# Patient Record
Sex: FEMALE | Race: WHITE
[De-identification: names, ages, dates, MRNs, and addresses within clinical notes are randomized per-mention and may not be internally consistent; named-entity substitution may affect disease eponyms.]

---

## 2017-02-03 ENCOUNTER — HOSPITAL ENCOUNTER (OUTPATIENT)
Dept: HOSPITAL 75 - PREOP | Age: 52
End: 2017-02-03
Attending: SURGERY
Payer: COMMERCIAL

## 2017-02-03 VITALS — BODY MASS INDEX: 44.2 KG/M2 | HEIGHT: 66 IN | WEIGHT: 275 LBS

## 2017-02-03 DIAGNOSIS — R19.5: ICD-10-CM

## 2017-02-03 DIAGNOSIS — R13.10: ICD-10-CM

## 2017-02-03 DIAGNOSIS — Z01.818: Primary | ICD-10-CM

## 2017-02-07 ENCOUNTER — HOSPITAL ENCOUNTER (OUTPATIENT)
Dept: HOSPITAL 75 - ENDO | Age: 52
Discharge: HOME | End: 2017-02-07
Attending: SURGERY
Payer: COMMERCIAL

## 2017-02-07 VITALS — SYSTOLIC BLOOD PRESSURE: 127 MMHG | DIASTOLIC BLOOD PRESSURE: 69 MMHG

## 2017-02-07 VITALS — WEIGHT: 275 LBS | BODY MASS INDEX: 44.2 KG/M2 | HEIGHT: 66 IN

## 2017-02-07 VITALS — DIASTOLIC BLOOD PRESSURE: 66 MMHG | SYSTOLIC BLOOD PRESSURE: 114 MMHG

## 2017-02-07 VITALS — SYSTOLIC BLOOD PRESSURE: 114 MMHG | DIASTOLIC BLOOD PRESSURE: 66 MMHG

## 2017-02-07 VITALS — DIASTOLIC BLOOD PRESSURE: 66 MMHG | SYSTOLIC BLOOD PRESSURE: 108 MMHG

## 2017-02-07 DIAGNOSIS — K44.9: ICD-10-CM

## 2017-02-07 DIAGNOSIS — K21.0: ICD-10-CM

## 2017-02-07 DIAGNOSIS — D12.3: Primary | ICD-10-CM

## 2017-02-07 PROCEDURE — 84703 CHORIONIC GONADOTROPIN ASSAY: CPT

## 2017-02-07 PROCEDURE — 82962 GLUCOSE BLOOD TEST: CPT

## 2017-02-07 PROCEDURE — 88305 TISSUE EXAM BY PATHOLOGIST: CPT

## 2017-02-07 NOTE — PROGRESS NOTE-PRE OPERATIVE
Pre-Operative Progress Note


H&P Reviewed


The H&P was reviewed, patient examined and no changes noted.


Date H&P Reviewed:  Feb 7, 2017


Time H&P Reviewed:  13:17


Pre-Operative Diagnosis:  dysphagia, blood in stools








NELSON MAI DO Feb 7, 2017 1:18 pm

## 2017-02-07 NOTE — PROGRESS NOTE-POST OPERATIVE
Post-Operative Progess Note


Pre-Operative Diagnosis


dysphagia, blood in stools





Post-Operative Diagnosis





small hiatal hernia, reflux esophagitis


colon polyps





Post-Op Procedure Note


Date of Procedure:  Feb 7, 2017


Name of Procedure:  


egd c biopsies, colonoscopy with hot bx polypectomy x 2, inking of colon


polyp 2 mL, snare polypectomy x 1 splenic flexure


Procedure Note/Findings


see note


Anesthesia Type


per crna


Estimated blood loss (mL):  none


Specimen(s) collected


antrum, distal esophagus, hepatic flexure x 2 splenic flexure x 1








NELSON MAI DO Feb 7, 2017 2:44 pm

## 2017-02-08 NOTE — PROCEDURE REPORT
PROCEDURE PHYSICIAN:   NELSON MAI

 

DATE OF PROCEDURE:  

02/07/2017

 

PREOPERATIVE DIAGNOSIS:  

Dysphagia,  blood in stools.  

 

POSTOPERATIVE DIAGNOSES:

1.   Small hiatal hernia. 

2.   Reflux esophagitis. 

3.   Colon polyps. 

 

PROCEDURE:

1.   EGD biopsies. 

2.   Colonoscopy with hot biopsy polypectomy x2. 

3.   Gloria inking of colon at the hepatic flexure, 

4.   Snare polypectomy x1 at the splenic flexure. 

 

ANESTHESIA:

Per CRNA. 

 

SURGEON:

Ronald. 

 

ESTIMATED BLOOD LOSS:

None. 

 

COMPLICATIONS:

None. 

 

INDICATIONS:

The patient is a 52-year-old female with dysphagia and blood in

her stools. She understands the risks and benefits of the

procedure and wishes to proceed with procedure. Consent was

signed on the chart. 

 

PROCEDURE:

The patient was taken to the endoscopy suite, placed in left

lateral recumbent position. Timeout was performed. The scope so

the mouth down the esophagus, stomach and into the duodenum

without difficulty. There are no polyps, masses or ulcerations

within the duodenum. The scope was slowly retracted back into the

stomach where it was further insufflated. There were no polyps,

masses, ulcerations within the stomach. The scope was then

retroflexed noting a small hiatal hernia. There were no polyps,

masses, ulcerations. The scope was returned its normal position.

Biopsy of the antrum was obtained. The scope was slowly retracted

back to the distal esophagus which had some erythematous changes.

Biopsy of the distal esophagus was obtained. The scope was slowly

retracted back noting no other pathology. The scope was slowly

retracted until completely removed. 

 

COLONOSCOPY:

Digital rectal exam was performed. There is no palpable polyps,

masses, ulcerations. The scope was inserted in the rectum,

advanced all of the way to the cecum with minimal difficulty.

Prep was adequate. 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

coli throughout the colon. There were no polyps, masses,

ulcerations within the cecum. No polyps, masses ulceration of the

ascending colon. At the hepatic flexure, there was a small polyp,

which hot biopsy polypectomy was performed. The scope was

continued be slowly retracted noting more of a slightly raised

polyp around fold of the colonic wall in the hepatic flexure. 

Hot biopsy polypectomy was performed. Multiple biopsies were

obtained and this area was inked for reevaluation or for if

needing colon resection. 1 mL of  Gloria ink was injected in 2

different locations at the level of the polyp. The scope was

continued to be slowly retracted back.  At the splenic flexure a

large pedunculated polyp was present which snare polypectomy was

performed. The specimen had to be removed at that time for it

would not suction. Once removed, the scope was then reinserted to

the location of the polyp and then continued be slowly retracted

back. There were no polyps, masses, ulcerations within the

descending colon, sigmoid colon or in the rectum, where the scope

was also retroflexed noting no further pathology. The scope was

returned to its normal position and slowly withdrawn until

completely removed. 

 

The patient tolerated the procedure well. She was taken to the

recovery room in stable condition. 

 

RECOMMENDATIONS:

The patient will need repeat colonoscopy in 3 months for

reevaluation. We will follow-up in 3 weeks to go over her

pathology.  The patient will be placed on Protonix and Carafate

and see how her symptoms are progressing from this point. 

 

 

 

Job ID: [id number]

Dictated Date: 02/07/2017 14:49:07 

Transcription Date: 02/08/2017 13:04:46 / live TIAN

## 2017-05-08 ENCOUNTER — HOSPITAL ENCOUNTER (OUTPATIENT)
Dept: HOSPITAL 75 - PREOP | Age: 52
End: 2017-05-08
Attending: SURGERY
Payer: COMMERCIAL

## 2017-05-08 VITALS — HEIGHT: 66 IN | BODY MASS INDEX: 43.39 KG/M2 | WEIGHT: 270 LBS

## 2017-05-08 DIAGNOSIS — Z86.010: ICD-10-CM

## 2017-05-08 DIAGNOSIS — Z01.818: Primary | ICD-10-CM

## 2017-05-09 ENCOUNTER — HOSPITAL ENCOUNTER (OUTPATIENT)
Dept: HOSPITAL 75 - ENDO | Age: 52
Discharge: HOME | End: 2017-05-09
Attending: SURGERY
Payer: COMMERCIAL

## 2017-05-09 VITALS — BODY MASS INDEX: 43.39 KG/M2 | WEIGHT: 270 LBS | HEIGHT: 66 IN

## 2017-05-09 VITALS — SYSTOLIC BLOOD PRESSURE: 118 MMHG | DIASTOLIC BLOOD PRESSURE: 68 MMHG

## 2017-05-09 VITALS — SYSTOLIC BLOOD PRESSURE: 128 MMHG | DIASTOLIC BLOOD PRESSURE: 68 MMHG

## 2017-05-09 VITALS — SYSTOLIC BLOOD PRESSURE: 96 MMHG | DIASTOLIC BLOOD PRESSURE: 62 MMHG

## 2017-05-09 DIAGNOSIS — D12.2: Primary | ICD-10-CM

## 2017-05-09 DIAGNOSIS — E11.9: ICD-10-CM

## 2017-05-09 DIAGNOSIS — D12.3: ICD-10-CM

## 2017-05-09 DIAGNOSIS — Z79.4: ICD-10-CM

## 2017-05-09 DIAGNOSIS — J44.9: ICD-10-CM

## 2017-05-09 DIAGNOSIS — Z79.899: ICD-10-CM

## 2017-05-09 DIAGNOSIS — E78.5: ICD-10-CM

## 2017-05-09 DIAGNOSIS — I10: ICD-10-CM

## 2017-05-09 PROCEDURE — 88305 TISSUE EXAM BY PATHOLOGIST: CPT

## 2017-05-09 PROCEDURE — 82962 GLUCOSE BLOOD TEST: CPT

## 2017-05-09 NOTE — PROGRESS NOTE-PRE OPERATIVE
Pre-Operative Progress Note


H&P Reviewed


The H&P was reviewed, patient examined and no changes noted.


Date H&P Reviewed:  May 9, 2017


Time H&P Reviewed:  13:09


Pre-Operative Diagnosis:  history tubular adenoma











NELSON MAI DO May 9, 2017 1:09 pm

## 2017-05-09 NOTE — OPERATIVE REPORT
DATE OF SERVICE:  05/09/2017



PREOPERATIVE DIAGNOSIS:

History of tubular adenomas.



POSTOPERATIVE DIAGNOSIS:

Colon polyps.



PROCEDURE PERFORMED:

Colonoscopy with hot biopsy polypectomy x 3.



SURGEON:

Nelson Cardenas DO.



ANESTHESIA:

Per CRNA.



ESTIMATED BLOOD LOSS:

None.



COMPLICATIONS:

None.



INDICATIONS:

The patient is a 52-year-old female with previous colonoscopy demonstrating a

larger polyp around the hepatic flexure.  She had multiple polyps.  She

understands risks and benefits of procedure and wished to proceed with

procedure.  Consent was signed on chart.



PROCEDURE:

The patient was taken to the endoscopy suite, placed in left lateral recumbent

position.  Timeout was performed.  The scope was inserted in the rectum and

advanced all the way to the cecum with minimal difficulty.  Prep was adequate

after irrigation and suction.  There was a lot of fine particulate and liquid

within the colon.  There are no polyps, masses or ulcerations visualized within

the cecum.  The scope was then slowly retracted back to the ascending colon.  A

small polyp which hot biopsy polypectomy was performed.  The scope was continued

slowly retracted back.  At the hepatic flexure, there was a marking of [place]

ink for the colon polyp, having grown, it was not completely eradicated.  This

was grasped and multiple biopsies were obtained and cautery was used to

fulgurate it.  This was present at the flexure at the site of previous [place]

ink.  The scope was then slowly retracted back.  In the transverse colon, one

more small polyp was present which hot biopsy polypectomy was performed.  The

scope was slowly retracted back into the descending colon.  No polyps were

visualized.  No other pathology was noted.  Scope was continued slowly retracted

back through the sigmoid colon with no pathology noted.  The scope was continued

back into the rectum, retroflexed noting no other pathology.  Scope was returned

to its normal position, slowly withdrawn until completely removed.



RECOMMENDATIONS:

The patient will follow up on pathology in 2 weeks.  Would recommend repeat

colonoscopy in 3 months to reevaluate this area again.  We can also discuss

colon resection versus another endoscopic examination.  If the patient would

like to proceed with endoscopic evaluation, we would perform this in

approximately  three months.  If she has any problems prior to that, she should

be reevaluated at that time.





Job ID: [id number]

DocumentID: [id number]

Dictated Date:  05/09/2017 14:03:32

Transcription Date: 05/09/2017 15:22:04

Dictated By: NELSON CARDENAS DO

MTDD

## 2017-05-09 NOTE — DISCHARGE INST-SIMPLE/STANDARD
Discharge Inst-Standard


Patient Instructions/Follow Up


Plan of Care/Instructions/FU:  


Follow up with Dr. Cardenas in 2 weeks


Will need repeat colonoscopy in 3 months


Hold Aspirin and meloxicam for 3 days.


Activity as Tolerated:  Yes


Discharge Diet:  No Restrictions











AMARA MANZO May 9, 2017 13:55

## 2017-05-09 NOTE — PROGRESS NOTE-POST OPERATIVE
Post-Operative Progess Note


Surgeon (s)/Assistant (s)


Surgeon


NELSON MAI DO


Assistant:  na





Pre-Operative Diagnosis


history tubular adenoma





Post-Operative Diagnosis





colon polyps





Procedure & Operative Findings


Date of Procedure


5/9/17


Procedure Preformed/Findings


colonoscopy with hot bx polypectomies


Anesthesia Type


per crna





Estimated Blood Loss


Estimated blood loss (mL):  none





Specimens/Packing


Specimens Removed


colon polyps


Packing:  


NELSON Rios DO May 9, 2017 2:01 pm

## 2017-08-08 ENCOUNTER — HOSPITAL ENCOUNTER (OUTPATIENT)
Dept: HOSPITAL 75 - ENDO | Age: 52
Discharge: HOME | End: 2017-08-08
Attending: SURGERY
Payer: MEDICAID

## 2017-08-08 VITALS — SYSTOLIC BLOOD PRESSURE: 101 MMHG | DIASTOLIC BLOOD PRESSURE: 53 MMHG

## 2017-08-08 VITALS — BODY MASS INDEX: 43.39 KG/M2 | HEIGHT: 66 IN | WEIGHT: 270 LBS

## 2017-08-08 VITALS — DIASTOLIC BLOOD PRESSURE: 74 MMHG | SYSTOLIC BLOOD PRESSURE: 125 MMHG

## 2017-08-08 VITALS — SYSTOLIC BLOOD PRESSURE: 95 MMHG | DIASTOLIC BLOOD PRESSURE: 48 MMHG

## 2017-08-08 DIAGNOSIS — R19.7: ICD-10-CM

## 2017-08-08 DIAGNOSIS — Z86.010: ICD-10-CM

## 2017-08-08 DIAGNOSIS — K59.00: ICD-10-CM

## 2017-08-08 DIAGNOSIS — Z79.899: ICD-10-CM

## 2017-08-08 DIAGNOSIS — Z09: Primary | ICD-10-CM

## 2017-08-08 DIAGNOSIS — F17.210: ICD-10-CM

## 2017-08-08 DIAGNOSIS — Z79.4: ICD-10-CM

## 2017-08-08 DIAGNOSIS — E78.5: ICD-10-CM

## 2017-08-08 DIAGNOSIS — D12.2: ICD-10-CM

## 2017-08-08 DIAGNOSIS — F41.9: ICD-10-CM

## 2017-08-08 DIAGNOSIS — Z79.84: ICD-10-CM

## 2017-08-08 DIAGNOSIS — D12.3: ICD-10-CM

## 2017-08-08 DIAGNOSIS — F32.9: ICD-10-CM

## 2017-08-08 DIAGNOSIS — E11.43: ICD-10-CM

## 2017-08-08 DIAGNOSIS — J44.9: ICD-10-CM

## 2017-08-08 DIAGNOSIS — I10: ICD-10-CM

## 2017-08-08 PROCEDURE — 84703 CHORIONIC GONADOTROPIN ASSAY: CPT

## 2017-08-08 PROCEDURE — 82962 GLUCOSE BLOOD TEST: CPT

## 2017-08-08 NOTE — PROGRESS NOTE-PRE OPERATIVE
Pre-Operative Progress Note


H&P Reviewed


The H&P was reviewed, patient examined and no changes noted.


Date Seen by Provider:  Aug 8, 2017


Time Seen by Provider:  13:18


Date H&P Reviewed:  Aug 8, 2017


Time H&P Reviewed:  13:18


Pre-Operative Diagnosis:  history of polyps











NELSON MAI DO Aug 8, 2017 1:19 pm

## 2017-08-08 NOTE — PROGRESS NOTE-POST OPERATIVE
Post-Operative Progess Note


Surgeon (s)/Assistant (s)


Surgeon


NELSON MAI DO


Assistant:  na





Pre-Operative Diagnosis


history of polyps





Post-Operative Diagnosis





ascending colon polyp, transverse colon polyp





Procedure & Operative Findings


Date of Procedure


8/8/17


Procedure Performed/Findings


colonoscopy with hot bx polypectomy ascending colon polyp and snare polpyectomy 

transverse colon polyp


Anesthesia Type


per crna





Estimated Blood Loss


Estimated blood loss (mL):  none





Specimens/Packing


Specimens Removed


colon polyps











NELSON MAI DO Aug 8, 2017 2:18 pm

## 2017-08-08 NOTE — DISCHARGE INST-SIMPLE/STANDARD
Discharge Inst-Standard


Patient Instructions/Follow Up


Plan of Care/Instructions/FU:  


Follow with Dr. Cardenas in 2 weeks


Will need repeat colonoscopy in 3-6 months due to inadequate prep


Activity as Tolerated:  Yes


Discharge Diet:  No Restrictions











AMARA MANZO Aug 8, 2017 14:08

## 2017-08-10 NOTE — OPERATIVE REPORT
DATE OF SERVICE:  08/08/2017



PREOPERATIVE DIAGNOSIS:

History of polyps.



POSTOPERATIVE DIAGNOSIS:

Ascending colon polyp and transverse colon polyp.



PROCEDURE:

Colonoscopy with hot biopsy polypectomy of ascending colon polyp and snare

polypectomy of  transverse colon polyp.



SURGEON:

Nelson Cardenas DO



ANESTHESIA:

Per CRNA.



ESTIMATED BLOOD LOSS:

None.  



COMPLICATIONS:

None.



INDICATIONS:

The patient is a 52-year-old female with history of colon polyps.  She was

recommended to have a repeat evaluation.  She understands risks and benefits and

wishes to proceed.  Consent was signed and on the chart.



DESCRIPTION OF PROCEDURE:

The patient was taken to the endoscopy suite, placed in left lateral recumbent

position.  Timeout was performed.  Digital rectal exam was performed.  There

were no palpable polyps, masses or ulcerations.  Scope was inserted in the

rectum and advanced all way to the cecum.  This was done with minimal difficulty

but there was a fair amount of stool that was still present.  The colon was

continued to be irrigated and suctioned, but patient still with poor prep.  Once

identifying the cecum, the scope was then slowly began to be retracted back. 

There were no polyps or masses or ulcerations visualized within the cecum.  In

the ascending colon on the back of a fold was what  appears to be previously

where a large polyp was.  There was a small polyp still present.  Hot biopsy

polypectomy was performed.  Scope was then continued to be slowly retracted

back.  There were no other polyps, masses or ulcerations visualized within the

ascending colon.  In the transverse colon, a second polyp was present which

snare polypectomy was performed.  This was obtained for pathology review.  Scope

was continued slowly retracted back along with copious amounts of irrigation and

suction.  There is no other polyps, masses or ulcerations visualized within the

transverse colon, descending colon, sigmoid colon and rectum.  The mucosa did

have the appearance of melanosis coli throughout the colon.  Once in the rectum,

scope was also retroflexed noting no other pathology.  Scope was returned to its

normal position, slowly withdrawn until completely removed.



RECOMMENDATIONS:

The patient will follow up in the office in 2 weeks to discuss pathology

results.  Would recommend repeat colonoscopy in 3 to 6 months due to poor prep. 

At that time would consider even extended prep or different problems such as

GoLYTELY for reevaluation since history of polyps and poor prep.





Job ID: [id number]

DocumentID: [id number]

Dictated Date:  08/08/2017 14:22:22

Transcription Date: 08/09/2017 04:18:34

Dictated By: NELSON CARDENAS DO

## 2018-02-15 ENCOUNTER — HOSPITAL ENCOUNTER (OUTPATIENT)
Dept: HOSPITAL 75 - PREOP | Age: 53
End: 2018-02-15
Attending: SURGERY
Payer: MEDICAID

## 2018-02-15 VITALS — BODY MASS INDEX: 42.75 KG/M2 | HEIGHT: 66 IN | WEIGHT: 266 LBS

## 2018-02-15 DIAGNOSIS — Z01.818: Primary | ICD-10-CM

## 2018-02-15 DIAGNOSIS — Z86.010: ICD-10-CM

## 2018-02-20 ENCOUNTER — HOSPITAL ENCOUNTER (OUTPATIENT)
Dept: HOSPITAL 75 - ENDO | Age: 53
Discharge: HOME | End: 2018-02-20
Attending: SURGERY
Payer: MEDICAID

## 2018-02-20 VITALS — DIASTOLIC BLOOD PRESSURE: 77 MMHG | SYSTOLIC BLOOD PRESSURE: 141 MMHG

## 2018-02-20 VITALS — SYSTOLIC BLOOD PRESSURE: 129 MMHG | DIASTOLIC BLOOD PRESSURE: 70 MMHG

## 2018-02-20 VITALS — WEIGHT: 266 LBS | HEIGHT: 66 IN | BODY MASS INDEX: 42.75 KG/M2

## 2018-02-20 VITALS — DIASTOLIC BLOOD PRESSURE: 62 MMHG | SYSTOLIC BLOOD PRESSURE: 116 MMHG

## 2018-02-20 VITALS — DIASTOLIC BLOOD PRESSURE: 70 MMHG | SYSTOLIC BLOOD PRESSURE: 129 MMHG

## 2018-02-20 DIAGNOSIS — I10: ICD-10-CM

## 2018-02-20 DIAGNOSIS — Z79.899: ICD-10-CM

## 2018-02-20 DIAGNOSIS — F41.9: ICD-10-CM

## 2018-02-20 DIAGNOSIS — Z09: Primary | ICD-10-CM

## 2018-02-20 DIAGNOSIS — F44.9: ICD-10-CM

## 2018-02-20 DIAGNOSIS — F17.210: ICD-10-CM

## 2018-02-20 DIAGNOSIS — I73.9: ICD-10-CM

## 2018-02-20 DIAGNOSIS — D12.3: ICD-10-CM

## 2018-02-20 DIAGNOSIS — E11.43: ICD-10-CM

## 2018-02-20 DIAGNOSIS — Z79.4: ICD-10-CM

## 2018-02-20 DIAGNOSIS — F32.9: ICD-10-CM

## 2018-02-20 PROCEDURE — 82962 GLUCOSE BLOOD TEST: CPT

## 2018-02-20 NOTE — OPERATIVE REPORT
DATE OF SERVICE:  02/20/2018



PREOPERATIVE DIAGNOSIS:

History of polyps.



POSTOPERATIVE DIAGNOSIS:

Hepatic flexure polyp.



SURGEON:

Nelson Cardenas DO.



ANESTHESIA:

Per MDA.



ESTIMATED BLOOD LOSS:

None.



COMPLICATIONS:

None.



INDICATIONS:

The patient is a 53-year-old female, who has had a recurrent polyp in the

hepatic flexure.  She understands risks and benefits of procedure and wished to

proceed with procedure.  Consent was signed and on the chart.



PROCEDURE PERFORMED:

Colonoscopy with hot biopsy polypectomy x1 at the hepatic flexure.



DESCRIPTION OF PROCEDURE:

The patient was taken to the endoscopy suite, placed in the left lateral

recumbent position.  Timeout was performed.  Digital rectal exam was performed

and there were no palpable polyps, mass or ulcerations.  The scope was inserted

in the rectum and advanced all the way to the cecum with minimal difficulty. 

Prep was adequate with irrigation and suction.  Scope was slowly retracted back.

 There were no polyps, masses or ulcerations within the cecum and ascending

colon.  At the hepatic flexure, there is Gloria ink marking and there is also a

smaller polyp present.  Again, hot biopsy polypectomy was performed.  Scope was

then slowly retracted back.  There were no polyps, masses or ulcerations within

the remainder of the transverse colon, descending colon, sigmoid colon and once

the scope was in the rectum, it was also retroflexed noting no other pathology. 

Scope was returned to its normal position noting no other pathology.  Scope was

slowly retracted until completely removed, noting no other pathology.  The

patient tolerated procedure well without any complications.  She was taken to

recovery room in stable condition.



RECOMMENDATIONS:

The patient will follow up on pathology in 2 weeks.  I would recheck colonoscopy

in one year for further reevaluation.  If she has any changes prior to that, she

should be reevaluated at that time.  We will also consider colon resection

depending on pathology and circumstances and the patient wishes as well.





Job ID: [id number]

DocumentID: [id number]

Dictated Date:  02/20/2018 13:26:34

Transcription Date: 02/20/2018 17:15:47

Dictated By: NELSON CARDENAS DO

## 2018-02-20 NOTE — XMS REPORT
[facility]

 Created on: 2017



Duyen Larkin

External Reference #: [id number]

: 1965

Sex: Female



Demographics







 Address  [address] [place]  [address]

 

 Preferred Language  Unknown

 

 Marital Status  Unknown

 

 [employer] Affiliation  Unknown

 

 Race  Unknown

 

 Ethnic Group  Unknown





Author







 Author  LIYAH LOWE

 

 [facility] [place]

 

 Address  [address] [place]  [address]



 

 Phone  [phone]







Care Team Providers







 Care Team Member Name  Role  Phone

 

 LIYAH LOWE  Unavailable  [phone]







PROBLEMS







 Type  Condition  ICD9-CM Code  GVW68-GM Code  Onset Dates  Condition Status  
SNOMED Code

 

 Problem  Hyperlipidemia LDL goal <130     E78.5     Active  29185594

 

 Problem  Hypothyroidism (acquired)     E03.9     Active  123869769

 

 Problem  Unilateral primary osteoarthritis, left knee     M17.12     Active  
402920562

 

 Problem  Type 2 diabetes mellitus with diabetic neuropathy, without long-term 
current use of insulin     E11.40     Active  36967885

 

 Problem  Other hyperlipidemia     E78.4     Active  44480735

 

 Problem  Primary osteoarthritis of both knees     M17.0     Active  628962977

 

 Problem  Primary generalized (osteo)arthritis     M15.0     Active  732639146

 

 Problem  Claudication     I73.9     Active  380911518

 

 Problem  Mild episode of recurrent major depressive disorder     F33.0     
Active  794140769

 

 Problem  COPD with exacerbation     J44.1     Active  377621512

 

 Problem  Chronic renal insufficiency, stage II (mild)     N18.2     Active  
033267719

 

 Problem  Chronic obstructive pulmonary disease with acute exacerbation     
J44.1     Active  659425250

 

 Problem  Essential (primary) hypertension     I10     Active  47696564

 

 Problem  Anxiety disorder, unspecified     F41.9     Active  453158130

 

 Problem  Type 2 diabetes mellitus without complications     E11.9     Active  
670737277

 

 Problem  Cataracts, bilateral     H26.9     Active  33929585

 

 Problem  Effusion, left knee     M25.462     Active  590546106

 

 Problem  Type 2 diabetes mellitus with hyperglycemia     E11.65     Active  
47581387

 

 Problem  Esophageal reflux  530.81        Active  328609866

 

 Problem  Major depressive disorder, single episode, unspecified     F32.9     
Active  92918133

 

 Problem  Old tear of lateral meniscus of left knee, unspecified tear type     
M23.201     Active  906288174

 

 Problem  Effusion of lower leg joint  719.06        Active  149921541

 

 Problem  Peripheral arterial occlusive disease     I77.9     Active  830581475

 

 Problem  Dysphagia, unspecified type     R13.10     Active  70224804







ALLERGIES







 Substance  Reaction  Event Type  Date  Status

 

 Lisinopril  Unknown  Drug Allergy  12 Dec, 2016  Active

 

 Gabapentin  dizzy/nausea  Drug Allergy  12 Dec, 2016  Active

 

 Demerol  Unknown  Drug Allergy  12 Dec, 2016  Active







SOCIAL HISTORY

No smoking Hx information available



PLAN OF CARE







 Activity  Details









  









 Follow Up  4 Weeks Reason:recheck medical condition







VITAL SIGNS







 Height  66 in  2016

 

 Weight  275.6 lbs  2016

 

 Temperature  98.8 degrees Fahrenheit  2016

 

 Heart Rate  80 bpm  2016

 

 Respiratory Rate  18   2016

 

 BMI  44.48 kg/m2  2016

 

 Blood pressure systolic  141 mmHg  2016

 

 Blood pressure diastolic  70 mmHg  2016







MEDICATIONS







 Medication  Instructions  Dosage  Frequency  Start Date  End Date  Duration  
Status

 

 Cymbalta 30 mg     take 3 capsule by Oral route 1 time per day             Active

 

 Lantus SoloStar 100 unit/mL (3 mL)     inject 92 Units by Subcutaneous route 
as per insulin protocol  1 time per day at HS (Pt assistance)     19 Mar, 2015 
       Active

 

 Micardis 40 MG     take 1 tablet (40 mg) by oral route once daily             Active

 

 Colace 100 MG  Orally Once a day  1 capsule as needed  24h           Active

 

 Levothyroxine Sodium 25 MCG  Orally Once a day in the morning  Take 1 tablet 
on an empty stomach             Active

 

 Ventolin HFA 90 mcg/actuation     inhale 1-2 puffs  4h  26 Sep, 2014        
Active

 

 Voltaren 1 %  Transdermal 4 times a day PRN knee pain  4 grams     11 May, 
2015        Active

 

 Lasix 20 MG     take 1 tablet (20 mg) by oral route once daily     31 Oct, 
2014        Active

 

 Klor-Con 10 10 MEQ     take 1 tablet by Oral route with food  1 time per day  
           Active

 

 Aspirin 325 MG  Orally Once a day  1 tablet  24h           Active

 

 Zoloft 100 mg     take 1 tablet (100 mg) by oral route once daily     10 Feb, 
2014        Active

 

 Ipratropium Bromide 0.02 %  Inhalation every 6 hours PRN wheezing/SOA.  MIx 
with albuterol  as directed     11 May, 2015        Active

 

 Lipitor 80 MG  Orally Once a day  1/2 tablet  24h  17 2015        Active

 

 NovoLog Flexpen 100 UNIT/ML     Inject 20 Units by Subcutaneous route as per 
insulin sliding scale protocol  3 times per day             Active

 

 Tramadol HCl 50 mg  Orally every 6 hrs  1 tablet as needed for pain  6h  14 Oct
, 2015        Active

 

 trazodone 150 mg     take 1 Tablet by Oral route 1 time per day qhs     31 Oct
, 2014        Active

 

 Fish Oil 1000 MG  Orally Once a day  1 capsule  24h           Active

 

 Alprazolam 0.5 MG     take 1 tablet (0.5 mg) by oral route 3 times per day     
10 Feb, 2014        Active

 

 pantoprazole 20 mg  by oral route Once a day  1 tablet  24h  17 2015     
   Active

 

 Meloxicam 15MG     TAKE ONE TABLET BY MOUTH ONCE DAILY              Active

 

 NovoFine 30G X 8 MM     as directed             Active

 

 Albuterol Sulfate (2.5 MG/3ML) 0.083%  Inhalation every 6 hrs PRN wheezing/SOA
  3 ml     11 May, 2015        Active

 

 Metformin HCl 500 MG  Orally Twice a day  1 tablet with meals  12h  14 Oct, 
2015        Active

 

 Vitamin B 12 100 MCG                    Active

 

 Cyclobenzaprine HCl 10MG     Take 1 tab by mouth 3 times daily as needed for 
muscle spasm.              Active







RESULTS







 Name  Result  Date  Reference Range

 

 TSH     2016   

 

 TSH  3.700     0.450-4.500

 

 CMP     2016   

 

 Glucose, Serum  124     65-99

 

 BUN  16     6-24

 

 Creatinine, Serum  0.91     0.57-1.00

 

 eGFR If NonAfricn Am  73         >59

 

 eGFR If Africn Am  84         >59

 

 BUN/Creatinine Ratio  18     9-23

 

 Sodium, Serum  143     134-144

 

 Potassium, Serum  4.0     3.5-5.2

 

 Chloride, Serum  101     

 

 Carbon Dioxide, Total  23     18-29

 

 Calcium, Serum  9.5     8.7-10.2

 

 Protein, Total, Serum  6.8     6.0-8.5

 

 Albumin, Serum  4.5     3.5-5.5

 

 Globulin, Total  2.3     1.5-4.5

 

 A/G Ratio  2.0     1.1-2.5

 

 Bilirubin, Total  0.3     0.0-1.2

 

 Alkaline Phosphatase, S  111     

 

 AST (SGOT)  15     0-40

 

 ALT (SGPT)  16     0-32

 

 Barium Swallow     2016   







PROCEDURES







 Procedure  Date Ordered  Related Diagnosis  Body Site

 

 ROUTINE VENIPUNCTURE  2016  N/A   

 

 ASSAY THYROID STIM HORMONE  Dec 12, 2016      

 

 VENIPUNCT, ROUTINE*  Dec 12, 2016      

 

 COMPREHEN METABOLIC PANEL  Dec 12, 2016      

 

 Office Visit, Est Pt., Level 3  Dec 12, 2016      







IMMUNIZATIONS

No Known Immunizations

## 2018-02-20 NOTE — XMS REPORT
[facility]

 Created on: 2017



Duyen Larkin

External Reference #: [id number]

: 1965

Sex: Female



Demographics







 Address  [address] [place]  [address]

 

 Preferred Language  Unknown

 

 Marital Status  Unknown

 

 Lutheran Affiliation  Unknown

 

 Race  Unknown

 

 Ethnic Group  Unknown





Author







 Author  LIYAH LOWE

 

 [facility] [place]

 

 Address  [address] [place]  [address]



 

 Phone  [phone]







Care Team Providers







 Care Team Member Name  Role  Phone

 

 LIYAH LOWE  Unavailable  [phone]







PROBLEMS







 Type  Condition  ICD9-CM Code  FYI95-RO Code  Onset Dates  Condition Status  
SNOMED Code

 

 Problem  Unilateral primary osteoarthritis, left knee     M17.12     Active  
279761989

 

 Problem  Dysphagia, unspecified type     R13.10     Active  14072262

 

 Problem  Hypothyroidism (acquired)     E03.9     Active  385830048

 

 Problem  Primary osteoarthritis of both knees     M17.0     Active  411029342

 

 Problem  Primary generalized (osteo)arthritis     M15.0     Active  343246708

 

 Problem  Type 2 diabetes mellitus with diabetic neuropathy, without long-term 
current use of insulin     E11.40     Active  76217779

 

 Problem  Type 2 diabetes mellitus without complications     E11.9     Active  
444384206

 

 Problem  Other hyperlipidemia     E78.4     Active  00510467

 

 Problem  Mild episode of recurrent major depressive disorder     F33.0     
Active  790166913

 

 Problem  COPD with exacerbation     J44.1     Active  839225502

 

 Problem  Chronic renal insufficiency, stage II (mild)     N18.2     Active  
080598164

 

 Problem  Chronic obstructive pulmonary disease with acute exacerbation     
J44.1     Active  896250363

 

 Problem  Essential (primary) hypertension     I10     Active  64687807

 

 Problem  Peripheral arterial occlusive disease     I77.9     Active  560461532

 

 Problem  Claudication     I73.9     Active  191616194

 

 Problem  Cataracts, bilateral     H26.9     Active  52168111

 

 Problem  Effusion, left knee     M25.462     Active  134556190

 

 Problem  Type 2 diabetes mellitus with hyperglycemia     E11.65     Active  
08950425

 

 Problem  Effusion of lower leg joint  719.06        Active  968116991

 

 Problem  Major depressive disorder, single episode, unspecified     F32.9     
Active  84166575

 

 Problem  Old tear of lateral meniscus of left knee, unspecified tear type     
M23.201     Active  907050706

 

 Problem  Esophageal reflux  530.81        Active  781300255

 

 Problem  Anxiety disorder, unspecified     F41.9     Active  143086813

 

 Problem  Hyperlipidemia LDL goal <130     E78.5     Active  95570025







ALLERGIES







 Substance  Reaction  Event Type  Date  Status

 

 Lisinopril  Unknown  Drug Allergy  10 Octaviano, 2017  Active

 

 Gabapentin  dizzy/nausea  Drug Allergy  10 Octaviano, 2017  Active

 

 Demerol  Unknown  Drug Allergy  10 Octaviano, 2017  Active







SOCIAL HISTORY

No smoking Hx information available



PLAN OF CARE







 Activity  Details









  









 Follow Up  prn Reason:







VITAL SIGNS







 Height  66 in  2017-01-10

 

 Weight  281.1 lbs  2017-01-10

 

 Temperature  98.2 degrees Fahrenheit  2017-01-10

 

 Heart Rate  80 bpm  2017-01-10

 

 Respiratory Rate  16   2017-01-10

 

 Oximetry  95 %  2017-01-10

 

 BMI  45.37 kg/m2  2017-01-10

 

 Blood pressure systolic  120 mmHg  2017-01-10

 

 Blood pressure diastolic  62 mmHg  2017-01-10







MEDICATIONS







 Medication  Instructions  Dosage  Frequency  Start Date  End Date  Duration  
Status

 

 Alprazolam 0.5 MG     take 1 tablet (0.5 mg) by oral route 3 times per day     
10 Feb, 2014        Active

 

 Tramadol HCl 50 mg  Orally every 6 hrs  1 tablet as needed for pain  6h  14 Oct
, 2015        Active

 

 Ventolin HFA 90 mcg/actuation     inhale 1-2 puffs  4h  26 Sep, 2014        
Active

 

 Aspirin 325 MG  Orally Once a day  1 tablet  24h           Active

 

 Lipitor 80 MG  Orally Once a day  1/2 tablet  24h          Active

 

 Vitamin B 12 100 MCG                    Active

 

 Fish Oil 1000 MG  Orally Once a day  1 capsule  24h           Active

 

 PredniSONE 20 mg  Orally Once a day with food or milk  2 tablets     10 Octaviano, 
2017  15 Octaviano, 2017  05 days  Active

 

 Zoloft 100 mg     take 1 tablet (100 mg) by oral route once daily     10 Feb, 
2014        Active

 

 Cymbalta 30 mg     take 3 capsule by Oral route 1 time per day             Active

 

 Ipratropium Bromide 0.02 %  Inhalation every 6 hours PRN wheezing/SOA.  MIx 
with albuterol  as directed     11 May, 2015        Active

 

 Klor-Con 10 10 MEQ     take 1 tablet by Oral route with food  1 time per day  
           Active

 

 Metformin HCl 500 MG  Orally Twice a day  1 tablet with meals  12h  14 Oct, 
2015        Active

 

 Lantus SoloStar 100 unit/mL (3 mL)     inject 92 Units by Subcutaneous route 
as per insulin protocol  1 time per day at HS (Pt assistance)     19 Mar, 2015 
       Active

 

 Voltaren 1 %  Transdermal 4 times a day PRN knee pain  4 grams     11 May, 
2015        Active

 

 trazodone 150 mg     take 1 Tablet by Oral route 1 time per day qhs     31 Oct
, 2014        Active

 

 Cyclobenzaprine HCl 10MG     TAKE ONE TABLET BY MOUTH THREE TIMES DAILY AS 
NEEDED FOR MUSCLE SPASM           30  Active

 

 Meloxicam 15MG     TAKE ONE TABLET BY MOUTH ONCE DAILY              Active

 

 Levothyroxine Sodium 25 MCG  Orally Once a day in the morning  Take 1 tablet 
on an empty stomach             Active

 

 Colace 100 MG  Orally Once a day  1 capsule as needed  24h           Active

 

 Micardis 40 mg     take 1 tablet (40 mg) by oral route once daily             Active

 

 Albuterol Sulfate (2.5 MG/3ML) 0.083%  Inhalation every 6 hrs PRN wheezing/SOA
  3 ml     11 May, 2015        Active

 

 Lasix 20 MG     take 1 tablet (20 mg) by oral route once daily     31 Oct, 
2014        Active

 

 NovoFine 30G X 8 MM     as directed             Active

 

 Pantoprazole Sodium 40 mg  Orally Once a day  1 tablet  24h  27 Dec, 2016     
   Active

 

 NovoLog Flexpen 100 UNIT/ML     Inject 20 Units by Subcutaneous route as per 
insulin sliding scale protocol  3 times per day             Active

 

 Doxycycline Hyclate 100 MG  Orally every 12 hrs  1 tablet  12h  10 Octaviano, 2017  
20 Octaviano, 2017  10 days  Active







RESULTS

No Results



PROCEDURES







 Procedure  Date Ordered  Related Diagnosis  Body Site

 

 MEASURE BLOOD OXYGEN LEVEL  Octaviano 10, 2017      

 

 Office Visit, Est Pt., Level 4  Octaviano 10, 2017      







IMMUNIZATIONS

No Known Immunizations

## 2018-02-20 NOTE — XMS REPORT
[facility]

 Created on: 2017



Duyen Larkin

External Reference #: [id number]

: 1965

Sex: Female



Demographics







 Address  [address] [place]  [address]

 

 Preferred Language  Unknown

 

 Marital Status  Unknown

 

 Scientologist Affiliation  Unknown

 

 Race  Unknown

 

 Ethnic Group  Unknown





Author







 Author  LIYAH LOWE

 

 [facility] [place]

 

 Address  [address] [place]  [address]



 

 Phone  [phone]







Care Team Providers







 Care Team Member Name  Role  Phone

 

 LIYAH LOWE  Unavailable  [phone]







PROBLEMS







 Type  Condition  ICD9-CM Code  ZCK24-ZR Code  Onset Dates  Condition Status  
SNOMED Code

 

 Problem  Effusion of lower leg joint  719.06        Active  968623133

 

 Problem  Hyperlipidemia LDL goal <130     E78.5     Active  09977738

 

 Problem  Esophageal reflux  530.81        Active  371124162

 

 Problem  Dysphagia, unspecified type     R13.10     Active  52582468

 

 Problem  Other hyperlipidemia     E78.4     Active  79189264

 

 Problem  Unilateral primary osteoarthritis, left knee     M17.12     Active  
864090846

 

 Problem  COPD with exacerbation     J44.1     Active  930809391

 

 Problem  Hypothyroidism (acquired)     E03.9     Active  909839321

 

 Problem  Diabetic polyneuropathy associated with type 2 diabetes mellitus     
E11.42     Active  83832431

 

 Problem  Primary osteoarthritis of both knees     M17.0     Active  956584028

 

 Problem  Claudication     I73.9     Active  559471807

 

 Problem  Primary generalized (osteo)arthritis     M15.0     Active  666827974

 

 Problem  Type 2 diabetes mellitus without complications     E11.9     Active  
943473786

 

 Problem  Chronic obstructive pulmonary disease with acute exacerbation     
J44.1     Active  460953885

 

 Problem  Mild episode of recurrent major depressive disorder     F33.0     
Active  602169930

 

 Problem  Type 2 diabetes mellitus with diabetic neuropathy, without long-term 
current use of insulin     E11.40     Active  64210927

 

 Problem  Chronic renal insufficiency, stage II (mild)     N18.2     Active  
922679213

 

 Problem  Peripheral arterial occlusive disease     I77.9     Active  048519833

 

 Problem  Major depressive disorder, single episode, unspecified     F32.9     
Active  62347603

 

 Problem  Cataracts, bilateral     H26.9     Active  99462455

 

 Problem  Essential (primary) hypertension     I10     Active  05152875

 

 Problem  Type 2 diabetes mellitus with hyperglycemia     E11.65     Active  
38158300

 

 Problem  Old tear of lateral meniscus of left knee, unspecified tear type     
M23.201     Active  064329539

 

 Problem  Anxiety disorder, unspecified     F41.9     Active  224053190

 

 Problem  Effusion, left knee     M25.462     Active  370826145







ALLERGIES

No Information



SOCIAL HISTORY

Never Assessed



PLAN OF CARE





VITAL SIGNS





MEDICATIONS

Unknown Medications



RESULTS

No Results



PROCEDURES

No Known procedures



IMMUNIZATIONS

No Known Immunizations



MEDICAL (GENERAL) HISTORY







 Type  Description  Date

 

 Medical History  Diabetes mellitus without mention of complication, type II or 
unspecified type, not stated as uncontrolled   

 

 Medical History  Depressive disorder, not elsewhere classified   

 

 Medical History  Anxiety state, unspecified   

 

 Medical History  Effusion of lower leg joint   

 

 Medical History  Generalized osteoarthrosis, unspecified site   

 

 Medical History  Other and unspecified hyperlipidemia   

 

 Medical History  Abnormal weight gain   

 

 Medical History  Effusion of joint, site unspecified   

 

 Medical History  Esophageal reflux   

 

 Medical History  hypertension   

 

 Medical History  colonoscopy- inscreased polyp   

 

 Surgical History  Tonsillectomy   

 

 Surgical History  Orthopedic: Bilateral Knee x2   

 

 Surgical History  colonoscopy  2017

 

 Hospitalization History  Surgery(s)/Childbirth(s) only

## 2018-02-20 NOTE — XMS REPORT
[facility]

 Created on: 10/03/2017



Debi Larkinthia

External Reference #: [id number]

: 1965

Sex: Female



Demographics







 Address  [address] [place]  [address]

 

 Preferred Language  Unknown

 

 Marital Status  Unknown

 

 Jainism Affiliation  Unknown

 

 Race  Unknown

 

 Ethnic Group  Unknown





Author







 Author  LIYAH LOWE

 

 [facility] [place]

 

 Address  [address] [place]  [address]



 

 Phone  [phone]







Care Team Providers







 Care Team Member Name  Role  Phone

 

 LIYAH LOWE  Unavailable  [phone]







PROBLEMS







 Type  Condition  ICD9-CM Code  WEP05-LD Code  Onset Dates  Condition Status  
SNOMED Code

 

 Problem  Effusion of lower leg joint  719.06        Active  892009687

 

 Problem  Hyperlipidemia LDL goal <130     E78.5     Active  41455389

 

 Problem  Esophageal reflux  530.81        Active  845107264

 

 Problem  Dysphagia, unspecified type     R13.10     Active  24581224

 

 Problem  Other hyperlipidemia     E78.4     Active  27766360

 

 Problem  Unilateral primary osteoarthritis, left knee     M17.12     Active  
711734981

 

 Problem  COPD with exacerbation     J44.1     Active  689705491

 

 Problem  Hypothyroidism (acquired)     E03.9     Active  430965953

 

 Problem  Diabetic polyneuropathy associated with type 2 diabetes mellitus     
E11.42     Active  76761462

 

 Problem  Primary osteoarthritis of both knees     M17.0     Active  757270359

 

 Problem  Claudication     I73.9     Active  412417289

 

 Problem  Primary generalized (osteo)arthritis     M15.0     Active  970553320

 

 Problem  Type 2 diabetes mellitus without complications     E11.9     Active  
837712145

 

 Problem  Chronic obstructive pulmonary disease with acute exacerbation     
J44.1     Active  479072448

 

 Problem  Mild episode of recurrent major depressive disorder     F33.0     
Active  456006121

 

 Problem  Type 2 diabetes mellitus with diabetic neuropathy, without long-term 
current use of insulin     E11.40     Active  64874909

 

 Problem  Chronic renal insufficiency, stage II (mild)     N18.2     Active  
758305610

 

 Problem  Peripheral arterial occlusive disease     I77.9     Active  088896052

 

 Problem  Major depressive disorder, single episode, unspecified     F32.9     
Active  24228296

 

 Problem  Cataracts, bilateral     H26.9     Active  10816808

 

 Problem  Essential (primary) hypertension     I10     Active  05181594

 

 Problem  Type 2 diabetes mellitus with hyperglycemia     E11.65     Active  
26563657

 

 Problem  Old tear of lateral meniscus of left knee, unspecified tear type     
M23.201     Active  653430798

 

 Problem  Anxiety disorder, unspecified     F41.9     Active  272866719

 

 Problem  Effusion, left knee     M25.462     Active  933163704







ALLERGIES

No Information



SOCIAL HISTORY

Never Assessed



PLAN OF CARE





VITAL SIGNS





MEDICATIONS







 Medication  Instructions  Dosage  Frequency  Start Date  End Date  Duration  
Status

 

 Tradjenta 5 mg  Orally Once a day  1 tablet  24h       30 day(s)  
Active







RESULTS

No Results



PROCEDURES

No Known procedures



IMMUNIZATIONS

No Known Immunizations



MEDICAL (GENERAL) HISTORY







 Type  Description  Date

 

 Medical History  Diabetes mellitus without mention of complication, type II or 
unspecified type, not stated as uncontrolled   

 

 Medical History  Depressive disorder, not elsewhere classified   

 

 Medical History  Anxiety state, unspecified   

 

 Medical History  Effusion of lower leg joint   

 

 Medical History  Generalized osteoarthrosis, unspecified site   

 

 Medical History  Other and unspecified hyperlipidemia   

 

 Medical History  Abnormal weight gain   

 

 Medical History  Effusion of joint, site unspecified   

 

 Medical History  Esophageal reflux   

 

 Medical History  hypertension   

 

 Medical History  colonoscopy- inscreased polyp   

 

 Surgical History  Tonsillectomy   

 

 Surgical History  Orthopedic: Bilateral Knee x2   

 

 Surgical History  colonoscopy  

 

 Hospitalization History  Surgery(s)/Childbirth(s) only

## 2018-02-20 NOTE — XMS REPORT
[facility]

 Created on: 10/07/2017



Duyen Larkin

External Reference #: [id number]

: 1965

Sex: Female



Demographics







 Address  [address] [place]  [address]

 

 Preferred Language  Unknown

 

 Marital Status  Unknown

 

 Denominational Affiliation  Unknown

 

 Race  Unknown

 

 Ethnic Group  Unknown





Author







 Author  LIYAH LOWE

 

 [facility] [place]

 

 Address  [address] [place]  [address]



 

 Phone  [phone]







Care Team Providers







 Care Team Member Name  Role  Phone

 

 LIYAH LOWE  Unavailable  [phone]







PROBLEMS







 Type  Condition  ICD9-CM Code  GHU89-UZ Code  Onset Dates  Condition Status  
SNOMED Code

 

 Problem  Effusion of lower leg joint  719.06        Active  207746496

 

 Problem  Hyperlipidemia LDL goal <130     E78.5     Active  90578855

 

 Problem  Esophageal reflux  530.81        Active  501708067

 

 Problem  Dysphagia, unspecified type     R13.10     Active  41987340

 

 Problem  Other hyperlipidemia     E78.4     Active  80104202

 

 Problem  Unilateral primary osteoarthritis, left knee     M17.12     Active  
075235009

 

 Problem  COPD with exacerbation     J44.1     Active  476078012

 

 Problem  Hypothyroidism (acquired)     E03.9     Active  125470237

 

 Problem  Diabetic polyneuropathy associated with type 2 diabetes mellitus     
E11.42     Active  88788730

 

 Problem  Primary osteoarthritis of both knees     M17.0     Active  482351759

 

 Problem  Claudication     I73.9     Active  509621045

 

 Problem  Primary generalized (osteo)arthritis     M15.0     Active  526827371

 

 Problem  Type 2 diabetes mellitus without complications     E11.9     Active  
525785426

 

 Problem  Chronic obstructive pulmonary disease with acute exacerbation     
J44.1     Active  761871487

 

 Problem  Mild episode of recurrent major depressive disorder     F33.0     
Active  980280768

 

 Problem  Type 2 diabetes mellitus with diabetic neuropathy, without long-term 
current use of insulin     E11.40     Active  84211606

 

 Problem  Chronic renal insufficiency, stage II (mild)     N18.2     Active  
651161022

 

 Problem  Peripheral arterial occlusive disease     I77.9     Active  674004523

 

 Problem  Major depressive disorder, single episode, unspecified     F32.9     
Active  15299824

 

 Problem  Cataracts, bilateral     H26.9     Active  46397853

 

 Problem  Essential (primary) hypertension     I10     Active  94710570

 

 Problem  Type 2 diabetes mellitus with hyperglycemia     E11.65     Active  
18622590

 

 Problem  Old tear of lateral meniscus of left knee, unspecified tear type     
M23.201     Active  961967968

 

 Problem  Anxiety disorder, unspecified     F41.9     Active  549476530

 

 Problem  Effusion, left knee     M25.462     Active  950913191







ALLERGIES

No Information



SOCIAL HISTORY

Never Assessed



PLAN OF CARE





VITAL SIGNS





MEDICATIONS







 Medication  Instructions  Dosage  Frequency  Start Date  End Date  Duration  
Status

 

 Tradjenta 5 mg  Orally Once a day  1 tablet  24h       30 day(s)  
Active







RESULTS

No Results



PROCEDURES

No Known procedures



IMMUNIZATIONS

No Known Immunizations



MEDICAL (GENERAL) HISTORY







 Type  Description  Date

 

 Medical History  Diabetes mellitus without mention of complication, type II or 
unspecified type, not stated as uncontrolled   

 

 Medical History  Depressive disorder, not elsewhere classified   

 

 Medical History  Anxiety state, unspecified   

 

 Medical History  Effusion of lower leg joint   

 

 Medical History  Generalized osteoarthrosis, unspecified site   

 

 Medical History  Other and unspecified hyperlipidemia   

 

 Medical History  Abnormal weight gain   

 

 Medical History  Effusion of joint, site unspecified   

 

 Medical History  Esophageal reflux   

 

 Medical History  hypertension   

 

 Medical History  colonoscopy- inscreased polyp   

 

 Surgical History  Tonsillectomy   

 

 Surgical History  Orthopedic: Bilateral Knee x2   

 

 Surgical History  colonoscopy  

 

 Hospitalization History  Surgery(s)/Childbirth(s) only

## 2018-02-20 NOTE — XMS REPORT
[facility]

 Created on: 2017



Duyen Larkin

External Reference #: [id number]

: 1965

Sex: Female



Demographics







 Address  [address] [place]  [address]

 

 Preferred Language  Unknown

 

 Marital Status  Unknown

 

 Yarsanism Affiliation  Unknown

 

 Race  Unknown

 

 Ethnic Group  Unknown





Author







 Author  LIYAH LOWE

 

 [facility] [place]

 

 Address  [address] [place]  [address]



 

 Phone  [phone]







Care Team Providers







 Care Team Member Name  Role  Phone

 

 LIYAH LOWE  Unavailable  [phone]







PROBLEMS







 Type  Condition  ICD9-CM Code  MQL33-EM Code  Onset Dates  Condition Status  
SNOMED Code

 

 Problem  Effusion of lower leg joint  719.06        Active  337374093

 

 Problem  Hyperlipidemia LDL goal <130     E78.5     Active  31951138

 

 Problem  Esophageal reflux  530.81        Active  995994505

 

 Problem  Dysphagia, unspecified type     R13.10     Active  89659333

 

 Problem  Other hyperlipidemia     E78.4     Active  10995717

 

 Problem  Unilateral primary osteoarthritis, left knee     M17.12     Active  
263563309

 

 Problem  COPD with exacerbation     J44.1     Active  328322517

 

 Problem  Hypothyroidism (acquired)     E03.9     Active  978727480

 

 Problem  Diabetic polyneuropathy associated with type 2 diabetes mellitus     
E11.42     Active  23198466

 

 Problem  Primary osteoarthritis of both knees     M17.0     Active  853212254

 

 Problem  Claudication     I73.9     Active  358837433

 

 Problem  Primary generalized (osteo)arthritis     M15.0     Active  087385750

 

 Problem  Type 2 diabetes mellitus without complications     E11.9     Active  
002793339

 

 Problem  Chronic obstructive pulmonary disease with acute exacerbation     
J44.1     Active  728286727

 

 Problem  Mild episode of recurrent major depressive disorder     F33.0     
Active  647433232

 

 Problem  Type 2 diabetes mellitus with diabetic neuropathy, without long-term 
current use of insulin     E11.40     Active  65754520

 

 Problem  Chronic renal insufficiency, stage II (mild)     N18.2     Active  
274806849

 

 Problem  Peripheral arterial occlusive disease     I77.9     Active  692674428

 

 Problem  Major depressive disorder, single episode, unspecified     F32.9     
Active  54385413

 

 Problem  Cataracts, bilateral     H26.9     Active  72175559

 

 Problem  Essential (primary) hypertension     I10     Active  05689664

 

 Problem  Type 2 diabetes mellitus with hyperglycemia     E11.65     Active  
52000664

 

 Problem  Old tear of lateral meniscus of left knee, unspecified tear type     
M23.201     Active  123202173

 

 Problem  Anxiety disorder, unspecified     F41.9     Active  625197396

 

 Problem  Effusion, left knee     M25.462     Active  954195529







ALLERGIES

No Information



SOCIAL HISTORY

Never Assessed



PLAN OF CARE





VITAL SIGNS





MEDICATIONS







 Medication  Instructions  Dosage  Frequency  Start Date  End Date  Duration  
Status

 

 Lantus SoloStar 100 unit/mL (3 mL)  Subcutaneous 2 times a day  inject 50 
Units by Subcutaneous  12h  19 Mar, 2015        Active







RESULTS

No Results



PROCEDURES

No Known procedures



IMMUNIZATIONS

No Known Immunizations



MEDICAL (GENERAL) HISTORY







 Type  Description  Date

 

 Medical History  Diabetes mellitus without mention of complication, type II or 
unspecified type, not stated as uncontrolled   

 

 Medical History  Depressive disorder, not elsewhere classified   

 

 Medical History  Anxiety state, unspecified   

 

 Medical History  Effusion of lower leg joint   

 

 Medical History  Generalized osteoarthrosis, unspecified site   

 

 Medical History  Other and unspecified hyperlipidemia   

 

 Medical History  Abnormal weight gain   

 

 Medical History  Effusion of joint, site unspecified   

 

 Medical History  Esophageal reflux   

 

 Medical History  hypertension   

 

 Medical History  colonoscopy- inscreased polyp   

 

 Surgical History  Tonsillectomy   

 

 Surgical History  Orthopedic: Bilateral Knee x2   

 

 Surgical History  colonoscopy  2017

 

 Hospitalization History  Surgery(s)/Childbirth(s) only

## 2018-02-20 NOTE — DISCHARGE INST-SIMPLE/STANDARD
Discharge Inst-Standard


Patient Instructions/Follow Up


Plan of Care/Instructions/FU:  


2 weeks Ronald


Activity as Tolerated:  Yes


Discharge Diet:  Regular Diet (high fiber)











NELSON MAI DO Feb 20, 2018 13:22

## 2018-02-20 NOTE — XMS REPORT
[facility]

 Created on: 2017



Duyen Larkin

External Reference #: [id number]

: 1965

Sex: Female



Demographics







 Address  [address] [place]  [address]

 

 Preferred Language  Unknown

 

 Marital Status  Unknown

 

 Scientologist Affiliation  Unknown

 

 Race  Unknown

 

 Ethnic Group  Unknown





Author







 Author  MARY ANNE WALKER

 

 [facility]

 

 Address  [address] [place]  [address]



 

 Phone  [phone]







Care Team Providers







 Care Team Member Name  Role  Phone

 

 WALKERMARY ANNE  Unavailable  [phone]







PROBLEMS







 Type  Condition  ICD9-CM Code  ULU10-NO Code  Onset Dates  Condition Status  
SNOMED Code

 

 Problem  Effusion of lower leg joint  719.06        Active  208466847

 

 Problem  Hyperlipidemia LDL goal <130     E78.5     Active  91802310

 

 Problem  Esophageal reflux  530.81        Active  878339754

 

 Problem  Dysphagia, unspecified type     R13.10     Active  43694025

 

 Problem  Other hyperlipidemia     E78.4     Active  12982427

 

 Problem  Unilateral primary osteoarthritis, left knee     M17.12     Active  
330539763

 

 Problem  COPD with exacerbation     J44.1     Active  201279849

 

 Problem  Hypothyroidism (acquired)     E03.9     Active  477263024

 

 Problem  Diabetic polyneuropathy associated with type 2 diabetes mellitus     
E11.42     Active  89234732

 

 Problem  Primary osteoarthritis of both knees     M17.0     Active  740553388

 

 Problem  Claudication     I73.9     Active  094428158

 

 Problem  Primary generalized (osteo)arthritis     M15.0     Active  253540585

 

 Problem  Type 2 diabetes mellitus without complications     E11.9     Active  
790621267

 

 Problem  Chronic obstructive pulmonary disease with acute exacerbation     
J44.1     Active  390679457

 

 Problem  Mild episode of recurrent major depressive disorder     F33.0     
Active  383723433

 

 Problem  Type 2 diabetes mellitus with diabetic neuropathy, without long-term 
current use of insulin     E11.40     Active  03878549

 

 Problem  Chronic renal insufficiency, stage II (mild)     N18.2     Active  
273178400

 

 Problem  Peripheral arterial occlusive disease     I77.9     Active  131838354

 

 Problem  Major depressive disorder, single episode, unspecified     F32.9     
Active  71980549

 

 Problem  Cataracts, bilateral     H26.9     Active  59452388

 

 Problem  Essential (primary) hypertension     I10     Active  26035132

 

 Problem  Type 2 diabetes mellitus with hyperglycemia     E11.65     Active  
53796535

 

 Problem  Old tear of lateral meniscus of left knee, unspecified tear type     
M23.201     Active  489521788

 

 Problem  Anxiety disorder, unspecified     F41.9     Active  889745918

 

 Problem  Effusion, left knee     M25.462     Active  660735730







ALLERGIES







 Substance  Reaction  Event Type  Date  Status

 

 Lisinopril  Unknown  Drug Allergy    Active

 

 Gabapentin  dizzy/nausea  Drug Allergy    Active

 

 Demerol  Unknown  Drug Allergy    Active







SOCIAL HISTORY

Never Assessed



PLAN OF CARE







 Activity  Details









  









 Follow Up  prn Reason:







VITAL SIGNS







 Height  66 in  2017

 

 Weight  273.8 lbs  2017

 

 Temperature  98.4 degrees Fahrenheit  2017

 

 Heart Rate  80 bpm  2017

 

 Respiratory Rate  20   2017

 

 BMI  44.19 kg/m2  2017

 

 Blood pressure systolic  118 mmHg  2017

 

 Blood pressure diastolic  68 mmHg  2017







MEDICATIONS







 Medication  Instructions  Dosage  Frequency  Start Date  End Date  Duration  
Status

 

 Tramadol HCl 50 mg  Orally every 6 hrs  1 tablet as needed for pain  6h  14 Oct
, 2015        Active

 

 Lipitor 80 MG  Orally Once a day  1/2 tablet  24h          Active

 

 Fish Oil 1000 MG  Orally Once a day  1 capsule  24h           Active

 

 NovoLog Flexpen 100 UNIT/ML     Inject 20 Units by Subcutaneous route as per 
insulin sliding scale protocol  3 times per day             Active

 

 Alprazolam 0.5 MG     take 1 tablet (0.5 mg) by oral route 3 times per day     
10 Feb, 2014        Active

 

 trazodone 150 mg     take 1 Tablet by Oral route 1 time per day qhs     31 Oct
, 2014        Active

 

 Cyclobenzaprine HCl 10MG     TAKE ONE TABLET BY MOUTH THREE TIMES DAILY AS 
NEEDED FOR MUSCLE SPASM           30  Active

 

 Diflucan 150 MG  Orally Take 1 tab po then 1 tab after 72 hours  1 tablet     
  03 days  Active

 

 Zoloft 100 mg     take 1 tablet (100 mg) by oral route once daily     10 Feb, 
2014        Active

 

 Albuterol Sulfate (2.5 MG/3ML) 0.083%  Inhalation every 6 hrs PRN wheezing/SOA
  3 ml     11 May, 2015        Active

 

 Micardis 40 mg     take 1 tablet (40 mg) by oral route once daily             Active

 

 Voltaren 1 %  Transdermal 4 times a day PRN knee pain  4 grams     11 May, 
2015        Active

 

 NovoFine 30G X 8 MM     as directed             Active

 

 Vitamin B 12 100 MCG                    Active

 

 Cymbalta 30 mg     take 3 capsule by Oral route 1 time per day             Active

 

 Sucralfate 1 GM  Orally 4 times a day  1 tablet on an empty stomach  6h       
    Active

 

 Meloxicam 15MG     TAKE ONE TABLET BY MOUTH ONCE DAILY              Active

 

 Metformin HCl 500 MG  Orally Twice a day  1 tablet with meals  12h  14 Oct, 
2015        Active

 

 Lantus SoloStar 100 unit/mL (3 mL)     inject 92 Units by Subcutaneous route 
as per insulin protocol  1 time per day at HS (Pt assistance)     19 Mar, 2015 
       Active

 

 Klor-Con 10 10 MEQ     take 1 tablet by Oral route with food  1 time per day  
           Active

 

 Ventolin HFA 90 mcg/actuation  by inhalation route every 4 hrs  1-2 puffs  4h  
26 Sep, 2014     90 days  Active

 

 Lasix 20 MG     take 1 tablet (20 mg) by oral route once daily     31 Oct, 
2014        Active

 

 Colace 100 MG  Orally Once a day  1 capsule as needed  24h           Active

 

 Levothyroxine Sodium 25 MCG  Orally Once a day in the morning  Take 1 tablet 
on an empty stomach           30  Active

 

 Aspirin 325 MG  Orally Once a day  1 tablet  24h           Active

 

 Pantoprazole Sodium 40 mg  Orally Once a day  1 tablet  24h  27 Dec, 2016     
   Active







RESULTS







 Name  Result  Date  Reference Range

 

 UA LONG DIP (IN HOUSE)     2017   

 

 Lot #  [id number]      

 

 Exp date  2017      

 

 Clarity  clear      

 

 Color  yellow      

 

 Odor  no      

 

 GLU  neg      

 

 ADRIA  neg      

 

 KET  neg      

 

 SG  1.015      

 

 BLO  neg      

 

 pH  6.0      

 

 Protein  neg      

 

 URO  0.2E.U./dl      

 

 NIT  neg      

 

 DANIA  neg      

 

 Lot #         

 

 Exp date         







PROCEDURES







 Procedure  Date Ordered  Result  Body Site

 

 URINALYSIS, AUTO, W/O SCOPE  2017      







IMMUNIZATIONS

No Known Immunizations



MEDICAL (GENERAL) HISTORY







 Type  Description  Date

 

 Medical History  Diabetes mellitus without mention of complication, type II or 
unspecified type, not stated as uncontrolled   

 

 Medical History  Depressive disorder, not elsewhere classified   

 

 Medical History  Anxiety state, unspecified   

 

 Medical History  Effusion of lower leg joint   

 

 Medical History  Generalized osteoarthrosis, unspecified site   

 

 Medical History  Other and unspecified hyperlipidemia   

 

 Medical History  Abnormal weight gain   

 

 Medical History  Effusion of joint, site unspecified   

 

 Medical History  Esophageal reflux   

 

 Medical History  hypertension   

 

 Medical History  colonoscopy- inscreased polyp   

 

 Surgical History  Tonsillectomy   

 

 Surgical History  Orthopedic: Bilateral Knee x2   

 

 Surgical History  colonoscopy  2017

 

 Hospitalization History  Surgery(s)/Childbirth(s) only

## 2018-02-20 NOTE — XMS REPORT
[facility]

 Created on: 2017



Duyen Larkin

External Reference #: [id number]

: 1965

Sex: Female



Demographics







 Address  [address] [place]  [address]

 

 Preferred Language  Unknown

 

 Marital Status  Unknown

 

 Catholic Affiliation  Unknown

 

 Race  Unknown

 

 Ethnic Group  Unknown





Author







 Author  LIYAH LOWE

 

 [facility] [place]

 

 Address  [address] [place]  [address]



 

 Phone  [phone]







Care Team Providers







 Care Team Member Name  Role  Phone

 

 LIYAH LOWE  Unavailable  [phone]







PROBLEMS







 Type  Condition  ICD9-CM Code  QUG27-HV Code  Onset Dates  Condition Status  
SNOMED Code

 

 Problem  Hyperlipidemia LDL goal <130     E78.5     Active  78421561

 

 Problem  Hypothyroidism (acquired)     E03.9     Active  579232926

 

 Problem  Unilateral primary osteoarthritis, left knee     M17.12     Active  
952422788

 

 Problem  Type 2 diabetes mellitus with diabetic neuropathy, without long-term 
current use of insulin     E11.40     Active  14493774

 

 Problem  Other hyperlipidemia     E78.4     Active  27218798

 

 Problem  Primary osteoarthritis of both knees     M17.0     Active  491046158

 

 Problem  Primary generalized (osteo)arthritis     M15.0     Active  427141815

 

 Problem  Claudication     I73.9     Active  803268494

 

 Problem  Mild episode of recurrent major depressive disorder     F33.0     
Active  737863961

 

 Problem  COPD with exacerbation     J44.1     Active  555103944

 

 Problem  Chronic renal insufficiency, stage II (mild)     N18.2     Active  
936662600

 

 Problem  Chronic obstructive pulmonary disease with acute exacerbation     
J44.1     Active  699711469

 

 Problem  Essential (primary) hypertension     I10     Active  46750921

 

 Problem  Anxiety disorder, unspecified     F41.9     Active  187605992

 

 Problem  Type 2 diabetes mellitus without complications     E11.9     Active  
146429344

 

 Problem  Cataracts, bilateral     H26.9     Active  72739504

 

 Problem  Effusion, left knee     M25.462     Active  764518611

 

 Problem  Type 2 diabetes mellitus with hyperglycemia     E11.65     Active  
47484318

 

 Problem  Esophageal reflux  530.81        Active  207994322

 

 Problem  Major depressive disorder, single episode, unspecified     F32.9     
Active  36626138

 

 Problem  Old tear of lateral meniscus of left knee, unspecified tear type     
M23.201     Active  383860457

 

 Problem  Effusion of lower leg joint  719.06        Active  594963789

 

 Problem  Peripheral arterial occlusive disease     I77.9     Active  014249296

 

 Problem  Dysphagia, unspecified type     R13.10     Active  09105854







ALLERGIES

Unknown Allergies



SOCIAL HISTORY

No smoking Hx information available



PLAN OF CARE





VITAL SIGNS





MEDICATIONS







 Medication  Instructions  Dosage  Frequency  Start Date  End Date  Duration  
Status

 

 Micardis 40 mg     take 1 tablet (40 mg) by oral route once daily             Active

 

 Pantoprazole Sodium 40 mg  Orally Once a day  1 tablet  24h  27 Dec, 2016     
   Active







RESULTS

No Results



PROCEDURES

No Known procedures



IMMUNIZATIONS

No Known Immunizations

## 2018-02-20 NOTE — XMS REPORT
[facility]

 Created on: 2017



Debi Larkinthia

External Reference #: [id number]

: 1965

Sex: Female



Demographics







 Address  [address] [place]  [address]

 

 Preferred Language  Unknown

 

 Marital Status  Unknown

 

 Jew Affiliation  Unknown

 

 Race  Unknown

 

 Ethnic Group  Unknown





Author







 Author  LIYAH LOWE

 

 [facility]

 

 Address  [address] [place]  [address]



 

 Phone  [phone]







Care Team Providers







 Care Team Member Name  Role  Phone

 

 LIYAH LOWE  Unavailable  [phone]







PROBLEMS







 Type  Condition  ICD9-CM Code  WBL87-RS Code  Onset Dates  Condition Status  
SNOMED Code

 

 Problem  Effusion of lower leg joint  719.06        Active  874496888

 

 Problem  Hyperlipidemia LDL goal <130     E78.5     Active  70472884

 

 Problem  Esophageal reflux  530.81        Active  066821281

 

 Problem  Dysphagia, unspecified type     R13.10     Active  53147890

 

 Problem  Other hyperlipidemia     E78.4     Active  34140726

 

 Problem  Unilateral primary osteoarthritis, left knee     M17.12     Active  
318226052

 

 Problem  COPD with exacerbation     J44.1     Active  860900502

 

 Problem  Hypothyroidism (acquired)     E03.9     Active  812929221

 

 Problem  Diabetic polyneuropathy associated with type 2 diabetes mellitus     
E11.42     Active  27663909

 

 Problem  Primary osteoarthritis of both knees     M17.0     Active  873508570

 

 Problem  Claudication     I73.9     Active  012927631

 

 Problem  Primary generalized (osteo)arthritis     M15.0     Active  235686006

 

 Problem  Type 2 diabetes mellitus without complications     E11.9     Active  
586442980

 

 Problem  Chronic obstructive pulmonary disease with acute exacerbation     
J44.1     Active  690966504

 

 Problem  Mild episode of recurrent major depressive disorder     F33.0     
Active  607773953

 

 Problem  Type 2 diabetes mellitus with diabetic neuropathy, without long-term 
current use of insulin     E11.40     Active  36344169

 

 Problem  Chronic renal insufficiency, stage II (mild)     N18.2     Active  
007127167

 

 Problem  Peripheral arterial occlusive disease     I77.9     Active  008411726

 

 Problem  Major depressive disorder, single episode, unspecified     F32.9     
Active  00960956

 

 Problem  Cataracts, bilateral     H26.9     Active  58515445

 

 Problem  Essential (primary) hypertension     I10     Active  12279320

 

 Problem  Type 2 diabetes mellitus with hyperglycemia     E11.65     Active  
37579245

 

 Problem  Old tear of lateral meniscus of left knee, unspecified tear type     
M23.201     Active  763626847

 

 Problem  Anxiety disorder, unspecified     F41.9     Active  102640119

 

 Problem  Effusion, left knee     M25.462     Active  451075746







ALLERGIES

No Information



SOCIAL HISTORY

Never Assessed



PLAN OF CARE





VITAL SIGNS





MEDICATIONS







 Medication  Instructions  Dosage  Frequency  Start Date  End Date  Duration  
Status

 

 Levothyroxine Sodium 25 MCG  Orally Once a day in the morning  Take 1 tablet 
on an empty stomach           30  Active







RESULTS

No Results



PROCEDURES

No Known procedures



IMMUNIZATIONS

No Known Immunizations



MEDICAL (GENERAL) HISTORY







 Type  Description  Date

 

 Medical History  Diabetes mellitus without mention of complication, type II or 
unspecified type, not stated as uncontrolled   

 

 Medical History  Depressive disorder, not elsewhere classified   

 

 Medical History  Anxiety state, unspecified   

 

 Medical History  Effusion of lower leg joint   

 

 Medical History  Generalized osteoarthrosis, unspecified site   

 

 Medical History  Other and unspecified hyperlipidemia   

 

 Medical History  Abnormal weight gain   

 

 Medical History  Effusion of joint, site unspecified   

 

 Medical History  Esophageal reflux   

 

 Medical History  hypertension   

 

 Medical History  colonoscopy- inscreased polyp   

 

 Surgical History  Tonsillectomy   

 

 Surgical History  Orthopedic: Bilateral Knee x2   

 

 Surgical History  colonoscopy  

 

 Hospitalization History  Surgery(s)/Childbirth(s) only

## 2018-02-20 NOTE — XMS REPORT
[facility]

 Created on: 2017



Debi Lariknthia

External Reference #: [id number]

: 1965

Sex: Female



Demographics







 Address  [address] [place]  [address]

 

 Preferred Language  Unknown

 

 Marital Status  Unknown

 

 Taoism Affiliation  Unknown

 

 Race  Unknown

 

 Ethnic Group  Unknown





Author







 Author  LIYAH LOWE

 

 [facility]

 

 Address  [address] [place]  [address]



 

 Phone  [phone]







Care Team Providers







 Care Team Member Name  Role  Phone

 

 LIYAH LOWE  Unavailable  [phone]







PROBLEMS







 Type  Condition  ICD9-CM Code  BZG35-KG Code  Onset Dates  Condition Status  
SNOMED Code

 

 Problem  Effusion of lower leg joint  719.06        Active  734831947

 

 Problem  Hyperlipidemia LDL goal <130     E78.5     Active  16163900

 

 Problem  Esophageal reflux  530.81        Active  255188562

 

 Problem  Dysphagia, unspecified type     R13.10     Active  95261954

 

 Problem  Other hyperlipidemia     E78.4     Active  98172118

 

 Problem  Unilateral primary osteoarthritis, left knee     M17.12     Active  
483095658

 

 Problem  COPD with exacerbation     J44.1     Active  532401200

 

 Problem  Hypothyroidism (acquired)     E03.9     Active  786763934

 

 Problem  Diabetic polyneuropathy associated with type 2 diabetes mellitus     
E11.42     Active  68549369

 

 Problem  Primary osteoarthritis of both knees     M17.0     Active  365219997

 

 Problem  Claudication     I73.9     Active  979372962

 

 Problem  Primary generalized (osteo)arthritis     M15.0     Active  372853023

 

 Problem  Type 2 diabetes mellitus without complications     E11.9     Active  
447880627

 

 Problem  Chronic obstructive pulmonary disease with acute exacerbation     
J44.1     Active  012692550

 

 Problem  Mild episode of recurrent major depressive disorder     F33.0     
Active  171734021

 

 Problem  Type 2 diabetes mellitus with diabetic neuropathy, without long-term 
current use of insulin     E11.40     Active  15604668

 

 Problem  Chronic renal insufficiency, stage II (mild)     N18.2     Active  
334632281

 

 Problem  Peripheral arterial occlusive disease     I77.9     Active  939957496

 

 Problem  Major depressive disorder, single episode, unspecified     F32.9     
Active  57454574

 

 Problem  Cataracts, bilateral     H26.9     Active  14714642

 

 Problem  Essential (primary) hypertension     I10     Active  45460551

 

 Problem  Type 2 diabetes mellitus with hyperglycemia     E11.65     Active  
43890082

 

 Problem  Old tear of lateral meniscus of left knee, unspecified tear type     
M23.201     Active  479972007

 

 Problem  Anxiety disorder, unspecified     F41.9     Active  688595626

 

 Problem  Effusion, left knee     M25.462     Active  749803228







ALLERGIES

No Information



SOCIAL HISTORY

Never Assessed



PLAN OF CARE





VITAL SIGNS





MEDICATIONS







 Medication  Instructions  Dosage  Frequency  Start Date  End Date  Duration  
Status

 

 Pantoprazole Sodium 40 mg  Orally Once a day  1 tablet  24h  27 Dec, 2016     
   Active







RESULTS

No Results



PROCEDURES

No Known procedures



IMMUNIZATIONS

No Known Immunizations



MEDICAL (GENERAL) HISTORY







 Type  Description  Date

 

 Medical History  Diabetes mellitus without mention of complication, type II or 
unspecified type, not stated as uncontrolled   

 

 Medical History  Depressive disorder, not elsewhere classified   

 

 Medical History  Anxiety state, unspecified   

 

 Medical History  Effusion of lower leg joint   

 

 Medical History  Generalized osteoarthrosis, unspecified site   

 

 Medical History  Other and unspecified hyperlipidemia   

 

 Medical History  Abnormal weight gain   

 

 Medical History  Effusion of joint, site unspecified   

 

 Medical History  Esophageal reflux   

 

 Medical History  hypertension   

 

 Medical History  colonoscopy- inscreased polyp   

 

 Surgical History  Tonsillectomy   

 

 Surgical History  Orthopedic: Bilateral Knee x2   

 

 Surgical History  colonoscopy  2017

 

 Hospitalization History  Surgery(s)/Childbirth(s) only

## 2018-02-20 NOTE — XMS REPORT
[facility]

 Created on: 10/04/2017



Debi Larkinthia

External Reference #: [id number]

: 1965

Sex: Female



Demographics







 Address  [address] [place]  [address]

 

 Preferred Language  Unknown

 

 Marital Status  Unknown

 

 Quaker Affiliation  Unknown

 

 Race  Unknown

 

 Ethnic Group  Unknown





Author







 Author  LIYAH LOWE

 

 [facility] [place]

 

 Address  [address] [place]  [address]



 

 Phone  [phone]







Care Team Providers







 Care Team Member Name  Role  Phone

 

 LIYAH LOWE  Unavailable  [phone]







PROBLEMS







 Type  Condition  ICD9-CM Code  KPP78-AI Code  Onset Dates  Condition Status  
SNOMED Code

 

 Problem  Effusion of lower leg joint  719.06        Active  484077334

 

 Problem  Hyperlipidemia LDL goal <130     E78.5     Active  24560426

 

 Problem  Esophageal reflux  530.81        Active  146505975

 

 Problem  Dysphagia, unspecified type     R13.10     Active  21747077

 

 Problem  Other hyperlipidemia     E78.4     Active  72344939

 

 Problem  Unilateral primary osteoarthritis, left knee     M17.12     Active  
928201308

 

 Problem  COPD with exacerbation     J44.1     Active  740556036

 

 Problem  Hypothyroidism (acquired)     E03.9     Active  230813664

 

 Problem  Diabetic polyneuropathy associated with type 2 diabetes mellitus     
E11.42     Active  15697042

 

 Problem  Primary osteoarthritis of both knees     M17.0     Active  935020087

 

 Problem  Claudication     I73.9     Active  727225114

 

 Problem  Primary generalized (osteo)arthritis     M15.0     Active  938080710

 

 Problem  Type 2 diabetes mellitus without complications     E11.9     Active  
776315051

 

 Problem  Chronic obstructive pulmonary disease with acute exacerbation     
J44.1     Active  300753550

 

 Problem  Mild episode of recurrent major depressive disorder     F33.0     
Active  346504361

 

 Problem  Type 2 diabetes mellitus with diabetic neuropathy, without long-term 
current use of insulin     E11.40     Active  33154790

 

 Problem  Chronic renal insufficiency, stage II (mild)     N18.2     Active  
172899717

 

 Problem  Peripheral arterial occlusive disease     I77.9     Active  737581153

 

 Problem  Major depressive disorder, single episode, unspecified     F32.9     
Active  86714775

 

 Problem  Cataracts, bilateral     H26.9     Active  04152156

 

 Problem  Essential (primary) hypertension     I10     Active  05053635

 

 Problem  Type 2 diabetes mellitus with hyperglycemia     E11.65     Active  
86972705

 

 Problem  Old tear of lateral meniscus of left knee, unspecified tear type     
M23.201     Active  270264394

 

 Problem  Anxiety disorder, unspecified     F41.9     Active  646902379

 

 Problem  Effusion, left knee     M25.462     Active  383109136







ALLERGIES

No Information



SOCIAL HISTORY

Never Assessed



PLAN OF CARE





VITAL SIGNS





MEDICATIONS







 Medication  Instructions  Dosage  Frequency  Start Date  End Date  Duration  
Status

 

 Tradjenta 5 mg  Orally Once a day  1 tablet  24h       30 day(s)  
Active







RESULTS

No Results



PROCEDURES

No Known procedures



IMMUNIZATIONS

No Known Immunizations



MEDICAL (GENERAL) HISTORY







 Type  Description  Date

 

 Medical History  Diabetes mellitus without mention of complication, type II or 
unspecified type, not stated as uncontrolled   

 

 Medical History  Depressive disorder, not elsewhere classified   

 

 Medical History  Anxiety state, unspecified   

 

 Medical History  Effusion of lower leg joint   

 

 Medical History  Generalized osteoarthrosis, unspecified site   

 

 Medical History  Other and unspecified hyperlipidemia   

 

 Medical History  Abnormal weight gain   

 

 Medical History  Effusion of joint, site unspecified   

 

 Medical History  Esophageal reflux   

 

 Medical History  hypertension   

 

 Medical History  colonoscopy- inscreased polyp   

 

 Surgical History  Tonsillectomy   

 

 Surgical History  Orthopedic: Bilateral Knee x2   

 

 Surgical History  colonoscopy  

 

 Hospitalization History  Surgery(s)/Childbirth(s) only

## 2018-02-20 NOTE — XMS REPORT
[facility]

 Created on: 2017



Debi Larkinthia

External Reference #: [id number]

: 1965

Sex: Female



Demographics







 Address  [address] [place]  [address]

 

 Preferred Language  Unknown

 

 Marital Status  Unknown

 

 Jainism Affiliation  Unknown

 

 Race  Unknown

 

 Ethnic Group  Unknown





Author







 Author  LIYAH LOWE

 

 [facility]

 

 Address  [address] [place]  [address]



 

 Phone  [phone]







Care Team Providers







 Care Team Member Name  Role  Phone

 

 LIYAH LOWE  Unavailable  [phone]







PROBLEMS







 Type  Condition  ICD9-CM Code  CXF85-SE Code  Onset Dates  Condition Status  
SNOMED Code

 

 Problem  Effusion of lower leg joint  719.06        Active  623861673

 

 Problem  Hyperlipidemia LDL goal <130     E78.5     Active  14103210

 

 Problem  Esophageal reflux  530.81        Active  998339524

 

 Problem  Dysphagia, unspecified type     R13.10     Active  86612025

 

 Problem  Other hyperlipidemia     E78.4     Active  18761823

 

 Problem  Unilateral primary osteoarthritis, left knee     M17.12     Active  
281952707

 

 Problem  COPD with exacerbation     J44.1     Active  805142954

 

 Problem  Hypothyroidism (acquired)     E03.9     Active  668043364

 

 Problem  Diabetic polyneuropathy associated with type 2 diabetes mellitus     
E11.42     Active  15505923

 

 Problem  Primary osteoarthritis of both knees     M17.0     Active  049978969

 

 Problem  Claudication     I73.9     Active  452434909

 

 Problem  Primary generalized (osteo)arthritis     M15.0     Active  370342705

 

 Problem  Type 2 diabetes mellitus without complications     E11.9     Active  
217044373

 

 Problem  Chronic obstructive pulmonary disease with acute exacerbation     
J44.1     Active  708625227

 

 Problem  Mild episode of recurrent major depressive disorder     F33.0     
Active  833423443

 

 Problem  Type 2 diabetes mellitus with diabetic neuropathy, without long-term 
current use of insulin     E11.40     Active  88867940

 

 Problem  Chronic renal insufficiency, stage II (mild)     N18.2     Active  
082951418

 

 Problem  Peripheral arterial occlusive disease     I77.9     Active  854204690

 

 Problem  Major depressive disorder, single episode, unspecified     F32.9     
Active  14911437

 

 Problem  Cataracts, bilateral     H26.9     Active  75207000

 

 Problem  Essential (primary) hypertension     I10     Active  15142703

 

 Problem  Type 2 diabetes mellitus with hyperglycemia     E11.65     Active  
24981802

 

 Problem  Old tear of lateral meniscus of left knee, unspecified tear type     
M23.201     Active  663028003

 

 Problem  Anxiety disorder, unspecified     F41.9     Active  424851683

 

 Problem  Effusion, left knee     M25.462     Active  544200775







ALLERGIES

No Information



SOCIAL HISTORY

Never Assessed



PLAN OF CARE





VITAL SIGNS





MEDICATIONS







 Medication  Instructions  Dosage  Frequency  Start Date  End Date  Duration  
Status

 

 Atorvastatin Calcium 40 mg  Orally Once a day  1 tablet  24h  30 May, 2017    
    Active







RESULTS

No Results



PROCEDURES

No Known procedures



IMMUNIZATIONS

No Known Immunizations



MEDICAL (GENERAL) HISTORY







 Type  Description  Date

 

 Medical History  Diabetes mellitus without mention of complication, type II or 
unspecified type, not stated as uncontrolled   

 

 Medical History  Depressive disorder, not elsewhere classified   

 

 Medical History  Anxiety state, unspecified   

 

 Medical History  Effusion of lower leg joint   

 

 Medical History  Generalized osteoarthrosis, unspecified site   

 

 Medical History  Other and unspecified hyperlipidemia   

 

 Medical History  Abnormal weight gain   

 

 Medical History  Effusion of joint, site unspecified   

 

 Medical History  Esophageal reflux   

 

 Medical History  hypertension   

 

 Medical History  colonoscopy- inscreased polyp   

 

 Surgical History  Tonsillectomy   

 

 Surgical History  Orthopedic: Bilateral Knee x2   

 

 Surgical History  colonoscopy  

 

 Hospitalization History  Surgery(s)/Childbirth(s) only

## 2018-02-20 NOTE — XMS REPORT
[facility]

 Created on: 2017



Duyen Larkin

External Reference #: [id number]

: 1965

Sex: Female



Demographics







 Address  [address] [place]  [address]

 

 Preferred Language  Unknown

 

 Marital Status  Unknown

 

 Taoist Affiliation  Unknown

 

 Race  Unknown

 

 Ethnic Group  Unknown





Author







 Author  LIYAH LOWE

 

 [facility] [place] [facility]

 

 Address  [address] [place]  [address]



 

 Phone  [phone]







Care Team Providers







 Care Team Member Name  Role  Phone

 

 LIYAH LOWE  Unavailable  [phone]







PROBLEMS







 Type  Condition  ICD9-CM Code  JYO11-UF Code  Onset Dates  Condition Status  
SNOMED Code

 

 Problem  Effusion of lower leg joint  719.06        Active  121130593

 

 Problem  Hyperlipidemia LDL goal <130     E78.5     Active  57752791

 

 Problem  Esophageal reflux  530.81        Active  006063480

 

 Problem  Dysphagia, unspecified type     R13.10     Active  60877628

 

 Problem  Other hyperlipidemia     E78.4     Active  14143712

 

 Problem  Unilateral primary osteoarthritis, left knee     M17.12     Active  
993491761

 

 Problem  COPD with exacerbation     J44.1     Active  560206474

 

 Problem  Hypothyroidism (acquired)     E03.9     Active  469838534

 

 Problem  Diabetic polyneuropathy associated with type 2 diabetes mellitus     
E11.42     Active  60574064

 

 Problem  Primary osteoarthritis of both knees     M17.0     Active  985606754

 

 Problem  Claudication     I73.9     Active  778264579

 

 Problem  Primary generalized (osteo)arthritis     M15.0     Active  203434186

 

 Problem  Type 2 diabetes mellitus without complications     E11.9     Active  
495533440

 

 Problem  Chronic obstructive pulmonary disease with acute exacerbation     
J44.1     Active  707490907

 

 Problem  Mild episode of recurrent major depressive disorder     F33.0     
Active  195208249

 

 Problem  Type 2 diabetes mellitus with diabetic neuropathy, without long-term 
current use of insulin     E11.40     Active  18899667

 

 Problem  Chronic renal insufficiency, stage II (mild)     N18.2     Active  
911131469

 

 Problem  Peripheral arterial occlusive disease     I77.9     Active  383562373

 

 Problem  Major depressive disorder, single episode, unspecified     F32.9     
Active  82391245

 

 Problem  Cataracts, bilateral     H26.9     Active  05630151

 

 Problem  Essential (primary) hypertension     I10     Active  66498184

 

 Problem  Type 2 diabetes mellitus with hyperglycemia     E11.65     Active  
20680077

 

 Problem  Old tear of lateral meniscus of left knee, unspecified tear type     
M23.201     Active  104409510

 

 Problem  Anxiety disorder, unspecified     F41.9     Active  758106913

 

 Problem  Effusion, left knee     M25.462     Active  450327840







ALLERGIES







 Substance  Reaction  Event Type  Date  Status

 

 Lisinopril  Unknown  Drug Allergy    Active

 

 Gabapentin  dizzy/nausea  Drug Allergy    Active

 

 Demerol  Unknown  Drug Allergy    Active







SOCIAL HISTORY

Never Assessed



PLAN OF CARE







 Activity  Details









  









 Follow Up  4 Weeks Reason:recheck diabetes







VITAL SIGNS







 Height  66 in  2017

 

 Weight  273.0 lbs  2017

 

 Temperature  97.7 degrees Fahrenheit  2017

 

 Heart Rate  92 bpm  2017

 

 Respiratory Rate  20   2017

 

 BMI  44.06 kg/m2  2017

 

 Blood pressure systolic  112 mmHg  2017

 

 Blood pressure diastolic  62 mmHg  2017







MEDICATIONS







 Medication  Instructions  Dosage  Frequency  Start Date  End Date  Duration  
Status

 

 Meloxicam 15MG     TAKE ONE TABLET BY MOUTH ONCE DAILY              Active

 

 Pantoprazole Sodium 40 mg  Orally Once a day  1 tablet  24h  27 Dec, 2016     
   Active

 

 Micardis 40 mg     take 1 tablet (40 mg) by oral route once daily             Active

 

 Lipitor 80 MG  Orally Once a day  1/2 tablet  24h  17 2015        Active

 

 NovoLog Flexpen 100 UNIT/ML     Inject 20 Units by Subcutaneous route as per 
insulin sliding scale protocol  3 times per day             Active

 

 Levothyroxine Sodium 25 MCG  Orally Once a day in the morning  Take 1 tablet 
on an empty stomach           30  Active

 

 Zoloft 50 MG  Orally Once a day  take 2 tablets (100 mg) by oral route once 
daily  24h  10 Feb, 2014        Active

 

 Lasix 20 MG     take 1 tablet (20 mg) by oral route once daily     31 Oct, 
2014        Active

 

 Ventolin HFA 90 mcg/actuation  by inhalation route every 4 hrs  1-2 puffs  4h  
26 Sep, 2014     90 days  Active

 

 Cyclobenzaprine HCl 10MG     TAKE ONE TABLET BY MOUTH THREE TIMES DAILY AS 
NEEDED FOR MUSCLE SPASM           30  Active

 

 Lantus SoloStar 100 unit/mL (3 mL)  Subcutaneous 2 times a day  inject 50 
Units by Subcutaneous  12h  19 Mar, 2015        Active

 

 Klor-Con 10 10 MEQ     take 1 tablet by Oral route with food  1 time per day  
           Active

 

 Sucralfate 1 GM  Orally 4 times a day  1 tablet on an empty stomach  6h       
    Active

 

 Tradjenta 5 MG  Orally Once a day  1 tablet  24h       30 day(s)  
Active







RESULTS







 Name  Result  Date  Reference Range

 

 A1C (IN HOUSE)     2017   

 

 A1C IN HOUSE  8.3     4.3 - 5.6 %

 

 Previous A1c  7.2      

 

 Lot   [id number]      

 

 Exp date  2018      

 

 MICROALBUMIN, URINE (IN HOUSE)     2017   

 

 MICROALBUMIN         

 

 Lot #  [id number]      

 

 Exp date  2018      

 

 Clarity  clear      

 

 Color  yellow      

 

 ALB  10 mg/L      

 

 CRE  50 mg/dL      

 

 A:C (IN HOUSE)  <30 mg/g      

 

 Control         

 

 Control         

 

 Lot #         

 

 Exp date         

 

 UA LONG DIP (IN HOUSE)     2017   

 

 Lot #  [id number]      

 

 Exp date  2017      

 

 Clarity  clear      

 

 Color  yellow      

 

 Odor  no      

 

 GLU  neg      

 

 ADRIA  neg      

 

 KET  neg      

 

 SG  1.015      

 

 BLO  neg      

 

 pH  7.0      

 

 Protein  neg      

 

 URO  0.2 E. U./dL      

 

 NIT  neg      

 

 DANIA  neg      

 

 Lot #         

 

 Exp date         







PROCEDURES







 Procedure  Date Ordered  Result  Body Site

 

 URINALYSIS, AUTO, W/O SCOPE  2017      

 

 GLYCATED HEMOGLOBIN TEST  2017      

 

 URINE CULTURE/COLONY COUNT  2017      

 

 MICROALBUMIN, SEMIQUANT  2017      







IMMUNIZATIONS

No Known Immunizations



MEDICAL (GENERAL) HISTORY







 Type  Description  Date

 

 Medical History  Diabetes mellitus without mention of complication, type II or 
unspecified type, not stated as uncontrolled   

 

 Medical History  Depressive disorder, not elsewhere classified   

 

 Medical History  Anxiety state, unspecified   

 

 Medical History  Effusion of lower leg joint   

 

 Medical History  Generalized osteoarthrosis, unspecified site   

 

 Medical History  Other and unspecified hyperlipidemia   

 

 Medical History  Abnormal weight gain   

 

 Medical History  Effusion of joint, site unspecified   

 

 Medical History  Esophageal reflux   

 

 Medical History  hypertension   

 

 Medical History  colonoscopy- inscreased polyp   

 

 Surgical History  Tonsillectomy   

 

 Surgical History  Orthopedic: Bilateral Knee x2   

 

 Surgical History  colonoscopy  

 

 Hospitalization History  Surgery(s)/Childbirth(s) only

## 2018-02-20 NOTE — PROGRESS NOTE-POST OPERATIVE
Post-Operative Progess Note


Surgeon (s)/Assistant (s)


Surgeon


NELSON MAI DO


Assistant:  na





Pre-Operative Diagnosis


history of polyps





Post-Operative Diagnosis





hepatic flexure polyp





Procedure & Operative Findings


Date of Procedure


2/20/18


Procedure Performed/Findings


colonoscopy with hot bx polypectomy


Anesthesia Type


per [facility]





Estimated Blood Loss


Estimated blood loss (mL):  none





Specimens/Packing


Specimens Removed


hepatic flexure polyp











NELSON MAI DO Feb 20, 2018 13:19

## 2018-02-20 NOTE — XMS REPORT
[facility]

 Created on: 2017



Debi Larkinthia

External Reference #: [id number]

: 1965

Sex: Female



Demographics







 Address  [address] [place]  [address]

 

 Preferred Language  Unknown

 

 Marital Status  Unknown

 

 Buddhist Affiliation  Unknown

 

 Race  Unknown

 

 Ethnic Group  Unknown





Author







 Author  LIYAH LOWE

 

 [facility] [place]

 

 Address  [address] [place]  [address]



 

 Phone  [phone]







Care Team Providers







 Care Team Member Name  Role  Phone

 

 LIYAH LOWE  Unavailable  [phone]







PROBLEMS







 Type  Condition  ICD9-CM Code  IBF00-BY Code  Onset Dates  Condition Status  
SNOMED Code

 

 Problem  Effusion of lower leg joint  719.06        Active  657136156

 

 Problem  Hyperlipidemia LDL goal <130     E78.5     Active  01734063

 

 Problem  Esophageal reflux  530.81        Active  849347790

 

 Problem  Dysphagia, unspecified type     R13.10     Active  64339064

 

 Problem  Other hyperlipidemia     E78.4     Active  86090424

 

 Problem  Unilateral primary osteoarthritis, left knee     M17.12     Active  
041582839

 

 Problem  COPD with exacerbation     J44.1     Active  591758681

 

 Problem  Hypothyroidism (acquired)     E03.9     Active  948082971

 

 Problem  Diabetic polyneuropathy associated with type 2 diabetes mellitus     
E11.42     Active  53734615

 

 Problem  Primary osteoarthritis of both knees     M17.0     Active  161932774

 

 Problem  Claudication     I73.9     Active  259532002

 

 Problem  Primary generalized (osteo)arthritis     M15.0     Active  028145776

 

 Problem  Type 2 diabetes mellitus without complications     E11.9     Active  
649368172

 

 Problem  Chronic obstructive pulmonary disease with acute exacerbation     
J44.1     Active  117375107

 

 Problem  Mild episode of recurrent major depressive disorder     F33.0     
Active  902805963

 

 Problem  Type 2 diabetes mellitus with diabetic neuropathy, without long-term 
current use of insulin     E11.40     Active  32997929

 

 Problem  Chronic renal insufficiency, stage II (mild)     N18.2     Active  
380132425

 

 Problem  Peripheral arterial occlusive disease     I77.9     Active  638791163

 

 Problem  Major depressive disorder, single episode, unspecified     F32.9     
Active  46620393

 

 Problem  Cataracts, bilateral     H26.9     Active  52491066

 

 Problem  Essential (primary) hypertension     I10     Active  24912788

 

 Problem  Type 2 diabetes mellitus with hyperglycemia     E11.65     Active  
02993212

 

 Problem  Old tear of lateral meniscus of left knee, unspecified tear type     
M23.201     Active  247227286

 

 Problem  Anxiety disorder, unspecified     F41.9     Active  677538685

 

 Problem  Effusion, left knee     M25.462     Active  794017936







ALLERGIES

No Information



SOCIAL HISTORY

Never Assessed



PLAN OF CARE





VITAL SIGNS





MEDICATIONS







 Medication  Instructions  Dosage  Frequency  Start Date  End Date  Duration  
Status

 

 Ventolin HFA 90 mcg/actuation  by inhalation route every 4 hrs  1-2 puffs  4h  
26 Sep, 2014     90 days  Active







RESULTS

No Results



PROCEDURES

No Known procedures



IMMUNIZATIONS

No Known Immunizations



MEDICAL (GENERAL) HISTORY







 Type  Description  Date

 

 Medical History  Diabetes mellitus without mention of complication, type II or 
unspecified type, not stated as uncontrolled   

 

 Medical History  Depressive disorder, not elsewhere classified   

 

 Medical History  Anxiety state, unspecified   

 

 Medical History  Effusion of lower leg joint   

 

 Medical History  Generalized osteoarthrosis, unspecified site   

 

 Medical History  Other and unspecified hyperlipidemia   

 

 Medical History  Abnormal weight gain   

 

 Medical History  Effusion of joint, site unspecified   

 

 Medical History  Esophageal reflux   

 

 Medical History  hypertension   

 

 Medical History  colonoscopy- inscreased polyp   

 

 Surgical History  Tonsillectomy   

 

 Surgical History  Orthopedic: Bilateral Knee x2   

 

 Surgical History  colonoscopy  2017

 

 Hospitalization History  Surgery(s)/Childbirth(s) only

## 2018-02-20 NOTE — XMS REPORT
[facility]

 Created on: 2017



Duyen Larkin

External Reference #: [id number]

: 1965

Sex: Female



Demographics







 Address  [address] [place]  [address]

 

 Preferred Language  Unknown

 

 Marital Status  Unknown

 

 Lutheran Affiliation  Unknown

 

 Race  Unknown

 

 Ethnic Group  Unknown





Author







 Author  LIYAH LOWE

 

 [facility] [place]

 

 Address  [address] [place]  [address]



 

 Phone  [phone]







Care Team Providers







 Care Team Member Name  Role  Phone

 

 LIYAH LOWE  Unavailable  [phone]







PROBLEMS







 Type  Condition  ICD9-CM Code  NWY80-KA Code  Onset Dates  Condition Status  
SNOMED Code

 

 Problem  Effusion of lower leg joint  719.06        Active  862651005

 

 Problem  Hyperlipidemia LDL goal <130     E78.5     Active  88186041

 

 Problem  Esophageal reflux  530.81        Active  633576816

 

 Problem  Dysphagia, unspecified type     R13.10     Active  18529961

 

 Problem  Other hyperlipidemia     E78.4     Active  84567378

 

 Problem  Unilateral primary osteoarthritis, left knee     M17.12     Active  
753402354

 

 Problem  COPD with exacerbation     J44.1     Active  890718598

 

 Problem  Hypothyroidism (acquired)     E03.9     Active  616869626

 

 Problem  Diabetic polyneuropathy associated with type 2 diabetes mellitus     
E11.42     Active  85713838

 

 Problem  Primary osteoarthritis of both knees     M17.0     Active  517478680

 

 Problem  Claudication     I73.9     Active  977585697

 

 Problem  Primary generalized (osteo)arthritis     M15.0     Active  284950294

 

 Problem  Type 2 diabetes mellitus without complications     E11.9     Active  
239679963

 

 Problem  Chronic obstructive pulmonary disease with acute exacerbation     
J44.1     Active  811843974

 

 Problem  Mild episode of recurrent major depressive disorder     F33.0     
Active  721464680

 

 Problem  Type 2 diabetes mellitus with diabetic neuropathy, without long-term 
current use of insulin     E11.40     Active  97198456

 

 Problem  Chronic renal insufficiency, stage II (mild)     N18.2     Active  
459274763

 

 Problem  Peripheral arterial occlusive disease     I77.9     Active  767799566

 

 Problem  Major depressive disorder, single episode, unspecified     F32.9     
Active  70772077

 

 Problem  Cataracts, bilateral     H26.9     Active  07565286

 

 Problem  Essential (primary) hypertension     I10     Active  64673108

 

 Problem  Type 2 diabetes mellitus with hyperglycemia     E11.65     Active  
18039176

 

 Problem  Old tear of lateral meniscus of left knee, unspecified tear type     
M23.201     Active  975322870

 

 Problem  Anxiety disorder, unspecified     F41.9     Active  072840774

 

 Problem  Effusion, left knee     M25.462     Active  834126440







ALLERGIES

No Information



SOCIAL HISTORY

Never Assessed



PLAN OF CARE





VITAL SIGNS





MEDICATIONS

Unknown Medications



RESULTS

No Results



PROCEDURES

No Known procedures



IMMUNIZATIONS

No Known Immunizations



MEDICAL (GENERAL) HISTORY







 Type  Description  Date

 

 Medical History  Diabetes mellitus without mention of complication, type II or 
unspecified type, not stated as uncontrolled   

 

 Medical History  Depressive disorder, not elsewhere classified   

 

 Medical History  Anxiety state, unspecified   

 

 Medical History  Effusion of lower leg joint   

 

 Medical History  Generalized osteoarthrosis, unspecified site   

 

 Medical History  Other and unspecified hyperlipidemia   

 

 Medical History  Abnormal weight gain   

 

 Medical History  Effusion of joint, site unspecified   

 

 Medical History  Esophageal reflux   

 

 Medical History  hypertension   

 

 Medical History  colonoscopy- inscreased polyp   

 

 Surgical History  Tonsillectomy   

 

 Surgical History  Orthopedic: Bilateral Knee x2   

 

 Surgical History  colonoscopy  2017

 

 Hospitalization History  Surgery(s)/Childbirth(s) only

## 2018-02-20 NOTE — XMS REPORT
Continuity of Care Document

 Created on: 2018



YELENA CHILDERS

External Reference #: [id number]

: 1965

Sex: Female



Demographics







 Address  [address] [place] [address]

 

 Home Phone  [phone] x

 

 Preferred Language  Unknown

 

 Marital Status  Unknown

 

 Yarsanism Affiliation  Unknown

 

 Race  Unknown

 

 Ethnic Group  Unknown





Author







 Author  [facility]

 

 Address  Unknown

 

 Phone  Unavailable



              



Allergies

      





 Active            Description            Code            Type            
Severity            Reaction            Onset            Reported/Identified   
         Relationship to Patient            Clinical Status        

 

 Yes            Demerol                         Drug Allergy            N/A    
        N/A                         02/10/2014                                  

 

 Yes            gabapentin                         Drug Allergy            N/A 
           N/A                         02/10/2014                              
    

 

 Yes            lisinopril                         Drug Allergy            N/A 
           N/A                         02/10/2014                              
    

 

 Yes            gabapentin            [id number]            Drug Allergy       
     Moderate            DIZZYNESS, GI U                         2017    
                              

 

 Yes            lisinopril            [id number]            Drug Allergy       
     Unknown            N/A                         2017                 
                 

 

 Yes            meperidine            [id number]            Drug Allergy       
     Unknown            N/A                         2017                 
                 



                            



Medications

      



There is no data.                  



Problems

      





 Date Dx Coded            Attending            Type            Code            
Diagnosis            Diagnosed By        

 

 02/10/2014            ENOCH KRAFT DO                         250.00         
   DIABETES II CONTROLLED (UNCOMPLICATED)                     

 

 02/10/2014            ENOCH KRAFT DO                         300.00         
   ANXIETY UNSPEC                     

 

 02/10/2014            ENOCH KRAFT DO                         311            
DEPRESSIVE DISORDER NOS                     

 

 02/10/2014            ENOCH KRAFT DO                         401.9          
  HYPERTENSION, UNSPECIFIED ESSENTIAL                     

 

 02/10/2014            ADELFO BALDWIN MD                         250.00        
    DIABETES II CONTROLLED (UNCOMPLICATED)                     

 

 02/10/2014            ADELFO BALDWIN MD                         300.00        
    ANXIETY UNSPEC                     

 

 02/10/2014            ADELFO BALDWIN MD                         311            
DEPRESSIVE DISORDER NOS                     

 

 02/10/2014            ADELFO BALDWIN MD                         401.9         
   HYPERTENSION, UNSPECIFIED ESSENTIAL                     

 

 02/10/2014            ADELFO BALDWIN MD                         250.00        
    DIABETES II CONTROLLED (UNCOMPLICATED)                     

 

 02/10/2014            ADELFO BALDWIN MD                         300.00        
    ANXIETY UNSPEC                     

 

 02/10/2014            ADELFO BALDWIN MD                         311            
DEPRESSIVE DISORDER NOS                     

 

 02/10/2014            ADELFO BALDWIN MD                         401.9         
   HYPERTENSION, UNSPECIFIED ESSENTIAL                     

 

 02/10/2014            ADELFO BALDWIN MD                         250.00        
    DIABETES II CONTROLLED (UNCOMPLICATED)                     

 

 02/10/2014            ADELFO BALDWIN MD                         300.00        
    ANXIETY UNSPEC                     

 

 02/10/2014            ADELFO BALDWIN MD                         311            
DEPRESSIVE DISORDER NOS                     

 

 02/10/2014            ADELFO BALDWIN MD                         401.9         
   HYPERTENSION, UNSPECIFIED ESSENTIAL                     

 

 02/10/2014            ADELFO BALDWIN MD                         250.00        
    DIABETES II CONTROLLED (UNCOMPLICATED)                     

 

 02/10/2014            ADELFO BALDWIN MD                         300.00        
    ANXIETY UNSPEC                     

 

 02/10/2014            ADELFO BALDWIN MD                         311            
DEPRESSIVE DISORDER NOS                     

 

 02/10/2014            ADELFO BALDWIN MD                         401.9         
   HYPERTENSION, UNSPECIFIED ESSENTIAL                     

 

 02/10/2014            ADELFO BALDWIN MD                         250.00        
    DIABETES II CONTROLLED (UNCOMPLICATED)                     

 

 02/10/2014            ADELFO BALDWIN MD                         300.00        
    ANXIETY UNSPEC                     

 

 02/10/2014            ADELFO BALDWIN MD                         311            
DEPRESSIVE DISORDER NOS                     

 

 02/10/2014            ADELFO BALDWIN MD                         401.9         
   HYPERTENSION, UNSPECIFIED ESSENTIAL                     

 

 02/10/2014            ADELFO BALDWIN MD                         250.00        
    DIABETES II CONTROLLED (UNCOMPLICATED)                     

 

 02/10/2014            ADELFO BALDWIN MD                         300.00        
    ANXIETY UNSPEC                     

 

 02/10/2014            ADELFO BALDWIN MD                         311            
DEPRESSIVE DISORDER NOS                     

 

 02/10/2014            ADELFO BALDWIN MD                         401.9         
   HYPERTENSION, UNSPECIFIED ESSENTIAL                     

 

 02/10/2014            ADELFO BALDWIN MD                         250.00        
    DIABETES II CONTROLLED (UNCOMPLICATED)                     

 

 02/10/2014            ADELFO BALDWIN MD                         300.00        
    ANXIETY UNSPEC                     

 

 02/10/2014            ADELFO BALDWIN MD                         311            
DEPRESSIVE DISORDER NOS                     

 

 02/10/2014            ADELFO BALDWIN MD                         401.9         
   HYPERTENSION, UNSPECIFIED ESSENTIAL                     

 

 02/10/2014            ADELFO BALDWIN MD                         250.00        
    DIABETES II CONTROLLED (UNCOMPLICATED)                     

 

 02/10/2014            ADELFO BALDWIN MD                         300.00        
    ANXIETY UNSPEC                     

 

 02/10/2014            ADELFO BALDWIN MD                         311            
DEPRESSIVE DISORDER NOS                     

 

 02/10/2014            ADELFO BALDWIN MD                         401.9         
   HYPERTENSION, UNSPECIFIED ESSENTIAL                     

 

 02/10/2014            ADELFO BALDWIN MD                         250.00        
    DIABETES II CONTROLLED (UNCOMPLICATED)                     

 

 02/10/2014            ADELFO BALDWIN MD                         300.00        
    ANXIETY UNSPEC                     

 

 02/10/2014            ADELFO BALDWIN MD                         311            
DEPRESSIVE DISORDER NOS                     

 

 02/10/2014            ADELFO BALDWIN MD                         401.9         
   HYPERTENSION, UNSPECIFIED ESSENTIAL                     

 

 2014            ADELFO BALDWIN MD                         715.00        
    OSTEOARTHROSIS GENERALIZED INVOLVING UNSPECIFIED SITE                     

 

 2014            ADELFO BALDWIN MD                         719.06        
    EFFUSION OF LOWER LEG JOINT                     

 

 2014            ADELFO BALDWIN MD                         715.00        
    OSTEOARTHROSIS GENERALIZED INVOLVING UNSPECIFIED SITE                     

 

 2014            ADELFO BALDWIN MD                         719.06        
    EFFUSION OF LOWER LEG JOINT                     

 

 2014            ADELFO BALDWIN MD                         715.00        
    OSTEOARTHROSIS GENERALIZED INVOLVING UNSPECIFIED SITE                     

 

 2014            ADELFO BALDWIN MD                         719.06        
    EFFUSION OF LOWER LEG JOINT                     

 

 2014            ADELFO BALDWIN MD                         715.00        
    OSTEOARTHROSIS GENERALIZED INVOLVING UNSPECIFIED SITE                     

 

 2014            ADELFO BALDWIN MD                         719.06        
    EFFUSION OF LOWER LEG JOINT                     

 

 2014            ADELFO BALDWIN MD                         715.00        
    OSTEOARTHROSIS GENERALIZED INVOLVING UNSPECIFIED SITE                     

 

 2014            ADELFO BALDWIN MD                         719.06        
    EFFUSION OF LOWER LEG JOINT                     

 

 2014            ADELFO BALDWIN MD                         715.00        
    OSTEOARTHROSIS GENERALIZED INVOLVING UNSPECIFIED SITE                     

 

 2014            ADELFO BALDWIN MD                         719.06        
    EFFUSION OF LOWER LEG JOINT                     

 

 2014            ADELFO BALDWIN MD                         715.00        
    OSTEOARTHROSIS GENERALIZED INVOLVING UNSPECIFIED SITE                     

 

 2014            ADELFO BALDWIN MD                         719.06        
    EFFUSION OF LOWER LEG JOINT                     

 

 2014            ADELFO BALDWIN MD                         715.00        
    OSTEOARTHROSIS GENERALIZED INVOLVING UNSPECIFIED SITE                     

 

 2014            ADELFO BALDWIN MD                         719.06        
    EFFUSION OF LOWER LEG JOINT                     

 

 2014            ADELFO BALDWIN MD                         715.00        
    OSTEOARTHROSIS GENERALIZED INVOLVING UNSPECIFIED SITE                     

 

 2014            ADELFO BALDWIN MD                         719.06        
    EFFUSION OF LOWER LEG JOINT                     

 

 2014            ADELFO BALDWIN MD                         564.00        
    CONSTIPATION                     

 

 2014            ADELFO BALDWIN MD                         719.00        
    EFFUSION OF JOINT SITE UNSPECIFIED                     

 

 2014            ADELFO BALDWIN MD                         564.00        
    CONSTIPATION                     

 

 2014            ADEFLO BALDWIN MD                         719.00        
    EFFUSION OF JOINT SITE UNSPECIFIED                     

 

 2014            ADELFO BALDWIN MD                         564.00        
    CONSTIPATION                     

 

 2014            ADELFO BALDWIN MD                         719.00        
    EFFUSION OF JOINT SITE UNSPECIFIED                     

 

 2014            ADELFO BALDWIN MD                         564.00        
    CONSTIPATION                     

 

 2014            ADELFO BALDWIN MD                         719.00        
    EFFUSION OF JOINT SITE UNSPECIFIED                     

 

 2014            ADELFO BALDWIN MD                         564.00        
    CONSTIPATION                     

 

 2014            ADELFO BALDWIN MD                         719.00        
    EFFUSION OF JOINT SITE UNSPECIFIED                     

 

 2014            ADELFO BALDWIN MD                         564.00        
    CONSTIPATION                     

 

 2014            ADELFO BALDWIN MD                         719.00        
    EFFUSION OF JOINT SITE UNSPECIFIED                     

 

 2014            ADELFO BALDWIN MD                         564.00        
    CONSTIPATION                     

 

 2014            ADELFO BALDWIN MD                         719.00        
    EFFUSION OF JOINT SITE UNSPECIFIED                     

 

 2014            ADELFO BALDWIN MD                         564.00        
    CONSTIPATION                     

 

 2014            ADELFO BALDWIN MD                         719.00        
    EFFUSION OF JOINT SITE UNSPECIFIED                     

 

 2014            ADELFO BALDWIN MD                         272.4         
   HYPERLIPIDEMIA                     

 

 2014            ADELFO BALDWIN MD                         729.82        
    CRAMP OF LIMB                     

 

 2014            ADELFO BALDWIN MD                         780.79        
    FATIGUE                     

 

 2014            ADELFO BALDWIN MD                         783.1         
   WEIGHT GAIN ABNORMAL                     

 

 2014            ADELFO BALDWIN MD                         788.1         
   DYSURIA                     

 

 2014            ADELFO BALDWIN MD                         272.4         
   HYPERLIPIDEMIA                     

 

 2014            ADELFO BALDWIN MD                         729.82        
    CRAMP OF LIMB                     

 

 2014            ADELFO BALDWIN MD                         780.79        
    FATIGUE                     

 

 2014            ADELFO BALDWIN MD                         783.1         
   WEIGHT GAIN ABNORMAL                     

 

 2014            ADELFO BALDWIN MD                         788.1         
   DYSURIA                     

 

 2014            ADELFO BALDWIN MD                         272.4         
   HYPERLIPIDEMIA                     

 

 2014            ADELFO BALDWIN MD                         729.82        
    CRAMP OF LIMB                     

 

 2014            ADELFO BALDWIN MD                         780.79        
    FATIGUE                     

 

 2014            ADELFO BALDWIN MD                         783.1         
   WEIGHT GAIN ABNORMAL                     

 

 2014            ADELFO BALDWIN MD                         788.1         
   DYSURIA                     

 

 2014            ADELFO BALDWIN MD                         272.4         
   HYPERLIPIDEMIA                     

 

 2014            ADELFO BALDWIN MD                         729.82        
    CRAMP OF LIMB                     

 

 2014            ADELFO BALDWIN MD                         780.79        
    FATIGUE                     

 

 2014            ADELFO BALDWIN MD                         783.1         
   WEIGHT GAIN ABNORMAL                     

 

 2014            ADELFO BALDWIN MD                         788.1         
   DYSURIA                     

 

 2014            ADELFO BALDWIN MD                         272.4         
   HYPERLIPIDEMIA                     

 

 2014            ADELFO BALDWIN MD                         729.82        
    CRAMP OF LIMB                     

 

 2014            ADELFO BALDWIN MD                         780.79        
    FATIGUE                     

 

 2014            ADELFO BALDWIN MD                         783.1         
   WEIGHT GAIN ABNORMAL                     

 

 2014            ADELFO BALDWIN MD                         788.1         
   DYSURIA                     

 

 2014            ADELFO BALDWIN MD                         272.4         
   HYPERLIPIDEMIA                     

 

 2014            ADELFO BALDWIN MD                         729.82        
    CRAMP OF LIMB                     

 

 2014            ADELFO BALDWIN MD                         780.79        
    FATIGUE                     

 

 2014            ADELFO BALDWIN MD                         783.1         
   WEIGHT GAIN ABNORMAL                     

 

 2014            ADELFO BALDWIN MD                         788.1         
   DYSURIA                     

 

 2014            ADELFO BALDWIN MD                         272.4         
   HYPERLIPIDEMIA                     

 

 2014            ADELFO BALDWIN MD                         729.82        
    CRAMP OF LIMB                     

 

 2014            ADELFO BALDWIN MD                         780.79        
    FATIGUE                     

 

 2014            ADELFO BALDWIN MD                         783.1         
   WEIGHT GAIN ABNORMAL                     

 

 2014            ADELFO BALDWIN MD                         788.1         
   DYSURIA                     

 

 2014            ADELFO BALDWIN MD                         794.5         
   NONSPECIFIC ABNORMAL RESULTS OF FUNCTION STUDY OF THYROID                   
  

 

 2014            ADELFO BALDWIN MD                         794.5         
   NONSPECIFIC ABNORMAL RESULTS OF FUNCTION STUDY OF THYROID                   
  

 

 2014            ADELFO BALDWIN MD                         794.5         
   NONSPECIFIC ABNORMAL RESULTS OF FUNCTION STUDY OF THYROID                   
  

 

 2014            ADELFO BALDWIN MD                         794.5         
   NONSPECIFIC ABNORMAL RESULTS OF FUNCTION STUDY OF THYROID                   
  

 

 2014            ADELFO BALDWIN MD                         794.5         
   NONSPECIFIC ABNORMAL RESULTS OF FUNCTION STUDY OF THYROID                   
  

 

 2014            ADELFO BALDWIN MD                         794.5         
   NONSPECIFIC ABNORMAL RESULTS OF FUNCTION STUDY OF THYROID                   
  

 

 2014            ADELFO BALDWIN MD                         530.81        
    GERD                     

 

 2014            ADELFO BALDWIN MD                         719.46        
    PAIN- KNEE                     

 

 2014            ADELFO BALDWIN MD                         530.81        
    GERD                     

 

 2014            DENNY JIMÉNEZ, ADELFO TAVAREZ                         719.46        
    PAIN- KNEE                     

 

 2014            ADELFO BALDWIN MD                         530.81        
    GERD                     

 

 2014            ADELFO BALDWIN MD                         719.46        
    PAIN- KNEE                     

 

 2014            ADELFO BALDWIN MD                         530.81        
    GERD                     

 

 2014            ADELFO BALDWIN MD                         719.46        
    PAIN- KNEE                     

 

 2014            ADELFO BALDWIN MD                         530.81        
    GERD                     

 

 2014            ADELFO BALDWIN MD                         719.46        
    PAIN- KNEE                     

 

 2016            CURL, WESTON L PA-C            Ot            I73.9      
                            

 

 2016            CURL, WESTON L PA-C            Ot            I73.9      
                            

 

 2016            CURL, WESTON L PA-C            Ot            I73.9      
      PERIPHERAL VASCULAR DISEASE, UNSPECIFIED                     

 

 2016            CURL, WESTON L PA-C            Ot            I73.9      
      PERIPHERAL VASCULAR DISEASE, UNSPECIFIED                     

 

 2016            CURL, WESTON L PA-C            Ot            I73.9      
      PERIPHERAL VASCULAR DISEASE, UNSPECIFIED                     

 

 2016            CURL, WESTON L PA-C            Ot            I73.9      
      PERIPHERAL VASCULAR DISEASE, UNSPECIFIED                     

 

 2016            CURL, WESTON L PA-C            Ot            M17.12     
       UNILATERAL PRIMARY OSTEOARTHRITIS, LEFT                      

 

 2016            CURL, WESTON L PA-C            Ot            M23.304    
        OTHER MENISCUS DERANGEMENTS, UNSP MEDIAL                     

 

 2016            CURL, WESTON L PA-C            Ot            M25.462    
        EFFUSION, LEFT KNEE                     

 

 2016            CURL, WESTON L PA-C            Ot            M41.26     
       OTHER IDIOPATHIC SCOLIOSIS, LUMBAR REGIO                     

 

 2016            CURL, WESTON L PA-C            Ot            M47.816    
        SPONDYLOSIS W/O MYELOPATHY OR RADICULOPA                     

 

 2016            CURL, WESTON L PA-C            Ot            M54.41     
       LUMBAGO WITH SCIATICA, RIGHT SIDE                     

 

 2016            ANDRÉS JIMÉNEZ, BRISEYDA MCGUIRE            Ot            R07.9       
     CHEST PAIN, UNSPECIFIED                     

 

 2016            ANDRÉS JIMÉNEZ, BRISEYDA MCGUIRE            Ot            R07.9       
     CHEST PAIN, UNSPECIFIED                     

 

 2016            CURL, WESTON L PA-C            Ot            M17.12     
       UNILATERAL PRIMARY OSTEOARTHRITIS, LEFT                      

 

 2016            CURL, WESTON L PA-C            Ot            M23.304    
        OTHER MENISCUS DERANGEMENTS, UNSP MEDIAL                     

 

 2016            CURL, WESTON L PA-C            Ot            M25.462    
        EFFUSION, LEFT KNEE                     

 

 2016            CURL, WESTON L PA-C            Ot            M41.26     
       OTHER IDIOPATHIC SCOLIOSIS, LUMBAR REGIO                     

 

 2016            CURL, WESTON L PA-C            Ot            M47.816    
        SPONDYLOSIS W/O MYELOPATHY OR RADICULOPA                     

 

 2016            CURL, WESTON L PA-C            Ot            M54.41     
       LUMBAGO WITH SCIATICA, RIGHT SIDE                     

 

 2016            CURL, WESTON L PA-C            Ot            M17.12     
       UNILATERAL PRIMARY OSTEOARTHRITIS, LEFT                      

 

 2016            CURL, WESTON L PA-C            Ot            M23.304    
        OTHER MENISCUS DERANGEMENTS, UNSP MEDIAL                     

 

 2016            CURL, WESTON L PA-C            Ot            M25.462    
        EFFUSION, LEFT KNEE                     

 

 2016            CURL, WESTON L PA-C            Ot            M41.26     
       OTHER IDIOPATHIC SCOLIOSIS, LUMBAR REGIO                     

 

 2016            CURL, WESTON L PA-C            Ot            M47.816    
        SPONDYLOSIS W/O MYELOPATHY OR RADICULOPA                     

 

 2016            CURL, WESTON L PA-C            Ot            M54.41     
       LUMBAGO WITH SCIATICA, RIGHT SIDE                     

 

 2016            BRISEYDA BOWEN MD            Ot            R07.9       
     CHEST PAIN, UNSPECIFIED                     

 

 2016            BRISEYDA BOWEN MD            Ot            E11.9       
     TYPE 2 DIABETES MELLITUS WITHOUT COMPLIC                     

 

 2016            BRISEYDA BOWEN MD            Ot            E66.9       
     OBESITY, UNSPECIFIED                     

 

 2016            BRISEYDA BOWEN MD            Ot            E78.5       
     HYPERLIPIDEMIA, UNSPECIFIED                     

 

 2016            BRISEYDA BOWEN MD            Ot            F41.9       
     ANXIETY DISORDER, UNSPECIFIED                     

 

 2016            BRISEYDA BOWEN MD            Ot            I10         
   ESSENTIAL (PRIMARY) HYPERTENSION                     

 

 2016            BRISEYDA BOWEN MD            Ot            I25.10      
      ATHSCL HEART DISEASE OF NATIVE CORONARY                      

 

 2016            BRISEYDA BOWEN MD            Ot            I70.202     
       UNSP ATHSCL NATIVE ARTERIES OF EXTREMITI                     

 

 2016            BRISEYDA BOWEN MD            Ot            J44.9       
     CHRONIC OBSTRUCTIVE PULMONARY DISEASE, U                     

 

 2016            BRISEYDA BOWEN MD            Ot            M79.605     
       PAIN IN LEFT LEG                     

 

 2016            BRISEYDA BOWEN MD            Ot            R94.39      
      ABNORMAL RESULT OF OTHER CARDIOVASCULAR                      

 

 2016            BRISEYDA BOWEN MD            Ot            Z68.41      
      BODY MASS INDEX (BMI) 40.0-44.9, ADULT                     

 

 2016            BRISEYDA BOWEN MD            Ot            Z72.0       
     TOBACCO USE                     

 

 2016            BRISEYDA BOWEN MD, Ot            Z79.4       
     LONG TERM (CURRENT) USE OF INSULIN                     

 

 2016            BRISEYDA BOWEN MD, Ot            Z79.899     
       OTHER LONG TERM (CURRENT) DRUG THERAPY                     

 

 10/21/2016            RAH GARCIA MD            Ot            M51.16      
      INTERVERTEBRAL DISC DISORDERS W RADICULO                     

 

 10/21/2016            RAH GARCIA MD            Ot            M53.3       
     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                      

 

 2016            CHRISSY JIMÉNEZ, LIYAH MCGUIRE            Ot            R13.10    
        DYSPHAGIA, UNSPECIFIED                     

 

 2016            LIYAH LOWE MD            Ot            R13.10    
        DYSPHAGIA, UNSPECIFIED                     

 

 2016            CHRISSY JIMÉNEZ, LIYAH MCGUIRE            Ot            R13.10    
        DYSPHAGIA, UNSPECIFIED                     

 

 2017            NELSON MAI DO            Ot            R13.10      
      DYSPHAGIA, UNSPECIFIED                     

 

 2017            NELSON MAI DO            Ot            R19.5       
     OTHER FECAL ABNORMALITIES                     

 

 2017            NELSON MAI DO            Ot            Z01.818     
       ENCOUNTER FOR OTHER PREPROCEDURAL EXAMIN                     

 

 2017            NELSON MAI DO            Ot            R13.10      
      DYSPHAGIA, UNSPECIFIED                     

 

 2017            NELSON MAI DO            Ot            R19.5       
     OTHER FECAL ABNORMALITIES                     

 

 2017            NELSON MAI DO            Ot            Z01.818     
       ENCOUNTER FOR OTHER PREPROCEDURAL EXAMIN                     

 

 2017            NELSON MAI DO            Ot            D12.3       
     BENIGN NEOPLASM OF TRANSVERSE COLON                     

 

 2017            NELSON MAI DO            Ot            K21.0       
     GASTRO-ESOPHAGEAL REFLUX DISEASE WITH ES                     

 

 2017            NELSON MAI DO            Ot            K44.9       
     DIAPHRAGMATIC HERNIA WITHOUT OBSTRUCTION                     

 

 2017            NELSON MAI DO            Ot            K63.5       
     POLYP OF COLON                     

 

 02/10/2017            NELSON MAI DO            Ot            D12.3       
     BENIGN NEOPLASM OF TRANSVERSE COLON                     

 

 02/10/2017            NELSON MAI DO            Ot            K21.0       
     GASTRO-ESOPHAGEAL REFLUX DISEASE WITH ES                     

 

 02/10/2017            NELSON MAI DO            Ot            K44.9       
     DIAPHRAGMATIC HERNIA WITHOUT OBSTRUCTION                     

 

 2017            NELSON MAI DO            Ot            D12.3       
     BENIGN NEOPLASM OF TRANSVERSE COLON                     

 

 2017            NELSON MAI DO            Ot            K21.0       
     GASTRO-ESOPHAGEAL REFLUX DISEASE WITH ES                     

 

 2017            NELSON MAI DO            Ot            K44.9       
     DIAPHRAGMATIC HERNIA WITHOUT OBSTRUCTION                     

 

 2017            CURWESTON RECINOS PA-C            Ot            I73.9      
      PERIPHERAL VASCULAR DISEASE, UNSPECIFIED                     

 

 2017            CURL, WESTON RECINOS PA-C            Ot            M17.12     
       UNILATERAL PRIMARY OSTEOARTHRITIS, LEFT                      

 

 2017            CURLWESTON PA-C            Ot            M23.304    
        OTHER MENISCUS DERANGEMENTS, UNSP MEDIAL                     

 

 2017            CURLWESTON PA-C            Ot            M25.462    
        EFFUSION, LEFT KNEE                     

 

 2017            HECTORLWESTON PA-C            Ot            M41.26     
       OTHER IDIOPATHIC SCOLIOSIS, LUMBAR REGIO                     

 

 2017            CURL, WESTON RECINOS PA-C            Ot            M47.816    
        SPONDYLOSIS W/O MYELOPATHY OR RADICULOPA                     

 

 2017            WESTON TERRAZAS PA-C            Ot            M54.41     
       LUMBAGO WITH SCIATICA, RIGHT SIDE                     

 

 2017            ANDRÉS JIMÉNEZ, BRISEYDA MCGUIRE            Ot            R07.9       
     CHEST PAIN, UNSPECIFIED                     

 

 2017            CHRISSY JIMÉNEZ, LIYAH MCGUIRE            Ot            R13.10    
        DYSPHAGIA, UNSPECIFIED                     

 

 2017            NELSON MAI DO            Ot            Z01.818     
       ENCOUNTER FOR OTHER PREPROCEDURAL EXAMIN                     

 

 2017            NELSON MAI DO            Ot            Z86.010     
       PERSONAL HISTORY OF COLONIC POLYPS                     

 

 2017            NELSON MAI DO            Ot            Z01.818     
       ENCOUNTER FOR OTHER PREPROCEDURAL EXAMIN                     

 

 2017            NELSON MAI DO            Ot            Z86.010     
       PERSONAL HISTORY OF COLONIC POLYPS                     

 

 2017            NELSON MAI DO            Ot            Z01.818     
       ENCOUNTER FOR OTHER PREPROCEDURAL EXAMIN                     

 

 2017            NELSON MAI DO            Ot            Z86.010     
       PERSONAL HISTORY OF COLONIC POLYPS                     

 

 05/15/2017            NELSON MAI DO            Ot            D12.2       
     BENIGN NEOPLASM OF ASCENDING COLON                     

 

 05/15/2017            NELSON MAI DO            Ot            D12.3       
     BENIGN NEOPLASM OF TRANSVERSE COLON                     

 

 05/15/2017            NELSON MAI DO            Ot            E11.9       
     TYPE 2 DIABETES MELLITUS WITHOUT COMPLIC                     

 

 05/15/2017            NELSON MAI DO            Ot            E78.5       
     HYPERLIPIDEMIA, UNSPECIFIED                     

 

 05/15/2017            NELSON MAI DO            Ot            I10         
   ESSENTIAL (PRIMARY) HYPERTENSION                     

 

 05/15/2017            NELSON MAI DO            Ot            J44.9       
     CHRONIC OBSTRUCTIVE PULMONARY DISEASE, U                     

 

 05/15/2017            NELSON MAI DO            Ot            Z79.4       
     LONG TERM (CURRENT) USE OF INSULIN                     

 

 05/15/2017            NELSON MAI DO            Ot            Z79.899     
       OTHER LONG TERM (CURRENT) DRUG THERAPY                     

 

 2017            NELSON MAI DO            Ot            D12.2       
     BENIGN NEOPLASM OF ASCENDING COLON                     

 

 2017            NELSON MAI DO            Ot            D12.3       
     BENIGN NEOPLASM OF TRANSVERSE COLON                     

 

 2017            NELSON MAI DO            Ot            E11.9       
     TYPE 2 DIABETES MELLITUS WITHOUT COMPLIC                     

 

 2017            NELSON MAI DO            Ot            E78.5       
     HYPERLIPIDEMIA, UNSPECIFIED                     

 

 2017            NELSON MAI DO            Ot            I10         
   ESSENTIAL (PRIMARY) HYPERTENSION                     

 

 2017            NELSON MAI DO            Ot            J44.9       
     CHRONIC OBSTRUCTIVE PULMONARY DISEASE, U                     

 

 2017            NELSON MAI DO            Ot            Z79.4       
     LONG TERM (CURRENT) USE OF INSULIN                     

 

 2017            NELSON MAI DO            Ot            Z79.899     
       OTHER LONG TERM (CURRENT) DRUG THERAPY                     

 

 2017            NELSON MAI DO            Ot            D12.2       
     BENIGN NEOPLASM OF ASCENDING COLON                     

 

 2017            NELSON MAI DO            Ot            D12.3       
     BENIGN NEOPLASM OF TRANSVERSE COLON                     

 

 2017            NELSON MAI DO            Ot            E11.43      
      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                     

 

 2017            NELSON MAI DO            Ot            E78.5       
     HYPERLIPIDEMIA, UNSPECIFIED                     

 

 2017            NELSON MAI DO            Ot            F17.210     
       NICOTINE DEPENDENCE, CIGARETTES, UNCOMPL                     

 

 2017            NELSON MAI DO            Ot            F32.9       
     MAJOR DEPRESSIVE DISORDER, SINGLE EPISOD                     

 

 2017            NELSON MAI DO            Ot            F41.9       
     ANXIETY DISORDER, UNSPECIFIED                     

 

 2017            NELSON MAI DO            Ot            I10         
   ESSENTIAL (PRIMARY) HYPERTENSION                     

 

 2017            NELSON MAI DO            Ot            J44.9       
     CHRONIC OBSTRUCTIVE PULMONARY DISEASE, U                     

 

 2017            NELSON MAI DO            Ot            K59.00      
      CONSTIPATION, UNSPECIFIED                     

 

 2017            NELSON MAI DO            Ot            R19.7       
     DIARRHEA, UNSPECIFIED                     

 

 2017            NELSON MAI DO            Ot            Z09         
   ENCNTR FOR F/U EXAM AFT TRTMT FOR COND O                     

 

 2017            NELSON MAI DO            Ot            Z79.4       
     LONG TERM (CURRENT) USE OF INSULIN                     

 

 2017            NELSON MAI DO            Ot            Z79.84      
      LONG TERM (CURRENT) USE OF ORAL HYPOGLYC                     

 

 2017            NELSON MAI DO            Ot            Z79.899     
       OTHER LONG TERM (CURRENT) DRUG THERAPY                     

 

 2017            NELSON MAI DO            Ot            Z86.010     
       PERSONAL HISTORY OF COLONIC POLYPS                     

 

 08/10/2017            NELSON MAI DO            Ot            D12.2       
     BENIGN NEOPLASM OF ASCENDING COLON                     

 

 08/10/2017            NELSON MAI DO            Ot            D12.3       
     BENIGN NEOPLASM OF TRANSVERSE COLON                     

 

 08/10/2017            NELSON MAI DO            Ot            E11.43      
      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                     

 

 08/10/2017            NELSON MAI DO            Ot            E78.5       
     HYPERLIPIDEMIA, UNSPECIFIED                     

 

 08/10/2017            NELSON MAI DO            Ot            F17.210     
       NICOTINE DEPENDENCE, CIGARETTES, UNCOMPL                     

 

 08/10/2017            NELSON MAI DO            Ot            F32.9       
     MAJOR DEPRESSIVE DISORDER, SINGLE EPISOD                     

 

 08/10/2017            NELSON MAI DO            Ot            F41.9       
     ANXIETY DISORDER, UNSPECIFIED                     

 

 08/10/2017            NELSON MAI DO            Ot            I10         
   ESSENTIAL (PRIMARY) HYPERTENSION                     

 

 08/10/2017            NELSON MAI DO            Ot            J44.9       
     CHRONIC OBSTRUCTIVE PULMONARY DISEASE, U                     

 

 08/10/2017            NELSON MAI DO            Ot            K59.00      
      CONSTIPATION, UNSPECIFIED                     

 

 08/10/2017            NELSON MAI DO            Ot            R19.7       
     DIARRHEA, UNSPECIFIED                     

 

 08/10/2017            NELSON MAI DO            Ot            Z09         
   ENCNTR FOR F/U EXAM AFT TRTMT FOR COND O                     

 

 08/10/2017            NELSON MAI DO            Ot            Z79.4       
     LONG TERM (CURRENT) USE OF INSULIN                     

 

 08/10/2017            NELSON MAI DO            Ot            Z79.84      
      LONG TERM (CURRENT) USE OF ORAL HYPOGLYC                     

 

 08/10/2017            NELSON MAI DO            Ot            Z79.899     
       OTHER LONG TERM (CURRENT) DRUG THERAPY                     

 

 08/10/2017            NELSON MAI DO            Ot            Z86.010     
       PERSONAL HISTORY OF COLONIC POLYPS                     

 

 2017            NELSON MAI DO            Ot            D12.2       
     BENIGN NEOPLASM OF ASCENDING COLON                     

 

 2017            NELSON MAI DO            Ot            D12.3       
     BENIGN NEOPLASM OF TRANSVERSE COLON                     

 

 2017            NELSON MAI DO            Ot            E11.43      
      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                     

 

 2017            NELSON MAI DO            Ot            E78.5       
     HYPERLIPIDEMIA, UNSPECIFIED                     

 

 2017            NELSON MAI DO            Ot            F17.210     
       NICOTINE DEPENDENCE, CIGARETTES, UNCOMPL                     

 

 2017            NELSON MAI DO            Ot            F32.9       
     MAJOR DEPRESSIVE DISORDER, SINGLE EPISOD                     

 

 2017            NELSON MAI DO            Ot            F41.9       
     ANXIETY DISORDER, UNSPECIFIED                     

 

 2017            NELSON MAI DO            Ot            I10         
   ESSENTIAL (PRIMARY) HYPERTENSION                     

 

 2017            NELSON MAI DO            Ot            J44.9       
     CHRONIC OBSTRUCTIVE PULMONARY DISEASE, U                     

 

 2017            NELSON MAI DO            Ot            K59.00      
      CONSTIPATION, UNSPECIFIED                     

 

 2017            NELSON MAI DO            Ot            R19.7       
     DIARRHEA, UNSPECIFIED                     

 

 2017            NELSON MAI DO            Ot            Z09         
   ENCNTR FOR F/U EXAM AFT TRTMT FOR COND O                     

 

 2017            NELSON MAI DO            Ot            Z79.4       
     LONG TERM (CURRENT) USE OF INSULIN                     

 

 2017            NELSON MAI DO            Ot            Z79.84      
      LONG TERM (CURRENT) USE OF ORAL HYPOGLYC                     

 

 2017            NELSON MAI DO            Ot            Z79.899     
       OTHER LONG TERM (CURRENT) DRUG THERAPY                     

 

 2017            NELSON MAI DO            Ot            Z86.010     
       PERSONAL HISTORY OF COLONIC POLYPS                     

 

 2017            NELSON MAI DO            Ot            D12.2       
     BENIGN NEOPLASM OF ASCENDING COLON                     

 

 2017            MAI DO, NELSON D            Ot            D12.3       
     BENIGN NEOPLASM OF TRANSVERSE COLON                     

 

 2017            MAI DO, NELSON D            Ot            E11.9       
     TYPE 2 DIABETES MELLITUS WITHOUT COMPLIC                     

 

 2017            MAI DO, NELSON D            Ot            E78.5       
     HYPERLIPIDEMIA, UNSPECIFIED                     

 

 2017            MAI DO, NELOSN D            Ot            I10         
   ESSENTIAL (PRIMARY) HYPERTENSION                     

 

 2017            MAI DO, NELSON D            Ot            J44.9       
     CHRONIC OBSTRUCTIVE PULMONARY DISEASE, U                     

 

 2017            MAI DO NELSON D            Ot            Z79.4       
     LONG TERM (CURRENT) USE OF INSULIN                     

 

 2017            MAI DO NELSON D            Ot            Z79.899     
       OTHER LONG TERM (CURRENT) DRUG THERAPY                     

 

 2017            MAI DO, NELSON D            Ot            D12.2       
     BENIGN NEOPLASM OF ASCENDING COLON                     

 

 2017            MAI DO, NELSON D            Ot            D12.3       
     BENIGN NEOPLASM OF TRANSVERSE COLON                     

 

 2017            MAI DO, NELSON D            Ot            E11.9       
     TYPE 2 DIABETES MELLITUS WITHOUT COMPLIC                     

 

 2017            MAI DOADILIATT D            Ot            E78.5       
     HYPERLIPIDEMIA, UNSPECIFIED                     

 

 2017            MAI DO, NELSON D            Ot            I10         
   ESSENTIAL (PRIMARY) HYPERTENSION                     

 

 2017            MAI DOADILIATT D            Ot            J44.9       
     CHRONIC OBSTRUCTIVE PULMONARY DISEASE, U                     

 

 2017            MAI DO, NELSON D            Ot            Z79.4       
     LONG TERM (CURRENT) USE OF INSULIN                     

 

 2017            MAI ADILIA JOHNSONTT D            Ot            Z79.899     
       OTHER LONG TERM (CURRENT) DRUG THERAPY                     

 

 09/15/2017            WESTON TERRAZAS-ANKITA            Ot            I73.9      
      PERIPHERAL VASCULAR DISEASE, UNSPECIFIED                     

 

 09/15/2017            WESTON TERRAZAS-C            Ot            M17.12     
       UNILATERAL PRIMARY OSTEOARTHRITIS, LEFT                      

 

 09/15/2017            WESTON TERRAZAS-C            Ot            M23.304    
        OTHER MENISCUS DERANGEMENTS, UNSP MEDIAL                     

 

 09/15/2017            WESTON TERRAZASC            Ot            M25.462    
        EFFUSION, LEFT KNEE                     

 

 09/15/2017            WESTON TERRAZASC            Ot            M41.26     
       OTHER IDIOPATHIC SCOLIOSIS, LUMBAR REGIO                     

 

 09/15/2017            WESTON TERRAZASC            Ot            M47.816    
        SPONDYLOSIS W/O MYELOPATHY OR RADICULOPA                     

 

 09/15/2017            CURL, WESTON L PA-C            Ot            M54.41     
       LUMBAGO WITH SCIATICA, RIGHT SIDE                     

 

 09/15/2017            ANDRÉS JIMÉNEZ, BRISEYDA MCGUIRE            Ot            R07.9       
     CHEST PAIN, UNSPECIFIED                     

 

 09/15/2017            CHRISSY JIMÉNEZ, LIYAH MCGUIRE            Ot            R13.10    
        DYSPHAGIA, UNSPECIFIED                     

 

 09/15/2017            NELSON MAI DO            Ot            D12.2       
     BENIGN NEOPLASM OF ASCENDING COLON                     

 

 09/15/2017            NELSON MAI DO            Ot            D12.3       
     BENIGN NEOPLASM OF TRANSVERSE COLON                     

 

 09/15/2017            NELSON MAI DO            Ot            E11.9       
     TYPE 2 DIABETES MELLITUS WITHOUT COMPLIC                     

 

 09/15/2017            NELSON MAI DO            Ot            E78.5       
     HYPERLIPIDEMIA, UNSPECIFIED                     

 

 09/15/2017            NELSON MAI DO            Ot            I10         
   ESSENTIAL (PRIMARY) HYPERTENSION                     

 

 09/15/2017            NELSON MAI DO            Ot            J44.9       
     CHRONIC OBSTRUCTIVE PULMONARY DISEASE, U                     

 

 09/15/2017            NELSON MAI DO            Ot            Z79.4       
     LONG TERM (CURRENT) USE OF INSULIN                     

 

 09/15/2017            NELSON MAI DO            Ot            Z79.899     
       OTHER LONG TERM (CURRENT) DRUG THERAPY                     

 

 2017            WESTON TERRAZAS PA-C            Ot            I73.9      
      PERIPHERAL VASCULAR DISEASE, UNSPECIFIED                     

 

 2017            WESTON TERRAZAS PA-C            Ot            M17.12     
       UNILATERAL PRIMARY OSTEOARTHRITIS, LEFT                      

 

 2017            WESTON TERRAZAS PA-C            Ot            M23.304    
        OTHER MENISCUS DERANGEMENTS, UNSP MEDIAL                     

 

 2017            WESTON TERRAZAS PA-C            Ot            M25.462    
        EFFUSION, LEFT KNEE                     

 

 2017            WESTON TERRAZAS PA-C            Ot            M41.26     
       OTHER IDIOPATHIC SCOLIOSIS, LUMBAR REGIO                     

 

 2017            WESTON TERRAZAS PA-C            Ot            M47.816    
        SPONDYLOSIS W/O MYELOPATHY OR RADICULOPA                     

 

 2017            WESTON TERRAZAS PA-C            Ot            M54.41     
       LUMBAGO WITH SCIATICA, RIGHT SIDE                     

 

 2017            ANDRÉS JIMÉNEZ, BRISEYDA MCGUIRE            Ot            R07.9       
     CHEST PAIN, UNSPECIFIED                     

 

 2017            CHRISSY JIMÉNEZ, LIYAH MCGUIRE            Ot            R13.10    
        DYSPHAGIA, UNSPECIFIED                     

 

 2017            NELSON MAI DO            Ot            D12.2       
     BENIGN NEOPLASM OF ASCENDING COLON                     

 

 2017            MAI DO, NELSON D            Ot            D12.3       
     BENIGN NEOPLASM OF TRANSVERSE COLON                     

 

 2017            MAI DO, NLESON D            Ot            E11.9       
     TYPE 2 DIABETES MELLITUS WITHOUT COMPLIC                     

 

 2017            MAI DO, NELSON D            Ot            E78.5       
     HYPERLIPIDEMIA, UNSPECIFIED                     

 

 2017            MAI DO, NELSON D            Ot            I10         
   ESSENTIAL (PRIMARY) HYPERTENSION                     

 

 2017            MAI DO NELSON D            Ot            J44.9       
     CHRONIC OBSTRUCTIVE PULMONARY DISEASE, U                     

 

 2017            MAI DO NELSON D            Ot            Z79.4       
     LONG TERM (CURRENT) USE OF INSULIN                     

 

 2017            MAI DO, NELSON D            Ot            Z79.899     
       OTHER LONG TERM (CURRENT) DRUG THERAPY                     

 

 10/11/2017            MAI DO, NELSON D            Ot            D12.2       
     BENIGN NEOPLASM OF ASCENDING COLON                     

 

 10/11/2017            MAI DO, NELSON D            Ot            D12.3       
     BENIGN NEOPLASM OF TRANSVERSE COLON                     

 

 10/11/2017            MAI DO, NELSON D            Ot            E11.9       
     TYPE 2 DIABETES MELLITUS WITHOUT COMPLIC                     

 

 10/11/2017            SERGEI DOADILIATT D            Ot            E78.5       
     HYPERLIPIDEMIA, UNSPECIFIED                     

 

 10/11/2017            MAI DO, NELSON D            Ot            I10         
   ESSENTIAL (PRIMARY) HYPERTENSION                     

 

 10/11/2017            MAI DO, NELSON D            Ot            J44.9       
     CHRONIC OBSTRUCTIVE PULMONARY DISEASE, U                     

 

 10/11/2017            MAI DOADILIATT D            Ot            Z79.4       
     LONG TERM (CURRENT) USE OF INSULIN                     

 

 10/11/2017            MAI DO NELSON D            Ot            Z79.899     
       OTHER LONG TERM (CURRENT) DRUG THERAPY                     

 

 10/23/2017            MAI DO NELSON D            Ot            D12.2       
     BENIGN NEOPLASM OF ASCENDING COLON                     

 

 10/23/2017            MAI DO, NELSON D            Ot            D12.3       
     BENIGN NEOPLASM OF TRANSVERSE COLON                     

 

 10/23/2017            MAI DO, NELSON D            Ot            E11.9       
     TYPE 2 DIABETES MELLITUS WITHOUT COMPLIC                     

 

 10/23/2017            MAI DO, NELSON D            Ot            E78.5       
     HYPERLIPIDEMIA, UNSPECIFIED                     

 

 10/23/2017            MAI DO, NELSON D            Ot            I10         
   ESSENTIAL (PRIMARY) HYPERTENSION                     

 

 10/23/2017            MAI DO, NELSON D            Ot            J44.9       
     CHRONIC OBSTRUCTIVE PULMONARY DISEASE, U                     

 

 10/23/2017            MAI DOADILIATT D            Ot            Z79.4       
     LONG TERM (CURRENT) USE OF INSULIN                     

 

 10/23/2017            MAI DO, ENLSON D            Ot            Z79.899     
       OTHER LONG TERM (CURRENT) DRUG THERAPY                     



                                                                               
                                                                               
                                                                               
                                                                               
                                                                               
                                                                               
                                                                               
                                                                               
                                                



Procedures

      





 Code            Description            Performed By            Performed On   
     

 

                                               DRAIN/INJECT JOINT/BURSA   
                                2014        

 

                                               KENALOG INJ, PER 10 MG     
                              2014        

 

             39149                                  A1C (IN-HOUSE)             
                      2014        

 

             11772                                  ROUTINE VENIPUNCTURE       
                            2014        

 

             44815                                  CAPILLARY BLOOD DRAW       
                            2014        

 

             06512                                  UA LONG DIP                
                   2014        

 

             46510                                  CMP                        
           2014        

 

             91589                                  LIPID PANEL                
                   2014        

 

             79811                                  MAGNESIUM                  
                 2014        

 

             1125213                                  GFR CALC (RESULT ONLY)   
                                2014        

 

             08647                                  CBC                        
           2014        

 

             23539                                  TSH                        
           2014        

 

             62970                                  T4 TOTAL                   
                2014        

 

             68022                                  TSH                        
           2014        

 

             75420                                  XRAY KNEE LEFT 3 VIEWS     
                              10/09/2014        

 

             94039                                  CAPILLARY BLOOD DRAW       
                            10/31/2014        

 

             00223                                  A1C (IN-HOUSE)             
                      10/31/2014        

 

             51095                                  ROUTINE VENIPUNCTURE       
                            2015        

 

             87825                                  BMP                        
           2015        

 

             79805                                  MAGNESIUM                  
                 2015        

 

             [id number]                                  GFR CALC (RESULT ONLY)   
                                2015        



                                                          



Results

      





 Test            Result            Range        









 Complete urinalysis with reflex to culture - 16 09:20         









 Urine color determination            YELLOW             NRG        

 

 Urine clarity determination            CLEAR             NRG        

 

 Urine pH measurement by test strip            7             5-9        

 

 Specific gravity of urine by test strip            1.010             1.016-
1.022        

 

 Urine protein assay by test strip, semi-quantitative            NEGATIVE      
       NEGATIVE        

 

 Urine glucose detection by automated test strip            NEGATIVE           
  NEGATIVE        

 

 Erythrocytes detection in urine sediment by light microscopy            
NEGATIVE             NEGATIVE        

 

 Urine ketones detection by automated test strip            NEGATIVE           
  NEGATIVE        

 

 Urine nitrite detection by test strip            NEGATIVE             NEGATIVE
        

 

 Urine total bilirubin detection by test strip            NEGATIVE             
NEGATIVE        

 

 Urine urobilinogen measurement by automated test strip (mass/volume)          
  NORMAL             NORMAL        

 

 Urine leukocyte esterase detection by dipstick            NEGATIVE             
NEGATIVE        

 

 Automated urine sediment erythrocyte count by microscopy (number/high power 
field)            NONE             NRG        

 

 Automated urine sediment leukocyte count by microscopy (number/high power field
)            NONE             NRG        

 

 Bacteria detection in urine sediment by light microscopy            TRACE     
        NRG        

 

 Squamous epithelial cells detection in urine sediment by light microscopy     
       2-5             NRG        

 

 Crystals detection in urine sediment by light microscopy            NONE      
       NRG        

 

 Casts detection in urine sediment by light microscopy            NONE         
    NRG        

 

 Mucus detection in urine sediment by light microscopy            NEGATIVE     
        NRG        

 

 Complete urinalysis with reflex to culture            NO             NRG      
  









 Automated blood complete blood count (hemogram) panel - 16 09:47         









 Blood leukocytes automated count (number/volume)            8.0 10*3/uL       
     4.3-11.0        

 

 Blood erythrocytes automated count (number/volume)            4.33 10*6/uL    
        4.35-5.85        

 

 Venous blood hemoglobin measurement (mass/volume)            14.4 g/dL        
    11.5-16.0        

 

 Blood hematocrit (volume fraction)            42 %            35-52        

 

 Automated erythrocyte mean corpuscular volume            97 [foz_us]          
  80-99        

 

 Automated erythrocyte mean corpuscular hemoglobin (mass per erythrocyte)      
      33 pg            25-34        

 

 Automated erythrocyte mean corpuscular hemoglobin concentration measurement (
mass/volume)            34 g/dL            32-36        

 

 Automated erythrocyte distribution width ratio            12.9 %            
10.0-14.5        

 

 Automated blood platelet count (count/volume)            127 10*3/uL          
  130-400        

 

 Automated blood platelet mean volume measurement            12.4 [foz_us]     
       7.4-10.4        









 PT panel in platelet poor plasma by coagulation assay - 16 09:47         









 Prothrombin time (PT) in platelet poor plasma by coagulation assay            
12.3 s            12.2-14.7        

 

 INR in platelet poor plasma or blood by coagulation assay            0.9      
       0.8-1.4        









 Activated partial thromboplastin time (aPTT) in platelet poor plasma 
bycoagulation assay - 16 09:47         









 Activated partial thromboplastin time (aPTT) in platelet poor plasma 
bycoagulation assay            29 s            24-35        









 Comprehensive metabolic panel - 16 09:47         









 Serum or plasma sodium measurement (moles/volume)            139 mmol/L       
     135-145        

 

 Serum or plasma potassium measurement (moles/volume)            4.3 mmol/L    
        3.6-5.0        

 

 Serum or plasma chloride measurement (moles/volume)            106 mmol/L     
               

 

 Carbon dioxide            23 mmol/L            21-32        

 

 Serum or plasma anion gap determination (moles/volume)            10 mmol/L   
         5-14        

 

 Serum or plasma urea nitrogen measurement (mass/volume)            11 mg/dL   
         7-18        

 

 Serum or plasma creatinine measurement (mass/volume)            0.82 mg/dL    
        0.60-1.30        

 

 Serum or plasma urea nitrogen/creatinine mass ratio            13             
NRG        

 

 Serum or plasma creatinine measurement with calculation of estimated 
glomerular filtration rate            >             NRG        

 

 Serum or plasma glucose measurement (mass/volume)            196 mg/dL        
            

 

 Serum or plasma calcium measurement (mass/volume)            9.4 mg/dL        
    8.5-10.1        

 

 Serum or plasma total bilirubin measurement (mass/volume)            0.6 mg/dL
            0.1-1.0        

 

 Serum or plasma alkaline phosphatase measurement (enzymatic activity/volume)  
          115 U/L                    

 

 Serum or plasma aspartate aminotransferase measurement (enzymatic activity/
volume)            18 U/L            5-34        

 

 Serum or plasma alanine aminotransferase measurement (enzymatic activity/volume
)            25 U/L            0-55        

 

 Serum or plasma protein measurement (mass/volume)            6.6 g/dL         
   6.4-8.2        

 

 Serum or plasma albumin measurement (mass/volume)            4.3 g/dL         
   3.2-4.5        









 Lipid 1996 panel - 16 09:47         









 Serum or plasma triglyceride measurement (mass/volume)            193 mg/dL   
         <150        

 

 Serum or plasma cholesterol measurement (mass/volume)            151 mg/dL    
        < 200        

 

 Serum or plasma cholesterol in HDL measurement (mass/volume)            35 mg/
dL            40-60        

 

 Cholesterol in LDL [mass/volume] in serum or plasma by direct assay            
88 mg/dL            1-129        

 

 Serum or plasma cholesterol in VLDL measurement (mass/volume)            39 mg/
dL            5-40        









 Methicillin resistant Staphylococcus aureus (MRSA) screening culture -  09:47         









 Methicillin resistant Staphylococcus aureus (MRSA) screening culture          
  NEG             [facility]        









 Capillary blood glucose measurement by glucometer (mass/volume) - 16 13:
42         









 Capillary blood glucose measurement by glucometer (mass/volume)            145 
mg/dL                    









 Urine beta human chorionic gonadotropin (hCG) measurement - 17 13:00    
     









 Urine beta human chorionic gonadotropin (hCG) measurement            NEGATIVE 
            NEGATIVE        









 Capillary blood glucose measurement by glucometer (mass/volume) - 17 13:
13         









 Capillary blood glucose measurement by glucometer (mass/volume)            154 
mg/dL                    









 Capillary blood glucose measurement by glucometer (mass/volume) - 17 12:
50         









 Capillary blood glucose measurement by glucometer (mass/volume)            108 
mg/dL                    









 Urine beta human chorionic gonadotropin (hCG) measurement - 17 13:05    
     









 Urine beta human chorionic gonadotropin (hCG) measurement            NEGATIVE 
            NEGATIVE        









 Capillary blood glucose measurement by glucometer (mass/volume) - 17 13:
18         









 Capillary blood glucose measurement by glucometer (mass/volume)            77 
mg/dL                    



                                        



Encounters

      





 ACCT No.            Visit Date/Time            Discharge            Status    
        Pt. Type            Provider            Facility            Loc./Unit  
          Complaint        

 

 [id number]            2015 09:52:00            2015 23:59:59          
  CLS            Outpatient            ADELFO BALDWIN MD                       
                        

 

 [id number]            2015 10:05:00            2015 23:59:59          
  CLS            Outpatient            ADELFO BALDWIN MD                       
                        

 

 [id number]            2014 10:40:00            2014 23:59:59          
  CLS            Outpatient            ADELFO BALDWIN MD                       
                        

 

 [id number]            10/31/2014 14:02:00            10/31/2014 23:59:59          
  CLS            Outpatient            ADELFO BALDWIN MD                       
                        

 

 [id number]            2014 09:48:00            2014 23:59:59          
  CLS            Outpatient            ADELFO BALDWIN MD                       
                        

 

 [id number]            2014 09:03:00            2014 23:59:59          
  CLS            Outpatient            ADELFO BALDWIN MD                       
                        

 

 [id number]            2014 08:22:00            2014 23:59:59          
  CLS            Outpatient            ADELFO BALDWIN MD                       
                        

 

 [id number]            2014 13:17:00            2014 23:59:59          
  CLS            Outpatient            ADELFO BALDWIN MD                       
                        

 

 [id number]            2014 14:52:00            2014 23:59:59          
  CLS            Outpatient            ADELFO BALDWIN MD                       
                        

 

 [id number]            02/10/2014 10:48:00            02/10/2014 23:59:59          
  CLS            Outpatient            ENOCH KRAFT DO                        
                       

 

 [id number]            2017 12:57:00            2017 15:00:00    
        DIS            Outpatient            NELSON MAI DO            [facility] 
[facility]            ENDO            HISTORY OF POLYPS      
  

 

 [id number]            2017 11:57:00            2017 23:59:59    
        CLS            Outpatient            NELSON MAI DO            [facility] 
[facility]            ENDO            HX OF POLYPS        

 

 [id number]            2017 06:10:00            2017 11:13:00    
        DIS            Outpatient            NELSON MAI DO            [facility] 
[facility]            PREOP            HX OF POLYPS        

 

 [id number]            2017 12:51:00            2017 15:50:00    
        DIS            Outpatient            NELSON MAI DO            [facility] 
[facility]            ENDO            BLOOD IN STOOLS;
DYSPHAGIA        

 

 [id number]            2017 09:27:00            2017 12:24:00    
        DIS            Outpatient            NELSON MAI DO            [facility] 
[facility]            PREOP            BLOOD IN STOOLS;
DYSPHAGIA        

 

 [id number]            2016 10:00:00            2016 23:59:59    
        CLS            Outpatient            LIYAH LOWE MD            
[facility]            RAD            DYSPHAGIA        

 

 [id number]            10/21/2016 10:15:00            10/21/2016 11:49:00    
        DIS            Outpatient            RAH GARCIA MD            [facility] 
[facility]            CARD            M51.16, M53.3        

 

 [id number]            2016 08:40:00            2016 16:05:00    
        DIS            Outpatient            BRISEYDA BOWEN MD            [facility] 
[facility]            CATH            PVP,CP,HTN, HLP        

 

 [id number]            08/10/2016 09:29:00            08/10/2016 23:59:59    
        CLS            Outpatient            BRISEYDA BOWEN MD            [facility] 
[facility]            CARD            CHEST PAIN        

 

 [id number]            2016 12:38:00            2016 23:59:59    
        CLS            Outpatient            WESTON TERRAZAS PA-C            [facility] 
[facility]            RAD            EFFUSION LEFT KNEE,LOW 
BACK PAIN        

 

 [id number]            2016 12:24:00            2016 23:59:59    
        CLS            Outpatient            WESTON TERRAZAS PA-C            [facility] 
[facility]            RAD            BILATERAL CLAUDICATION  
      

 

 [id number]            2018 12:00:00                         PEN       
     Preadmit            NELSON MAI DO            [facility]            ENDO            HX OF POLYPS

## 2018-02-20 NOTE — XMS REPORT
[facility]

 Created on: 2017



Duyen Larkin

External Reference #: [id number]

: 1965

Sex: Female



Demographics







 Address  [address] [place]  [address]

 

 Preferred Language  Unknown

 

 Marital Status  Unknown

 

 Jain Affiliation  Unknown

 

 Race  Unknown

 

 Ethnic Group  Unknown





Author







 Author  LIYAH LOWE

 

 [facility]

 

 Address  [address] [place]  [address]



 

 Phone  [phone]







Care Team Providers







 Care Team Member Name  Role  Phone

 

 LIYAH LOWE  Unavailable  [phone]







PROBLEMS







 Type  Condition  ICD9-CM Code  ICS54-FJ Code  Onset Dates  Condition Status  
SNOMED Code

 

 Problem  Effusion of lower leg joint  719.06        Active  829335167

 

 Problem  Hyperlipidemia LDL goal <130     E78.5     Active  84193017

 

 Problem  Esophageal reflux  530.81        Active  801447087

 

 Problem  Dysphagia, unspecified type     R13.10     Active  54274276

 

 Problem  Other hyperlipidemia     E78.4     Active  55096928

 

 Problem  Unilateral primary osteoarthritis, left knee     M17.12     Active  
758894845

 

 Problem  COPD with exacerbation     J44.1     Active  214423352

 

 Problem  Hypothyroidism (acquired)     E03.9     Active  834708142

 

 Problem  Diabetic polyneuropathy associated with type 2 diabetes mellitus     
E11.42     Active  27336092

 

 Problem  Primary osteoarthritis of both knees     M17.0     Active  600369575

 

 Problem  Claudication     I73.9     Active  935069797

 

 Problem  Primary generalized (osteo)arthritis     M15.0     Active  514523233

 

 Problem  Type 2 diabetes mellitus without complications     E11.9     Active  
691214977

 

 Problem  Chronic obstructive pulmonary disease with acute exacerbation     
J44.1     Active  994414274

 

 Problem  Mild episode of recurrent major depressive disorder     F33.0     
Active  369441522

 

 Problem  Type 2 diabetes mellitus with diabetic neuropathy, without long-term 
current use of insulin     E11.40     Active  73367787

 

 Problem  Chronic renal insufficiency, stage II (mild)     N18.2     Active  
438100456

 

 Problem  Peripheral arterial occlusive disease     I77.9     Active  019203813

 

 Problem  Major depressive disorder, single episode, unspecified     F32.9     
Active  51041770

 

 Problem  Cataracts, bilateral     H26.9     Active  18090409

 

 Problem  Essential (primary) hypertension     I10     Active  40399232

 

 Problem  Type 2 diabetes mellitus with hyperglycemia     E11.65     Active  
95366274

 

 Problem  Old tear of lateral meniscus of left knee, unspecified tear type     
M23.201     Active  551352552

 

 Problem  Anxiety disorder, unspecified     F41.9     Active  936096811

 

 Problem  Effusion, left knee     M25.462     Active  594653669







ALLERGIES

No Information



SOCIAL HISTORY

Never Assessed



PLAN OF CARE





VITAL SIGNS





MEDICATIONS

Unknown Medications



RESULTS

No Results



PROCEDURES

No Known procedures



IMMUNIZATIONS

No Known Immunizations



MEDICAL (GENERAL) HISTORY







 Type  Description  Date

 

 Medical History  Diabetes mellitus without mention of complication, type II or 
unspecified type, not stated as uncontrolled   

 

 Medical History  Depressive disorder, not elsewhere classified   

 

 Medical History  Anxiety state, unspecified   

 

 Medical History  Effusion of lower leg joint   

 

 Medical History  Generalized osteoarthrosis, unspecified site   

 

 Medical History  Other and unspecified hyperlipidemia   

 

 Medical History  Abnormal weight gain   

 

 Medical History  Effusion of joint, site unspecified   

 

 Medical History  Esophageal reflux   

 

 Medical History  hypertension   

 

 Medical History  colonoscopy- inscreased polyp   

 

 Surgical History  Tonsillectomy   

 

 Surgical History  Orthopedic: Bilateral Knee x2   

 

 Surgical History  colonoscopy  2017

 

 Hospitalization History  Surgery(s)/Childbirth(s) only

## 2018-02-20 NOTE — XMS REPORT
[facility]

 Created on: 2017



Duyen Larkin

External Reference #: [id number]

: 1965

Sex: Female



Demographics







 Address  [address] [place]  [address]

 

 Preferred Language  Unknown

 

 Marital Status  Unknown

 

 Druze Affiliation  Unknown

 

 Race  Unknown

 

 Ethnic Group  Unknown





Author







 Author  LIYAH LOWE

 

 [facility]

 

 Address  [address] [place]  [address]



 

 Phone  [phone]







Care Team Providers







 Care Team Member Name  Role  Phone

 

 LIYAH LOWE  Unavailable  [phone]







PROBLEMS







 Type  Condition  ICD9-CM Code  OSM71-MI Code  Onset Dates  Condition Status  
SNOMED Code

 

 Problem  Unilateral primary osteoarthritis, left knee     M17.12     Active  
318206143

 

 Problem  Dysphagia, unspecified type     R13.10     Active  89387275

 

 Problem  Hypothyroidism (acquired)     E03.9     Active  904231251

 

 Problem  Primary osteoarthritis of both knees     M17.0     Active  222285915

 

 Problem  Primary generalized (osteo)arthritis     M15.0     Active  952327326

 

 Problem  Type 2 diabetes mellitus with diabetic neuropathy, without long-term 
current use of insulin     E11.40     Active  64745997

 

 Problem  Type 2 diabetes mellitus without complications     E11.9     Active  
260393038

 

 Problem  Other hyperlipidemia     E78.4     Active  64389852

 

 Problem  Mild episode of recurrent major depressive disorder     F33.0     
Active  839206737

 

 Problem  COPD with exacerbation     J44.1     Active  480340636

 

 Problem  Chronic renal insufficiency, stage II (mild)     N18.2     Active  
539531056

 

 Problem  Chronic obstructive pulmonary disease with acute exacerbation     
J44.1     Active  832343864

 

 Problem  Essential (primary) hypertension     I10     Active  18710149

 

 Problem  Peripheral arterial occlusive disease     I77.9     Active  993791218

 

 Problem  Claudication     I73.9     Active  262367318

 

 Problem  Cataracts, bilateral     H26.9     Active  85039067

 

 Problem  Effusion, left knee     M25.462     Active  990538771

 

 Problem  Type 2 diabetes mellitus with hyperglycemia     E11.65     Active  
01017211

 

 Problem  Effusion of lower leg joint  719.06        Active  541438233

 

 Problem  Major depressive disorder, single episode, unspecified     F32.9     
Active  27218496

 

 Problem  Old tear of lateral meniscus of left knee, unspecified tear type     
M23.201     Active  645714957

 

 Problem  Esophageal reflux  530.81        Active  046473100

 

 Problem  Anxiety disorder, unspecified     F41.9     Active  612181048

 

 Problem  Hyperlipidemia LDL goal <130     E78.5     Active  05626342







ALLERGIES

Unknown Allergies



SOCIAL HISTORY

No smoking Hx information available



PLAN OF CARE





VITAL SIGNS





MEDICATIONS







 Medication  Instructions  Dosage  Frequency  Start Date  End Date  Duration  
Status

 

 Ventolin HFA 90 mcg/actuation  by inhalation route every 4 hrs  1-2 puffs  4h  
26 Sep, 2014     90 days  Active







RESULTS

No Results



PROCEDURES

No Known procedures



IMMUNIZATIONS

No Known Immunizations

## 2018-02-20 NOTE — XMS REPORT
[facility]

 Created on: 2017



Duyen Larkin

External Reference #: [id number]

: 1965

Sex: Female



Demographics







 Address  [address] [place]  [address]

 

 Preferred Language  Unknown

 

 Marital Status  Unknown

 

 Mandaen Affiliation  Unknown

 

 Race  Unknown

 

 Ethnic Group  Unknown





Author







 Author  LIYAH LOWE

 

 [facility]

 

 Address  [address] [place]  [address]



 

 Phone  [phone]







Care Team Providers







 Care Team Member Name  Role  Phone

 

 LIYAH LOWE  Unavailable  [phone]







PROBLEMS







 Type  Condition  ICD9-CM Code  SXZ09-NF Code  Onset Dates  Condition Status  
SNOMED Code

 

 Problem  Effusion of lower leg joint  719.06        Active  368239366

 

 Problem  Hyperlipidemia LDL goal <130     E78.5     Active  25882627

 

 Problem  Esophageal reflux  530.81        Active  958250744

 

 Problem  Dysphagia, unspecified type     R13.10     Active  71111953

 

 Problem  Other hyperlipidemia     E78.4     Active  38741928

 

 Problem  Unilateral primary osteoarthritis, left knee     M17.12     Active  
545582692

 

 Problem  COPD with exacerbation     J44.1     Active  023077986

 

 Problem  Hypothyroidism (acquired)     E03.9     Active  316056479

 

 Problem  Diabetic polyneuropathy associated with type 2 diabetes mellitus     
E11.42     Active  09295620

 

 Problem  Primary osteoarthritis of both knees     M17.0     Active  592461152

 

 Problem  Claudication     I73.9     Active  428746378

 

 Problem  Primary generalized (osteo)arthritis     M15.0     Active  085171861

 

 Problem  Type 2 diabetes mellitus without complications     E11.9     Active  
705755237

 

 Problem  Chronic obstructive pulmonary disease with acute exacerbation     
J44.1     Active  911234351

 

 Problem  Mild episode of recurrent major depressive disorder     F33.0     
Active  617028531

 

 Problem  Type 2 diabetes mellitus with diabetic neuropathy, without long-term 
current use of insulin     E11.40     Active  10266396

 

 Problem  Chronic renal insufficiency, stage II (mild)     N18.2     Active  
939050765

 

 Problem  Peripheral arterial occlusive disease     I77.9     Active  582726534

 

 Problem  Major depressive disorder, single episode, unspecified     F32.9     
Active  53509981

 

 Problem  Cataracts, bilateral     H26.9     Active  60407194

 

 Problem  Essential (primary) hypertension     I10     Active  93359585

 

 Problem  Type 2 diabetes mellitus with hyperglycemia     E11.65     Active  
70293834

 

 Problem  Old tear of lateral meniscus of left knee, unspecified tear type     
M23.201     Active  745233261

 

 Problem  Anxiety disorder, unspecified     F41.9     Active  425764689

 

 Problem  Effusion, left knee     M25.462     Active  482374084







ALLERGIES







 Substance  Reaction  Event Type  Date  Status

 

 Lisinopril  Unknown  Drug Allergy    Active

 

 Gabapentin  dizzy/nausea  Drug Allergy    Active

 

 Demerol  Unknown  Drug Allergy    Active







SOCIAL HISTORY

Never Assessed



PLAN OF CARE







 Activity  Details









  









 Follow Up  call tomorrow Reason:







VITAL SIGNS







 Height  66 in  2017

 

 Weight  276.6 lbs  2017

 

 Temperature  98.0 degrees Fahrenheit  2017

 

 Heart Rate  72 bpm  2017

 

 Respiratory Rate  20   2017

 

 Oximetry  95 %  2017

 

 BMI  44.64 kg/m2  2017

 

 Blood pressure systolic  110 mmHg  2017

 

 Blood pressure diastolic  69 mmHg  2017







MEDICATIONS







 Medication  Instructions  Dosage  Frequency  Start Date  End Date  Duration  
Status

 

 Meloxicam 15MG     TAKE ONE TABLET BY MOUTH ONCE DAILY              Active

 

 Lantus SoloStar 100 unit/mL (3 mL)  Subcutaneous 2 times a day  inject 50 
Units by Subcutaneous  12h  19 Mar, 2015        Active

 

 Colace 100 MG  Orally Once a day  1 capsule as needed  24h           Active

 

 Cyclobenzaprine HCl 10MG     TAKE ONE TABLET BY MOUTH THREE TIMES DAILY AS 
NEEDED FOR MUSCLE SPASM           30  Active

 

 Vitamin B 12 100 MCG                    Active

 

 Micardis 40 mg     take 1 tablet (40 mg) by oral route once daily             Active

 

 Klor-Con 10 10 MEQ     take 1 tablet by Oral route with food  1 time per day  
           Active

 

 NovoFine 30G X 8 MM     as directed             Active

 

 PredniSONE 20 mg  Orally Once a day  2 tabs daily with food or milk  24h    05 days  Active

 

 NovoLog Flexpen 100 UNIT/ML     Inject 20 Units by Subcutaneous route as per 
insulin sliding scale protocol  3 times per day             Active

 

 Levaquin 250 MG  Orally Once a day  3 tablets  24h  
  05 days  Active

 

 Fish Oil 1000 MG  Orally Once a day  1 capsule  24h           Active

 

 Pantoprazole Sodium 40 mg  Orally Once a day  1 tablet  24h  27 Dec, 2016     
   Active

 

 Ventolin HFA 90 mcg/actuation  by inhalation route every 4 hrs  1-2 puffs  4h  
26 Sep, 2014     90 days  Active

 

 Lipitor 80 MG  Orally Once a day  1/2 tablet  24h          Active

 

 Lasix 20 MG     take 1 tablet (20 mg) by oral route once daily     31 Oct, 
2014        Active

 

 Voltaren 1 %  Transdermal 4 times a day PRN knee pain  4 grams     11 May, 
2015        Active

 

 Zoloft 50 MG  Orally Once a day  take 2 tablets (100 mg) by oral route once 
daily  24h  10 Feb, 2014        Active

 

 Tradjenta 5 MG  Orally Once a day  1 tablet  24h       30 day(s)  
Active

 

 Levothyroxine Sodium 25 MCG  Orally Once a day in the morning  Take 1 tablet 
on an empty stomach           30  Active

 

 Alprazolam 0.5 MG     take 1 tablet (0.5 mg) by oral route 3 times per day     
10 Feb, 2014        Active

 

 Metformin HCl 500 MG  Orally Twice a day  1 tablet with meals  12h  14 Oct, 
2015        Active

 

 trazodone 150 mg     take 1 Tablet by Oral route 1 time per day qhs     31 Oct
, 2014        Active

 

 Sucralfate 1 GM  Orally 4 times a day  1 tablet on an empty stomach  6h       
    Active

 

 Tramadol HCl 50 mg  Orally every 6 hrs  1 tablet as needed for pain  6h  14 Oct
, 2015        Active

 

 Albuterol Sulfate (2.5 MG/3ML) 0.083%  Inhalation every 6 hrs PRN wheezing/SOA
  3 ml     11 May, 2015        Active

 

 Cymbalta 30 mg     take 3 capsule by Oral route 1 time per day             Active







RESULTS

No Results



PROCEDURES







 Procedure  Date Ordered  Result  Body Site

 

 MEASURE BLOOD OXYGEN LEVEL  2017      







IMMUNIZATIONS

No Known Immunizations



MEDICAL (GENERAL) HISTORY







 Type  Description  Date

 

 Medical History  Diabetes mellitus without mention of complication, type II or 
unspecified type, not stated as uncontrolled   

 

 Medical History  Depressive disorder, not elsewhere classified   

 

 Medical History  Anxiety state, unspecified   

 

 Medical History  Effusion of lower leg joint   

 

 Medical History  Generalized osteoarthrosis, unspecified site   

 

 Medical History  Other and unspecified hyperlipidemia   

 

 Medical History  Abnormal weight gain   

 

 Medical History  Effusion of joint, site unspecified   

 

 Medical History  Esophageal reflux   

 

 Medical History  hypertension   

 

 Medical History  colonoscopy- inscreased polyp   

 

 Surgical History  Tonsillectomy   

 

 Surgical History  Orthopedic: Bilateral Knee x2   

 

 Surgical History  colonoscopy  

 

 Hospitalization History  Surgery(s)/Childbirth(s) only

## 2019-02-19 ENCOUNTER — HOSPITAL ENCOUNTER (OUTPATIENT)
Dept: HOSPITAL 75 - PREOP | Age: 54
Discharge: HOME | End: 2019-02-19
Attending: SURGERY
Payer: MEDICAID

## 2019-02-19 VITALS — BODY MASS INDEX: 42.75 KG/M2 | HEIGHT: 66 IN | WEIGHT: 266 LBS

## 2019-02-19 DIAGNOSIS — Z01.818: Primary | ICD-10-CM

## 2019-02-26 ENCOUNTER — HOSPITAL ENCOUNTER (OUTPATIENT)
Dept: HOSPITAL 75 - ENDO | Age: 54
Discharge: HOME | End: 2019-02-26
Attending: SURGERY
Payer: MEDICAID

## 2019-02-26 VITALS — SYSTOLIC BLOOD PRESSURE: 110 MMHG | DIASTOLIC BLOOD PRESSURE: 63 MMHG

## 2019-02-26 VITALS — DIASTOLIC BLOOD PRESSURE: 51 MMHG | SYSTOLIC BLOOD PRESSURE: 100 MMHG

## 2019-02-26 VITALS — SYSTOLIC BLOOD PRESSURE: 125 MMHG | DIASTOLIC BLOOD PRESSURE: 65 MMHG

## 2019-02-26 VITALS — WEIGHT: 266 LBS | HEIGHT: 66 IN | BODY MASS INDEX: 42.75 KG/M2

## 2019-02-26 DIAGNOSIS — J44.9: ICD-10-CM

## 2019-02-26 DIAGNOSIS — Z86.010: ICD-10-CM

## 2019-02-26 DIAGNOSIS — K63.89: ICD-10-CM

## 2019-02-26 DIAGNOSIS — F17.210: ICD-10-CM

## 2019-02-26 DIAGNOSIS — K59.09: ICD-10-CM

## 2019-02-26 DIAGNOSIS — Z79.899: ICD-10-CM

## 2019-02-26 DIAGNOSIS — Z09: Primary | ICD-10-CM

## 2019-02-26 DIAGNOSIS — K63.5: ICD-10-CM

## 2019-02-26 DIAGNOSIS — Z79.4: ICD-10-CM

## 2019-02-26 DIAGNOSIS — E11.51: ICD-10-CM

## 2019-02-26 DIAGNOSIS — E78.5: ICD-10-CM

## 2019-02-26 DIAGNOSIS — I10: ICD-10-CM

## 2019-02-26 PROCEDURE — 82962 GLUCOSE BLOOD TEST: CPT

## 2019-02-26 PROCEDURE — 88305 TISSUE EXAM BY PATHOLOGIST: CPT

## 2019-02-26 NOTE — PROGRESS NOTE-PRE OPERATIVE
Pre-Operative Progress Note


H&P Reviewed


The H&P was reviewed, patient examined and no changes noted.


Date Seen by Provider:  Feb 26, 2019


Time Seen by Provider:  13:17


Date H&P Reviewed:  Feb 26, 2019


Time H&P Reviewed:  13:17


Pre-Operative Diagnosis:  history of polyps











NELSON MAI DO Feb 26, 2019 13:17

## 2019-02-26 NOTE — XMS REPORT
[facility]

 Created on: 2018



Duyen Larkin

External Reference #: [id number]

: 1965

Sex: Female



Demographics







 Address  [address] [place]  [address]

 

 Preferred Language  Unknown

 

 Marital Status  Unknown

 

 Christianity Affiliation  Unknown

 

 Race  Unknown

 

 Ethnic Group  Unknown





Author







 Author  LIYAH LOWE

 

 [facility]  [facility]  [place]

 

 Address  [address] [place]  [address]



 

 Phone  [phone]







Care Team Providers







 Care Team Member Name  Role  Phone

 

 LIYAH LOWE  Unavailable  [phone]







PROBLEMS







 Type  Condition  ICD9-CM Code  MSG34-TU Code  Onset Dates  Condition Status  
SNOMED Code

 

 Problem  Effusion of lower leg joint  719.06        Active  324629829

 

 Problem  Esophageal reflux  530.81        Active  872221277

 

 Problem  Other hyperlipidemia     E78.4     Active  43460137

 

 Problem  Type 2 diabetes mellitus without complications     E11.9     Active  
172384402

 

 Problem  Primary generalized (osteo)arthritis     M15.0     Active  414373204

 

 Problem  COPD with exacerbation     J44.1     Active  329531421

 

 Problem  Claudication     I73.9     Active  871626175

 

 Problem  Mild episode of recurrent major depressive disorder     F33.0     
Active  870758066

 

 Problem  Cataracts, bilateral     H26.9     Active  00188187

 

 Problem  Chronic obstructive pulmonary disease with acute exacerbation     
J44.1     Active  427052616

 

 Problem  Chronic renal insufficiency, stage II (mild)     N18.2     Active  
086397267

 

 Problem  Primary osteoarthritis of both knees     M17.0     Active  019565467

 

 Problem  Other chronic pain     G89.29     Active  15399609

 

 Problem  Lumbar degenerative disc disease     M51.36     Active  68864669

 

 Problem  Essential (primary) hypertension     I10     Active  98274449

 

 Problem  Anxiety disorder, unspecified     F41.9     Active  420957460

 

 Problem  Peripheral arterial occlusive disease     I77.9     Active  116601527

 

 Problem  Diabetic polyneuropathy associated with type 2 diabetes mellitus     
E11.42     Active  07678636

 

 Problem  Type 2 diabetes mellitus with diabetic neuropathy, without long-term 
current use of insulin     E11.40     Active  37550093

 

 Problem  Chronic obstructive pulmonary disease, unspecified COPD type     
J44.9     Active  89650020

 

 Problem  Long term current use of insulin     Z79.4     Active  145135390

 

 Problem  Type 2 diabetes mellitus with hyperglycemia     E11.65     Active  
81877550

 

 Problem  Old tear of lateral meniscus of left knee, unspecified tear type     
M23.201     Active  369021244

 

 Problem  Major depressive disorder, single episode, unspecified     F32.9     
Active  53441549

 

 Problem  Effusion, left knee     M25.462     Active  212947271

 

 Problem  Dysphagia, unspecified type     R13.10     Active  82700146

 

 Problem  Hyperlipidemia LDL goal <130     E78.5     Active  44094914

 

 Problem  Unilateral primary osteoarthritis, left knee     M17.12     Active  
411647805

 

 Problem  Hypothyroidism (acquired)     E03.9     Active  250682644







ALLERGIES

No Information



ENCOUNTERS







 Encounter  Location  Date  Diagnosis

 

 [address] [address]     

 

 [address] [address]  21 May, 2018  Pain in left 
knee M25.562

 

 [address] [address]  21 May, 2018  Pain in left 
knee M25.562 and Other chronic pain G89.29

 

 [facility]  [address] [place] [address]  15 May, 2018   

 

 [address] [address]  02 May, 2018  Type 2 diabetes 
mellitus without complications E11.9 ; Long term current use of insulin Z79.4 ; 
Unilateral primary osteoarthritis, left knee M17.12 ; Lumbar degenerative disc 
disease M51.36 and Chronic obstructive pulmonary disease, unspecified COPD type 
J44.9

 

 [address] [address]     

 

 [address] [address]     

 

 [address] [address]     

 

 [address] [address]  23 Mar, 2018  Type 2 diabetes 
mellitus without complications E11.9

 

 [address] [address]  23 Mar, 2018   

 

 [facility]  [address] [place] [address]  13 Mar, 2018   

 

 [facility]  [address] [place] [address]-
[address]  09 Mar, 2018  Type 2 diabetes mellitus without complications E11.9

 

 [address] [place] [address]     

 

 [facility]  [address] [place] [address]    Diabetic 
polyneuropathy associated with type 2 diabetes mellitus E11.42

 

 [facility] [place]  [address] [place] [address]     

 

 [facility] [place]  [address] [place] [address]  21 Dec, 2017  Type 2 diabetes 
mellitus without complications E11.9 ; Long term current use of insulin Z79.4 ; 
High risk sexual behavior Z72.51 ; BMI 40.0-44.9, adult Z68.41 and Encounter 
for immunization Z23

 

 [facility] [address] [address]  04 Dec, 2017   

 

 [facility] [place]  [address] [place] [address]  04 Dec, 2017   

 

 [facility] [place]  [address] [place] [address]  17 2017  COPD with 
exacerbation J44.1 and BMI 40.0-44.9, adult Z68.41

 

 [facility] [address] [address]  09 Oct, 2017  Encounter for 
screening mammogram for malignant neoplasm of breast Z12.31 ; Well woman exam 
Z01.419 and High risk sexual behavior Z72.51

 

 [facility] [address] [address]  11 Sep, 2017   

 

 [facility] [address] [address]  11 Sep, 2017  Type 2 diabetes 
mellitus without complications E11.9

 

 [facility] [address] [address]  11 Sep, 2017   

 

 [facility] [address] [address]  11 Sep, 2017  Old tear of 
lateral meniscus of left knee, unspecified tear type M23.201

 

 [facility] [place]  [address] [place] [address]  11 Sep, 2017  Other type of 
osteoarthritis, unspecified site M19.90 ; Type 2 diabetes mellitus without 
complications E11.9 ; Primary osteoarthritis of both knees M17.0 ; Chronic 
renal insufficiency, stage II (mild) N18.2 ; Diabetic polyneuropathy associated 
with type 2 diabetes mellitus E11.42 and Exposure to hepatitis C Z20.5

 

 [facility] [place]  [address] [place] [address]  06 Sep, 2017  Type 2 diabetes 
mellitus without complications E11.9

 

 [facility] [place]  [address] [place] [address]  30 Aug, 2017   

 

 [facility] [place]  [address] [place] [address]  30 Aug, 2017   

 

 [facility] [place]  [address] [place] [address]  16 Aug, 2017  Other type of 
osteoarthritis, unspecified site M19.90

 

 [facility] [place]  [address] [place] [address]  15 Aug, 2017  Type 2 diabetes 
mellitus without complications E11.9

 

 [facility] [place]  [address] [place] [address]  07 Aug, 2017   

 

 [facility] [place]  [address] [place] [address]     

 

 [facility] [place]  [address] [place] [address]     

 

 [facility] [place]  [address] [place] [address]    Type 2 diabetes 
mellitus without complications E11.9 ; Type 2 diabetes mellitus with diabetic 
neuropathy, without long-term current use of insulin E11.40 ; Primary 
osteoarthritis of both knees M17.0 and Chronic renal insufficiency, stage II (
mild) N18.2

 

 [facility] [place]  [address] [place] [address]     

 

 [facility] [place]  [address] [place] [address]  30 May, 2017  Hyperlipidemia 
LDL goal <130 E78.5

 

 [facility]  [address] [place] [address]  19 May, 2017  Type 2 diabetes 
mellitus without complications E11.9

 

 [facility]  [address] [facility] [id number] [place] [address]  16 May, 2017   

 

 [facility] [place]  [address] [place] [address]  08 May, 2017   

 

 [facility]  [address] [place] [address]  01 May, 2017   

 

 [facility] [place]  [address] [place] [address]     

 

 [facility] [place]  [address] [place] [address]    Primary 
generalized (osteo)arthritis M15.0 and Type 2 diabetes mellitus without 
complications E11.9

 

 [facility] [place]  [address] [place] [address]    Old tear of 
lateral meniscus of left knee, unspecified tear type M23.201

 

 [facility] [place]  [address] [place] [address]     

 

 [facility] [place]  [address] [place] [address]     

 

 [facility] [place]  [address] [place] [address]     

 

 [facility] [place]  [address] [place] [address]  20 Mar, 2017  Type 2 diabetes 
mellitus without complications E11.9

 

 [facility] [place]  [address] [place] [address]  13 Mar, 2017  Type 2 diabetes 
mellitus without complications E11.9

 

 [facility] [place]  [address] [place] [address]  13 Mar, 2017  Type 2 diabetes 
mellitus without complications E11.9

 

 [facility] [place]  [address] [place] [address]  04 Mar, 2017  Type 2 diabetes 
mellitus without complications E11.9

 

 [facility] [address] [address]     

 

 [facility] [place]  [address] [place] [address]    Chronic 
obstructive pulmonary disease with acute exacerbation J44.1

 

 [facility] [address] [address]     

 

 [facility] [address] [address]    Dysuria R30.0 ; 
Essential (primary) hypertension I10 ; Hypothyroidism (acquired) E03.9 ; Type 2 
diabetes mellitus without complications E11.9 and Mild episode of recurrent 
major depressive disorder F33.0

 

 [facility] [place]  [address] [place] [address]     

 

 [facility] [place]  [address] [place] [address]  08 2017  Dysuria R30.0 ; 
Vaginal candidiasis B37.3 and Candidiasis B37.9

 

 [facility] [address] [address]     

 

 [facility] [place]  [address] [place] [address]  10 Octaviano, 2017  COPD with 
exacerbation J44.1 and Dysphagia, unspecified type R13.10

 

 [facility] [place]  [address] [place] [address]  27 Dec, 2016   

 

 [facility] [place]  [address] [place] [address]  12 Dec, 2016  Essential (
primary) hypertension I10 ; Hypothyroidism (acquired) E03.9 ; Type 2 diabetes 
mellitus without complications E11.9 ; Primary generalized (osteo)arthritis 
M15.0 ; Major depressive disorder, single episode, unspecified F32.9 ; 
Unilateral primary osteoarthritis, left knee M17.12 ; Hyperlipidemia LDL goal <
130 E78.5 and Dysphagia, unspecified type R13.10

 

 [facility] [place]  [address] [place] [address]  01 Dec, 2016   

 

 [facility] [place]  [address] [place] [address]     

 

 [facility] [place]  [address] [place] [address]     

 

 [facility] [place]  [address] [place] [address]     

 

 [facility] [place]  [address] [place] [address]     

 

 [facility] [place]  [address] [place] [address]     

 

 [facility] [place]  [address] [place] [address]    Type 2 diabetes 
mellitus without complications E11.9 ; Primary generalized (osteo)arthritis 
M15.0 ; Effusion, left knee M25.462 ; Old tear of lateral meniscus of left knee
, unspecified tear type M23.201 and Fatigue, unspecified type R53.83

 

 [facility] [place]  [address] [place] [address]  26 Sep, 2016  Type 2 diabetes 
mellitus without complications E11.9 ; Claudication I73.9 ; Pain in right leg 
M79.604 and Pain of left leg M79.605

 

 [facility] [place]  [address] [place] [address]  14 Sep, 2016   

 

 [facility] [place]  [address] [place] [address]  12 Sep, 2016   

 

 [facility] [place]  [address] [place] [address]  18 Aug, 2016  Type 2 diabetes 
mellitus with hyperglycemia E11.65 ; Long term current use of insulin Z79.4 ; 
Anxiety disorder, unspecified F41.9 ; Essential (primary) hypertension I10 ; 
Major depressive disorder, single episode, unspecified F32.9 and Peripheral 
arterial occlusive disease I77.9

 

 [address] [address]  17 Aug, 2016   

 

 [address] [address]  11 Aug, 2016   

 

 [address] [address]  11 Aug, 2016   

 

 [address] [address]  10 Aug, 2016   

 

 [address] [address]  05 Aug, 2016  Acute midline 
low back pain with right-sided sciatica M54.41

 

 [facility]  [address] [facility] [id number] [place] [address]-
[address]  05 Aug, 2016  Chest pain, unspecified type R07.9 ; Palpitations R00.2 ; 
Claudication I73.9 ; Generalized pain R52 and Type 2 diabetes mellitus without 
complications E11.9

 

 [address] [address]    Acute midline 
low back pain with right-sided sciatica M54.41 ; Dysuria R30.0 ; Claudication 
I73.9 ; Peripheral arterial occlusive disease I77.9 ; Shortness of breath 
R06.02 ; Effusion, left knee M25.462 and Type 2 diabetes mellitus without 
complications E11.9

 

 [address] [address]     

 

 [address] [address]     

 

 [address] [address]     

 

 [address] [address]    Type 2 diabetes 
mellitus without complications E11.9 ; Other hyperlipidemia E78.4 ; Peripheral 
arterial occlusive disease I77.9 ; Essential (primary) hypertension I10 and 
Other fatigue R53.83

 

 [facility] [place]  [address] [place] [address]  18 May, 2016   

 

 [facility] [place]  [address] [place] [address]  06 May, 2016   

 

 [facility] [place]  [address] [place] [address]  05 May, 2016  Chest pain, 
unspecified type R07.9 ; Peripheral arterial occlusive disease I77.9 ; Anxiety 
disorder, unspecified F41.9 ; Essential (primary) hypertension I10 ; Type 2 
diabetes mellitus without complications E11.9 and Other hyperlipidemia E78.4

 

 [facility]  [address] [place] [address]  04 May, 2016   

 

 [facility] [place]  [address] [place] [address]     

 

 [facility] [address] [address]     

 

 [facility] [address] [address]    Type 2 diabetes 
mellitus without complications E11.9 ; Peripheral arterial occlusive disease 
I77.9 ; Primary generalized (osteo)arthritis M15.0 ; Major depressive disorder, 
single episode, unspecified F32.9 ; Anxiety disorder, unspecified F41.9 and 
Essential (primary) hypertension I10

 

 [facility] [address] [address]     

 

 [facility] [address] [address]     

 

 [facility]  [address] [facility] [id number] [place] [address]-
[address]76  02 Mar, 2016   

 

 [facility] [address] [address]     

 

 [facility] [address] [address]    Bronchitis J40 ; 
Shortness of breath R06.02 and Wheezing R06.2

 

 [facility] [address] [address]     

 

 [facility] [address] [address]     

 

 [facility] [address] [address]     

 

 [facility] [address] [place] [address]     

 

 [facility]  [address] [place] [address]    Type 2 diabetes 
mellitus without complications E11.9 ; Claudication I73.9 ; Other 
hyperlipidemia E78.4 ; Cataracts, bilateral H26.9 and Other fatigue R53.83

 

 [facility]  [address] [place] [address]  28 Oct, 2015   

 

 [facility]  [address] [place] [address]  22 Oct, 2015   

 

 [facility] [place]  [address] [place] [address]  16 Oct, 2015   

 

 [facility]  [address] [place] [address]  14 Oct, 2015  Type 2 diabetes 
mellitus without complications E11.9 ; Other hyperlipidemia E78.4 ; Primary 
generalized (osteo)arthritis M15.0 and Claudication I73.9

 

 [facility]  [address] [place] [address]  12 Oct, 2015   

 

 [facility] [place]  [address] [place] [address]  26 Aug, 2015   

 

 [facility]  [address] [place] [address]  12 Aug, 2015   

 

 [facility]  [address] [place] [address]  12 Aug, 2015   

 

 [facility] [place]  [address] [place] [address]  10 Aug, 2015   

 

 [facility]  [address] [place] [address]  05 Aug, 2015   

 

 [facility] [place]  [address] [place] [address]  11 May, 2015  Diabetes 
mellitus without mention of complication, type II or unspecified type, not 
stated as uncontrolled 250.00 ; Bronchitis 490 and Effusion of lower leg joint 
719.06

 

 [facility]  [address] [place] [address]  01 May, 2015   

 

 [facility] [address] [address]  01 May, 2015  Bronchitis 490 
and Wheezing 786.07

 

 [facility]  [address] [facility] [id number] [address]-
2006     

 

 [facility]  [address]100[place] [address]  14 2015   

 

 [facility]  [address], [place] [address]-
[address]     

 

 [facility]  [address] [place] [address]  19 Mar, 2015   

 

 [facility]  [address] [address]-
[address]  19 Mar, 2015   

 

 [facility]  [address] [place] [address]  09 Mar, 2015   

 

 [facility]  [address] [address]-
[address]  09 Mar, 2015   

 

 [facility]  [address] [place] [address]  ,    

 

 [facility]  [address]100[place] [address]-
[address]  ,    

 

 [facility]  [address] [place] [address]-
9257  10 Feb,    

 

 [facility]  [address] [place] [address]     

 

 [facility]  [address] [place] [address]     

 

 [facility]  [address]100[place], [place] [address]-
6121     

 

 [facility]  [address]65100[place], [place] [address]-
[address]     

 

 [facility]  [address] [place] [address]     

 

 [facility]  [address]100[place], [place] [address]-
[address]68     

 

 [facility]  [address] [place] [address]     

 

 [facility]  [address], [place] [address]-
[address]     

 

 [facility]  [address] [place] [address]  24 Dec, 2014   

 

 [facility] [place]  [address] [facility] [id number][place] [address]-
8[address]  24 Dec, 2014   

 

 [facility]  [address] [place] [address]  18 Dec, 2014   

 

 [facility] [place]  [id number] [address]-
9666  18 Dec, 2014   

 

 [facility]  [address] [place] [address]  09 Dec, 2014   

 

 [facility]  [id number], [place] [address]-
[address]  09 Dec, 2014   

 

 [facility]  [address] [place] [address]  02 Dec, 2014   

 

 [facility] [place] [facility]  [address], [place] [address]-
1272  02 Dec, 2014   

 

 [facility]  [address] [place] [address]     

 

 [facility] [place]  [id number], [place] [address]-
[address]     

 

 [facility]  [address] [place] [address]     

 

 [facility] [place]  [id number], [place] [address]-
1236     

 

 [facility]  [address] [place] [address]  31 Oct, 2014   

 

 [facility] [place]  [id number], [place] [address]-
7394  31 Oct, 2014   

 

 [facility]  [address] [place] [address]  16 Oct, 2014   

 

 [facility] [place]  [id number], [place] [address]-
8116  16 Oct, 2014   

 

 [facility]  [address] [facility] [place] [address]  10 Oct, 2014   

 

 [facility] [place]  [id number], [place] [address]-
3920  10 Oct, 2014   

 

 [facility]  [address] [place] [address]  26 Sep, 2014   

 

 [facility] [place]  [address] [id number] [address]-
1549  26 Sep, 2014   

 

 [facility]  [address] [place] [address]  29 Aug, 2014   

 

 [facility] [place] [facility]  [address] [id number] [place] [address]-
9149  29 Aug, 2014   

 

 [facility]  [address] [place] [address]  25 Aug, 2014   

 

 [facility]  [address] [place] [address]  25 Aug, 2014   

 

 [facility] [place] [facility]  [address] [facility] [id number] [place] [address]-
8344  25 Aug, 2014   

 

 [facility] [place] [facility]  [address] [id number] [place] [address]-
2387  25 Aug, 2014   

 

 [facility]  [address] [place] [address]  18 Aug, 2014   

 

 [facility] [place] [facility]  [address] [id number] [address]-
1714  18 Aug, 2014   

 

 [facility]  [address] [place] [address]  14 Aug, 2014   

 

 [facility] [place] [facility]  [address] [facility] [id number] [place] [address]-
4286  14 Aug, 2014   

 

 [facility] [place] [facility]  [address] [facility] [id number] [place] [address]-
7335  11 Aug, 2014   

 

 [facility]  [address] [place] [address]  11 Aug, 2014   

 

 [facility] [facility]  [address] [facility] [id number] [address]-
[address]13  11 Aug, 2014   

 

 [facility] [place] [facility]  [address] [facility] [id number] [place] [address]-
8256  11 Aug, 2014   

 

 [facility] [place] [facility]  [address] [facility] [id number] [place] [address]-
7447     

 

 [facility]  [address] [facility] [place] [address]     

 

 [facility] [place] [facility]  [address] [facility] [id number][place] [address]-
5791     

 

 [facility] [facility]  [id number], [place] [address]     

 

 [facility]  [address] [place] [address]     

 

 [facility]  [id number] [id number]-
[address]     

 

 [facility]  [address] [place] [address]  30 May, 2014   

 

 [facility]  [id number] [address]  30 May, 2014   

 

 [facility]  [address] [place] [phone]  14 May, 2014   

 

 [facility]  [id number] [place] [phone]  14 May, 2014   

 

 [facility]  [address] [place] [address]  05 May, 2014   

 

 [facility]  [id number] [place] [phone]  05 May, 2014   

 

 [facility]  [address] [place] [address]     

 

 [facility]  [id number] [address]-
[address]     

 

 [facility]  [address] [place] [address]     

 

 [facility]  [id number], [place] [address]     

 

 [facility]  [address] [place] [phone]  10 Feb, 2014   

 

 [facility]  [id number], [place] [address]  10 Feb, 2014   







IMMUNIZATIONS

No Known Immunizations



SOCIAL HISTORY

Never Assessed



REASON FOR VISIT

prior authorization



PLAN OF CARE





VITAL SIGNS





MEDICATIONS

Unknown Medications



RESULTS

No Results



PROCEDURES

No Known procedures



INSTRUCTIONS





MEDICATIONS ADMINISTERED

No Known Medications



MEDICAL (GENERAL) HISTORY







 Type  Description  Date

 

 Medical History  Diabetes mellitus without mention of complication, type II or 
unspecified type, not stated as uncontrolled   

 

 Medical History  Depressive disorder, not elsewhere classified   

 

 Medical History  Anxiety state, unspecified   

 

 Medical History  Effusion of lower leg joint   

 

 Medical History  Generalized osteoarthrosis, unspecified site   

 

 Medical History  Other and unspecified hyperlipidemia   

 

 Medical History  Abnormal weight gain   

 

 Medical History  Effusion of joint, site unspecified   

 

 Medical History  Esophageal reflux   

 

 Medical History  hypertension   

 

 Medical History  colonoscopy- inscreased polyp   

 

 Surgical History  Tonsillectomy   

 

 Surgical History  Orthopedic: Bilateral Knee x2   

 

 Surgical History  colonoscopy  

 

 Hospitalization History  Surgery(s)/Childbirth(s) only

## 2019-02-26 NOTE — XMS REPORT
[facility]

 Created on: 2018



Duyen Larkin

External Reference #: [id number]

: 1965

Sex: Female



Demographics







 Address  [address] [place] [address]  [address]

 

 Preferred Language  Unknown

 

 Marital Status  Unknown

 

 Sikhism Affiliation  Unknown

 

 Race  Unknown

 

 Ethnic Group  Unknown





Author







 Author  LIYAH LOWE

 

 [facility]  [facility]

 

 Address  [address] [place]  [address]



 

 Phone  [phone]







Care Team Providers







 Care Team Member Name  Role  Phone

 

 ANGELITOLIYAH PAUL  Unavailable  [phone]







PROBLEMS







 Type  Condition  ICD9-CM Code  LQJ56-TW Code  Onset Dates  Condition Status  
SNOMED Code

 

 Problem  Effusion of lower leg joint  719.06        Active  903717268

 

 Problem  Esophageal reflux  530.81        Active  487795909

 

 Problem  Other hyperlipidemia     E78.4     Active  66066636

 

 Problem  Type 2 diabetes mellitus without complications     E11.9     Active  
585786926

 

 Problem  Primary generalized (osteo)arthritis     M15.0     Active  866754302

 

 Problem  Claudication     I73.9     Active  602815355

 

 Problem  Mild episode of recurrent major depressive disorder     F33.0     
Active  365093093

 

 Problem  Cataracts, bilateral     H26.9     Active  57018956

 

 Problem  Chronic obstructive pulmonary disease with acute exacerbation     
J44.1     Active  714009725

 

 Problem  Peripheral arterial occlusive disease     I77.9     Active  959277609

 

 Problem  Primary osteoarthritis of both knees     M17.0     Active  763828703

 

 Problem  Type 2 diabetes mellitus with diabetic neuropathy, without long-term 
current use of insulin     E11.40     Active  30262455

 

 Problem  Chronic renal insufficiency, stage II (mild)     N18.2     Active  
135915687

 

 Problem  Thrombocytopenia     D69.6     Active  965529416

 

 Problem  Other chronic pain     G89.29     Active  33779663

 

 Problem  Major depressive disorder, single episode, unspecified     F32.9     
Active  40306224

 

 Problem  Essential (primary) hypertension     I10     Active  39941162

 

 Problem  Anxiety disorder, unspecified     F41.9     Active  912239635

 

 Problem  Long term current use of insulin     Z79.4     Active  048633520

 

 Problem  Diabetic polyneuropathy associated with type 2 diabetes mellitus     
E11.42     Active  49280659

 

 Problem  Chronic obstructive pulmonary disease, unspecified COPD type     
J44.9     Active  77615959

 

 Problem  Lumbar degenerative disc disease     M51.36     Active  93305991

 

 Problem  Old tear of lateral meniscus of left knee, unspecified tear type     
M23.201     Active  842729431

 

 Problem  Unilateral primary osteoarthritis, left knee     M17.12     Active  
416580245

 

 Problem  Effusion, left knee     M25.462     Active  853545514

 

 Problem  Type 2 diabetes mellitus with hyperglycemia     E11.65     Active  
58286337

 

 Problem  Dysphagia, unspecified type     R13.10     Active  40992657

 

 Problem  COPD with exacerbation     J44.1     Active  578902483

 

 Problem  Hypothyroidism (acquired)     E03.9     Active  950663258

 

 Problem  Hyperlipidemia LDL goal <130     E78.5     Active  02456994







ALLERGIES

No Information



ENCOUNTERS







 Encounter  Location  Date  Diagnosis

 

 [place] [id number] [place] [phone]     

 

 [address]  [address] [place] [address]  18 Oct, 2018  Type 2 
diabetes mellitus without complications E11.9 ; Primary generalized (osteo)
arthritis M15.0 ; Chronic obstructive pulmonary disease, unspecified COPD type 
J44.9 ; Chronic renal insufficiency, stage II (mild) N18.2 ; Hyperlipidemia LDL 
goal <130 E78.5 ; Hypothyroidism (acquired) E03.9 and Thrombocytopenia D69.6

 

 [facility]  [address] [place] [address]  17 Oct, 2018   

 

 [place] [id number] [place] [phone]
[phone]  12 Oct, 2018   

 

 [address]  [address] [place] [address]  10 Oct, 2018   

 

 [facility]  [address] [place] [address]  21 May, 2018  Pain in 
left knee M25.562

 

 [facility]  [address] [place] [address]  21 May, 2018  Pain in 
left knee M25.562 and Other chronic pain G89.29

 

 [place] [id number] [place] [phone]
[phone]  15 May, 2018   

 

 [facility]  [address] [place] [address]  02 May, 2018  Type 2 
diabetes mellitus without complications E11.9 ; Long term current use of 
insulin Z79.4 ; Unilateral primary osteoarthritis, left knee M17.12 ; Lumbar 
degenerative disc disease M51.36 and Chronic obstructive pulmonary disease, 
unspecified COPD type J44.9

 

 [facility] [address]     

 

 [facility]  [address] [place] [address]     

 

 [facility]  [address] [place] [address]     

 

 [facility]  [address] [place] [address]  23 Mar, 2018  Type 2 
diabetes mellitus without complications E11.9

 

 [facility]  [address] [place] [address]  23 Mar, 2018   

 

 [facility] [place]  [address], [place] [address]  13 Mar, 2018   

 

 [facility] [place]  [address], [place] [address]  09 Mar, 2018  Type 2 diabetes mellitus without complications E11.9

 

 [facility]  [address] [place] [address]     

 

 [facility]  [address] [place] [address]    Diabetic 
polyneuropathy associated with type 2 diabetes mellitus E11.42

 

 [facility]  [address] [place] [address]     

 

 [facility]  [address] [place] [address]  21 Dec, 2017  Type 2 
diabetes mellitus without complications E11.9 ; Long term current use of 
insulin Z79.4 ; High risk sexual behavior Z72.51 ; BMI 40.0-44.9, adult Z68.41 
and Encounter for immunization Z23

 

 [facility]  [address] [place] [address]  04 Dec, 2017   

 

 [facility]  [address] [place] [address]  04 Dec, 2017   

 

 [facility]  [address] [place] [address]    COPD with 
exacerbation J44.1 and BMI 40.0-44.9, adult Z68.41

 

 [facility] [address]  09 Oct, 2017  Encounter 
for screening mammogram for malignant neoplasm of breast Z12.31 ; Well woman 
exam Z01.419 and High risk sexual behavior Z72.51

 

 [facility] [address]  11 Sep, 2017   

 

 [facility]  [address] [place] [address]  11 Sep, 2017  Type 2 
diabetes mellitus without complications E11.9

 

 [facility] [address]  11 Sep, 2017   

 

 [facility]  [address] [place] [address]  11 Sep, 2017  Old tear 
of lateral meniscus of left knee, unspecified tear type M23.201

 

 [facility]  [address] [place] [address]  11 Sep, 2017  Other type 
of osteoarthritis, unspecified site M19.90 ; Type 2 diabetes mellitus without 
complications E11.9 ; Primary osteoarthritis of both knees M17.0 ; Chronic 
renal insufficiency, stage II (mild) N18.2 ; Diabetic polyneuropathy associated 
with type 2 diabetes mellitus E11.42 and Exposure to hepatitis C Z20.5

 

 [facility]  [address] [place] [address]  06 Sep, 2017  Type 2 
diabetes mellitus without complications E11.9

 

 [facility]  [address] [place] [address]  30 Aug, 2017   

 

 [facility]  [address] [place] [address]  30 Aug, 2017   

 

 [facility]  [address] [place] [address]  16 Aug, 2017  Other type 
of osteoarthritis, unspecified site M19.90

 

 [facility]  [address] [place] [address]  15 Aug, 2017  Type 2 
diabetes mellitus without complications E11.9

 

 [facility] [address]  07 Aug, 2017   

 

 [facility]  [address] [place] [address]     

 

 [facility] [address]     

 

 [facility] [address]    Type 2 
diabetes mellitus without complications E11.9 ; Type 2 diabetes mellitus with 
diabetic neuropathy, without long-term current use of insulin E11.40 ; Primary 
osteoarthritis of both knees M17.0 and Chronic renal insufficiency, stage II (
mild) N18.2

 

 [facility]  [address] [place] [address]     

 

 [facility]  [address] [place] [address]  30 May, 2017  
Hyperlipidemia LDL goal <130 E78.5

 

 [facility]  [address] [place] [address]  19 May, 2017  Type 2 
diabetes mellitus without complications E11.9

 

 [facility] [place]  [address] [facility] [id number] [place] [address]  16 May, 2017   

 

 [facility] [address]  08 May, 2017   

 

 [facility] [address]  01 May, 2017   

 

 [facility]  [address] [place] [address]     

 

 [facility]  [address] [place] [address]    Primary 
generalized (osteo)arthritis M15.0 and Type 2 diabetes mellitus without 
complications E11.9

 

 [facility]  [address] [place] [address]    Old tear 
of lateral meniscus of left knee, unspecified tear type M23.201

 

 [facility]  [address] [place] [address]     

 

 [facility] [address]     

 

 [facility]  [address] [place] [address]     

 

 [facility] [address]  20 Mar, 2017  Type 2 
diabetes mellitus without complications E11.9

 

 [facility]  [address] [place] [address]  13 Mar, 2017  Type 2 
diabetes mellitus without complications E11.9

 

 [facility]  [address] [place] [address]  13 Mar, 2017  Type 2 
diabetes mellitus without complications E11.9

 

 [facility]  [address] [place] [address]  04 Mar, 2017  Type 2 
diabetes mellitus without complications E11.9

 

 [facility]  [address] [place] [address]     

 

 [facility]  [address] [place] [address]    Chronic 
obstructive pulmonary disease with acute exacerbation J44.1

 

 [facility]  [address] [place] [address]     

 

 [facility]  [address] [place] [address]    Dysuria 
R30.0 ; Essential (primary) hypertension I10 ; Hypothyroidism (acquired) E03.9 
; Type 2 diabetes mellitus without complications E11.9 and Mild episode of 
recurrent major depressive disorder F33.0

 

 [facility]  [address] [place] [address]     

 

 [facility]  [address] [place] [address]    Dysuria 
R30.0 ; Vaginal candidiasis B37.3 and Candidiasis B37.9

 

 [facility]  [address] [place] [address]     

 

 [facility]  [address] [place] [address]  10 Octaviano, 2017  COPD with 
exacerbation J44.1 and Dysphagia, unspecified type R13.10

 

 [facility]  [address] [place] [address]  27 Dec, 2016   

 

 [facility]  [address] [place] [address]  12 Dec, 2016  Essential (
primary) hypertension I10 ; Hypothyroidism (acquired) E03.9 ; Type 2 diabetes 
mellitus without complications E11.9 ; Primary generalized (osteo)arthritis 
M15.0 ; Major depressive disorder, single episode, unspecified F32.9 ; 
Unilateral primary osteoarthritis, left knee M17.12 ; Hyperlipidemia LDL goal <
130 E78.5 and Dysphagia, unspecified type R13.10

 

 [facility] [place] [address]  01 Dec, 2016   

 

 [facility]  [address] [place] [address]     

 

 [facility]  [address] [place] [address]     

 

 [facility]  [address] [place] [address]     

 

 [facility]  [address] [place] [address]     

 

 [facility]  [address] [place] [address]     

 

 [facility]  [address] [place] [address]    Type 2 
diabetes mellitus without complications E11.9 ; Primary generalized (osteo)
arthritis M15.0 ; Effusion, left knee M25.462 ; Old tear of lateral meniscus of 
left knee, unspecified tear type M23.201 and Fatigue, unspecified type R53.83

 

 [facility]  [address] [place] [address]  26 Sep, 2016  Type 2 
diabetes mellitus without complications E11.9 ; Claudication I73.9 ; Pain in 
right leg M79.604 and Pain of left leg M79.605

 

 [facility]  [address] [place] [address]  14 Sep, 2016   

 

 [facility]  [address] [place] [address]  12 Sep, 2016   

 

 [facility]  [address] [place] [address]  18 Aug, 2016  Type 2 
diabetes mellitus with hyperglycemia E11.65 ; Long term current use of insulin 
Z79.4 ; Anxiety disorder, unspecified F41.9 ; Essential (primary) hypertension 
I10 ; Major depressive disorder, single episode, unspecified F32.9 and 
Peripheral arterial occlusive disease I77.9

 

 [place] [address]  17 Aug, 2016   

 

 [facility]  [address] [place] [address]  11 Aug, 2016   

 

 [facility] [place] [address]  11 Aug, 2016   

 

 [place] [address]  10 Aug, 2016   

 

 [facility]  [address] [place] [address]  05 Aug, 2016  Acute 
midline low back pain with right-sided sciatica M54.41

 

 [facility] [place]  [address] [facility] [id number] [place] [address]-
[address]  05 Aug, 2016  Chest pain, unspecified type R07.9 ; Palpitations R00.2 ; 
Claudication I73.9 ; Generalized pain R52 and Type 2 diabetes mellitus without 
complications E11.9

 

 [facility] [place] [address]    Acute 
midline low back pain with right-sided sciatica M54.41 ; Dysuria R30.0 ; 
Claudication I73.9 ; Peripheral arterial occlusive disease I77.9 ; Shortness of 
breath R06.02 ; Effusion, left knee M25.462 and Type 2 diabetes mellitus 
without complications E11.9

 

 [facility] [place] [address]     

 

 [facility]  [address] [place] [address]     

 

 [facility]  [address] [place] [address]     

 

 [facility]  [address] [place] [address]    Type 2 
diabetes mellitus without complications E11.9 ; Other hyperlipidemia E78.4 ; 
Peripheral arterial occlusive disease I77.9 ; Essential (primary) hypertension 
I10 and Other fatigue R53.83

 

 [facility]  [address] [place] [address]  18 May, 2016   

 

 [facility]  [address] [place] [address]  06 May, 2016   

 

 [facility]  [address] [place] [address]  05 May, 2016  Chest pain
, unspecified type R07.9 ; Peripheral arterial occlusive disease I77.9 ; 
Anxiety disorder, unspecified F41.9 ; Essential (primary) hypertension I10 ; 
Type 2 diabetes mellitus without complications E11.9 and Other hyperlipidemia 
E78.4

 

 [facility]  [address] [place] [address]  04 May, 2016   

 

 [facility]  [address] [place] [address]     

 

 [facility]  [address] [place] [address]     

 

 [facility]  [address] [place] [address]    Type 2 
diabetes mellitus without complications E11.9 ; Peripheral arterial occlusive 
disease I77.9 ; Primary generalized (osteo)arthritis M15.0 ; Major depressive 
disorder, single episode, unspecified F32.9 ; Anxiety disorder, unspecified 
F41.9 and Essential (primary) hypertension I10

 

 [facility]  [address] [place] [address]     

 

 [facility]  [address] [place] [address]     

 

 [facility] [place]  [address] [facility] [id number] [place] [address]  02 Mar, 2016   

 

 [facility]  [address] [place] [address]     

 

 [facility]  [address] [place] [address]    Bronchitis 
J40 ; Shortness of breath R06.02 and Wheezing R06.2

 

 [facility]  [address] [place] [address]     

 

 [facility] [address]     

 

 [facility] [address]     

 

 [facility] [address]     

 

 [facility]  [address] [place] [address]    Type 2 
diabetes mellitus without complications E11.9 ; Claudication I73.9 ; Other 
hyperlipidemia E78.4 ; Cataracts, bilateral H26.9 and Other fatigue R53.83

 

 [facility]  [address] [place] [address]  28 Oct, 2015   

 

 [facility]  [address] [place] [address]  22 Oct, 2015   

 

 [facility]  [address] [place] [address]  16 Oct, 2015   

 

 [facility]  [address] [place] [address]  14 Oct, 2015  Type 2 
diabetes mellitus without complications E11.9 ; Other hyperlipidemia E78.4 ; 
Primary generalized (osteo)arthritis M15.0 and Claudication I73.9

 

 [facility]  [address] [place] [address]  12 Oct, 2015   

 

 [facility]  [address] [place] [address]  26 Aug, 2015   

 

 [facility]  [address] [place] [address]  12 Aug, 2015   

 

 [facility]  [address] [place] [address]  12 Aug, 2015   

 

 [facility] [address]  10 Aug, 2015   

 

 [facility]  [address] [place] [address]  05 Aug, 2015   

 

 [facility]  [address] [place] [address]  11 May, 2015  Diabetes 
mellitus without mention of complication, type II or unspecified type, not 
stated as uncontrolled 250.00 ; Bronchitis 490 and Effusion of lower leg joint 
719.06

 

 [facility] [place] [address]  01 May, 2015   

 

 [facility] [address]  01 May, 2015  Bronchitis 
490 and Wheezing 786.07

 

 [facility] [place]  [id number] [address]     

 

 [facility] [place]  [address] [address]-
[address]     

 

 [facility] [place]  [address] [place] [id number] [place] [address]-
2040     

 

 [facility] [address]  19 Mar, 2015   

 

 [facility] [place]  [address] [place] [id number] [place] [address]-
1591  19 Mar, 2015   

 

 [facility] [place] [address]  09 Mar, 2015   

 

 [facility] [place]  [address] [place] [id number] [place] [address]-
2741  09 Mar, 2015   

 

 [facility] [address]     

 

 [facility] [place]  [address], [place] [address]-
3073     

 

 [facility] [place]  [address] [place] [id number] [place] [address]-
2298  10 Feb, 2015   

 

 [facility] [address]     

 

 [facility] [address]     

 

 [facility] [place]  [address], [place] [address]32     

 

 [facility] [place]  [address] [place] [id number] [place] [address]-
2201     

 

 [facility] [address]     

 

 [facility] [place]  [address], [place] [address]-
9106     

 

 [facility] [address]     

 

 [facility] [place]  [address] [place] [address]-
3740     

 

 [facility]   [place] [address]  24 Dec, 2014   

 

 [facility] [place]  [address] [place] [address]-
2370  24 Dec, 2014   

 

 [facility]   [place] [address]  18 Dec, 2014   

 

 [facility] [place]  [address] [place] [address]-
6280  18 Dec, 2014   

 

 [facility]   [place] [address]  09 Dec, 2014   

 

 [facility] [place]  [address] [place] [address]-
0110  09 Dec, 2014   

 

 [facility]   [place] [address]  02 Dec, 2014   

 

 [facility] [place]  [address], [place] [address]
1466  02 Dec, 2014   

 

 [facility] [address]     

 

 [facility] [place]  [id number], [place] [address]-
3426     

 

 [facility] [address]     

 

 [facility] [place]  [address], [place] [address]
7701     

 

 [facility] [place] [address]  31 Oct, 2014   

 

 [facility]  [id number], [place] [address]
5563  31 Oct, 2014   

 

 [facility] [place] [address]  16 Oct, 2014   

 

 [facility]  [id number], [place] [address]
1198  16 Oct, 2014   

 

 [facility] [place] [address]  10 Oct, 2014   

 

 [facility] [place]  [address] [place] [id number] [place] [address]-
5434  10 Oct, 2014   

 

 [facility]   [place] [address]  26 Sep, 2014   

 

 [facility] [place]  [address] [place] [id number] [place] [address]-
3146  26 Sep, 2014   

 

 [facility] [place] [address]  29 Aug, 2014   

 

 [facility] [place]  [id number] [place] [id number]
[phone]  29 Aug, 2014   

 

 [facility] [place] [address]  25 Aug, 2014   

 

 [facility] [place] [address]  25 Aug, 2014   

 

 [facility] [place]  [address] [id number] [address]  25 Aug, 2014   

 

 [facility] [place]  [address] [id number] [address]  25 Aug, 2014   

 

 [facility] [place] [address]  18 Aug, 2014   

 

 [facility]  [address] [id number] [address]  18 Aug, 2014   

 

 [facility] [place] [address]  14 Aug, 2014   

 

 [facility], [place]  [id number] [place] [phone]
[phone]  14 Aug, 2014   

 

 [facility]  [address] [facility] [id number] [address]  11 Aug, 2014   

 

 [facility] [place] [address]  11 Aug, 2014   

 

 [facility]  [address] [facility] [id number] [place] [address]
68[address]  11 Aug, 2014   

 

 [facility]  [address] [facility] [id number] [address]  11 Aug, 2014   

 

 [facility]  [address] [facility] [id number] [place] [address]-
[address]     

 

 [facility] [place] [address]     

 

 [facility]  [address] [id number] [address]     

 

 [facility]  [id number], [place] [address]     

 

 [facility] [place] [address]     

 

 [facility]  [id number] [address]     

 

 [facility] [place] [address]  30 May, 2014   

 

 [facility]  [address] [facility] [id number] [address]80  30 May, 2014   

 

 [facility] [place] [address]  14 May, 2014   

 

 [facility]  [address] [facility] [id number] [address]  14 May, 2014   

 

 [facility] [place] [address]  05 May, 2014   

 

 [facility] [place] [facility]  [id number] [address]16  05 May, 2014   

 

 [facility] [place] [address]     

 

 [facility], [place]  [id number], [place] [address]     

 

 [facility] [place] [address]     

 

 [facility] [place]  [id number], [place] [address]-
1341     

 

 [facility] [place] [address]  10 Feb, 2014   

 

 [facility], [place]  [address] [id number] [address]  10 Feb, 2014   







IMMUNIZATIONS

No Known Immunizations



SOCIAL HISTORY

Never Assessed



REASON FOR VISIT

US



PLAN OF CARE





VITAL SIGNS





MEDICATIONS

Unknown Medications



RESULTS

No Results



PROCEDURES

No Known procedures



INSTRUCTIONS





MEDICATIONS ADMINISTERED

No Known Medications



MEDICAL (GENERAL) HISTORY







 Type  Description  Date

 

 Medical History  Diabetes mellitus without mention of complication, type II or 
unspecified type, not stated as uncontrolled   

 

 Medical History  Depressive disorder, not elsewhere classified   

 

 Medical History  Anxiety state, unspecified   

 

 Medical History  Effusion of lower leg joint   

 

 Medical History  Generalized osteoarthrosis, unspecified site   

 

 Medical History  Other and unspecified hyperlipidemia   

 

 Medical History  Abnormal weight gain   

 

 Medical History  Effusion of joint, site unspecified   

 

 Medical History  Esophageal reflux   

 

 Medical History  hypertension   

 

 Medical History  colonoscopy- inscreased polyp   

 

 Surgical History  Tonsillectomy   

 

 Surgical History  Orthopedic: Bilateral Knee x2   

 

 Surgical History  colonoscopy  2017

 

 Hospitalization History  Surgery(s)/Childbirth(s) only

## 2019-02-26 NOTE — XMS REPORT
[facility]

 Created on: 2018



Duyen Larkin

External Reference #: [id number]

: 1965

Sex: Female



Demographics







 Address  [address] [place]  [address]

 

 Preferred Language  Unknown

 

 Marital Status  Unknown

 

 Rastafari Affiliation  Unknown

 

 Race  Unknown

 

 Ethnic Group  Unknown





Author







 Author  LIYAH LOWE

 

 [facility]

 

 Address  [address] [place]  [address]



 

 Phone  [phone]







Care Team Providers







 Care Team Member Name  Role  Phone

 

 LIYAH LOWE  Unavailable  [phone]







PROBLEMS







 Type  Condition  ICD9-CM Code  OZN21-PB Code  Onset Dates  Condition Status  
SNOMED Code

 

 Problem  Effusion of lower leg joint  719.06        Active  049361768

 

 Problem  Esophageal reflux  530.81        Active  529931825

 

 Problem  Other hyperlipidemia     E78.4     Active  83370504

 

 Problem  Type 2 diabetes mellitus without complications     E11.9     Active  
605068013

 

 Problem  Primary generalized (osteo)arthritis     M15.0     Active  483252775

 

 Problem  COPD with exacerbation     J44.1     Active  438755162

 

 Problem  Claudication     I73.9     Active  524875145

 

 Problem  Mild episode of recurrent major depressive disorder     F33.0     
Active  108014047

 

 Problem  Cataracts, bilateral     H26.9     Active  48339921

 

 Problem  Chronic obstructive pulmonary disease with acute exacerbation     
J44.1     Active  432688907

 

 Problem  Chronic renal insufficiency, stage II (mild)     N18.2     Active  
960445286

 

 Problem  Primary osteoarthritis of both knees     M17.0     Active  475039692

 

 Problem  Other chronic pain     G89.29     Active  88342394

 

 Problem  Lumbar degenerative disc disease     M51.36     Active  08363847

 

 Problem  Essential (primary) hypertension     I10     Active  74953190

 

 Problem  Anxiety disorder, unspecified     F41.9     Active  040913101

 

 Problem  Peripheral arterial occlusive disease     I77.9     Active  232063589

 

 Problem  Diabetic polyneuropathy associated with type 2 diabetes mellitus     
E11.42     Active  78996777

 

 Problem  Type 2 diabetes mellitus with diabetic neuropathy, without long-term 
current use of insulin     E11.40     Active  04518532

 

 Problem  Chronic obstructive pulmonary disease, unspecified COPD type     
J44.9     Active  67309223

 

 Problem  Long term current use of insulin     Z79.4     Active  562930234

 

 Problem  Type 2 diabetes mellitus with hyperglycemia     E11.65     Active  
90907117

 

 Problem  Old tear of lateral meniscus of left knee, unspecified tear type     
M23.201     Active  235706092

 

 Problem  Major depressive disorder, single episode, unspecified     F32.9     
Active  43635970

 

 Problem  Effusion, left knee     M25.462     Active  311070856

 

 Problem  Dysphagia, unspecified type     R13.10     Active  87718752

 

 Problem  Hyperlipidemia LDL goal <130     E78.5     Active  32836308

 

 Problem  Unilateral primary osteoarthritis, left knee     M17.12     Active  
250447704

 

 Problem  Hypothyroidism (acquired)     E03.9     Active  701428512







ALLERGIES

No Information



ENCOUNTERS







 Encounter  Location  Date  Diagnosis

 

 [facility] [place]  [address] [facility] C [id number] [place] [id number]  21 May, 
2018  Pain in left knee M25.562

 

 [facility] [place]  [address] [place] [address] C [id number] [place] [id number]  21 May, 
2018  Pain in left knee M25.562 and Other chronic pain G89.29

 

 [facility] [place]  [address] [id number], [place] [address]  15 May, 2018   

 

 [facility] [place]  [address] [place] [address] C [id number] [place] [id number]  02 May, 
2018  Type 2 diabetes mellitus without complications E11.9 ; Long term current 
use of insulin Z79.4 ; Unilateral primary osteoarthritis, left knee M17.12 ; 
Lumbar degenerative disc disease M51.36 and Chronic obstructive pulmonary 
disease, unspecified COPD type J44.9

 

 [facility] [place]  [address] [facility] C [id number] [place] [id number]     

 

 [facility] [place]  [address] [facility] C [id number] [place], [place] [id number]     

 

 [facility] [place]  [address] [place] [address] C [id number] [place] [id number]     

 

 [facility] [place]  [address] [place] [address] C [id number] [place] [address]  23 Mar, 
2018  Type 2 diabetes mellitus without complications E11.9

 

 [facility] [place]  [address] [place] [address] C [id number] [place] [id number]  23 Mar, 
2018   

 

 [facility] [place]  [address] [facility] [id number] [address]  13 Mar, 2018   

 

 [facility] [place]  [address] [id number], [place] [address]  09 Mar, 2018  Type 2 diabetes mellitus without complications E11.9

 

 [facility] [place]  [address] [facility] C [id number] [place] [id number]     

 

 [facility] [place]  [address] [facility] C [id number] [place] [address]    Diabetic polyneuropathy associated with type 2 diabetes mellitus E11.42

 

 [facility] [place]  [address] [facility] C [id number] [place] [address]     

 

 [facility] [place]  [address] [facility] C [id number] [place] [address]  21 Dec, 
2017  Type 2 diabetes mellitus without complications E11.9 ; Long term current 
use of insulin Z79.4 ; High risk sexual behavior Z72.51 ; BMI 40.0-44.9, adult 
Z68.41 and Encounter for immunization Z23

 

 [facility] [place]  [address] [place] [address] C [id number] [place], [place] [id number]  04 Dec, 
2017   

 

 [facility] [place]  [address] [place] [address] C [id number] [place] [id number]  04 Dec, 
2017   

 

 [facility] [place]  [address] [place] [address] C [id number] [place] [id number]    COPD with exacerbation J44.1 and BMI 40.0-44.9, adult Z68.41

 

 [facility] [place]  [address] [facility] C [id number] [place] [id number]  09 Oct, 
2017  Encounter for screening mammogram for malignant neoplasm of breast Z12.31 
; Well woman exam Z01.419 and High risk sexual behavior Z72.51

 

 [facility] [place]  [address] [facility] [address] [id number] [place] [id number]  11 Sep, 
2017   

 

 [facility] [place]  [address] [facility] [address] [id number] [place] [id number]  11 Sep, 
2017  Type 2 diabetes mellitus without complications E11.9

 

 [facility] [place]  [address] [facility] [address] [id number] [place] [id number]  11 Sep, 
2017   

 

 [facility] [place]  [address] [facility] C [id number] [place] [id number]  11 Sep, 
2017  Old tear of lateral meniscus of left knee, unspecified tear type M23.201

 

 [facility] [place]  [address] [facility] [address] [id number] [place], [place] [address]  11 Sep, 
2017  Other type of osteoarthritis, unspecified site M19.90 ; Type 2 diabetes 
mellitus without complications E11.9 ; Primary osteoarthritis of both knees 
M17.0 ; Chronic renal insufficiency, stage II (mild) N18.2 ; Diabetic 
polyneuropathy associated with type 2 diabetes mellitus E11.42 and Exposure to 
hepatitis C Z20.5

 

 [facility] [place]  [address] [facility] [address] [id number] [place] [address]  06 Sep, 
2017  Type 2 diabetes mellitus without complications E11.9

 

 [facility] [place]  [address] [facility] [address] [id number] [place] [address]  30 Aug, 
2017   

 

 [facility] [place]  [address] [facility] [address] [id number] [place] [address]  30 Aug, 
2017   

 

 [facility] [place]  [address] [facility] [address] [id number] [place] [address]  16 Aug, 
2017  Other type of osteoarthritis, unspecified site M19.90

 

 [facility] [place]  [address] [facility] [address] [id number] [place] 119766350  15 Aug, 
2017  Type 2 diabetes mellitus without complications E11.9

 

 [facility] [place]  [address] [facility] [address] [id number] [place] [id number]  07 Aug, 
2017   

 

 [facility] [place]  [address] [facility] [address] [id number] [place] [id number]     

 

 [facility] [place]  [address] [facility] [address] [id number] [place] [id number]     

 

 [facility] [place]  [address] [facility] [address] [id number] [place], [place] [id number]    Type 2 diabetes mellitus without complications E11.9 ; Type 2 diabetes 
mellitus with diabetic neuropathy, without long-term current use of insulin 
E11.40 ; Primary osteoarthritis of both knees M17.0 and Chronic renal 
insufficiency, stage II (mild) N18.2

 

 [facility] [place]  [address] [facility] [address] [id number] [place] [id number]     

 

 [facility] [place]  [address] [facility] [address] [id number] [place], [place] [id number]  30 May, 
2017  Hyperlipidemia LDL goal <130 E78.5

 

 [facility] [place]  [address] [facility] [address] [id number] [place], [place] [id number]  19 May, 
2017  Type 2 diabetes mellitus without complications E11.9

 

 [facility]  [address] [facility] [id number] [place] [address]  16 May, 2017   

 

 [facility] [place]  [address] [facility] [address] [id number] [place], [place] [id number]  08 May, 
2017   

 

 [facility] [place]  [address] [facility] [address] [id number] [place], [place] [id number]  01 May, 
2017   

 

 [facility] [place]  [address] [facility] [address] [id number] [place] [id number]     

 

 [facility] [place]  [address] [facility] [address] [id number] [place], [place] [id number]    Primary generalized (osteo)arthritis M15.0 and Type 2 diabetes mellitus 
without complications E11.9

 

 [facility] [place]  [address] [facility] [address] [id number] [place] [address]    Old tear of lateral meniscus of left knee, unspecified tear type M23.201

 

 [facility] [place]  [address] [facility] [address] [id number] [place] [id number]     

 

 [facility] [place]  [address] [facility] [address] [id number] [place] [address]     

 

 [facility] [place]  [address] [facility] [address] [id number] [place] [id number]     

 

 [facility] [place]  [address] [facility] [address] [id number] [place] [id number]  20 Mar, 
2017  Type 2 diabetes mellitus without complications E11.9

 

 [facility] [place]  [address] [facility] [address] [id number] [place] [id number]  13 Mar, 
2017  Type 2 diabetes mellitus without complications E11.9

 

 [facility] [place]  [address] [facility] [address] [id number] [place] [id number]  13 Mar, 
2017  Type 2 diabetes mellitus without complications E11.9

 

 [facility] [place]  [address] [facility] [address] [id number] [place] 460[address]  04 Mar, 
2017  Type 2 diabetes mellitus without complications E11.9

 

 [facility] [place]  [address] [place] [address] C [id number] [place], [place] [id number]     

 

 [facility] [place]  [address] [place] [address] C [id number] [place] [id number]    Chronic obstructive pulmonary disease with acute exacerbation J44.1

 

 [facility] [place]  [address] [place] [address] C [id number] [place], [place] [id number]     

 

 [facility] [place]  [address] [place] [address] C [id number] [place], [place] [id number]    Dysuria R30.0 ; Essential (primary) hypertension I10 ; Hypothyroidism (
acquired) E03.9 ; Type 2 diabetes mellitus without complications E11.9 and Mild 
episode of recurrent major depressive disorder F33.0

 

 [facility] [place]  [address] [place] [address] C [id number] [place] [id number]     

 

 [facility] [place]  [address] [place] [address] C [id number] [place] [id number]    Dysuria R30.0 ; Vaginal candidiasis B37.3 and Candidiasis B37.9

 

 [facility] [place]  [address] [place] [address] C [id number] [place], [place] [id number]     

 

 [facility] [place]  [address] [place] [address] C [id number] [place], [place] [id number]  10 Octaviano, 
2017  COPD with exacerbation J44.1 and Dysphagia, unspecified type R13.10

 

 [facility] [place]  [address] [place] [address] C [id number] [place], [place] [id number]  27 Dec, 
2016   

 

 [facility] [place]  [address] [place] [address] C [id number] [place] [id number]  12 Dec, 
2016  Essential (primary) hypertension I10 ; Hypothyroidism (acquired) E03.9 ; 
Type 2 diabetes mellitus without complications E11.9 ; Primary generalized (
osteo)arthritis M15.0 ; Major depressive disorder, single episode, unspecified 
F32.9 ; Unilateral primary osteoarthritis, left knee M17.12 ; Hyperlipidemia 
LDL goal <130 E78.5 and Dysphagia, unspecified type R13.10

 

 [facility] [place]  [address] [place] [address] C [id number] [place] [id number]  01 Dec, 
2016   

 

 [facility] [place]  [address] [facility] C [id number] [place] [id number]     

 

 [facility] [place]  [address] [facility] C [id number] [place] [id number]     

 

 [facility] [place]  [address] [facility] C [id number] [place] 62346[address]     

 

 [facility] [place]  [address] [place] [address] C [id number] [place] 903352501     

 

 [facility] [place]  [address] [place] [address] C [id number] [place] [id number]     

 

 [facility] [place]  [address] [place] [address] C [id number] [place], [place] 549593751    Type 2 diabetes mellitus without complications E11.9 ; Primary 
generalized (osteo)arthritis M15.0 ; Effusion, left knee M25.462 ; Old tear of 
lateral meniscus of left knee, unspecified tear type M23.201 and Fatigue, 
unspecified type R53.83

 

 [facility] [place]  [address] [place] [address] C [id number] [place] [id number]  26 Sep, 
2016  Type 2 diabetes mellitus without complications E11.9 ; Claudication I73.9 
; Pain in right leg M79.604 and Pain of left leg M79.605

 

 [facility] [place]  [address] [place] [address] C [id number] [place] [id number]  14 Sep, 
2016   

 

 [facility] [place]  [address] [place] [address] C [id number] [place], [place] [id number]  12 Sep, 
2016   

 

 [facility] [place]  [address] [place] [address] C [id number] [place] [id number]  18 Aug, 
2016  Type 2 diabetes mellitus with hyperglycemia E11.65 ; Long term current 
use of insulin Z79.4 ; Anxiety disorder, unspecified F41.9 ; Essential (primary
) hypertension I10 ; Major depressive disorder, single episode, unspecified 
F32.9 and Peripheral arterial occlusive disease I77.9

 

 [facility] [place]  [address] [place] [address] C [id number] [place] [id number]  17 Aug, 
2016   

 

 [facility] [place]  [address] [place] [address] C [id number] [place] [id number]  11 Aug, 
2016   

 

 [facility] [place]  [address] [facility] [address] [id number] [place] [id number]  11 Aug, 
2016   

 

 [facility] [place]  [address] [facility] C [id number] [place] [id number]  10 Aug, 
2016   

 

 [facility] [place]  [address] [place] [address] C [id number] [place] [id number]  05 Aug, 
2016  Acute midline low back pain with right-sided sciatica M54.41

 

 [facility] [place]  [address] [facility] [id number] [place] [address]-
[address]  05 Aug, 2016  Chest pain, unspecified type R07.9 ; Palpitations R00.2 ; 
Claudication I73.9 ; Generalized pain R52 and Type 2 diabetes mellitus without 
complications E11.9

 

 [facility] [place]  [address] [place] [address] C [id number] [place], [place] [id number]    Acute midline low back pain with right-sided sciatica M54.41 ; Dysuria 
R30.0 ; Claudication I73.9 ; Peripheral arterial occlusive disease I77.9 ; 
Shortness of breath R06.02 ; Effusion, left knee M25.462 and Type 2 diabetes 
mellitus without complications E11.9

 

 [facility] [place]  [address] [place] [address] [address] [id number] [place] [id number]     

 

 [facility] [place]  [address] [place] [address] C [id number] [place] [id number]     

 

 [facility] [place]  [address] [place] [address] [address] [id number] [place] [id number]     

 

 [facility] [place]  [address] [place] [address] [address] [id number] [place] [id number]    Type 2 diabetes mellitus without complications E11.9 ; Other 
hyperlipidemia E78.4 ; Peripheral arterial occlusive disease I77.9 ; Essential (
primary) hypertension I10 and Other fatigue R53.83

 

 [facility] [place]  [address] [facility] [address] [id number] [place] [id number]  18 May, 
2016   

 

 [facility] [place]  [address] [place] [address] C [id number] [place], [place] [id number]  06 May, 
2016   

 

 [facility] [place]  [address] [facility] [address] [id number] [place] [id number]  05 May, 
2016  Chest pain, unspecified type R07.9 ; Peripheral arterial occlusive 
disease I77.9 ; Anxiety disorder, unspecified F41.9 ; Essential (primary) 
hypertension I10 ; Type 2 diabetes mellitus without complications E11.9 and 
Other hyperlipidemia E78.4

 

 [facility] [place]  [address] [facility] [address] [id number] [place] [id number]  04 May, 
2016   

 

 [facility] [place]  [address] [place] [address] [address] [id number] [place] [id number]     

 

 [facility] [place]  [address] [place] [address] [address] [id number] [place] [id number]     

 

 [facility] [place]  [address] [place] [address] [address] [id number] [place] [id number]    Type 2 diabetes mellitus without complications E11.9 ; Peripheral 
arterial occlusive disease I77.9 ; Primary generalized (osteo)arthritis M15.0 ; 
Major depressive disorder, single episode, unspecified F32.9 ; Anxiety disorder
, unspecified F41.9 and Essential (primary) hypertension I10

 

 [facility] [place]  [address] [place] [address] [address] [id number] [place], [place] [id number]     

 

 [facility] [place]  [address] [place] [address] [address] [id number] [place] [id number]     

 

 [facility] [place]  [address] [facility] [id number] [place] [address]-
[address]  02 Mar, 2016   

 

 [facility] [place]  [address] [place] [address] [address] [id number] [place] [id number]     

 

 [facility] [place]  [address] [place] [address] [address] [id number] [place], [place] [id number]    Bronchitis J40 ; Shortness of breath R06.02 and Wheezing R06.2

 

 [facility] [place]  [address] [place] [address] [address] [id number] [place] 966856067     

 

 [facility] [place]  [address] [place] [address] [address] [id number] [place] [id number]     

 

 [facility] [place]  [address] [place] [address] [address] [id number] [place] [id number]     

 

 [facility] [place]  [address] [facility] [address] [id number] [place] [id number]     

 

 [facility] [place]  [address] [facility] [address] [id number] [place] [id number]    Type 2 diabetes mellitus without complications E11.9 ; Claudication I73.9 
; Other hyperlipidemia E78.4 ; Cataracts, bilateral H26.9 and Other fatigue 
R53.83

 

 [facility] [place]  [address] [facility] [address] [id number] [place] [id number]  28 Oct, 
2015   

 

 [facility] [place]  [address] [facility] [address] [id number] [place] [id number]  22 Oct, 
2015   

 

 [facility] [place]  [address] [facility] [address] [id number] [place] [id number]  16 Oct, 
2015   

 

 [facility] [place]  [address] [facility] [address] [id number] [place] [id number]  14 Oct, 
2015  Type 2 diabetes mellitus without complications E11.9 ; Other 
hyperlipidemia E78.4 ; Primary generalized (osteo)arthritis M15.0 and 
Claudication I73.9

 

 [facility] [place]  [address] [facility] [address] [id number] [place] [id number]  12 Oct, 
2015   

 

 [facility] [place]  [address] [facility] [address] [id number] [place] 110806196  26 Aug, 
2015   

 

 [facility] [place]  [address] [facility] [address] [id number] [place] [id number]  12 Aug, 
2015   

 

 [facility] [place]  [address] [facility] [address] [id number] [place] [id number]  12 Aug, 
2015   

 

 [facility] [place]  [address] [facility] [address] [id number] [place] [id number]  10 Aug, 
2015   

 

 [facility] [place]  [address] [facility] [address] [id number] [place] [id number]  05 Aug, 
2015   

 

 [facility] [place]  [address] [facility] [address] [id number] [place], [place] [id number]  11 May, 
2015  Diabetes mellitus without mention of complication, type II or unspecified 
type, not stated as uncontrolled 250.00 ; Bronchitis 490 and Effusion of lower 
leg joint 719.06

 

 [facility] [place]  [address] [facility] [address] [id number] [place] [id number]  01 May, 
2015   

 

 [facility] [place]  [address] [facility] [address] [id number] [place], [place] [id number]  01 May, 
2015  Bronchitis 490 and Wheezing 786.07

 

 [facility]  [address] [facility] [id number] [place] [address]-
[address]     

 

 [facility] [place]  [address] [facility] [id number], [place] [address]-
[address]     

 

 [facility] [place]  [address] [facility] [id number], [place] [address]-
[address]     

 

 [facility] [place]  [address] [facility] [address] [id number] [place] [id number]  19 Mar, 
2015   

 

 [facility] [place]  [address] [facility] [id number], [place] [address]-
1335  19 Mar, 2015   

 

 [facility] [place]  [address] [facility] [address] [id number] [place] [id number]  09 Mar, 
2015   

 

 [facility] [place]  [address] [facility] [id number], [place] [address]-
8530  09 Mar, 2015   

 

 [facility] [place]  [address] [facility] [address] [id number] [place], [place] [id number]     

 

 [facility] [place]  [address] [facility] [id number], [place] [address]-
76[address]     

 

 [facility]  [address] [facility] [id number], [place] [address]-
2546  10 Feb, 2015   

 

 [facility] [place]  [address] [facility] [address] [id number] [place], [place] [id number]     

 

 [facility] [place]  [address] [facility] [address] [id number] [place], [place] [id number]     

 

 [facility] [place]  [address] [facility] [id number] [address]76     

 

 [facility] [place]  [address] [facility] [id number], [place] [address]-
9251     

 

 [facility] [place]  [address] [id number] [place] [id number]     

 

 [facility] [place] [facility]  [address] [id number] [place] [address]-
[address]     

 

 [facility] [place]  [address] [id number] [place] [id number]     

 

 [facility] [place] [facility]  [address] [id number] [place] [address]-
[address]     

 

 [facility] [place]  [address] [id number] [place] [id number]  24 Dec, 
2014   

 

 [facility] [place] [facility]  [address] [id number] [place] [address]-
[address]  24 Dec, 2014   

 

 [facility] [place]  [address] [id number] [place] [phone]  18 Dec, 
2014   

 

 [facility] [place] [facility]  [address] [id number] [place] [address]  18 Dec, 2014   

 

 [facility] [place]  [address] [id number] [place] [id number]  09 Dec, 
2014   

 

 [facility] [place] [facility]  [address] [id number] [place] [address]-
78[address]  09 Dec, 2014   

 

 [facility] [place]  [address] [facility] [address] [id number] [place] [phone]  02 Dec, 
2014   

 

 [facility] [place] [facility]  [address] [id number] [place] [address]  02 Dec, 2014   

 

 [facility] [place]  [address] [id number] [place] [id number]     

 

 [facility]  [address] [id number] [place] [address]-
[address]     

 

 [facility] [place]  [address] [id number] [place] [id number]     

 

 [facility] [place] [facility]  [address] [id number] [place] [address]-
1270     

 

 [facility] [place]  [address] [id number] [place] [id number]  31 Oct, 
2014   

 

 [facility] [place] [facility]  [address] [id number] [place] [address]-
2952  31 Oct, 2014   

 

 [facility] [place]  [address] [facility] [address] [id number] [place] [id number]  16 Oct, 
2014   

 

 [facility] [place] [facility]  [address] [id number] [place] [address]-
3895  16 Oct, 2014   

 

 [facility] [place]  [address] [facility] [address] [id number] [place] [phone]  10 Oct, 
2014   

 

 [facility] [place] [facility]  [address] [id number] [place] [address]-
9235  10 Oct, 2014   

 

 [facility] [place]  [address] [facility] [address] [id number] [place] [id number]  26 Sep, 
2014   

 

 [facility] [place] [facility]  [address] [id number] [place] [address]-
4271  26 Sep, 2014   

 

 [facility] [place]  [address] [facility] [address] [id number] [place] [id number]  29 Aug, 
2014   

 

 [facility] [place] [facility]  [address] [id number] [place] [address]-
3702  29 Aug, 2014   

 

 [facility] [place]  [address] [facility] [address] [id number] [place] [id number]  25 Aug, 
2014   

 

 [facility] [place]  [address] [facility] [address] [id number] [place] [id number]  25 Aug, 
2014   

 

 [facility] [place] [facility]  [address] [id number] [place] [address]-
4653  25 Aug, 2014   

 

 [facility] [place] [facility]  [address] [id number] [place] [address]-
8385  25 Aug, 2014   

 

 [facility] [place]  [address] [facility] [address] [id number] [place] [id number]  18 Aug, 
2014   

 

 [facility] [place] [facility]  [address] [id number] [place] [address]-
7829  18 Aug, 2014   

 

 [facility] [place]  [address] [facility] [address] [id number] [place] [id number]  14 Aug, 
2014   

 

 [facility]  [address] [id number] [place] [address]-
[address]  14 Aug, 2014   

 

 [facility] [place] [facility]  [address] [id number] [place] [address]-
[address]646  11 Aug, 2014   

 

 [facility] [place]  [address] [id number] [place] [id number]  11 Aug, 
2014   

 

 [facility] [place] [facility]  [address] [id number] [place] [address]-
7456  11 Aug, 2014   

 

 [facility]  [address] [id number] [place] [address]-
8787  11 Aug, 2014   

 

 [facility] [place] [facility]  [address] [id number] [place] [address]-
1773     

 

 [facility] [place]  [address] [id number] [place] [id number]     

 

 [facility] [place] [facility]  [address] [id number] [place] [address]-
2451     

 

 [facility] [place] [facility]  [address] [id number] [place] [address]-
2054     

 

 [facility] [place]  [address] [id number] [place] [id number]     

 

 [facility]  [address] [id number] [place] [address]-
45[address]     

 

 [facility] [place]  [address] [id number] [place] [id number]  30 May, 
2014   

 

 [facility] [place] [facility]  [address] [id number] [place] [address]-
[address]  30 May, 2014   

 

 [facility] [place]  [address] [id number] [place] [id number]  14 May, 
2014   

 

 [facility] [place] [facility]  [address] [id number] [place] [address]-
[address]6  14 May, 2014   

 

 [facility] [place]  [address] [id number] [place] [address]  05 May, 
2014   

 

 [facility] [place]  [address] [facility] [id number] [place] [address]-
8486  05 May, 2014   

 

 [facility] [place]  [address] [facility] [address] [id number] [place] [phone]     

 

 [facility] [place]  [address] [facility] [id number] [place] [address]-
1046     

 

 [facility] [place]  [address] [facility] [address] [id number] [place] [phone]     

 

 [facility] [place]  [address] [facility] [id number] [place] [address]-
3476     

 

 [facility] [place]  [address] [facility] [address] [id number] [place] [phone]  10 Feb, 
2014   

 

 [facility] [place]  [address] [facility] [id number] [place] [address]-
7457  10 Feb, 2014   







IMMUNIZATIONS

No Known Immunizations



SOCIAL HISTORY

Never Assessed



REASON FOR VISIT

Rx Request: Metformin, Losartan



PLAN OF CARE





VITAL SIGNS





MEDICATIONS







 Medication  Instructions  Dosage  Frequency  Start Date  End Date  Duration  
Status

 

 Lipitor 40 MG     1 tablet          90 days  Active

 

 Metformin HCl 500MG  Orally Twice a day  1 tablet with meals  12h        90 
days  Active







RESULTS

No Results



PROCEDURES

No Known procedures



INSTRUCTIONS





MEDICATIONS ADMINISTERED

No Known Medications



MEDICAL (GENERAL) HISTORY







 Type  Description  Date

 

 Medical History  Diabetes mellitus without mention of complication, type II or 
unspecified type, not stated as uncontrolled   

 

 Medical History  Depressive disorder, not elsewhere classified   

 

 Medical History  Anxiety state, unspecified   

 

 Medical History  Effusion of lower leg joint   

 

 Medical History  Generalized osteoarthrosis, unspecified site   

 

 Medical History  Other and unspecified hyperlipidemia   

 

 Medical History  Abnormal weight gain   

 

 Medical History  Effusion of joint, site unspecified   

 

 Medical History  Esophageal reflux   

 

 Medical History  hypertension   

 

 Medical History  colonoscopy- inscreased polyp   

 

 Surgical History  Tonsillectomy   

 

 Surgical History  Orthopedic: Bilateral Knee x2   

 

 Surgical History  colonoscopy  2017

 

 Hospitalization History  Surgery(s)/Childbirth(s) only

## 2019-02-26 NOTE — OPERATIVE REPORT
DATE OF SERVICE:  02/26/2019



PREOPERATIVE DIAGNOSIS:

History of colon polyps.



POSTOPERATIVE DIAGNOSIS:

Colon polyps.



PROCEDURE:

Colonoscopy with hot biopsy polypectomy x2.



SURGEON:

Nelson Cardenas DO



ANESTHESIA:

Per CRNA.



ESTIMATED BLOOD LOSS:

None.



COMPLICATIONS:

None.



INDICATIONS:

The patient is a 54-year-old female who had a large polyp previously in the

hepatic flexure.  This area was tattooed.  The patient was recommended repeat

colonoscopy in one year for reevaluation.  She understands risks and benefits of

procedure and wished to proceed with procedure.  Consent was signed on the

chart.



DESCRIPTION OF PROCEDURE:

The patient was taken to the endoscopy suite, placed in left lateral recumbent

position.  Timeout was performed.  Digital rectal exam was performed.  There

were no palpable polyps, masses or ulcerations.  Scope was inserted in the

rectum, advanced all the way to the cecum with minimal difficulty.  Prep was

adequate with irrigation and suction.  Scope was then slowly retracted back. 

There were no polyps, mass or ulceration of the cecum, ascending colon.  At the

hepatic flexure, the tattooed was previously placed was noted, near this was a

small polyp, which hot biopsy polypectomy was performed.  Just distal to this

area, another small polyp was present, which hot biopsy polypectomy was

performed.  Scope was continued to be slowly retracted back.  There were no

polyps, masses or ulcerations visualized within the transverse colon, descending

colon and sigmoid colon.  Once in the rectum, scope was retroflexed noting no

other pathology.  Scope was returned to its normal position, slowly withdrawn

until completely removed, noting no other pathology.



RECOMMENDATIONS:

The patient to follow up on pathology in 2 weeks.  Also recommend high-fiber

diet.  We would recommend repeat colonoscopy in three years.  If any issues

before that, will be seen at that time.





Job ID: [id number]

DocumentID: [id number]

Dictated Date:  02/26/2019 14:03:37

Transcription Date: 02/26/2019 22:51:46

Dictated By: NELSON CARDENAS DO

## 2019-02-26 NOTE — XMS REPORT
[facility]

 Created on: 2018



Duyen Larkin

External Reference #: [id number]

: 1965

Sex: Female



Demographics







 Address  [address] [place]  [address]

 

 Preferred Language  Unknown

 

 Marital Status  Unknown

 

 Advent Affiliation  Unknown

 

 Race  Unknown

 

 Ethnic Group  Unknown





Author







 Author  LIYAH LWOE

 

 [facility]

 

 Address  [address] [place]  [address]



 

 Phone  [phone]







Care Team Providers







 Care Team Member Name  Role  Phone

 

 LIYAH LOWE  Unavailable  [phone]







PROBLEMS







 Type  Condition  ICD9-CM Code  MZT63-GP Code  Onset Dates  Condition Status  
SNOMED Code

 

 Problem  Effusion of lower leg joint  719.06        Active  277043395

 

 Problem  Esophageal reflux  530.81        Active  844918434

 

 Problem  Dysphagia, unspecified type     R13.10     Active  44116635

 

 Problem  Other hyperlipidemia     E78.4     Active  92522517

 

 Problem  Unilateral primary osteoarthritis, left knee     M17.12     Active  
495633197

 

 Problem  Type 2 diabetes mellitus without complications     E11.9     Active  
509128152

 

 Problem  Hypothyroidism (acquired)     E03.9     Active  507954962

 

 Problem  Mild episode of recurrent major depressive disorder     F33.0     
Active  264240195

 

 Problem  COPD with exacerbation     J44.1     Active  375297423

 

 Problem  Long term current use of insulin     Z79.4     Active  567447216

 

 Problem  Diabetic polyneuropathy associated with type 2 diabetes mellitus     
E11.42     Active  40679493

 

 Problem  Cataracts, bilateral     H26.9     Active  78551111

 

 Problem  Claudication     I73.9     Active  853907891

 

 Problem  Primary generalized (osteo)arthritis     M15.0     Active  082037446

 

 Problem  Chronic renal insufficiency, stage II (mild)     N18.2     Active  
971887271

 

 Problem  Chronic obstructive pulmonary disease with acute exacerbation     
J44.1     Active  874363826

 

 Problem  Primary osteoarthritis of both knees     M17.0     Active  301880585

 

 Problem  Type 2 diabetes mellitus with diabetic neuropathy, without long-term 
current use of insulin     E11.40     Active  57139473

 

 Problem  Major depressive disorder, single episode, unspecified     F32.9     
Active  09589394

 

 Problem  Anxiety disorder, unspecified     F41.9     Active  666829266

 

 Problem  Essential (primary) hypertension     I10     Active  17007945

 

 Problem  Peripheral arterial occlusive disease     I77.9     Active  050955059

 

 Problem  Old tear of lateral meniscus of left knee, unspecified tear type     
M23.201     Active  787138991

 

 Problem  Hyperlipidemia LDL goal <130     E78.5     Active  13152054

 

 Problem  Effusion, left knee     M25.462     Active  553683330

 

 Problem  Type 2 diabetes mellitus with hyperglycemia     E11.65     Active  
46901981







ALLERGIES







 Substance  Reaction  Event Type  Date  Status

 

 Lisinopril  Unknown  Drug Allergy  11 Sep, 2017  Active

 

 Gabapentin  dizzy/nausea  Drug Allergy  11 Sep, 2017  Active

 

 Demerol  Unknown  Drug Allergy  11 Sep, 2017  Active







ENCOUNTERS







 Encounter  Location  Date  Diagnosis

 

 [facility] [place]  [address] [facility] [address] [id number] [place] [id number]  02 May, 
2018   

 

 [facility] [place]  [address] [facility] [address] [id number] [place] [id number]     

 

 [facility] [place]  [address] [facility] [address] [id number] [place] [id number]     

 

 [facility] [place]  [address] [facility] [address] [id number] [place] [id number]     

 

 [facility] [place]  [address] [facility] [address] [id number] [place] [id number]  23 Mar, 
2018  Type 2 diabetes mellitus without complications E11.9

 

 [facility] [place]  [address] [facility] [address] [id number] [place] [id number]  23 Mar, 
2018   

 

 [facility]  [address] [facility] [id number] [place] [address]  13 Mar, 2018   

 

 [facility]  [address] [facility] [id number] [place] [address]  09 Mar, 2018  Type 2 diabetes mellitus without complications E11.9

 

 [facility] [place]  [address] [place] [address] [address] [id number] [place] [id number]     

 

 [facility] [place]  [address] [place] [address] [address] [id number] [place] [id number]    Diabetic polyneuropathy associated with type 2 diabetes mellitus E11.42

 

 [facility] [place]  [address] [facility] [address] [id number] [place] [id number]     

 

 [facility] [place]  [address] [facility] [address] [id number] [place] [id number]  21 Dec, 
2017  Type 2 diabetes mellitus without complications E11.9 ; Long term current 
use of insulin Z79.4 ; High risk sexual behavior Z72.51 ; BMI 40.0-44.9, adult 
Z68.41 and Encounter for immunization Z23

 

 [facility] [place]  [address] [facility] [address] [id number] [place] [id number]  04 Dec, 
2017   

 

 [facility] [place]  [address] [facility] [address] [id number] [place] [id number]  04 Dec, 
2017   

 

 [facility] [place]  [address] [place] [address] [address] [id number] [place] [id number]  17 2017  COPD with exacerbation J44.1 and BMI 40.0-44.9, adult Z68.41

 

 [facility] [place]  [address] [facility] [address] [id number] [place] [id number]  09 Oct, 
2017  Encounter for screening mammogram for malignant neoplasm of breast Z12.31 
; Well woman exam Z01.419 and High risk sexual behavior Z72.51

 

 [facility] [place]  [address] [place] [address] C [id number] [place] [id number]  11 Sep, 
2017   

 

 [facility] [place]  [address] [place] [address] [address] [id number] [place] [id number]  11 Sep, 
2017  Type 2 diabetes mellitus without complications E11.9

 

 [facility] [place]  [address] [place] [address] [address] [id number] [place] [id number]  11 Sep, 
2017   

 

 [facility] [place]  [address] [place] [address] C [id number] [place], [place] [id number]  11 Sep, 
2017  Old tear of lateral meniscus of left knee, unspecified tear type M23.201

 

 [facility] [place]  [address] [place] [address] C [id number] [place] [id number]  11 Sep, 
2017  Other type of osteoarthritis, unspecified site M19.90 ; Type 2 diabetes 
mellitus without complications E11.9 ; Primary osteoarthritis of both knees 
M17.0 ; Chronic renal insufficiency, stage II (mild) N18.2 ; Diabetic 
polyneuropathy associated with type 2 diabetes mellitus E11.42 and Exposure to 
hepatitis C Z20.5

 

 [facility] [place]  [address] [facility] [address] [id number] [place], [place] [id number]  06 Sep, 
2017  Type 2 diabetes mellitus without complications E11.9

 

 [facility] [place]  [address] [facility] [address] [id number] [place] [id number]  30 Aug, 
2017   

 

 [facility] [place]  [address] [facility] [address] [id number] [place] [id number]  30 Aug, 
2017   

 

 [facility] [place]  [address] [facility] [address] [id number] [place], [place] [id number]  16 Aug, 
2017  Other type of osteoarthritis, unspecified site M19.90

 

 [facility] [place]  [address] [facility] [address] [id number] [place] [id number]  15 Aug, 
2017  Type 2 diabetes mellitus without complications E11.9

 

 [facility] [place]  [address] [facility] [address] [id number] [place] [id number]  07 Aug, 
2017   

 

 [facility] [place]  [address] [facility] [address] [id number] [place] [id number]     

 

 [facility] [place]  [address] [facility] [address] [id number] [place] [id number]     

 

 [facility] [place]  [address] [facility] [address] [id number] [place], [place] [id number]    Type 2 diabetes mellitus without complications E11.9 ; Type 2 diabetes 
mellitus with diabetic neuropathy, without long-term current use of insulin 
E11.40 ; Primary osteoarthritis of both knees M17.0 and Chronic renal 
insufficiency, stage II (mild) N18.2

 

 [facility] [place]  [address] [facility] [address] [id number] [place] [id number]     

 

 [facility] [place]  [address] [facility] [address] [id number] [place] [id number]  30 May, 
2017  Hyperlipidemia LDL goal <130 E78.5

 

 [facility] [place]  [address] [facility] [address] [id number] [place], [place] [id number]  19 May, 
2017  Type 2 diabetes mellitus without complications E11.9

 

 [facility]  [address] [facility] [id number] [place] [address]  16 May, 2017   

 

 [facility] [place]  [address] [facility] [address] [id number] [place] [id number]  08 May, 
2017   

 

 [facility] [place]  [address] [facility] [address] [id number] [place] [id number]  01 May, 
2017   

 

 [facility] [place]  [address] [facility] [address] [id number] [place] [id number]     

 

 [facility] [place]  [address] [facility] [address] [id number] [place], [place] [id number]    Primary generalized (osteo)arthritis M15.0 and Type 2 diabetes mellitus 
without complications E11.9

 

 [facility] [place]  [address] [facility] [address] [id number] [place] [address]    Old tear of lateral meniscus of left knee, unspecified tear type M23.201

 

 [facility] [place]  [address] [place] [address] [address] [id number] [place], [place] [id number]     

 

 [facility] [place]  [address] [place] [address] C [id number] [place] [id number]     

 

 [facility] [place]  [address] [place] [address] [address] [id number] [place] 005[address]395     

 

 [facility] [place]  [address] [place] [address] C [id number] [place] [id number]  20 Mar, 
2017  Type 2 diabetes mellitus without complications E11.9

 

 [facility] [place]  [address] [place] [address] [address] [id number] [place] [id number]  13 Mar, 
2017  Type 2 diabetes mellitus without complications E11.9

 

 [facility] [place]  [address] [place] [address] C [id number] [place], [place] [id number]  13 Mar, 
2017  Type 2 diabetes mellitus without complications E11.9

 

 [facility] [place]  [address] [place] [address] C [id number] [place] [id number]  04 Mar, 
2017  Type 2 diabetes mellitus without complications E11.9

 

 [facility] [place]  [address] [place] [address] C [id number] [place] [id number]     

 

 [facility] [place]  [address] [place] [address] C [id number] [place] [id number]    Chronic obstructive pulmonary disease with acute exacerbation J44.1

 

 [facility] [place]  [address] [place] [address] [address] [id number] [place] 970633281     

 

 [facility] [place]  [address] [place] [address] C [id number] [place] [id number]    Dysuria R30.0 ; Essential (primary) hypertension I10 ; Hypothyroidism (
acquired) E03.9 ; Type 2 diabetes mellitus without complications E11.9 and Mild 
episode of recurrent major depressive disorder F33.0

 

 [facility] [place]  [address] [place] [address] [address] [id number] [place] [id number]     

 

 [facility] [place]  [address] [facility] [address] [id number] [place] 517[address]8664    Dysuria R30.0 ; Vaginal candidiasis B37.3 and Candidiasis B37.9

 

 [facility] [place]  [address] [place] [address] [address] [id number] [place] [id number]     

 

 [facility] [place]  [address] [place] [address] [address] [id number] [place], [place] [id number]  10 Octaviano, 
2017  COPD with exacerbation J44.1 and Dysphagia, unspecified type R13.10

 

 [facility] [place]  [address] [place] [address] [address] [id number] [place] 874122403  27 Dec, 
2016   

 

 [facility] [place]  [address] [place] [address] [address] [id number] [place] [id number]  12 Dec, 
2016  Essential (primary) hypertension I10 ; Hypothyroidism (acquired) E03.9 ; 
Type 2 diabetes mellitus without complications E11.9 ; Primary generalized (
osteo)arthritis M15.0 ; Major depressive disorder, single episode, unspecified 
F32.9 ; Unilateral primary osteoarthritis, left knee M17.12 ; Hyperlipidemia 
LDL goal <130 E78.5 and Dysphagia, unspecified type R13.10

 

 [facility] [place]  [address] [place] [address] [address] [id number] [place] [id number]  01 Dec, 
2016   

 

 [facility] [place]  [address] [place] [address] [address] [id number] [place] [id number]     

 

 [facility] [place]  [address] [place] [address] [address] [id number] [place] [id number]     

 

 [facility] [place]  [address] [place] [address] [address] [id number] [place] [id number]     

 

 [facility] [place]  [address] [place] [address] [address] [id number] [place] [id number]     

 

 [facility] [place]  [address] [place] [address] [address] [id number] [place] [id number]     

 

 [facility] [place]  [address] [place] [address] [address] [id number] [place], [place] [id number]    Type 2 diabetes mellitus without complications E11.9 ; Primary 
generalized (osteo)arthritis M15.0 ; Effusion, left knee M25.462 ; Old tear of 
lateral meniscus of left knee, unspecified tear type M23.201 and Fatigue, 
unspecified type R53.83

 

 [facility] [place]  [address] [place] [address] C [id number] [place], [place] [id number]  26 Sep, 
2016  Type 2 diabetes mellitus without complications E11.9 ; Claudication I73.9 
; Pain in right leg M79.604 and Pain of left leg M79.605

 

 [facility] [place]  [address] [place] [address] C [id number] [place], [place] [id number]  14 Sep, 
2016   

 

 [facility] [place]  [address] [place] [address] C [id number] [place] [id number]  12 Sep, 
2016   

 

 [facility] [place]  [address] [place] [address] C [id number] [place], [place] [id number]  18 Aug, 
2016  Type 2 diabetes mellitus with hyperglycemia E11.65 ; Long term current 
use of insulin Z79.4 ; Anxiety disorder, unspecified F41.9 ; Essential (primary
) hypertension I10 ; Major depressive disorder, single episode, unspecified 
F32.9 and Peripheral arterial occlusive disease I77.9

 

 [facility] [place]  [address] [place] [address] C [id number] [place] [id number]  17 Aug, 
2016   

 

 [facility] [place]  [address] [place] [address] C [id number] [place] [id number]  11 Aug, 
2016   

 

 [facility] [place]  [address] [place] [address] C [id number] [place] [id number]  11 Aug, 
2016   

 

 [facility] [place]  [address] [place] [address] C [id number] [place] [id number]  10 Aug, 
2016   

 

 [facility] [place]  [address] [place] [address] C [id number] [place] [id number]  05 Aug, 
2016  Acute midline low back pain with right-sided sciatica M54.41

 

 [facility]  [address] [facility] [address] [id number] [place] [address]-
[address]  05 Aug, 2016  Chest pain, unspecified type R07.9 ; Palpitations R00.2 ; 
Claudication I73.9 ; Generalized pain R52 and Type 2 diabetes mellitus without 
complications E11.9

 

 [facility] [place]  [address] [facility] [address] [id number] [place], [place] [id number]    Acute midline low back pain with right-sided sciatica M54.41 ; Dysuria 
R30.0 ; Claudication I73.9 ; Peripheral arterial occlusive disease I77.9 ; 
Shortness of breath R06.02 ; Effusion, left knee M25.462 and Type 2 diabetes 
mellitus without complications E11.9

 

 [facility] [place]  [address] [facility] [address] [id number] [place] [id number]     

 

 [facility] [place]  [address] [facility] [address] [id number] [place] [id number]     

 

 [facility] [place]  [address] [facility] [address] [id number] [place], [place] 005242586     

 

 [facility] [place]  [address] [facility] [address] [id number] [place] 734142470    Type 2 diabetes mellitus without complications E11.9 ; Other 
hyperlipidemia E78.4 ; Peripheral arterial occlusive disease I77.9 ; Essential (
primary) hypertension I10 and Other fatigue R53.83

 

 [facility] [place]  [address] [facility] [address] [id number] [place] [id number]  18 May, 
2016   

 

 [facility] [place]  [address] [facility] [address] [id number] [place] [id number]  06 May, 
2016   

 

 [facility] [place]  [address] [facility] [address] [id number] [place] [id number]  05 May, 
2016  Chest pain, unspecified type R07.9 ; Peripheral arterial occlusive 
disease I77.9 ; Anxiety disorder, unspecified F41.9 ; Essential (primary) 
hypertension I10 ; Type 2 diabetes mellitus without complications E11.9 and 
Other hyperlipidemia E78.4

 

 [facility] [place]  [address] [facility] [address] [id number] [place] [id number]  04 May, 
2016   

 

 [facility] [place]  [address] [facility] [address] [id number] [place] [id number]     

 

 [facility] [place]  [address] [facility] [address] [id number] [place] [id number]     

 

 [facility] [place]  [address] [facility] [address] [id number] [place] [id number]    Type 2 diabetes mellitus without complications E11.9 ; Peripheral 
arterial occlusive disease I77.9 ; Primary generalized (osteo)arthritis M15.0 ; 
Major depressive disorder, single episode, unspecified F32.9 ; Anxiety disorder
, unspecified F41.9 and Essential (primary) hypertension I10

 

 [facility] [place]  [address] [place] [address] C [id number] [place] [id number]     

 

 [facility] [place]  [address] [place] [address] C [id number] [place] 138996906     

 

 [facility]  [address] [facility] [id number] [place] [address]-
[address]  02 Mar, 2016   

 

 [facility] [place]  [address] [place] [address] [address] [id number] [place] [id number]     

 

 [facility] [place]  [address] [place] [address] C [id number] [place] [id number]    Bronchitis J40 ; Shortness of breath R06.02 and Wheezing R06.2

 

 [facility] [place]  [address] [place] [address] C [id number] [place] [id number]     

 

 [facility] [place]  [address] [place] [address] [address] [id number] [place] [id number]     

 

 [facility] [place]  [address] [place] [address] [address] [id number] [place] 556366903     

 

 [facility] [place]  [address] [place] [address] [address] [id number] [place] [id number]     

 

 [facility] [place]  [address] [place] [address] [address] [id number] [place] [id number]    Type 2 diabetes mellitus without complications E11.9 ; Claudication I73.9 
; Other hyperlipidemia E78.4 ; Cataracts, bilateral H26.9 and Other fatigue 
R53.83

 

 [facility] [place]  [address] [facility] [address] [id number] [place] [id number]  28 Oct, 
2015   

 

 [facility] [place]  [address] [place] [address] [address] [id number] [place] [id number]  22 Oct, 
2015   

 

 [facility] [place]  [address] [place] [address] [address] [id number] [place] [id number]  16 Oct, 
2015   

 

 [facility] [place]  [address] [facility] [address] [id number] [place] [id number]  14 Oct, 
2015  Type 2 diabetes mellitus without complications E11.9 ; Other 
hyperlipidemia E78.4 ; Primary generalized (osteo)arthritis M15.0 and 
Claudication I73.9

 

 [facility] [place]  [address] [facility] [address] [id number] [place], [place] [id number]  12 Oct, 
2015   

 

 [facility] [place]  [address] [facility] [address] [id number] [place] [id number]  26 Aug, 
2015   

 

 [facility] [place]  [address] [facility] [address] [id number] [place] [id number]  12 Aug, 
2015   

 

 [facility] [place]  [address] [facility] [address] [id number] [place], [place] [id number]  12 Aug, 
2015   

 

 [facility] [place]  [address] [facility] [address] [id number] [place] [id number]  10 Aug, 
2015   

 

 [facility] [place]  [address] [facility] [address] [id number] [place] [id number]  05 Aug, 
2015   

 

 [facility] [place]  [address] [facility] [address] [id number] [place], [place] [id number]  11 May, 
2015  Diabetes mellitus without mention of complication, type II or unspecified 
type, not stated as uncontrolled 250.00 ; Bronchitis 490 and Effusion of lower 
leg joint 719.06

 

 [facility] [place]  [address] [facility] [address] [id number] [place], [place] [id number]  01 May, 
2015   

 

 [facility] [place]  [address] [facility] C [id number] [place] [id number]  01 May, 
2015  Bronchitis 490 and Wheezing 786.07

 

 [facility]  [address] [facility] [id number] [place] [phone]-
[address]     

 

 [facility]  [address] [id number] [address]-
[address]     

 

 [facility]  [address] [facility] [id number] [place] [address]-
57[address]     

 

 [facility] [place]  [address] [place] [address] [address] [id number] [place] [id number]  19 Mar, 
2015   

 

 [facility] [place] [facility]  [address] [facility] [id number] [facility] [place] [address]-
29[address]  19 Mar, 2015   

 

 [facility] [place]  [address] [facility] [address] [id number] [place] [id number]  09 Mar, 
2015   

 

 [facility] [place] [facility]  [address] [facility] [id number] [facility] [place] [address]-
40[address]  09 Mar, 2015   

 

 [facility] [place]  [address] [facility] [address] [id number] [place] [id number]     

 

 [facility] [place] [facility]  [address] [facility] [id number] [facility] [place] [address]-
35[address]     

 

 [facility] [place] [facility]  [address] [facility] [id number] [facility] [place] [address]-
[address]  10 Feb, 2015   

 

 [facility] [place]  [address] [facility] [address] [id number] [place] [id number]     

 

 [facility] [place]  [address] [facility] [address] [id number] [place] [id number]     

 

 [facility] [place] [facility]  [address] [facility] [id number] [facility] [place] [address]-
1636     

 

 [facility] [place] [facility]  [address] [facility] [id number] [facility] [place] [address]-
8496     

 

 [facility] [place]  [address] [facility] [address] [id number] [place] [id number]     

 

 [facility] [place] [facility]  [address] [facility] [id number] [facility] [place] [address]-
[address]6     

 

 [facility] [place]  [address] [facility] [address] [id number] [place] [id number]     

 

 [facility] [place] [facility]  [address] [facility] [id number] [facility] [place] [address]     

 

 [facility] [place]  [address] [facility] [address] [id number] [place] [id number]  24 Dec, 
2014   

 

 [facility] [place] [facility]  [address] [facility] [id number] [facility] [place] [address]-
[address]  24 Dec, 2014   

 

 [facility] [place]  [address] [id number] [place] [id number]  18 Dec, 
2014   

 

 [facility] [place] [facility]  [address] [id number] [place] [address]-
3119  18 Dec, 2014   

 

 [facility] [place]  [address] [id number] [place] [id number]  09 Dec, 
2014   

 

 [facility] [place] [facility]  [address] [id number] [place]-
8110  09 Dec, 2014   

 

 [facility] [place]  [address] [id number] [place], [place] [id number]  02 Dec, 
2014   

 

 [facility] [place] [facility]  [address] [id number] [place]
66[address]  02 Dec, 2014   

 

 [facility] [place]  [address] [facility] [address] [id number] [place] [id number]     

 

 [facility] [place] [facility]  [address] [id number] [place] [address]-
7947     

 

 [facility] [place]  [address] [id number] [place] [id number]     

 

 [facility] [place] [facility]  [address] [id number] [place] [id number]     

 

 [facility] [place]  [address] [facility] [address] [id number] [place], [place] [id number]  31 Oct, 
2014   

 

 [facility] [place] [facility]  [address] [id number] [place] [id number]  31 Oct, 2014   

 

 [facility] [place]  [address] [address] [id number] [place] [id number]  16 Oct, 
2014   

 

 [facility] [place] [facility]  [address] [id number] [place] [address]  16 Oct, 2014   

 

 [facility] [place]  [address] [id number] [place] [phone]  10 Oct, 
2014   

 

 [facility] [place] [facility]  [address] [facility] [id number] [place] [place] [address]  10 Oct, 2014   

 

 [facility] [place]  [address] [id number] [place] [phone]  26 Sep, 
2014   

 

 [facility] [place] [facility]  [address] [id number] [place] [address]-
[address]  26 Sep, 2014   

 

 [facility] [place]  [address] [id number] [place] [id number]  29 Aug, 
2014   

 

 [facility] [place] [facility]  [address] [id number] [facility] [place] [address]  29 Aug, 2014   

 

 [facility] [place]  [address] [id number] [place] [id number]  25 Aug, 
2014   

 

 [facility] [place]  [address] [id number] [place] [id number]  25 Aug, 
2014   

 

 [facility] [place] [facility]  [address] [id number] [place] [address]  25 Aug, 2014   

 

 [facility] [place] [facility]  [address] [id number] [place] [address]-
[address]  25 Aug, 2014   

 

 [facility] [place]  [address] [id number] [place] [id number]  18 Aug, 
2014   

 

 [facility] [place] [facility]  [address] [id number] [place] [address]-
2073  18 Aug, 2014   

 

 [facility] [place]  [address] [facility] [address] [id number] [place] [id number]  14 Aug, 
2014   

 

 [facility] [place] [facility]  [address] [facility] [address] [id number] [place] [address]  14 Aug, 2014   

 

 [facility] [place] [facility]  [address] [id number] [place] [address]-
7246  11 Aug, 2014   

 

 [facility] [place]  [address] [facility] [address] [id number] [place] [id number]  11 Aug, 
2014   

 

 [facility] [place] [facility]  [address] [id number] [place] [address]-
1313  11 Aug, 2014   

 

 [facility] [place] [facility]  [address] [facility] [address] [id number] [place] [address]-
[address]  11 Aug, 2014   

 

 [facility] [place] [facility]  [address] [id number] [place] [address]-
[address]     

 

 [facility] [place]  [address] [id number] [place] [id number]     

 

 [facility] [place] [facility]  [address] [id number] [facility] [place] [address]
[address]     

 

 [facility] [place] [facility]  [address] [id number] [facility] [place] [address]     

 

 [facility] [place]  [address] [id number] [place] [phone]     

 

 [facility] [place] [facility]  [address] [id number] [place] [address]
[address]     

 

 [facility] [place]  [address] [id number] [place] [id number]  30 May, 
2014   

 

 [facility] [place] [facility]  [address] [facility] [id number] [place] [place] [address]
5[address]  30 May, 2014   

 

 [facility] [place]  [address] [id number] [place] [phone]  14 May, 
2014   

 

 [facility] [place] [facility]  [address] [id number] [place] [address]  14 May, 2014   

 

 [facility] [place]  [address] [id number] [place] [id number]  05 May, 
2014   

 

 [facility] [place] [facility]  [address] [id number] [place] [address]-
[address]  05 May, 2014   

 

 [facility] [place]  [address] [id number] [place] [phone]     

 

 [facility] [place] [facility]  [address] [id number] [place] [address]-
[address]     

 

 [facility] [place]  [address] [id number] [place] [id number]     

 

 [facility] [place] [facility]  [address] [id number] [place] [address]-
[address]     

 

 [facility] [place]  [address] [id number] [place] [id number]  10 Feb, 
2014   

 

 [facility]  [address] [address] [id number] [place] [address]  10 Feb, 2014   







IMMUNIZATIONS

No Known Immunizations



SOCIAL HISTORY

Never Assessed



REASON FOR VISIT

[facility]- DM A1C   - Arsenio, Review Colonoscopy- Arsenio, Discuss Tramadol for 
pain due to Meoxicam decrease, Now has Medic Aide needs refills [facility]



PLAN OF CARE







 Activity  Details









  









 Follow Up  4 Weeks pap smear with Chely Gooden Reason:







VITAL SIGNS







 Height  66 in  2017

 

 Weight  264.6 lbs  2017

 

 Temperature  97.7 degrees Fahrenheit  2017

 

 Heart Rate  72 bpm  2017

 

 Respiratory Rate  20   2017

 

 BMI  42.70 kg/m2  2017

 

 Blood pressure systolic  128 mmHg  2017

 

 Blood pressure diastolic  68 mmHg  2017







MEDICATIONS







 Medication  Instructions  Dosage  Frequency  Start Date  End Date  Duration  
Status

 

 Lyrica 75 MG  Orally Twice a day  1 capsule  12h  11 Sep, 2017     28 days  
Active

 

 Tramadol HCl 50 mg  Orally at bedtime for pain  1 tablet as needed             Active

 

 Fish Oil 1000 MG  Orally Once a day  1 capsule  24h           Active

 

 Levothyroxine Sodium 25 MCG  Orally Once a day in the morning  Take 1 tablet 
on an empty stomach           30  Active

 

 Sucralfate 1 GM  Orally 4 times a day  1 tablet on an empty stomach  6h       
    Active

 

 NovoLog Flexpen 100 UNIT/ML  Subcutaneous 3 times a day  Inject 25 Units by 
Subcutaneous route as per insulin sliding scale protocol  3 times per day  8h  
        Active

 

 Mobic 7.5 MG  Orally Once a day  1 tablet  24h  20 Aug, 2017  18 Nov, 2017  30 
day(s)  Active

 

 Atorvastatin Calcium 40 mg  Orally Once a day  1 tablet  24h  30 May, 2017    
    Active

 

 Micardis 40 mg  Orally Once a day  take 1 tablet (40 mg)  24h     
  90 days  Active

 

 Lasix 20 mg     take 1 tablet (20 mg) by oral route once daily     31 Oct, 
2014        Active

 

 Pantoprazole Sodium 40 mg  Orally Once a day  1 tablet  24h  27 Dec, 2016     
   Active

 

 Albuterol Sulfate (2.5 MG/3ML) 0.083%  Inhalation every 6 hrs PRN wheezing/SOA
  3 ml     11 May, 2015        Active

 

 Alprazolam 0.5 MG     take 1 tablet (0.5 mg) by oral route 3 times per day     
10 Feb, 2014        Active

 

 Klor-Con 10 10 MEQ     take 1 tablet by Oral route with food  1 time per day  
           Active

 

 Zoloft 50 MG  Orally Once a day  take 2 tablets (100 mg) by oral route once 
daily  24h  10 Feb, 2014        Active

 

 Tradjenta 5 mg  Orally Once a day  1 tablet  24h       90 days  
Active

 

 Voltaren 1 %  Transdermal 4 times a day PRN knee pain  4 grams     11 May, 
2015        Active

 

 Tramadol HCl 50 mg  Orally every 6 hrs  1 tablet as needed for pain  6h  14 Oct
, 2015        Active

 

 trazodone 150 mg     take 1 Tablet by Oral route 1 time per day qhs     31 Oct
, 2014        Active

 

 Colace 100 MG  Orally Once a day  1 capsule as needed  24h           Active

 

 Ventolin HFA 90 mcg/actuation  by inhalation route every 4 hrs  1-2 puffs  4h  
26 Sep, 2014     90 days  Active

 

 Cyclobenzaprine HCl 10MG  Orally Three times a day for muscle spasms  1 tablet 
as needed           30  Active

 

 Cymbalta 30 mg     take 3 capsule by Oral route 1 time per day             Active

 

 Lantus SoloStar 100 unit/mL (3 mL)  Subcutaneous 2 times a day  inject 50 
Units by Subcutaneous  12h  19 Mar, 2015     30 days  Active

 

 NovoFine 30G X 8 MM     as directed             Active

 

 Metformin HCl 500 mg  Orally Twice a day  1 tablet with meals  12h  14 Oct, 
2015     30 days  Active







RESULTS







 Name  Result  Date  Reference Range

 

 A1C (IN HOUSE)     2017   

 

 A1C IN HOUSE  6.7     4.3 - 5.6 %

 

 Previous A1c  6.6      

 

 Lot   [id number]      

 

 Exp date  2019      

 

 [facility]     2017   

 

 Glucose, Serum  43     65-99

 

 BUN  11     6-24

 

 Creatinine, Serum  0.82     0.57-1.00

 

 eGFR If NonAfricn Am  83         >59

 

 eGFR If Africn Am  95         >59

 

 BUN/Creatinine Ratio  13     9-23

 

 Sodium, Serum  141     134-144

 

 Potassium, Serum  3.9     3.5-5.2

 

 Chloride, Serum  101     

 

 Carbon Dioxide, Total  21     18-29

 

 Calcium, Serum  9.4     8.7-10.2

 

 Protein, Total, Serum  7.1     6.0-8.5

 

 Albumin, Serum  4.6     3.5-5.5

 

 Globulin, Total  2.5     1.5-4.5

 

 A/G Ratio  1.8     1.2-2.2

 

 Bilirubin, Total  0.4     0.0-1.2

 

 Alkaline Phosphatase, S  111     

 

 AST (SGOT)  20     0-40

 

 ALT (SGPT)  19     0-32

 

 HEPATITIS PROFILE     2017   

 

 Hep A Ab, IgM  Negative     Negative

 

 HBsAg Screen  Negative     Negative

 

 Hep B Core Ab, IgM  Negative     Negative

 

 Hep C Virus Ab  <0.1     0.0-0.9







PROCEDURES







 Procedure  Date Ordered  Result  Body Site

 

 ROUTINE VENIPUNCTURE  2017  N/A   

 

 GLYCATED HEMOGLOBIN TEST  2017      

 

 LAB NOT BILLED BY [facility]  2017      







INSTRUCTIONS





MEDICATIONS ADMINISTERED

No Known Medications



MEDICAL (GENERAL) HISTORY







 Type  Description  Date

 

 Medical History  Diabetes mellitus without mention of complication, type II or 
unspecified type, not stated as uncontrolled   

 

 Medical History  Depressive disorder, not elsewhere classified   

 

 Medical History  Anxiety state, unspecified   

 

 Medical History  Effusion of lower leg joint   

 

 Medical History  Generalized osteoarthrosis, unspecified site   

 

 Medical History  Other and unspecified hyperlipidemia   

 

 Medical History  Abnormal weight gain   

 

 Medical History  Effusion of joint, site unspecified   

 

 Medical History  Esophageal reflux   

 

 Medical History  hypertension   

 

 Medical History  colonoscopy- inscreased polyp   

 

 Surgical History  Tonsillectomy   

 

 Surgical History  Orthopedic: Bilateral Knee x2   

 

 Surgical History  colonoscopy  2017

 

 Hospitalization History  Surgery(s)/Childbirth(s) only

## 2019-02-26 NOTE — XMS REPORT
[facility]

 Created on: 10/29/2018



Duyen Larkin

External Reference #: [id number]

: 1965

Sex: Female



Demographics







 Address  [address] [place] [address]  [address]

 

 Preferred Language  Unknown

 

 Marital Status  Unknown

 

 Sabianism Affiliation  Unknown

 

 Race  Unknown

 

 Ethnic Group  Unknown





Author







 Author  LIYAH LOWE

 

 [facility]  [facility]  [place]

 

 Address  [address] [place]  [address]



 

 Phone  [phone]







Care Team Providers







 Care Team Member Name  Role  Phone

 

 ANGELITOLIYAH PAUL  Unavailable  [phone]







PROBLEMS







 Type  Condition  ICD9-CM Code  YLH64-CY Code  Onset Dates  Condition Status  
SNOMED Code

 

 Problem  Effusion of lower leg joint  719.06        Active  612228102

 

 Problem  Esophageal reflux  530.81        Active  887583423

 

 Problem  Other hyperlipidemia     E78.4     Active  30635635

 

 Problem  Type 2 diabetes mellitus without complications     E11.9     Active  
575745022

 

 Problem  Primary generalized (osteo)arthritis     M15.0     Active  000071523

 

 Problem  Claudication     I73.9     Active  231929800

 

 Problem  Mild episode of recurrent major depressive disorder     F33.0     
Active  334496838

 

 Problem  Cataracts, bilateral     H26.9     Active  98693704

 

 Problem  Chronic obstructive pulmonary disease with acute exacerbation     
J44.1     Active  248706857

 

 Problem  Peripheral arterial occlusive disease     I77.9     Active  280733707

 

 Problem  Primary osteoarthritis of both knees     M17.0     Active  870114462

 

 Problem  Type 2 diabetes mellitus with diabetic neuropathy, without long-term 
current use of insulin     E11.40     Active  12746611

 

 Problem  Chronic renal insufficiency, stage II (mild)     N18.2     Active  
160266209

 

 Problem  Thrombocytopenia     D69.6     Active  666832776

 

 Problem  Other chronic pain     G89.29     Active  46550649

 

 Problem  Major depressive disorder, single episode, unspecified     F32.9     
Active  55952637

 

 Problem  Essential (primary) hypertension     I10     Active  87478905

 

 Problem  Anxiety disorder, unspecified     F41.9     Active  780783021

 

 Problem  Long term current use of insulin     Z79.4     Active  066545128

 

 Problem  Diabetic polyneuropathy associated with type 2 diabetes mellitus     
E11.42     Active  83246052

 

 Problem  Chronic obstructive pulmonary disease, unspecified COPD type     
J44.9     Active  23181504

 

 Problem  Lumbar degenerative disc disease     M51.36     Active  24615165

 

 Problem  Old tear of lateral meniscus of left knee, unspecified tear type     
M23.201     Active  189935223

 

 Problem  Unilateral primary osteoarthritis, left knee     M17.12     Active  
435758118

 

 Problem  Effusion, left knee     M25.462     Active  732620149

 

 Problem  Type 2 diabetes mellitus with hyperglycemia     E11.65     Active  
75134832

 

 Problem  Dysphagia, unspecified type     R13.10     Active  05128076

 

 Problem  COPD with exacerbation     J44.1     Active  303698027

 

 Problem  Hypothyroidism (acquired)     E03.9     Active  682852361

 

 Problem  Hyperlipidemia LDL goal <130     E78.5     Active  85634353







ALLERGIES







 Substance  Reaction  Event Type  Date  Status

 

 Lisinopril  Unknown  Drug Allergy  18 Oct, 2018  Active

 

 Gabapentin  dizzy/nausea  Drug Allergy  18 Oct, 2018  Active

 

 Demerol  Unknown  Drug Allergy  18 Oct, 2018  Active







ENCOUNTERS







 Encounter  Location  Date  Diagnosis

 

 [address]  [address] [place] [address]  18 Oct, 2018  Type 2 
diabetes mellitus without complications E11.9 ; Primary generalized (osteo)
arthritis M15.0 ; Chronic obstructive pulmonary disease, unspecified COPD type 
J44.9 ; Chronic renal insufficiency, stage II (mild) N18.2 ; Hyperlipidemia LDL 
goal <130 E78.5 ; Hypothyroidism (acquired) E03.9 and Thrombocytopenia D69.6

 

 [facility]  [address] [place] [address]  17 Oct, 2018   

 

 [address] [place] [phone]  12 Oct, 2018   

 

 [address]  [address] [place] [address]  10 Oct, 2018   

 

 [facility]  [address] [place] [address]  21 May, 2018  Pain in 
left knee M25.562

 

 [facility]  [address] [place] [address]  21 May, 2018  Pain in 
left knee M25.562 and Other chronic pain G89.29

 

 [address] [place] [address]-
[address]  15 May, 2018   

 

 [facility]  [address] [place] [address]  02 May, 2018  Type 2 
diabetes mellitus without complications E11.9 ; Long term current use of 
insulin Z79.4 ; Unilateral primary osteoarthritis, left knee M17.12 ; Lumbar 
degenerative disc disease M51.36 and Chronic obstructive pulmonary disease, 
unspecified COPD type J44.9

 

 [facility]  [address] [place] [address]     

 

 [facility] [place]  [address] [place] [address]     

 

 [facility] [place] [address]     

 

 [facility] [place]  [address] [place] [address]  23 Mar, 2018  Type 2 
diabetes mellitus without complications E11.9

 

 [facility]  [address] [place] [address]  23 Mar, 2018   

 

 [facility]  [address] [facility] [id number] [address]  13 Mar, 2018   

 

 [facility]  [id number] [address]  09 Mar, 2018  Type 2 diabetes mellitus without complications E11.9

 

 [facility] [place]  [address] [place] [address]     

 

 [facility] [place]  [address] [place] [address]    Diabetic 
polyneuropathy associated with type 2 diabetes mellitus E11.42

 

 [facility] [place]  [address] [place] [address]     

 

 [facility] [place]  [address] [place] [address]  21 Dec, 2017  Type 2 
diabetes mellitus without complications E11.9 ; Long term current use of 
insulin Z79.4 ; High risk sexual behavior Z72.51 ; BMI 40.0-44.9, adult Z68.41 
and Encounter for immunization Z23

 

 [facility]  [address] [place] [address]  04 Dec, 2017   

 

 [facility] [place]  [address] [place] [address]  04 Dec, 2017   

 

 [facility] [place]  [address] [place] [address]    COPD with 
exacerbation J44.1 and BMI 40.0-44.9, adult Z68.41

 

 [facility] [place] [address]  09 Oct, 2017  Encounter 
for screening mammogram for malignant neoplasm of breast Z12.31 ; Well woman 
exam Z01.419 and High risk sexual behavior Z72.51

 

 [facility] [place]  [address] [place] [address]  11 Sep, 2017   

 

 [facility] [place] [address]  11 Sep, 2017  Type 2 
diabetes mellitus without complications E11.9

 

 [facility] [place] [address]  11 Sep, 2017   

 

 [facility] [place]  [address] [place] [address]  11 Sep, 2017  Old tear 
of lateral meniscus of left knee, unspecified tear type M23.201

 

 [facility] [place]  [address] [place] [address]  11 Sep, 2017  Other type 
of osteoarthritis, unspecified site M19.90 ; Type 2 diabetes mellitus without 
complications E11.9 ; Primary osteoarthritis of both knees M17.0 ; Chronic 
renal insufficiency, stage II (mild) N18.2 ; Diabetic polyneuropathy associated 
with type 2 diabetes mellitus E11.42 and Exposure to hepatitis C Z20.5

 

 [facility] [place]  [address] [place] [address]  06 Sep, 2017  Type 2 
diabetes mellitus without complications E11.9

 

 [facility] [place]  [address] [place] [address]  30 Aug, 2017   

 

 [facility] [place]  [address] [place] [address]  30 Aug, 2017   

 

 [facility] [place]  [address] [place] [address]  16 Aug, 2017  Other type 
of osteoarthritis, unspecified site M19.90

 

 [facility] [place]  [address] [place] [address]  15 Aug, 2017  Type 2 
diabetes mellitus without complications E11.9

 

 [facility] [place]  [address] [place] [address]  07 Aug, 2017   

 

 [facility] [place]  [address] [place] [address]     

 

 [facility] [place]  [address] [place] [address]     

 

 [facility] [place]  [address] [place] [address]    Type 2 
diabetes mellitus without complications E11.9 ; Type 2 diabetes mellitus with 
diabetic neuropathy, without long-term current use of insulin E11.40 ; Primary 
osteoarthritis of both knees M17.0 and Chronic renal insufficiency, stage II (
mild) N18.2

 

 [facility] [address]     

 

 [facility] [place]  [address] [place] [address]  30 May, 2017  
Hyperlipidemia LDL goal <130 E78.5

 

 [facility] [place]  [address] [place] [address]  19 May, 2017  Type 2 
diabetes mellitus without complications E11.9

 

 [facility]  [address] [facility]note 2[facility] [id number] [place] [address]
29[address]  16 May, 2017   

 

 [facility] [place] [address]  08 May, 2017   

 

 [facility] [place]  [address] [place] [address]  01 May, 2017   

 

 [facility] [place]  [address] [place] [address]     

 

 [facility] [place]  [address] [place] [address]    Primary 
generalized (osteo)arthritis M15.0 and Type 2 diabetes mellitus without 
complications E11.9

 

 [facility] [place] [address]    Old tear 
of lateral meniscus of left knee, unspecified tear type M23.201

 

 [facility] [address]     

 

 [facility] [address]     

 

 [facility] [address]     

 

 [facility] [place] [address]  20 Mar, 2017  Type 2 
diabetes mellitus without complications E11.9

 

 [facility] [place]  [address] [place] [address]  13 Mar, 2017  Type 2 
diabetes mellitus without complications E11.9

 

 [facility] [place]  [address] [place] [address]  13 Mar, 2017  Type 2 
diabetes mellitus without complications E11.9

 

 [facility] [place]  [address] [place] [address]  04 Mar, 2017  Type 2 
diabetes mellitus without complications E11.9

 

 [facility] [place]  [address] [place] [address]     

 

 [facility]  [address] [place] [address]    Chronic 
obstructive pulmonary disease with acute exacerbation J44.1

 

 [facility] [place]  [address] [place] [address]     

 

 [facility] [place]  [address] [place] [address]    Dysuria 
R30.0 ; Essential (primary) hypertension I10 ; Hypothyroidism (acquired) E03.9 
; Type 2 diabetes mellitus without complications E11.9 and Mild episode of 
recurrent major depressive disorder F33.0

 

 [facility] [place]  [address] [place] [address]     

 

 [facility]  [address] [place] [address]    Dysuria 
R30.0 ; Vaginal candidiasis B37.3 and Candidiasis B37.9

 

 [facility] [place]  [address] [place] [address]     

 

 [facility] [place]  [address] [place] [address]  10 Octaviano, 2017  COPD with 
exacerbation J44.1 and Dysphagia, unspecified type R13.10

 

 [facility]  [address] [place] [address]  27 Dec, 2016   

 

 [facility]  [address] [place] [address]  12 Dec, 2016  Essential (
primary) hypertension I10 ; Hypothyroidism (acquired) E03.9 ; Type 2 diabetes 
mellitus without complications E11.9 ; Primary generalized (osteo)arthritis 
M15.0 ; Major depressive disorder, single episode, unspecified F32.9 ; 
Unilateral primary osteoarthritis, left knee M17.12 ; Hyperlipidemia LDL goal <
130 E78.5 and Dysphagia, unspecified type R13.10

 

 [facility] [place]  [address] [place] [address]  01 Dec, 2016   

 

 [facility] [place]  [address] [place] [address]     

 

 [facility] [place]  [address] [place] [address]     

 

 [facility] [place]  [address] [place] [address]     

 

 [facility] [place]  [address] [place] [address]     

 

 [facility] [place]  [address] [place] [address]     

 

 [facility] [place]  [address] [place] [address]    Type 2 
diabetes mellitus without complications E11.9 ; Primary generalized (osteo)
arthritis M15.0 ; Effusion, left knee M25.462 ; Old tear of lateral meniscus of 
left knee, unspecified tear type M23.201 and Fatigue, unspecified type R53.83

 

 [facility] [place]  [address] [place] [address]  26 Sep, 2016  Type 2 
diabetes mellitus without complications E11.9 ; Claudication I73.9 ; Pain in 
right leg M79.604 and Pain of left leg M79.605

 

 [facility] [place]  [address] [place] [address]  14 Sep, 2016   

 

 [facility] [place]  [address] [place] [address]  12 Sep, 2016   

 

 [facility] [place]  [address] [place] [address]  18 Aug, 2016  Type 2 
diabetes mellitus with hyperglycemia E11.65 ; Long term current use of insulin 
Z79.4 ; Anxiety disorder, unspecified F41.9 ; Essential (primary) hypertension 
I10 ; Major depressive disorder, single episode, unspecified F32.9 and 
Peripheral arterial occlusive disease I77.9

 

 [place] [address]  17 Aug, 2016   

 

 [facility] [place]  [address] [place] [address]  11 Aug, 2016   

 

 [facility] [place]  [address] [place] [address]  11 Aug, 2016   

 

 [place] [place] [address]  10 Aug, 2016   

 

 [facility] [place]  [address] [place] [address]  05 Aug, 2016  Acute 
midline low back pain with right-sided sciatica M54.41

 

 [facility]  [address] [facility]note 2] [id number] [place] [address]-
1420  05 Aug, 2016  Chest pain, unspecified type R07.9 ; Palpitations R00.2 ; 
Claudication I73.9 ; Generalized pain R52 and Type 2 diabetes mellitus without 
complications E11.9

 

 [facility]  [address] [place] [address]    Acute 
midline low back pain with right-sided sciatica M54.41 ; Dysuria R30.0 ; 
Claudication I73.9 ; Peripheral arterial occlusive disease I77.9 ; Shortness of 
breath R06.02 ; Effusion, left knee M25.462 and Type 2 diabetes mellitus 
without complications E11.9

 

 [facility] [place] [address]     

 

 [facility] [place]  [address] [place] [address]     

 

 [facility] [place]  [address] [place] [address]     

 

 [facility] [place]  [address] [place] [address]    Type 2 
diabetes mellitus without complications E11.9 ; Other hyperlipidemia E78.4 ; 
Peripheral arterial occlusive disease I77.9 ; Essential (primary) hypertension 
I10 and Other fatigue R53.83

 

 [facility]  [address] [place] [address]  18 May, 2016   

 

 [facility]  [address] [place] [address]  06 May, 2016   

 

 [facility] [place]  [address] [place] [address]  05 May, 2016  Chest pain
, unspecified type R07.9 ; Peripheral arterial occlusive disease I77.9 ; 
Anxiety disorder, unspecified F41.9 ; Essential (primary) hypertension I10 ; 
Type 2 diabetes mellitus without complications E11.9 and Other hyperlipidemia 
E78.4

 

 [facility]  [address] [place] [address]  04 May, 2016   

 

 [facility]  [address] [place] [address]     

 

 [facility] [place]  [address] [place] [address]     

 

 [address] [address]    Type 2 
diabetes mellitus without complications E11.9 ; Peripheral arterial occlusive 
disease I77.9 ; Primary generalized (osteo)arthritis M15.0 ; Major depressive 
disorder, single episode, unspecified F32.9 ; Anxiety disorder, unspecified 
F41.9 and Essential (primary) hypertension I10

 

 [facility]  [address] [place] [address]     

 

 [facility]  [address] [place] [address]     

 

 [facility]  [address] [facility]note 2] [id number] [place] [address]
7[address]  02 Mar, 2016   

 

 [address] [address]     

 

 [address] [address]    Bronchitis 
J40 ; Shortness of breath R06.02 and Wheezing R06.2

 

 [facility]  [address] [place] [address]     

 

 [facility] [place] [address]     

 

 [facility] [place] [address]     

 

 [facility] [place] [address]     

 

 [facility] [place]  [address] [place] [address]    Type 2 
diabetes mellitus without complications E11.9 ; Claudication I73.9 ; Other 
hyperlipidemia E78.4 ; Cataracts, bilateral H26.9 and Other fatigue R53.83

 

 [facility] [place]  [address] [place] [address]  28 Oct, 2015   

 

 [facility] [place]  [address] [place] [address]  22 Oct, 2015   

 

 [facility] [place]  [address] [place] [address]  16 Oct, 2015   

 

 [facility] [place]  [address] [place] [address]  14 Oct, 2015  Type 2 
diabetes mellitus without complications E11.9 ; Other hyperlipidemia E78.4 ; 
Primary generalized (osteo)arthritis M15.0 and Claudication I73.9

 

 [facility] [place]  [address] [place] [address]  12 Oct, 2015   

 

 [facility] [place]  [address] [place] [address]  26 Aug, 2015   

 

 [facility] [place]  [address] [place] [address]  12 Aug, 2015   

 

 [facility] [place] [address]  12 Aug, 2015   

 

 [facility] [place] [address]  10 Aug, 2015   

 

 [facility] [place]  [address] [place] [address]  05 Aug, 2015   

 

 [facility] [place]  [address] [place] [address]  11 May, 2015  Diabetes 
mellitus without mention of complication, type II or unspecified type, not 
stated as uncontrolled 250.00 ; Bronchitis 490 and Effusion of lower leg joint 
719.06

 

 [facility] [address]  01 May, 2015   

 

 [facility] [place] [address]  01 May, 2015  Bronchitis 
490 and Wheezing 786.07

 

 [facility]  [id number] [place] [address]-
4587     

 

 [facility]  [id number] [place] [address]-
7327     

 

 [facility]  [id number] [place] [address]-
4087     

 

 [facility] [place] [address]  19 Mar, 2015   

 

 [facility]  [id number] [place] [address]-
6677  19 Mar, 2015   

 

 [facility] [place] [address]  09 Mar, 2015   

 

 [facility]  [address] [place], [place] [address] [place] [address]-
9283  09 Mar, 2015   

 

 [facility] [place] [address]     

 

 [facility]  [address] [place] [address]-
9266     

 

 [facility]  [id number] [place] [address]-
4831  10 Feb, 2015   

 

 [facility] [place] [address]     

 

 [facility] [place] [address]     

 

 [facility]  [id number] [place] [address]-
2606     

 

 [facility]  [id number] [place] [address]-
1652     

 

 [facility] [place]   [facility] [place] [facility] [place] [address]     

 

 [facility]  [address] [address]-
[address]     

 

 [facility] [place] [address]     

 

 [facility]  [address], [place] [address]-
8918     

 

 [facility] [place] [address]  24 Dec, 2014   

 

 [facility]  [address] [place] [address]-
9284  24 Dec, 2014   

 

 [facility] [place] [address]  18 Dec, 2014   

 

 [facility]  [address] [address]-
5738  18 Dec, 2014   

 

 [facility] [place] [address]  09 Dec, 2014   

 

 [facility]  [address] [place] [address]-
0527  09 Dec, 2014   

 

 [facility] [place] [address]  02 Dec, 2014   

 

 [facility]  [address]100[place], [place] [address]-
5812  02 Dec, 2014   

 

 [facility] [place] [address]     

 

 [facility]  [address] [id number] [address]
8562     

 

 [facility] [address]     

 

 [facility]  [address], [place] [address]     

 

 [facility] [address]  31 Oct, 2014   

 

 [facility]  [address] [id number] [address]-
5794  31 Oct, 2014   

 

 [facility] [address]  16 Oct, 2014   

 

 [facility]  [address] [id number] [address]
2691  16 Oct, 2014   

 

 [facility] [place] [address]  10 Oct, 2014   

 

 [facility]  [id number] [address]
8784  10 Oct, 2014   

 

 [facility] [place] [address]  26 Sep, 2014   

 

 [facility]  [address] [place] [address]-
1542  26 Sep, 2014   

 

 [facility] [place] [address]  29 Aug, 2014   

 

 [facility]  [address] [id number] [address]-
5448  29 Aug, 2014   

 

 [facility] [place] [address]  25 Aug, 2014   

 

 [facility] [place] [address]  25 Aug, 2014   

 

 [facility]  [address] [id number] [address]
6658  25 Aug, 2014   

 

 [facility]  [address] [id number] [address]
4458  25 Aug, 2014   

 

 [facility] [place] [address]  18 Aug, 2014   

 

 [facility]  [address] [id number] [address]-
3384  18 Aug, 2014   

 

 [facility] [address]  14 Aug, 2014   

 

 [facility]  [address]100[place] [address]
8397  14 Aug, 2014   

 

 [facility]  [address] [facility]note 2] [id number] [address]-
0207  11 Aug, 2014   

 

 [facility] [place] [address]  11 Aug, 2014   

 

 [facility]  [address] [id number] [address]  11 Aug, 2014   

 

 [facility]  [address] [facility] [place]note 2[place] [id number] [address]  11 Aug, 2014   

 

 [facility]  [address] [facility] [place][note 2] [id number], [place] [address]-
5179     

 

 [facility] [address]     

 

 [facility]  [id number] [address]     

 

 [facility]  [id number], [place] [address]-
[address]     

 

 [facility]  [address] [place] [address]     

 

 [facility]  [id number], [place] [address]-
[address]     

 

 [facility] [address]  30 May, 2014   

 

 [facility]  [address] [id number] [address]  30 May, 2014   

 

 [facility]  [address] [place] [address]  14 May, 2014   

 

 [facility]  [address] [id number] [address]  14 May, 2014   

 

 [facility]  [address] [place] [address]  05 May, 2014   

 

 [facility]  [address] [id number] [address]
4890  05 May, 2014   

 

 [place] [address]     

 

 [facility]  [address] [id number] [address]
1467     

 

 [place] [address]     

 

 [facility]  [address] [id number] [address]
7366     

 

 [place] [address]  10 Feb, 2014   

 

 [facility]  [id number] [address]
8881  10 Feb, 2014   







IMMUNIZATIONS

No Known Immunizations



SOCIAL HISTORY

Never Assessed



REASON FOR VISIT

Diabetes, [place], A1C=7.0



PLAN OF CARE







 Activity  Details









  









 Follow Up  4 Months Reason:recheck dm

 

 Pending Test  Ultrasound : Abdomen, LIMITED (specify organ) 

 

 Future/Pending Procedure  FOOT EXAM PERFORMED 



  



VITAL SIGNS







 Height  66 in  2018-10-18

 

 Weight  265.8 lbs  2018-10-18

 

 Temperature  97.9 degrees Fahrenheit  2018-10-18

 

 Heart Rate  80 bpm  2018-10-18

 

 Respiratory Rate  20   2018-10-18

 

 BMI  42.90 kg/m2  2018-10-18

 

 Blood pressure systolic  124 mmHg  2018-10-18

 

 Blood pressure diastolic  73 mmHg  2018-10-18







MEDICATIONS







 Medication  Instructions  Dosage  Frequency  Start Date  End Date  Duration  
Status

 

 Klor-Con 10 10 MEQ     take 1 tablet by Oral route with food  1 time per day  
           Active

 

 Fish Oil 1000 MG  Orally Once a day  1 capsule  24h           Not-Taking

 

 Tramadol HCl 50 mg  Orally every 6 hrs  1 tablet as needed for pain  6h  14 Oct
, 2015        Not-Taking

 

 Pantoprazole Sodium 40MG  Orally Once a day  1 tablet  24h           Active

 

 Sucralfate 1 GM  Orally 4 times a day  1 tablet on an empty stomach  6h       
    Not-Taking

 

 Losartan Potassium 25MG  Orally Once a day  1 tablet  24h           Active

 

 Ipratropium-Albuterol 0.5-2.5 (3) MG/3ML  Inhalation every 6 hrs  3 ml  6h  02 
May, 2018        Active

 

 Alprazolam 0.5 MG     take 1 tablet (0.5 mg) by oral route 3 times per day     
10 Feb, 2014        Active

 

 NovoLog Flexpen 100 UNIT/ML  Subcutaneous 3 times a day  Inject 25 Units by 
Subcutaneous route as per insulin sliding scale protocol  3 times per day  8h  
05 2014        Active

 

 Mobic 7.5 MG  Orally Once a day  1 tablet  24h        30  Active

 

 Atorvastatin Calcium 40 mg  Orally Once a day  1 tablet  24h  30 May, 2017    
    Active

 

 trazodone 150 mg     take 1 Tablet by Oral route 1 time per day qhs     31 Oct
, 2014        Active

 

 B Complex -                    Not-Taking

 

 Levothyroxine Sodium 25MCG     TAKE ONE TABLET BY MOUTH IN THE MORNING ON  AN  
EMPTY  STOMACH.              Active

 

 Voltaren 1%  Transdermal 4 times a day PRN knee pain  4 grams              
Active

 

 Lasix 40MG     TAKE ONE-HALF TABLET BY MOUTH ONCE DAILY              Active

 

 Metformin HCl 1000 MG  Orally Twice a day  1 tablet with meals  12h        90 
days  Active

 

 NovoFine 30G X 8 MM     as directed             Not-Taking

 

 Lyrica 75MG  Orally twice a day  1 capsule  12h           Active

 

 HydrOXYzine HCl 50 MG/ML  Intramuscular every 6 hrs  1 ml as needed  6h        
30 day(s)  Active

 

 Lantus SoloStar 100UNIT/     INJECT 50 UNITS SUB-Q TWICE DAILY              
Active

 

 Colace 100 MG  Orally Once a day  1 capsule as needed  24h           Not-Taking

 

 Zoloft 50 MG  Orally Once a day  take 2 tablets (100 mg) by oral route once 
daily  24h  10 2014        Active

 

 Vitamin B 12 100 MCG                    Not-Taking

 

 Januvia 100 MG  Orally Once a day  1 tablet  24h        30  Active

 

 Meloxicam 15 mg  Orally Once a day  1 tablet  24h  18 Oct, 2018  17 Dec, 2018  
30 day(s)  Active

 

 Cyclobenzaprine HCl 10MG  Orally Three times a day for muscle spasms  1 tablet 
as needed              Active

 

 Tramadol HCl 50 mg  Orally 2 times a day  1 tablet as needed  12h  08 2017        Active

 

 Cymbalta 30 mg     take 3 capsule by Oral route 1 time per day             Active

 

 Ventolin HFA 90 mcg/actuation  by inhalation route every 4 hrs  1-2 puffs  4h  
26 Sep, 2014     90 days  Active







RESULTS

No Results



PROCEDURES







 Procedure  Date Ordered  Result  Body Site

 

 GLYCATED HEMOGLOBIN TEST  Oct 18, 2018      

 

 FOOT EXAM PERFORMED  Oct 18, 2018      

 

 MICROALBUMIN, SEMIQUANT  Oct 18, 2018      

 

 LAB NOT BILLED BY [facility]  Oct 18, 2018      







INSTRUCTIONS





MEDICATIONS ADMINISTERED

No Known Medications



MEDICAL (GENERAL) HISTORY







 Type  Description  Date

 

 Medical History  Diabetes mellitus without mention of complication, type II or 
unspecified type, not stated as uncontrolled   

 

 Medical History  Depressive disorder, not elsewhere classified   

 

 Medical History  Anxiety state, unspecified   

 

 Medical History  Effusion of lower leg joint   

 

 Medical History  Generalized osteoarthrosis, unspecified site   

 

 Medical History  Other and unspecified hyperlipidemia   

 

 Medical History  Abnormal weight gain   

 

 Medical History  Effusion of joint, site unspecified   

 

 Medical History  Esophageal reflux   

 

 Medical History  hypertension   

 

 Medical History  colonoscopy- inscreased polyp   

 

 Surgical History  Tonsillectomy   

 

 Surgical History  Orthopedic: Bilateral Knee x2   

 

 Surgical History  colonoscopy  

 

 Hospitalization History  Surgery(s)/Childbirth(s) only

## 2019-02-26 NOTE — XMS REPORT
[facility]

 Created on: 2018



Duyen Larkin

External Reference #: [id number]

: 1965

Sex: Female



Demographics







 Address  [address] [place]  [address]

 

 Preferred Language  Unknown

 

 Marital Status  Unknown

 

 Judaism Affiliation  Unknown

 

 Race  Unknown

 

 Ethnic Group  Unknown





Author







 Author  LIYAH LOWE

 

 [facility]

 

 Address  [address] [place]  [address]



 

 Phone  [phone]







Care Team Providers







 Care Team Member Name  Role  Phone

 

 LIYAH LOWE  Unavailable  [phone]







PROBLEMS







 Type  Condition  ICD9-CM Code  MDO00-RU Code  Onset Dates  Condition Status  
SNOMED Code

 

 Problem  Effusion of lower leg joint  719.06        Active  138631272

 

 Problem  Esophageal reflux  530.81        Active  930916101

 

 Problem  Other hyperlipidemia     E78.4     Active  70267474

 

 Problem  Type 2 diabetes mellitus without complications     E11.9     Active  
482244133

 

 Problem  Primary generalized (osteo)arthritis     M15.0     Active  609214308

 

 Problem  COPD with exacerbation     J44.1     Active  356988913

 

 Problem  Claudication     I73.9     Active  644317229

 

 Problem  Mild episode of recurrent major depressive disorder     F33.0     
Active  211430103

 

 Problem  Cataracts, bilateral     H26.9     Active  54332184

 

 Problem  Chronic obstructive pulmonary disease with acute exacerbation     
J44.1     Active  656513704

 

 Problem  Chronic renal insufficiency, stage II (mild)     N18.2     Active  
877755301

 

 Problem  Primary osteoarthritis of both knees     M17.0     Active  861961720

 

 Problem  Other chronic pain     G89.29     Active  23238408

 

 Problem  Lumbar degenerative disc disease     M51.36     Active  92882897

 

 Problem  Essential (primary) hypertension     I10     Active  21452735

 

 Problem  Anxiety disorder, unspecified     F41.9     Active  154938667

 

 Problem  Peripheral arterial occlusive disease     I77.9     Active  608629442

 

 Problem  Diabetic polyneuropathy associated with type 2 diabetes mellitus     
E11.42     Active  46892959

 

 Problem  Type 2 diabetes mellitus with diabetic neuropathy, without long-term 
current use of insulin     E11.40     Active  14791067

 

 Problem  Chronic obstructive pulmonary disease, unspecified COPD type     
J44.9     Active  06176257

 

 Problem  Long term current use of insulin     Z79.4     Active  155591528

 

 Problem  Type 2 diabetes mellitus with hyperglycemia     E11.65     Active  
12555072

 

 Problem  Old tear of lateral meniscus of left knee, unspecified tear type     
M23.201     Active  995096444

 

 Problem  Major depressive disorder, single episode, unspecified     F32.9     
Active  53801635

 

 Problem  Effusion, left knee     M25.462     Active  222932024

 

 Problem  Dysphagia, unspecified type     R13.10     Active  49052136

 

 Problem  Hyperlipidemia LDL goal <130     E78.5     Active  11327766

 

 Problem  Unilateral primary osteoarthritis, left knee     M17.12     Active  
577021756

 

 Problem  Hypothyroidism (acquired)     E03.9     Active  691046301







ALLERGIES

No Information



ENCOUNTERS







 Encounter  Location  Date  Diagnosis

 

 [address] [id number]  21 May, 2018  Pain in left 
knee M25.562

 

 [address] [id number]  21 May, 2018  Pain in left 
knee M25.562 and Other chronic pain G89.29

 

 [place] [id number] [place] [address]-
77[address]  15 May, 2018   

 

 [address] [id number]  02 May, 2018  Type 2 
diabetes mellitus without complications E11.9 ; Long term current use of 
insulin Z79.4 ; Unilateral primary osteoarthritis, left knee M17.12 ; Lumbar 
degenerative disc disease M51.36 and Chronic obstructive pulmonary disease, 
unspecified COPD type J44.9

 

 [address] [id number]     

 

 [address] [id number]     

 

 [address] [id number]     

 

 [address] [id number]  23 Mar, 2018  Type 2 
diabetes mellitus without complications E11.9

 

 [address] [id number]  23 Mar, 2018   

 

 [place] [id number] [address]  13 Mar, 2018   

 

 [place] [id number] [place] [phone]
[phone]  09 Mar, 2018  Type 2 diabetes mellitus without complications E11.9

 

 [address] [id number]     

 

 [address] [place] [id number]    Diabetic 
polyneuropathy associated with type 2 diabetes mellitus E11.42

 

 [address] [id number]     

 

 [address] [id number]  21 Dec, 2017  Type 2 
diabetes mellitus without complications E11.9 ; Long term current use of 
insulin Z79.4 ; High risk sexual behavior Z72.51 ; BMI 40.0-44.9, adult Z68.41 
and Encounter for immunization Z23

 

 [address] [id number]  04 Dec, 2017   

 

 [address] [id number]  04 Dec, 2017   

 

 [address] [id number]    COPD with 
exacerbation J44.1 and BMI 40.0-44.9, adult Z68.41

 

 [address] [id number]  09 Oct, 2017  Encounter for 
screening mammogram for malignant neoplasm of breast Z12.31 ; Well woman exam 
Z01.419 and High risk sexual behavior Z72.51

 

 [address] [id number]  11 Sep, 2017   

 

 [address] [id number]  11 Sep, 2017  Type 2 
diabetes mellitus without complications E11.9

 

 [address] [id number]  11 Sep, 2017   

 

 [address] [id number]  11 Sep, 2017  Old tear of 
lateral meniscus of left knee, unspecified tear type M23.201

 

 [address] [id number]  11 Sep, 2017  Other type of 
osteoarthritis, unspecified site M19.90 ; Type 2 diabetes mellitus without 
complications E11.9 ; Primary osteoarthritis of both knees M17.0 ; Chronic 
renal insufficiency, stage II (mild) N18.2 ; Diabetic polyneuropathy associated 
with type 2 diabetes mellitus E11.42 and Exposure to hepatitis C Z20.5

 

 [address] [id number]  06 Sep, 2017  Type 2 
diabetes mellitus without complications E11.9

 

 [facility]  [address] [place] [address] [id number]  30 Aug, 2017   

 

 [facility]  [address] [place] [address] [id number]  30 Aug, 2017   

 

 [facility]  [address] [place] [address] [id number]  16 Aug, 2017  Other type of 
osteoarthritis, unspecified site M19.90

 

 [facility]  [address] [place] [address] [id number]  15 Aug, 2017  Type 2 
diabetes mellitus without complications E11.9

 

 [facility]  [address] [place] [address] [id number]  07 Aug, 2017   

 

 [facility]  [address] [place] [address] [id number]     

 

 [facility]  [address] [place] [address] [id number]     

 

 [facility]  [address] [place] [address] [id number]    Type 2 
diabetes mellitus without complications E11.9 ; Type 2 diabetes mellitus with 
diabetic neuropathy, without long-term current use of insulin E11.40 ; Primary 
osteoarthritis of both knees M17.0 and Chronic renal insufficiency, stage II (
mild) N18.2

 

 [facility]  [address] [place] [address] [id number]     

 

 [facility]  [address] [place] [address] [id number]  30 May, 2017  
Hyperlipidemia LDL goal <130 E78.5

 

 [facility] [address] [id number]  19 May, 2017  Type 2 
diabetes mellitus without complications E11.9

 

 [facility]  [address] [facility] [id number] [place] [address]-
9380  16 May, 2017   

 

 [facility]  [address] [place] [address] [id number]  08 May, 2017   

 

 [facility]  [address] [place] [address] [id number]  01 May, 2017   

 

 [facility]  [address] [place] [address] [id number]     

 

 [facility]  [address] [place] [address] [id number]    Primary 
generalized (osteo)arthritis M15.0 and Type 2 diabetes mellitus without 
complications E11.9

 

 [facility] [address] [id number]    Old tear of 
lateral meniscus of left knee, unspecified tear type M23.201

 

 [address] [id number]     

 

 [facility] [address] [id number]     

 

 [address] [id number]     

 

 [address] [id number]  20 Mar, 2017  Type 2 
diabetes mellitus without complications E11.9

 

 [address] [id number]  13 Mar, 2017  Type 2 
diabetes mellitus without complications E11.9

 

 [address] [id number]  13 Mar, 2017  Type 2 
diabetes mellitus without complications E11.9

 

 [address] [id number]  04 Mar, 2017  Type 2 
diabetes mellitus without complications E11.9

 

 [address] [id number]     

 

 [address] [id number]    Chronic 
obstructive pulmonary disease with acute exacerbation J44.1

 

 [address] [id number]     

 

 [address] [id number]    Dysuria R30.0 
; Essential (primary) hypertension I10 ; Hypothyroidism (acquired) E03.9 ; Type 
2 diabetes mellitus without complications E11.9 and Mild episode of recurrent 
major depressive disorder F33.0

 

 [address] [id number]     

 

 [address] [id number]    Dysuria R30.0 
; Vaginal candidiasis B37.3 and Candidiasis B37.9

 

 [address] [id number]     

 

 [address] [id number]  10 Octaviano, 2017  COPD with 
exacerbation J44.1 and Dysphagia, unspecified type R13.10

 

 [address] [id number]  27 Dec, 2016   

 

 [address] [id number]  12 Dec, 2016  Essential (
primary) hypertension I10 ; Hypothyroidism (acquired) E03.9 ; Type 2 diabetes 
mellitus without complications E11.9 ; Primary generalized (osteo)arthritis 
M15.0 ; Major depressive disorder, single episode, unspecified F32.9 ; 
Unilateral primary osteoarthritis, left knee M17.12 ; Hyperlipidemia LDL goal <
130 E78.5 and Dysphagia, unspecified type R13.10

 

 [address] [id number]  01 Dec, 2016   

 

 [address] [id number]     

 

 [address] [id number]     

 

 [address] [id number]     

 

 [address] [id number]     

 

 [address] [id number]     

 

 [address] [id number]    Type 2 
diabetes mellitus without complications E11.9 ; Primary generalized (osteo)
arthritis M15.0 ; Effusion, left knee M25.462 ; Old tear of lateral meniscus of 
left knee, unspecified tear type M23.201 and Fatigue, unspecified type R53.83

 

 [address] [id number]  26 Sep, 2016  Type 2 
diabetes mellitus without complications E11.9 ; Claudication I73.9 ; Pain in 
right leg M79.604 and Pain of left leg M79.605

 

 [address] [id number]  14 Sep, 2016   

 

 [address] [id number]  12 Sep, 2016   

 

 [address] [id number]  18 Aug, 2016  Type 2 
diabetes mellitus with hyperglycemia E11.65 ; Long term current use of insulin 
Z79.4 ; Anxiety disorder, unspecified F41.9 ; Essential (primary) hypertension 
I10 ; Major depressive disorder, single episode, unspecified F32.9 and 
Peripheral arterial occlusive disease I77.9

 

 [address] [id number]  17 Aug, 2016   

 

 [address] [id number]  11 Aug, 2016   

 

 [address] [id number]  11 Aug, 2016   

 

 [address] [id number]  10 Aug, 2016   

 

 [address] [id number]  05 Aug, 2016  Acute midline 
low back pain with right-sided sciatica M54.41

 

 [facility]  [address] [facility] [id number] [place], [place] [phone]  05 Aug, 2016  Chest pain, unspecified type R07.9 ; Palpitations R00.2 ; 
Claudication I73.9 ; Generalized pain R52 and Type 2 diabetes mellitus without 
complications E11.9

 

 [address] [id number]    Acute midline 
low back pain with right-sided sciatica M54.41 ; Dysuria R30.0 ; Claudication 
I73.9 ; Peripheral arterial occlusive disease I77.9 ; Shortness of breath 
R06.02 ; Effusion, left knee M25.462 and Type 2 diabetes mellitus without 
complications E11.9

 

 [address] [id number]     

 

 [address] [id number]     

 

 [address] [id number]     

 

 [address] [id number]    Type 2 
diabetes mellitus without complications E11.9 ; Other hyperlipidemia E78.4 ; 
Peripheral arterial occlusive disease I77.9 ; Essential (primary) hypertension 
I10 and Other fatigue R53.83

 

 [address] [id number]  18 May, 2016   

 

 [address] [id number]  06 May, 2016   

 

 [address] [id number]  05 May, 2016  Chest pain, 
unspecified type R07.9 ; Peripheral arterial occlusive disease I77.9 ; Anxiety 
disorder, unspecified F41.9 ; Essential (primary) hypertension I10 ; Type 2 
diabetes mellitus without complications E11.9 and Other hyperlipidemia E78.4

 

 [address] [id number]  04 May, 2016   

 

 [address] [id number]     

 

 [address] [id number]     

 

 [address] [id number]    Type 2 
diabetes mellitus without complications E11.9 ; Peripheral arterial occlusive 
disease I77.9 ; Primary generalized (osteo)arthritis M15.0 ; Major depressive 
disorder, single episode, unspecified F32.9 ; Anxiety disorder, unspecified 
F41.9 and Essential (primary) hypertension I10

 

 [address] [id number]     

 

 [address] [id number]     

 

 [facility]  [address] [facility] [id number] [place] [address]-
7[address]  02 Mar, 2016   

 

 [address] [id number]     

 

 [address] [id number]    Bronchitis 
J40 ; Shortness of breath R06.02 and Wheezing R06.2

 

 [address] [id number]     

 

 [address] [id number]     

 

 [address] [id number]     

 

 [address] [id number]     

 

 [address], [place] [id number]    Type 2 
diabetes mellitus without complications E11.9 ; Claudication I73.9 ; Other 
hyperlipidemia E78.4 ; Cataracts, bilateral H26.9 and Other fatigue R53.83

 

 [address] [id number]  28 Oct, 2015   

 

 [address] [id number]  22 Oct, 2015   

 

 [address] [id number]  16 Oct, 2015   

 

 [address] [id number]  14 Oct, 2015  Type 2 
diabetes mellitus without complications E11.9 ; Other hyperlipidemia E78.4 ; 
Primary generalized (osteo)arthritis M15.0 and Claudication I73.9

 

 [address] [id number]  12 Oct, 2015   

 

 [address] [id number]  26 Aug, 2015   

 

 [address] [id number]  12 Aug, 2015   

 

 [address] [id number]  12 Aug, 2015   

 

 [address] [id number]  10 Aug, 2015   

 

 [address] [id number]  05 Aug, 2015   

 

 [address] [id number]  11 May, 2015  Diabetes 
mellitus without mention of complication, type II or unspecified type, not 
stated as uncontrolled 250.00 ; Bronchitis 490 and Effusion of lower leg joint 
719.06

 

 [address] [id number]  01 May, 2015   

 

 [address] [id number]  01 May, 2015  Bronchitis 
490 and Wheezing 786.07

 

 [facility]  [address] [place], [place] [address] [address]-
[address]     

 

 [facility]  [address] [place], [place] [address] [address]-
2566     

 

 [facility]  [address] [place] [id number] [address]-
[address]     

 

 [facility]  [address] [place] [id number]  19 Mar, 2015   

 

 [facility]  [address] [address]-
2095  19 Mar, 2015   

 

 [facility]  [address] [place] [id number]  09 Mar, 2015   

 

 [facility]  [address] [address]-
7473  09 Mar, 2015   

 

 [facility]  [address] [place] [id number]     

 

 [facility]  [address] [address]-
6[address]     

 

 [facility]  [address] [address]-
6036  10 Feb,    

 

 [facility]  [address] [place] [id number]     

 

 [facility]  [address] [place] [id number]     

 

 [facility]  [address] [address]-
4[address]     

 

 [facility]  [address] [address]-
57[address]     

 

 [facility]  [address] [place] [id number]     

 

 [facility]  [address] [place] [id number] [address]-
44[address]     

 

 [facility]  [address] [place] [id number]     

 

 [facility] [place] [facility]  [address] [place] [id number] [address]-
65[address]     

 

 [facility]  [address] [place] [id number]  24 Dec, 2014   

 

 [facility] [place]  [address] [place] [id number] [address]-
6326  24 Dec, 2014   

 

 [facility]  [address] [place] [id number]  18 Dec, 2014   

 

 [facility] [place]  [address] [place] [id number] [address]-
2112  18 Dec, 2014   

 

 [facility]  [address] [place] [id number]  09 Dec, 2014   

 

 [facility] [place]  [address] [place] [id number] [address]-
2156  09 Dec, 2014   

 

 [facility]  [address] [place] [id number]  02 Dec, 2014   

 

 [facility] [place]  [address] [place] [id number] [address]-
2054  02 Dec, 2014   

 

 [facility]  [address] [place] [id number]     

 

 [facility] [place]  [address] [address]-
3116     

 

 [facility]  [address] [place] [address]     

 

 [facility] [place]  [address] [address]-
3009     

 

 [facility]  [address] [place] [id number]  31 Oct, 2014   

 

 [facility] [place]  [address] [address]-
0926  31 Oct, 2014   

 

 [facility]  [address] [place], [place] [id number]  16 Oct, 2014   

 

 [facility]  [address] [place] [id number] [address]-
7231  16 Oct, 2014   

 

 [facility]  [address] [place] [id number]  10 Oct, 2014   

 

 [facility] [place]  [address] [place] [id number] [address]-
4336  10 Oct, 2014   

 

 [facility]  [address] [place] [id number]  26 Sep, 2014   

 

 [facility] [place]  [address] [place] [id number] [address]-
0376  26 Sep, 2014   

 

 [facility]  [address] [place] [id number]  29 Aug, 2014   

 

 [facility]  [address] [facility] [id number] [address]-
0906  29 Aug, 2014   

 

 [facility]   [address] [place] [id number]  25 Aug, 2014   

 

 [facility]   [address] [place] [id number]  25 Aug, 2014   

 

 [facility]  [address] [facility] [id number] [address]-
4826  25 Aug, 2014   

 

 [facility]  [address] [facility] [id number] [address]-
7475  25 Aug, 2014   

 

 [facility]   [address] [place] [id number]  18 Aug, 2014   

 

 [facility]  [address] [place] [id number] [address]-
1346  18 Aug, 2014   

 

 [facility]   [address] [place] [id number]  14 Aug, 2014   

 

 [facility]  [address] [facility] [id number][place] [address]
2546  14 Aug, 2014   

 

 [facility]  [address] [facility] [id number][place] [address]
2546  11 Aug, 2014   

 

 [facility]  [address] [id number]  11 Aug, 2014   

 

 [facility]  [address] [facility] [id number][place] [address]-
7866  11 Aug, 2014   

 

 [facility]  [address] [facility] [id number][place] [address]
2546  11 Aug, 2014   

 

 [facility]  [address] [facility] [id number] [address]
2546     

 

 [facility]  [address] [place] [id number]     

 

 [facility]  [address] [facility] [id number] [place] [address]-
0516     

 

 [facility]  [address] [facility] [id number][place] [address]-
2546     

 

 [address] [id number]     

 

 [facility]  [address] [address]-
2546     

 

 [address] [id number]  30 May, 2014   

 

 [address]100[place] [address]-
2546  30 May, 2014   

 

 [address] [id number]  14 May, 2014   

 

 [address]100[place] [address]-
2546  14 May, 2014   

 

 [address] [id number]  05 May, 2014   

 

 [place] [id number] [address]-
2546  05 May, 2014   

 

 [address] [id number]     

 

 [place] [id number] [address]-
2546     

 

 [address] [id number]     

 

 [place] [id number] [address]-
2546     

 

 [address] [id number]  10 Feb, 2014   

 

 [facility]  [address] [place], [place] [address]B[place]65[place] [address]-
2546  10 Feb, 2014   







IMMUNIZATIONS

No Known Immunizations



SOCIAL HISTORY

Never Assessed



REASON FOR VISIT

medication refill



PLAN OF CARE





VITAL SIGNS





MEDICATIONS







 Medication  Instructions  Dosage  Frequency  Start Date  End Date  Duration  
Status

 

 Tradjenta 5 mg  Orally Once a day  1 tablet  24h       90 days  
Active







RESULTS

No Results



PROCEDURES

No Known procedures



INSTRUCTIONS





MEDICATIONS ADMINISTERED

No Known Medications



MEDICAL (GENERAL) HISTORY







 Type  Description  Date

 

 Medical History  Diabetes mellitus without mention of complication, type II or 
unspecified type, not stated as uncontrolled   

 

 Medical History  Depressive disorder, not elsewhere classified   

 

 Medical History  Anxiety state, unspecified   

 

 Medical History  Effusion of lower leg joint   

 

 Medical History  Generalized osteoarthrosis, unspecified site   

 

 Medical History  Other and unspecified hyperlipidemia   

 

 Medical History  Abnormal weight gain   

 

 Medical History  Effusion of joint, site unspecified   

 

 Medical History  Esophageal reflux   

 

 Medical History  hypertension   

 

 Medical History  colonoscopy- inscreased polyp   

 

 Surgical History  Tonsillectomy   

 

 Surgical History  Orthopedic: Bilateral Knee x2   

 

 Surgical History  colonoscopy  2017

 

 Hospitalization History  Surgery(s)/Childbirth(s) only

## 2019-02-26 NOTE — XMS REPORT
[facility]

 Created on: 2018



Debi Lariknthia

External Reference #: [id number]

: 1965

Sex: Female



Demographics







 Address  [address] [place] [address]  [address]

 

 Preferred Language  Unknown

 

 Marital Status  Unknown

 

 Sabianism Affiliation  Unknown

 

 Race  Unknown

 

 Ethnic Group  Unknown





Author







 Author  LIYAH LOWE

 

 [facility]  [facility]  [place]

 

 Address  [address] [place]  [address]



 

 Phone  [phone]







Care Team Providers







 Care Team Member Name  Role  Phone

 

 LIYAH LOWE  Unavailable  [phone]







PROBLEMS







 Type  Condition  ICD9-CM Code  BXI39-OH Code  Onset Dates  Condition Status  
SNOMED Code

 

 Problem  Esophageal reflux  530.81        Active  344442052

 

 Problem  Effusion of lower leg joint  719.06        Active  184011149

 

 Problem  Other hyperlipidemia     E78.4     Active  96531878

 

 Problem  Type 2 diabetes mellitus without complications     E11.9     Active  
454129338

 

 Problem  Primary generalized (osteo)arthritis     M15.0     Active  464691102

 

 Problem  Claudication     I73.9     Active  666336818

 

 Problem  Cataracts, bilateral     H26.9     Active  63428826

 

 Problem  Chronic obstructive pulmonary disease with acute exacerbation     
J44.1     Active  093651643

 

 Problem  Peripheral arterial occlusive disease     I77.9     Active  626795428

 

 Problem  Primary osteoarthritis of both knees     M17.0     Active  991627204

 

 Problem  Anxiety disorder, unspecified     F41.9     Active  444809824

 

 Problem  Chronic renal insufficiency, stage II (mild)     N18.2     Active  
168004141

 

 Problem  Diabetic polyneuropathy associated with type 2 diabetes mellitus     
E11.42     Active  90468912

 

 Problem  Type 2 diabetes mellitus with diabetic neuropathy, without long-term 
current use of insulin     E11.40     Active  02582527

 

 Problem  COPD mixed type     J44.9     Active  30898155

 

 Problem  Thrombocytopenia     D69.6     Active  062101055

 

 Problem  Effusion, left knee     M25.462     Active  883234042

 

 Problem  Major depressive disorder, single episode, unspecified     F32.9     
Active  96653285

 

 Problem  Essential (primary) hypertension     I10     Active  09119777

 

 Problem  Lumbar degenerative disc disease     M51.36     Active  88328044

 

 Problem  Long term current use of insulin     Z79.4     Active  452764667

 

 Problem  Other chronic pain     G89.29     Active  18999777

 

 Problem  Chronic obstructive pulmonary disease, unspecified COPD type     
J44.9     Active  10106403

 

 Problem  Unilateral primary osteoarthritis, left knee     M17.12     Active  
020371223

 

 Problem  Hypothyroidism (acquired)     E03.9     Active  126229660

 

 Problem  Type 2 diabetes mellitus with hyperglycemia     E11.65     Active  
15699590

 

 Problem  Old tear of lateral meniscus of left knee, unspecified tear type     
M23.201     Active  211306855

 

 Problem  COPD with exacerbation     J44.1     Active  952553929

 

 Problem  Mild episode of recurrent major depressive disorder     F33.0     
Active  894252104

 

 Problem  Hyperlipidemia LDL goal <130     E78.5     Active  72369458

 

 Problem  Dysphagia, unspecified type     R13.10     Active  44160191







ALLERGIES

No Information



ENCOUNTERS







 Encounter  Location  Date  Diagnosis

 

 [address]  [address] [place] [address]  07 Dec, 2018  Primary 
generalized (osteo)arthritis M15.0

 

 [address] [address]  07 Dec, 2018   

 

 [facility]  [address] [place], [place] [address] [place] [phone]     

 

 [address] [address]  18 Oct, 2018  Type 2 
diabetes mellitus without complications E11.9 ; Primary generalized (osteo)
arthritis M15.0 ; Chronic obstructive pulmonary disease, unspecified COPD type 
J44.9 ; Chronic renal insufficiency, stage II (mild) N18.2 ; Hyperlipidemia LDL 
goal <130 E78.5 ; Hypothyroidism (acquired) E03.9 and Thrombocytopenia D69.6

 

 [address] [address]  17 Oct, 2018   

 

 [address] [place] [address]  12 Oct, 2018   

 

 [address] [address]  10 Oct, 2018   

 

 [address] [address]  21 May, 2018  Pain in 
left knee M25.562

 

 [address] [address]  21 May, 2018  Pain in 
left knee M25.562 and Other chronic pain G89.29

 

 [address] [place] [address]  15 May, 2018   

 

 [facility] [address]  02 May, 2018  Type 2 
diabetes mellitus without complications E11.9 ; Long term current use of 
insulin Z79.4 ; Unilateral primary osteoarthritis, left knee M17.12 ; Lumbar 
degenerative disc disease M51.36 and Chronic obstructive pulmonary disease, 
unspecified COPD type J44.9

 

 [place] [address]     

 

 [place] [address]     

 

 [facility] [address]     

 

 [facility]  [address] [place] [address]  23 Mar, 2018  Type 2 
diabetes mellitus without complications E11.9

 

 [facility] [address]  23 Mar, 2018   

 

 [facility]  [address] [place], [place] [address], [place] [address]  13 Mar, 2018   

 

 [facility]  [id number] [address]  09 Mar, 2018  Type 2 diabetes mellitus without complications E11.9

 

 [facility]  [address] [place] [address]     

 

 [facility]  [address] [place] [address]    Diabetic 
polyneuropathy associated with type 2 diabetes mellitus E11.42

 

 [facility]  [address] [place] [address]     

 

 [facility]  [address] [place] [address]  21 Dec, 2017  Type 2 
diabetes mellitus without complications E11.9 ; Long term current use of 
insulin Z79.4 ; High risk sexual behavior Z72.51 ; BMI 40.0-44.9, adult Z68.41 
and Encounter for immunization Z23

 

 [facility]  [address] [place] [address]  04 Dec, 2017   

 

 [facility]  [address] [place] [address]  04 Dec, 2017   

 

 [facility]  [address] [place] [address]  17 2017  COPD with 
exacerbation J44.1 and BMI 40.0-44.9, adult Z68.41

 

 [facility] [place]  [address] [place] [address]  09 Oct, 2017  Encounter 
for screening mammogram for malignant neoplasm of breast Z12.31 ; Well woman 
exam Z01.419 and High risk sexual behavior Z72.51

 

 [facility] [place]  [address] [place] [address]  11 Sep, 2017   

 

 [facility]  [address] [place] [address]  11 Sep, 2017  Type 2 
diabetes mellitus without complications E11.9

 

 [facility] [place]  [address] [place] [address]  11 Sep, 2017   

 

 [facility]  [address] [place] [address]  11 Sep, 2017  Old tear 
of lateral meniscus of left knee, unspecified tear type M23.201

 

 [facility] [place]  [address] [place] [address]  11 Sep, 2017  Other type 
of osteoarthritis, unspecified site M19.90 ; Type 2 diabetes mellitus without 
complications E11.9 ; Primary osteoarthritis of both knees M17.0 ; Chronic 
renal insufficiency, stage II (mild) N18.2 ; Diabetic polyneuropathy associated 
with type 2 diabetes mellitus E11.42 and Exposure to hepatitis C Z20.5

 

 [facility]  [address] [place] [address]  06 Sep, 2017  Type 2 
diabetes mellitus without complications E11.9

 

 [facility]  [address] [place] [address]  30 Aug, 2017   

 

 [facility]  [address] [place] [address]  30 Aug, 2017   

 

 [facility]  [address] [place] [address]  16 Aug, 2017  Other type 
of osteoarthritis, unspecified site M19.90

 

 [facility] [place]  [address] [place] [address]  15 Aug, 2017  Type 2 
diabetes mellitus without complications E11.9

 

 [facility] [address]  07 Aug, 2017   

 

 [facility]  [address] [place] [address]     

 

 [facility]  [address] [place] [address]     

 

 [facility]  [address] [place] [address]    Type 2 
diabetes mellitus without complications E11.9 ; Type 2 diabetes mellitus with 
diabetic neuropathy, without long-term current use of insulin E11.40 ; Primary 
osteoarthritis of both knees M17.0 and Chronic renal insufficiency, stage II (
mild) N18.2

 

 [facility]  [address] [place] [address]     

 

 [facility]  [address] [place] [address]  30 May, 2017  
Hyperlipidemia LDL goal <130 E78.5

 

 [facility]  [address] [place] [address]  19 May, 2017  Type 2 
diabetes mellitus without complications E11.9

 

 [facility]  [address] [facility] [id number] [place] [address]  16 May, 2017   

 

 [facility]  [address] [place] [address]  08 May, 2017   

 

 [facility]  [address] [place] [address]  01 May, 2017   

 

 [facility]  [address] [place] [address]     

 

 [facility]  [address] [place] [address]    Primary 
generalized (osteo)arthritis M15.0 and Type 2 diabetes mellitus without 
complications E11.9

 

 [facility]  [address] [place] [address]    Old tear 
of lateral meniscus of left knee, unspecified tear type M23.201

 

 [place] [address]     

 

 [facility] [address]     

 

 [facility] [address]     

 

 [facility]  [address] [place] [address]  20 Mar, 2017  Type 2 
diabetes mellitus without complications E11.9

 

 [facility] [address]  13 Mar, 2017  Type 2 
diabetes mellitus without complications E11.9

 

 [facility]  [address] [place] [address]  13 Mar, 2017  Type 2 
diabetes mellitus without complications E11.9

 

 [facility]  [address] [place] [address]  04 Mar, 2017  Type 2 
diabetes mellitus without complications E11.9

 

 [facility]  [address] [place] [address]     

 

 [facility]  [address] [place] [address]    Chronic 
obstructive pulmonary disease with acute exacerbation J44.1

 

 [facility]  [address] [place] [address]     

 

 [facility]  [address] [place] [address]    Dysuria 
R30.0 ; Essential (primary) hypertension I10 ; Hypothyroidism (acquired) E03.9 
; Type 2 diabetes mellitus without complications E11.9 and Mild episode of 
recurrent major depressive disorder F33.0

 

 [place] [address]     

 

 [facility]  [address] [place] [address]    Dysuria 
R30.0 ; Vaginal candidiasis B37.3 and Candidiasis B37.9

 

 [facility]  [address] [place] [address]     

 

 [facility]  [address] [place] [address]  10 Octaviano, 2017  COPD with 
exacerbation J44.1 and Dysphagia, unspecified type R13.10

 

 [facility] [place]  [address] [place] [address]  27 Dec, 2016   

 

 [facility] [place]  [address] [place] [address]  12 Dec, 2016  Essential (
primary) hypertension I10 ; Hypothyroidism (acquired) E03.9 ; Type 2 diabetes 
mellitus without complications E11.9 ; Primary generalized (osteo)arthritis 
M15.0 ; Major depressive disorder, single episode, unspecified F32.9 ; 
Unilateral primary osteoarthritis, left knee M17.12 ; Hyperlipidemia LDL goal <
130 E78.5 and Dysphagia, unspecified type R13.10

 

 [facility] [place]  [address] [place] [address]  01 Dec, 2016   

 

 [facility] [place]  [address] [place] [address]     

 

 [facility] [place]  [address] [place] [address]     

 

 [facility] [place]  [address] [place] [address]     

 

 [facility] [place]  [address] [place] [address]     

 

 [facility] [place]  [address] [place] [address]     

 

 [facility] [place]  [address] [place] [address]    Type 2 
diabetes mellitus without complications E11.9 ; Primary generalized (osteo)
arthritis M15.0 ; Effusion, left knee M25.462 ; Old tear of lateral meniscus of 
left knee, unspecified tear type M23.201 and Fatigue, unspecified type R53.83

 

 [facility] [place]  [address] [place] [address]  26 Sep, 2016  Type 2 
diabetes mellitus without complications E11.9 ; Claudication I73.9 ; Pain in 
right leg M79.604 and Pain of left leg M79.605

 

 [facility]  [address] [place] [address]  14 Sep, 2016   

 

 [facility] [place] [address]  12 Sep, 2016   

 

 [facility]  [address] [place] [address]  18 Aug, 2016  Type 2 
diabetes mellitus with hyperglycemia E11.65 ; Long term current use of insulin 
Z79.4 ; Anxiety disorder, unspecified F41.9 ; Essential (primary) hypertension 
I10 ; Major depressive disorder, single episode, unspecified F32.9 and 
Peripheral arterial occlusive disease I77.9

 

 [facility] [place] [address]  17 Aug, 2016   

 

 [facility] [place]  [address] [place] [address]  11 Aug, 2016   

 

 [facility]  [address] [place] [address]  11 Aug, 2016   

 

 [facility]  [address] [place] [address]  10 Aug, 2016   

 

 [facility]  [address] [place] [address]  05 Aug, 2016  Acute 
midline low back pain with right-sided sciatica M54.41

 

 [facility]  [address] [facility] [id number] [place] [address]  05 Aug, 2016  Chest pain, unspecified type R07.9 ; Palpitations R00.2 ; 
Claudication I73.9 ; Generalized pain R52 and Type 2 diabetes mellitus without 
complications E11.9

 

 [facility] [place] [address]    Acute 
midline low back pain with right-sided sciatica M54.41 ; Dysuria R30.0 ; 
Claudication I73.9 ; Peripheral arterial occlusive disease I77.9 ; Shortness of 
breath R06.02 ; Effusion, left knee M25.462 and Type 2 diabetes mellitus 
without complications E11.9

 

 [facility]  [address] [place] [address]     

 

 [facility]  [address] [place] [address]     

 

 [facility]  [address] [place] [address]     

 

 [facility] [place]  [address] [place] [address]    Type 2 
diabetes mellitus without complications E11.9 ; Other hyperlipidemia E78.4 ; 
Peripheral arterial occlusive disease I77.9 ; Essential (primary) hypertension 
I10 and Other fatigue R53.83

 

 [facility] [place] [address]  18 May, 2016   

 

 [facility]  [address] [place] [address]  06 May, 2016   

 

 [facility] [place]  [address] [place] [address]  05 May, 2016  Chest pain
, unspecified type R07.9 ; Peripheral arterial occlusive disease I77.9 ; 
Anxiety disorder, unspecified F41.9 ; Essential (primary) hypertension I10 ; 
Type 2 diabetes mellitus without complications E11.9 and Other hyperlipidemia 
E78.4

 

 [facility] [place] [address]  04 May, 2016   

 

 [facility]  [address] [place] [address]     

 

 [facility] [place]  [address] [place] [address]     

 

 [facility] [place]  [address] [place] [address]    Type 2 
diabetes mellitus without complications E11.9 ; Peripheral arterial occlusive 
disease I77.9 ; Primary generalized (osteo)arthritis M15.0 ; Major depressive 
disorder, single episode, unspecified F32.9 ; Anxiety disorder, unspecified 
F41.9 and Essential (primary) hypertension I10

 

 [facility] [place] [address]     

 

 [facility] [place]  [address] [place] [address]     

 

 [facility]  [address] [facility] [id number] [place] [address]  02 Mar, 2016   

 

 [facility]  [address] [place] [address]     

 

 [facility] [address]    Bronchitis 
J40 ; Shortness of breath R06.02 and Wheezing R06.2

 

 [facility]  [address] [place] [address]     

 

 [facility]  [address] [place] [address]     

 

 [facility]  [address] [place] [address]     

 

 [facility]  [address] [place] [address]     

 

 [facility]  [address] [place] [address]    Type 2 
diabetes mellitus without complications E11.9 ; Claudication I73.9 ; Other 
hyperlipidemia E78.4 ; Cataracts, bilateral H26.9 and Other fatigue R53.83

 

 [facility]  [address] [place] [address]  28 Oct, 2015   

 

 [facility]  [address] [place] [address]  22 Oct, 2015   

 

 [facility]  [address] [place] [address]  16 Oct, 2015   

 

 [facility]  [address] [place] [address]  14 Oct, 2015  Type 2 
diabetes mellitus without complications E11.9 ; Other hyperlipidemia E78.4 ; 
Primary generalized (osteo)arthritis M15.0 and Claudication I73.9

 

 [facility]  [address] [place] [address]  12 Oct, 2015   

 

 [facility]  [address] [place] [address]  26 Aug, 2015   

 

 [facility]  [address] [place] [address]  12 Aug, 2015   

 

 [facility]  [address] [place] [address]  12 Aug, 2015   

 

 [facility] [place]  [address] [place] [address]  10 Aug, 2015   

 

 [facility] [place] [place] [address]  05 Aug, 2015   

 

 [facility] [place]  [address] [place] [address]  11 May, 2015  Diabetes 
mellitus without mention of complication, type II or unspecified type, not 
stated as uncontrolled 250.00 ; Bronchitis 490 and Effusion of lower leg joint 
719.06

 

 [facility] [address]  01 May, 2015   

 

 [facility]  [address] [place] [address]  01 May, 2015  Bronchitis 
490 and Wheezing 786.07

 

 [facility]  [address] [place], [place] [address] [place] [phone]     

 

 [facility]  [address] [place], [place] [address] [place] [address]-
[address]     

 

 [facility]  [address] [place], [place] [address] [place] [address]-
8191     

 

 [facility] [place] [address]  19 Mar, 2015   

 

 [facility]  [address] [place], [place] [address] [place] [address]-
7199  19 Mar, 2015   

 

 [facility]  [address] [place] [address]  09 Mar, 2015   

 

 [facility]  [address] [place], [place] [address] [place] [address]-
[address]  09 Mar, 2015   

 

 [facility] [place] [address]     

 

 [facility]  [address] [place], [place] [address] [place] [address]-
[phone]     

 

 [facility]  [address] [place], [place] [address] [place] [address]-
[address]386  10 Feb, 2015   

 

 [facility] [place] [address]     

 

 [facility] [place]  [address] [place] [address]  ,    

 

 [facility]  [address], [place] [address]  ,    

 

 [facility]  [address], [place] [address]  ,    

 

 [facility] [place] [address]     

 

 [facility]  [address] [place] [address]     

 

 [facility] [place] [address]     

 

 [facility]  [address] [place] [address]-
8167     

 

 [facility] [place] [address]  24 Dec, 2014   

 

 [facility]  [address] [place] [address]-
[address]  24 Dec, 2014   

 

 [facility] [place] [address]  18 Dec, 2014   

 

 [facility]  [address], [place] [address]-
6852  18 Dec, 2014   

 

 [facility] [place] [address]  09 Dec, 2014   

 

 [facility]  [address], [place] [address]  09 Dec, 2014   

 

 [facility] [place] [address]  02 Dec, 2014   

 

 [facility]  [address] [place] [address]-
[address]  02 Dec, 2014   

 

 [facility] [place] [address]     

 

 [facility]  [address], [place] [address]
8[address]     

 

 [facility]   [place] [facility] [place] [address]     

 

 [facility]  [address] [facility] [id number][place] [address]68     

 

 [facility]   [place] [facility] [place] [address]  31 Oct, 2014   

 

 [facility]  [address] [id number] [address]
6328  31 Oct, 2014   

 

 [facility]   [place] [facility] [place] [address]  16 Oct, 2014   

 

 [facility]  [address] [id number] [address]
3621  16 Oct, 2014   

 

 [facility]   [place] [facility] [place] [address]  10 Oct, 2014   

 

 [facility]  [address] [id number] [address]
2479  10 Oct, 2014   

 

 [facility]   [place] [facility] [place] [address]  26 Sep, 2014   

 

 [facility]  [address] [id number] [address]
5229  26 Sep, 2014   

 

 [facility]   [place] [facility] [place] [address]  29 Aug, 2014   

 

 [facility]  [address] [id number] [address]-
[address]  29 Aug, 2014   

 

 [facility] [facility] [place] [address]  25 Aug, 2014   

 

 [facility]   [place] [facility] [place] [address]  25 Aug, 2014   

 

 [facility]  [address] [facility] [id number][place] [address]67  25 Aug, 2014   

 

 [facility]  [address] [id number] [place] [address]-
1778  25 Aug, 2014   

 

 [facility]   [place] [facility] [place] [address]  18 Aug, 2014   

 

 [facility]  [address] [facility] [id number][place] [address]-
8063  18 Aug, 2014   

 

 [facility]  [address] [place] [address]  14 Aug, 2014   

 

 [facility]  [address] [facility] [id number][place] [address]-
[address]06  14 Aug, 2014   

 

 [facility]  [address] [facility] [id number][place] [address]-
3226  11 Aug, 2014   

 

 [facility]  [address] [facility] [id number]  11 Aug, 2014   

 

 [facility]  [address] [id number] [address]-
[address]  11 Aug, 2014   

 

 [facility]  [address] [id number], [place] [address]-
[address]  11 Aug, 2014   

 

 [facility]  [address] [id number] [address]-
[address]     

 

 [facility]  [address] [place] [address]     

 

 [facility]  [address] [id number], [place] [address]     

 

 [facility]  [address] [id number], [place] [address]-
8668     

 

 [facility]  [address] [place] [address]     

 

 [facility]  [address] [id number], [place] [address]-
[address]23     

 

 [facility]  [address] [place] [address]  30 May, 2014   

 

 [facility]  [address] [id number] [address]  30 May, 2014   

 

 [facility]  [address] [place] [address]  14 May, 2014   

 

 [facility]  [address] [id number], [place] [address]  14 May, 2014   

 

 [place] [address]  05 May, 2014   

 

 [facility]  [id number], [place] [address]-
[address]  05 May, 2014   

 

 [place] [address]     

 

 [facility]  [id number], [place] [address]-
[address]37     

 

 [place] [address]     

 

 [facility]  [id number], [place] [address]-
[address]     

 

 [place] [address]  10 Feb, 2014   

 

 [place] [id number] [id number]-
[address]  10 Feb, 2014   







IMMUNIZATIONS

No Known Immunizations



SOCIAL HISTORY

Never Assessed



REASON FOR VISIT





PLAN OF CARE





VITAL SIGNS





MEDICATIONS







 Medication  Instructions  Dosage  Frequency  Start Date  End Date  Duration  
Status

 

 Meloxicam 15 mg  Orally Once a day  1 tablet  24h  18 Oct, 2018     30 day(s)  
Active







RESULTS

No Results



PROCEDURES

No Known procedures



INSTRUCTIONS





MEDICATIONS ADMINISTERED

No Known Medications



MEDICAL (GENERAL) HISTORY







 Type  Description  Date

 

 Medical History  Diabetes mellitus without mention of complication, type II or 
unspecified type, not stated as uncontrolled   

 

 Medical History  Depressive disorder, not elsewhere classified   

 

 Medical History  Anxiety state, unspecified   

 

 Medical History  Effusion of lower leg joint   

 

 Medical History  Generalized osteoarthrosis, unspecified site   

 

 Medical History  Other and unspecified hyperlipidemia   

 

 Medical History  Abnormal weight gain   

 

 Medical History  Effusion of joint, site unspecified   

 

 Medical History  Esophageal reflux   

 

 Medical History  hypertension   

 

 Medical History  colonoscopy- inscreased polyp   

 

 Surgical History  Tonsillectomy   

 

 Surgical History  Orthopedic: Bilateral Knee x2   

 

 Surgical History  colonoscopy  2017

 

 Hospitalization History  Surgery(s)/Childbirth(s) only

## 2019-02-26 NOTE — XMS REPORT
[facility]

 Created on: 2018



Duyen Larkin

External Reference #: [id number]

: 1965

Sex: Female



Demographics







 Address  [address] [place]  [address]

 

 Preferred Language  Unknown

 

 Marital Status  Unknown

 

 [employer] Affiliation  Unknown

 

 Race  Unknown

 

 Ethnic Group  Unknown





Author







 Author  LIYAH LOWE

 

 [facility]

 

 Address  [address] [place]  [address]



 

 Phone  [phone]







Care Team Providers







 Care Team Member Name  Role  Phone

 

 LIYAH LOWE  Unavailable  [phone]







PROBLEMS







 Type  Condition  ICD9-CM Code  HWZ62-BG Code  Onset Dates  Condition Status  
SNOMED Code

 

 Problem  Effusion of lower leg joint  719.06        Active  190332075

 

 Problem  Esophageal reflux  530.81        Active  980154028

 

 Problem  Dysphagia, unspecified type     R13.10     Active  66230849

 

 Problem  Other hyperlipidemia     E78.4     Active  56513188

 

 Problem  Unilateral primary osteoarthritis, left knee     M17.12     Active  
980892035

 

 Problem  Type 2 diabetes mellitus without complications     E11.9     Active  
927455150

 

 Problem  Hypothyroidism (acquired)     E03.9     Active  327172147

 

 Problem  Mild episode of recurrent major depressive disorder     F33.0     
Active  792538450

 

 Problem  COPD with exacerbation     J44.1     Active  211711233

 

 Problem  Long term current use of insulin     Z79.4     Active  175455142

 

 Problem  Diabetic polyneuropathy associated with type 2 diabetes mellitus     
E11.42     Active  77054758

 

 Problem  Cataracts, bilateral     H26.9     Active  56669226

 

 Problem  Claudication     I73.9     Active  191484223

 

 Problem  Primary generalized (osteo)arthritis     M15.0     Active  618409758

 

 Problem  Chronic renal insufficiency, stage II (mild)     N18.2     Active  
456709972

 

 Problem  Chronic obstructive pulmonary disease with acute exacerbation     
J44.1     Active  619363095

 

 Problem  Primary osteoarthritis of both knees     M17.0     Active  926164785

 

 Problem  Type 2 diabetes mellitus with diabetic neuropathy, without long-term 
current use of insulin     E11.40     Active  15235961

 

 Problem  Major depressive disorder, single episode, unspecified     F32.9     
Active  25812817

 

 Problem  Anxiety disorder, unspecified     F41.9     Active  019505338

 

 Problem  Essential (primary) hypertension     I10     Active  76865261

 

 Problem  Peripheral arterial occlusive disease     I77.9     Active  458909396

 

 Problem  Old tear of lateral meniscus of left knee, unspecified tear type     
M23.201     Active  445063894

 

 Problem  Hyperlipidemia LDL goal <130     E78.5     Active  24787078

 

 Problem  Effusion, left knee     M25.462     Active  950053451

 

 Problem  Type 2 diabetes mellitus with hyperglycemia     E11.65     Active  
69035726







ALLERGIES

No Information



ENCOUNTERS







 Encounter  Location  Date  Diagnosis

 

 [facility] [place]  [address] [facility] [address] [id number] [place], [place] [id number]  02 May, 
2018   

 

 [facility] [place]  [address] [facility] [address] [id number] [place] [id number]     

 

 [facility] [place]  [address] [facility] [address] [id number] [place] [id number]     

 

 [facility] [place]  [address] [facility] [address] [id number] [place] [id number]     

 

 [facility] [place]  [address] [facility] [address] [id number] [place] [id number]  23 Mar, 
2018  Type 2 diabetes mellitus without complications E11.9

 

 [facility] [place]  [address] [place] [address] [address] [id number] [place], [place] [id number]  23 Mar, 
2018   

 

 [facility]  [address] [facility] [place] [facility] [place] [id number] [place] [address]  13 Mar, 2018   

 

 [facility]  [address] [facility] [place] [facility] [place] [id number] [place] [address]  09 Mar, 2018  Type 2 diabetes mellitus without complications E11.9

 

 [facility] [place]  [address] [place] [address] [address] [id number] [place] [id number]     

 

 [facility] [place]  [address] [facility] [address] [id number] [place] [id number]    Diabetic polyneuropathy associated with type 2 diabetes mellitus E11.42

 

 [facility] [place]  [address] [facility] [address] [id number] [place] [id number]     

 

 [facility] [place]  [address] [facility] [address] [id number] [place] [id number]  21 Dec, 
2017  Type 2 diabetes mellitus without complications E11.9 ; Long term current 
use of insulin Z79.4 ; High risk sexual behavior Z72.51 ; BMI 40.0-44.9, adult 
Z68.41 and Encounter for immunization Z23

 

 [facility] [place]  [address] [facility] [address] [id number] [place] [id number]  04 Dec, 
2017   

 

 [facility] [place]  [address] [facility] [address] [id number] [place] [id number]  04 Dec, 
2017   

 

 [facility] [place]  [address] [facility] [address] [id number] [place] [id number]    COPD with exacerbation J44.1 and BMI 40.0-44.9, adult Z68.41

 

 [facility] [place]  [address] [facility] [address] [id number] [place] [id number]  09 Oct, 
2017  Encounter for screening mammogram for malignant neoplasm of breast Z12.31 
; Well woman exam Z01.419 and High risk sexual behavior Z72.51

 

 [facility] [place]  [address] [place] [address] [address] [id number] [place] [id number]  11 Sep, 
2017   

 

 [facility] [place]  [address] [place] [address] C [id number] [place] [id number]  11 Sep, 
2017  Type 2 diabetes mellitus without complications E11.9

 

 [facility] [place]  [address] [place] [address] C [id number] [place] [id number]  11 Sep, 
2017   

 

 [facility] [place]  [address] [place] [address] C [id number] [place] [id number]  11 Sep, 
2017  Old tear of lateral meniscus of left knee, unspecified tear type M23.201

 

 [facility] [place]  [address] [place] [address] C [id number] [place] [id number]  11 Sep, 
2017  Other type of osteoarthritis, unspecified site M19.90 ; Type 2 diabetes 
mellitus without complications E11.9 ; Primary osteoarthritis of both knees 
M17.0 ; Chronic renal insufficiency, stage II (mild) N18.2 ; Diabetic 
polyneuropathy associated with type 2 diabetes mellitus E11.42 and Exposure to 
hepatitis C Z20.5

 

 [facility] [place]  [address] [place] [address] [address] [id number] [place] [id number]  06 Sep, 
2017  Type 2 diabetes mellitus without complications E11.9

 

 [facility] [place]  [address] [facility] [address] [id number] [place] [id number]  30 Aug, 
2017   

 

 [facility] [place]  [address] [facility] [address] [id number] [place] [id number]  30 Aug, 
2017   

 

 [facility] [place]  [address] [place] [address] C [id number] [place] [id number]  16 Aug, 
2017  Other type of osteoarthritis, unspecified site M19.90

 

 [facility] [place]  [address] [facility] [address] [id number] [place] [id number]  15 Aug, 
2017  Type 2 diabetes mellitus without complications E11.9

 

 [facility] [place]  [address] [facility] [address] [id number] [place] [id number]  07 Aug, 
2017   

 

 [facility] [place]  [address] [facility] [address] [id number] [place] [id number]     

 

 [facility] [place]  [address] [facility] [address] [id number] [place] 846454976     

 

 [facility] [place]  [address] [place] [address] [address] [id number] [place], [place] [id number]    Type 2 diabetes mellitus without complications E11.9 ; Type 2 diabetes 
mellitus with diabetic neuropathy, without long-term current use of insulin 
E11.40 ; Primary osteoarthritis of both knees M17.0 and Chronic renal 
insufficiency, stage II (mild) N18.2

 

 [facility] [place]  [address] [place] [address] [address] [id number] [place] [id number]     

 

 [facility] [place]  [address] [place] [address] [address] [id number] [place] [id number]  30 May, 
2017  Hyperlipidemia LDL goal <130 E78.5

 

 [facility] [place]  [address] [place] [address] [address] [id number] [place] [id number]  19 May, 
2017  Type 2 diabetes mellitus without complications E11.9

 

 [facility]  [address] [facility] [place] [id number] [place] [address]  16 May, 2017   

 

 [facility] [place]  [address] [facility] [address] [id number] [place] [id number]  08 May, 
2017   

 

 [facility] [place]  [address] [facility] [address] [id number] [place] [id number]  01 May, 
2017   

 

 [facility] [place]  [address] [place] [address] [address] [id number] [place], [place] [id number]     

 

 [facility] [place]  [address] [facility] [address] [id number] [place] [id number]    Primary generalized (osteo)arthritis M15.0 and Type 2 diabetes mellitus 
without complications E11.9

 

 [facility] [place]  [address] [facility] [address] [id number] [place] 22787[address]41    Old tear of lateral meniscus of left knee, unspecified tear type M23.201

 

 [facility] [place]  [address] [facility] [address] [id number] [place] [id number]     

 

 [facility] [place]  [address] [facility] [address] [id number] [place] 929715689     

 

 [facility] [place]  [address] [facility] [address] [id number] [place] [id number]     

 

 [facility] [place]  [address] [facility] [address] [id number] [place] [id number]  20 Mar, 
2017  Type 2 diabetes mellitus without complications E11.9

 

 [facility] [place]  [address] [place] [address] [address] [id number] [place], [place] [id number]  13 Mar, 
2017  Type 2 diabetes mellitus without complications E11.9

 

 [facility] [place]  [address] [place] [address] [address] [id number] [place] 716154464  13 Mar, 
2017  Type 2 diabetes mellitus without complications E11.9

 

 [facility] [place]  [address] [place] [address] [address] [id number] [place] 640509734  04 Mar, 
2017  Type 2 diabetes mellitus without complications E11.9

 

 [facility] [place]  [address] [place] [address] [address] [id number] [place] 753984586     

 

 [facility] [place]  [address] [facility] [address] [id number] [place] [id number]    Chronic obstructive pulmonary disease with acute exacerbation J44.1

 

 [facility] [place]  [address] [place] [address] [address] [id number] [place] [id number]     

 

 [facility] [place]  [address] [place] [address] [address] [id number] [place] [id number]    Dysuria R30.0 ; Essential (primary) hypertension I10 ; Hypothyroidism (
acquired) E03.9 ; Type 2 diabetes mellitus without complications E11.9 and Mild 
episode of recurrent major depressive disorder F33.0

 

 [facility] [place]  [address] [facility] [address] [id number] [place] [id number]     

 

 [facility] [place]  [address] [facility] [address] [id number] [place] 633081154    Dysuria R30.0 ; Vaginal candidiasis B37.3 and Candidiasis B37.9

 

 [facility] [place]  [address] [facility] [address] [id number] [place] 946847539     

 

 [facility] [place]  [address] [facility] [address] [id number] [place] [id number]  10 Octaviano, 
2017  COPD with exacerbation J44.1 and Dysphagia, unspecified type R13.10

 

 [facility] [place]  [address] [facility] [address] [id number] [place], [place] [id number]  27 Dec, 
2016   

 

 [facility] [place]  [address] [place] [address] [address] [id number] [place] [id number]  12 Dec, 
2016  Essential (primary) hypertension I10 ; Hypothyroidism (acquired) E03.9 ; 
Type 2 diabetes mellitus without complications E11.9 ; Primary generalized (
osteo)arthritis M15.0 ; Major depressive disorder, single episode, unspecified 
F32.9 ; Unilateral primary osteoarthritis, left knee M17.12 ; Hyperlipidemia 
LDL goal <130 E78.5 and Dysphagia, unspecified type R13.10

 

 [facility] [place]  [address] [place] [address] [address] [id number] [place] [id number]  01 Dec, 
2016   

 

 [facility] [place]  [address] [place] [address] [address] [id number] [place] [id number]     

 

 [facility] [place]  [address] [facility] [address] [id number] [place] 875292492     

 

 [facility] [place]  [address] [place] [address] [address] [id number] [place] [id number]     

 

 [facility] [place]  [address] [place] [address] [address] [id number] [place] [id number]     

 

 [facility] [place]  [address] [facility] [address] [id number] [place] 889135092     

 

 [facility] [place]  [address] [facility] [address] [id number] [place], [place] [id number]    Type 2 diabetes mellitus without complications E11.9 ; Primary 
generalized (osteo)arthritis M15.0 ; Effusion, left knee M25.462 ; Old tear of 
lateral meniscus of left knee, unspecified tear type M23.201 and Fatigue, 
unspecified type R53.83

 

 [facility] [place]  [address] [place] [address] [id number] [place], [place] [id number]  26 Sep, 
2016  Type 2 diabetes mellitus without complications E11.9 ; Claudication I73.9 
; Pain in right leg M79.604 and Pain of left leg M79.605

 

 [facility] [place]  [address] [place] [address] C [id number] [place] 418163449  14 Sep, 
2016   

 

 [facility] [place]  [address] [place] [address] [id number] [place] [id number]  12 Sep, 
2016   

 

 [facility] [place]  [address] [place] [address] [id number] [place], [place] [id number]  18 Aug, 
2016  Type 2 diabetes mellitus with hyperglycemia E11.65 ; Long term current 
use of insulin Z79.4 ; Anxiety disorder, unspecified F41.9 ; Essential (primary
) hypertension I10 ; Major depressive disorder, single episode, unspecified 
F32.9 and Peripheral arterial occlusive disease I77.9

 

 [facility] [place]  [address] [place] [address] [id number] [place] [id number]  17 Aug, 
2016   

 

 [facility] [place]  [address] [place] [address] C [id number] [place] [id number]  11 Aug, 
2016   

 

 [facility] [place]  [address] [place] [address] [id number] [place] [id number]  11 Aug, 
2016   

 

 [facility] [place]  [address] [place] [address] [id number] [place] 225541892  10 Aug, 
2016   

 

 [facility] [place]  [address] [place] [address] [id number] [place], [place] [id number]  05 Aug, 
2016  Acute midline low back pain with right-sided sciatica M54.41

 

 [facility]  [address] [facility] [place] [id number] [place] [address]-
[address]  05 Aug, 2016  Chest pain, unspecified type R07.9 ; Palpitations R00.2 ; 
Claudication I73.9 ; Generalized pain R52 and Type 2 diabetes mellitus without 
complications E11.9

 

 [facility] [place]  [address] [place] [address] [id number] [place], [place] [id number]    Acute midline low back pain with right-sided sciatica M54.41 ; Dysuria 
R30.0 ; Claudication I73.9 ; Peripheral arterial occlusive disease I77.9 ; 
Shortness of breath R06.02 ; Effusion, left knee M25.462 and Type 2 diabetes 
mellitus without complications E11.9

 

 [facility] [place]  [address] [facility] [address] [id number] [place] [id number]     

 

 [facility] [place]  [address] [facility] [address] [id number] [place] [id number]     

 

 [facility] [place]  [address] [facility] [address] [id number] [place] 759503359     

 

 [facility] [place]  [address] [facility] [address] [id number] [place] [id number]    Type 2 diabetes mellitus without complications E11.9 ; Other 
hyperlipidemia E78.4 ; Peripheral arterial occlusive disease I77.9 ; Essential (
primary) hypertension I10 and Other fatigue R53.83

 

 [facility] [place]  [address] [facility] [address] [id number] [place] 931224541  18 May, 
2016   

 

 [facility] [place]  [address] [place] [address] [address] [id number] [place] [id number]  06 May, 
2016   

 

 [facility] [place]  [address] [place] [address] [address] [id number] [place] [id number]  05 May, 
2016  Chest pain, unspecified type R07.9 ; Peripheral arterial occlusive 
disease I77.9 ; Anxiety disorder, unspecified F41.9 ; Essential (primary) 
hypertension I10 ; Type 2 diabetes mellitus without complications E11.9 and 
Other hyperlipidemia E78.4

 

 [facility] [place]  [address] [facility] [address] [id number] [place] [id number]  04 May, 
2016   

 

 [facility] [place]  [address] [facility] [address] [id number] [place] [id number]     

 

 [facility] [place]  [address] [facility] [address] [id number] [place] 754261047     

 

 [facility] [place]  [address] [facility] [address] [id number] [place] [id number]    Type 2 diabetes mellitus without complications E11.9 ; Peripheral 
arterial occlusive disease I77.9 ; Primary generalized (osteo)arthritis M15.0 ; 
Major depressive disorder, single episode, unspecified F32.9 ; Anxiety disorder
, unspecified F41.9 and Essential (primary) hypertension I10

 

 [facility] [place]  [address] [place] [address] [address] [id number] [place] [id number]     

 

 [facility] [place]  [address] [place] [address] [address] [id number] [place] [id number]     

 

 [facility]  [address] [facility] [place] [id number] [place] [address]-
[address]  02 Mar, 2016   

 

 [facility] [place]  [address] [place] [address] [address] [id number] [place] 379344423     

 

 [facility] [place]  [address] [place] [address] [address] [id number] [place] [id number]    Bronchitis J40 ; Shortness of breath R06.02 and Wheezing R06.2

 

 [facility] [place]  [address] [place] [address] [address] [id number] [place] 521587201     

 

 [facility] [place]  [address] [place] [address] [address] [id number] [place] [id number]     

 

 [facility] [place]  [address] [place] [address] [address] [id number] [place] [id number]     

 

 [facility] [place]  [address] [place] [address] [address] [id number] [place] [id number]     

 

 [facility] [place]  [address] [place] [address] [address] [id number] [place] [id number]    Type 2 diabetes mellitus without complications E11.9 ; Claudication I73.9 
; Other hyperlipidemia E78.4 ; Cataracts, bilateral H26.9 and Other fatigue 
R53.83

 

 [facility] [place]  [address] [facility] [address] [id number] [place] [id number]  28 Oct, 
2015   

 

 [facility] [place]  [address] [place] [address] [address] [id number] [place] [id number]  22 Oct, 
2015   

 

 [facility] [place]  [address] [place] [address] [address] [id number] [place] [id number]  16 Oct, 
2015   

 

 [facility] [place]  [address] [place] [address] [address] [id number] [place] [id number]  14 Oct, 
2015  Type 2 diabetes mellitus without complications E11.9 ; Other 
hyperlipidemia E78.4 ; Primary generalized (osteo)arthritis M15.0 and 
Claudication I73.9

 

 [facility] [place]  [address] [facility] [address] [id number] [place] [id number]  12 Oct, 
2015   

 

 [facility] [place]  [address] [facility] [address] [id number] [place] 278448982  26 Aug, 
2015   

 

 [facility] [place]  [address] [facility] [address] [id number] [place] 094390790  12 Aug, 
2015   

 

 [facility] [place]  [address] [facility] [address] [id number] [place] [id number]  12 Aug, 
2015   

 

 [facility] [place]  [address] [facility] [address] [id number] [place] [id number]  10 Aug, 
2015   

 

 [facility] [place]  [address] [facility] [address] [id number] [place] [id number]  05 Aug, 
2015   

 

 [facility] [place]  [address] [place] [address] [address] [id number] [place] [id number]  11 May, 
2015  Diabetes mellitus without mention of complication, type II or unspecified 
type, not stated as uncontrolled 250.00 ; Bronchitis 490 and Effusion of lower 
leg joint 719.06

 

 [facility] [place]  [address] [place] [address] [address] [id number] [place], [place] 827276580  01 May, 
2015   

 

 [facility] [place]  [address] [place] [address] C [id number] [place] 949251931  01 May, 
2015  Bronchitis 490 and Wheezing 786.07

 

 [place] [address] [id number] [address]     

 

 [place] [address] [facility] [place] [id number][place] [address]-
[address]     

 

 [place] [address] [id number], [place] [address]     

 

 [facility]  [address] [place] [address] C [id number] [place] [id number]  19 Mar, 
2015   

 

 [place] [address] [facility] [id number][place] [address]
2546  19 Mar, 2015   

 

 [facility] [place]  [address] [id number] [place] [id number]  09 Mar, 
2015   

 

 [facility]  [address] [facility] [place] [id number] [facility] [place] [address]
[address]  09 Mar, 2015   

 

 [facility] [place]  [address] [id number] [place] [id number]     

 

 [facility]  [address] [facility] [place] [id number] [place] [place] [address]-
[address]     

 

 [facility]  [address] [facility] [place] [id number] [place] [place] [address]-
[address]  10 Feb, 2015   

 

 [facility] [place]  [address] [id number] [place] [id number]     

 

 [facility] [place]  [address] [facility] [address] [id number] [place], [place] [id number]     

 

 [facility] [place] [facility]  [address] [facility] [place] [id number] [place] [address]-
5[address]     

 

 [facility] [place] [facility]  [address] [facility] [place] [id number] [facility] [place] [address]-
[address]     

 

 [facility] [place]  [address] [facility] [address] [id number] [place] [id number]     

 

 [facility] [place] [facility]  [address] [facility] [place] [id number] [place] [address]-
[address]     

 

 [facility] [place]  [address] [facility] [address] [id number] [place] [id number]     

 

 [facility] [place] [facility]  [address] [facility] [place] [id number] [facility] [place] [address]-
[address]     

 

 [facility] [place]  [address] [id number] [place] [id number]  24 Dec, 
2014   

 

 [facility]  [address] [facility] [place] [id number] [place] [address]-
7996  24 Dec, 2014   

 

 [facility] [place]  [address] [facility] [address] [id number] [place] 656350079  18 Dec, 
2014   

 

 [facility] [place] [facility]  [address] [id number] [place] [address]-
8[address]  18 Dec, 2014   

 

 [facility] [place]  [address] [facility] [address] [id number] [place] [id number]  09 Dec, 
2014   

 

 [facility] [place] [facility]  [address] [facility] [place] [id number] [place] [place] [address]-
[address]  09 Dec, 2014   

 

 [facility] [place]  [address] [id number] [place] [id number]  02 Dec, 
2014   

 

 [facility] [place] [facility]  [address] [id number] [place] [address]  02 Dec, 2014   

 

 [facility] [place]  [address] [id number] [place] [id number]     

 

 [facility] [place] [facility]  [address] [place] [facility] [place] [id number] [facility] [place] [phone]     

 

 [facility] [place]  [address] [id number] [place] [id number]     

 

 [facility] [place] [facility]  [address] [facility] [place] [id number] [facility] [place] [address]
[address]     

 

 [facility] [place]  [address] [facility] [address] [id number] [place] [id number]  31 Oct, 
2014   

 

 [facility] [place] [facility]  [address] [facility] [place] [facility] [place] [id number] [facility] [place] [address]  31 Oct, 2014   

 

 [facility] [place]  [address] [facility] [address] [id number] [place] [id number]  16 Oct, 
2014   

 

 [facility] [place] [facility]  [address] [id number] [place] [address]-
[address]  16 Oct, 2014   

 

 [facility] [place]  [address] [facility] [address] [id number] [place] [id number]  10 Oct, 
2014   

 

 [facility] [place] [facility]  [address] [id number] [facility] [place] [address]  10 Oct, 2014   

 

 [facility] [place]  [address] [facility] [address] [id number] [place] [id number]  26 Sep, 
2014   

 

 [facility] [place] [facility]  [address] [id number] [place] [address]-
[address]  26 Sep, 2014   

 

 [facility] [place]  [address] [id number] [place] [id number]  29 Aug, 
2014   

 

 [facility] [place] [facility]  [address] [facility] [place] [facility] [place] [id number] [facility] [place] [address]
[address]  29 Aug, 2014   

 

 [facility] [place]  [address] [id number] [place] [id number]  25 Aug, 
2014   

 

 [facility] [place]  [address] [id number] [place] [id number]  25 Aug, 
2014   

 

 [facility] [place] [facility]  [address] [id number] [place] [address]-
[address]  25 Aug, 2014   

 

 [facility] [place] [facility]  [address] [facility] [place] [facility] [place] [id number] [place] [address]-
[address]63  25 Aug, 2014   

 

 [facility] [place]  [address] [facility] [address] [id number] [place] [id number]  18 Aug, 
2014   

 

 [facility] [place] [facility]  [address] [id number] [place] [address]-
1541  18 Aug, 2014   

 

 [facility] [place]  [address] [facility] [address] [id number] [place] [id number]  14 Aug, 
2014   

 

 [facility] [place] [facility]  [address] [facility] [place] [facility] [place] [id number] [place] [address]
4899  14 Aug, 2014   

 

 [facility] [place] [facility]  [address] [facility] [place] [facility] [place] [id number] [place] [address]-
5273  11 Aug, 2014   

 

 [facility] [place]  [address] [facility] [address] [id number] [place] [id number]  11 Aug, 
2014   

 

 [facility] [place] [facility]  [address] [facility] [place] [facility] [place] [id number] [place] [address]-
[address]  11 Aug, 2014   

 

 [facility] [place] [facility]  [address] [facility] [place] [facility] [place] [id number] [place] [address]-
2215  11 Aug, 2014   

 

 [facility] [place] [facility]  [address] [facility] [place] [facility] [place] [id number] [place] [address]-
9[address]     

 

 [facility] [place]  [address] [facility] [address] [id number] [place] [id number]     

 

 [facility] [place] [facility]  [address] [id number] [facility] [place] [address]     

 

 [facility] [place] [facility]  [address] [id number] [facility] [place] [address]-
[address]     

 

 [facility] [place]  [address] [id number] [place] [id number]     

 

 [facility] [place] [facility]  [address] [id number] [place] [address]-
[address]     

 

 [facility] [place]  [address] [id number] [place] [id number]  30 May, 
2014   

 

 [facility] [place]  [address] [id number] [place] [address]-
280[address]  30 May, 2014   

 

 [facility] [place]  [address] [id number] [place] [id number]  14 May, 
2014   

 

 [facility] [place] [facility]  [address] [id number] [place] [address]-
[address]  14 May, 2014   

 

 [facility] [place]  [address] [id number] [place] [id number]  05 May, 
2014   

 

 [facility] [place] [facility]  [address] [id number] [place] [address]-
0069  05 May, 2014   

 

 [facility] [place]  [address] [id number] [place] [id number]     

 

 [facility] [place] [facility]  [address] [id number] [place] [address]-
[address]     

 

 [facility] [place]  [address] [id number] [place] [id number]     

 

 [facility]  [address] [id number] [facility] [place] [address]     

 

 [facility] [place]  [address] [id number] [place] [id number]  10 Feb, 
2014   

 

 [facility] [place] [facility]  [address] [facility] [place] [facility] [place] [id number] [place] [address]-
[address]  10 Feb, 2014   







IMMUNIZATIONS

No Known Immunizations



SOCIAL HISTORY

Never Assessed



REASON FOR VISIT

Medication Verification



PLAN OF CARE





VITAL SIGNS





MEDICATIONS







 Medication  Instructions  Dosage  Frequency  Start Date  End Date  Duration  
Status

 

 Voltaren 1 %  Transdermal 4 times a day PRN knee pain  4 grams     11 May, 
2015        Active

 

 Tramadol HCl 50 mg  Orally every 6 hrs  1 tablet as needed for pain  6h  14 Oct
, 2015        Active







RESULTS

No Results



PROCEDURES

No Known procedures



INSTRUCTIONS





MEDICATIONS ADMINISTERED

No Known Medications



MEDICAL (GENERAL) HISTORY







 Type  Description  Date

 

 Medical History  Diabetes mellitus without mention of complication, type II or 
unspecified type, not stated as uncontrolled   

 

 Medical History  Depressive disorder, not elsewhere classified   

 

 Medical History  Anxiety state, unspecified   

 

 Medical History  Effusion of lower leg joint   

 

 Medical History  Generalized osteoarthrosis, unspecified site   

 

 Medical History  Other and unspecified hyperlipidemia   

 

 Medical History  Abnormal weight gain   

 

 Medical History  Effusion of joint, site unspecified   

 

 Medical History  Esophageal reflux   

 

 Medical History  hypertension   

 

 Medical History  colonoscopy- inscreased polyp   

 

 Surgical History  Tonsillectomy   

 

 Surgical History  Orthopedic: Bilateral Knee x2   

 

 Surgical History  colonoscopy  2017

 

 Hospitalization History  Surgery(s)/Childbirth(s) only

## 2019-02-26 NOTE — XMS REPORT
[facility]

 Created on: 2018



Debi Larkinthia

External Reference #: [id number]

: 1965

Sex: Female



Demographics







 Address  [address] [place] [address]  [address]

 

 Preferred Language  Unknown

 

 Marital Status  Unknown

 

 Jainism Affiliation  Unknown

 

 Race  Unknown

 

 Ethnic Group  Unknown





Author







 Author  LIYAH LOWE

 

 Organization  [facility]

 

 Address  [address] [place]  [address]



 

 Phone  [phone]







Care Team Providers







 Care Team Member Name  Role  Phone

 

 LIYAH LOWE  Unavailable  [phone]







PROBLEMS







 Type  Condition  ICD9-CM Code  OIR23-KE Code  Onset Dates  Condition Status  
SNOMED Code

 

 Problem  Effusion of lower leg joint  719.06        Active  142926635

 

 Problem  Esophageal reflux  530.81        Active  616990648

 

 Problem  Other hyperlipidemia     E78.4     Active  44755594

 

 Problem  Type 2 diabetes mellitus without complications     E11.9     Active  
329988099

 

 Problem  Primary generalized (osteo)arthritis     M15.0     Active  401371323

 

 Problem  COPD with exacerbation     J44.1     Active  087156706

 

 Problem  Claudication     I73.9     Active  716947534

 

 Problem  Mild episode of recurrent major depressive disorder     F33.0     
Active  741125569

 

 Problem  Cataracts, bilateral     H26.9     Active  41778175

 

 Problem  Chronic obstructive pulmonary disease with acute exacerbation     
J44.1     Active  073163638

 

 Problem  Chronic renal insufficiency, stage II (mild)     N18.2     Active  
039227040

 

 Problem  Primary osteoarthritis of both knees     M17.0     Active  878384508

 

 Problem  Other chronic pain     G89.29     Active  16004864

 

 Problem  Lumbar degenerative disc disease     M51.36     Active  19525033

 

 Problem  Essential (primary) hypertension     I10     Active  76391745

 

 Problem  Anxiety disorder, unspecified     F41.9     Active  335709547

 

 Problem  Peripheral arterial occlusive disease     I77.9     Active  789330244

 

 Problem  Diabetic polyneuropathy associated with type 2 diabetes mellitus     
E11.42     Active  96267364

 

 Problem  Type 2 diabetes mellitus with diabetic neuropathy, without long-term 
current use of insulin     E11.40     Active  63435459

 

 Problem  Chronic obstructive pulmonary disease, unspecified COPD type     
J44.9     Active  24739692

 

 Problem  Long term current use of insulin     Z79.4     Active  972236378

 

 Problem  Type 2 diabetes mellitus with hyperglycemia     E11.65     Active  
15941190

 

 Problem  Old tear of lateral meniscus of left knee, unspecified tear type     
M23.201     Active  165290915

 

 Problem  Major depressive disorder, single episode, unspecified     F32.9     
Active  77978538

 

 Problem  Effusion, left knee     M25.462     Active  127176002

 

 Problem  Dysphagia, unspecified type     R13.10     Active  38980776

 

 Problem  Hyperlipidemia LDL goal <130     E78.5     Active  94997000

 

 Problem  Unilateral primary osteoarthritis, left knee     M17.12     Active  
592008074

 

 Problem  Hypothyroidism (acquired)     E03.9     Active  656497069







ALLERGIES

No Information



ENCOUNTERS







 Encounter  Location  Date  Diagnosis

 

 [address] [address]  21 May, 2018  Pain in left 
knee M25.562

 

 [address] [address]  21 May, 2018  Pain in left 
knee M25.562 and Other chronic pain G89.29

 

 [place] [address] [place] [address]-
[address]  15 May, 2018   

 

 [address] [address]  02 May, 2018  Type 2 diabetes 
mellitus without complications E11.9 ; Long term current use of insulin Z79.4 ; 
Unilateral primary osteoarthritis, left knee M17.12 ; Lumbar degenerative disc 
disease M51.36 and Chronic obstructive pulmonary disease, unspecified COPD type 
J44.9

 

 [address] [address]     

 

 [address] [address]     

 

 [address] [address]     

 

 [address] [address]  23 Mar, 2018  Type 2 diabetes 
mellitus without complications E11.9

 

 [address] [address]  23 Mar, 2018   

 

 [facility]  [id number] [place] [address]-
0808  13 Mar, 2018   

 

 [facility]  [address] [place] [address]-
4081  09 Mar, 2018  Type 2 diabetes mellitus without complications E11.9

 

 [address] [address]     

 

 [address] [place] [address]    Diabetic 
polyneuropathy associated with type 2 diabetes mellitus E11.42

 

 [facility]  [address] [place] [address]     

 

 [facility] [address]  21 Dec, 2017  Type 2 diabetes 
mellitus without complications E11.9 ; Long term current use of insulin Z79.4 ; 
High risk sexual behavior Z72.51 ; BMI 40.0-44.9, adult Z68.41 and Encounter 
for immunization Z23

 

 [facility] [address]  04 Dec, 2017   

 

 [facility]  [address] [place] [address]  04 Dec, 2017   

 

 [facility] [address] [address]  17 2017  COPD with 
exacerbation J44.1 and BMI 40.0-44.9, adult Z68.41

 

 [facility] [address] [address]  09 Oct, 2017  Encounter for 
screening mammogram for malignant neoplasm of breast Z12.31 ; Well woman exam 
Z01.419 and High risk sexual behavior Z72.51

 

 [facility] [address] [address]  11 Sep, 2017   

 

 [facility] [address] [address]  11 Sep, 2017  Type 2 diabetes 
mellitus without complications E11.9

 

 [address] [address]  11 Sep, 2017   

 

 [facility] [address]  11 Sep, 2017  Old tear of 
lateral meniscus of left knee, unspecified tear type M23.201

 

 [facility] [address] [address]  11 Sep, 2017  Other type of 
osteoarthritis, unspecified site M19.90 ; Type 2 diabetes mellitus without 
complications E11.9 ; Primary osteoarthritis of both knees M17.0 ; Chronic 
renal insufficiency, stage II (mild) N18.2 ; Diabetic polyneuropathy associated 
with type 2 diabetes mellitus E11.42 and Exposure to hepatitis C Z20.5

 

 [facility] [address] [address]  06 Sep, 2017  Type 2 diabetes 
mellitus without complications E11.9

 

 [facility]  [address] [place] [address]  30 Aug, 2017   

 

 [facility]  [address] [place] [address]  30 Aug, 2017   

 

 [facility]  [address] [place] [address]  16 Aug, 2017  Other type of 
osteoarthritis, unspecified site M19.90

 

 [facility]  [address] [place] [address]  15 Aug, 2017  Type 2 diabetes 
mellitus without complications E11.9

 

 [facility]  [address] [place] [address]  07 Aug, 2017   

 

 [facility]  [address] [place] [address]     

 

 [facility]  [address] [place] [address]     

 

 [facility]  [address] [place] [address]    Type 2 diabetes 
mellitus without complications E11.9 ; Type 2 diabetes mellitus with diabetic 
neuropathy, without long-term current use of insulin E11.40 ; Primary 
osteoarthritis of both knees M17.0 and Chronic renal insufficiency, stage II (
mild) N18.2

 

 [facility]  [address] [place] [address]     

 

 [facility]  [address] [place] [address]  30 May, 2017  Hyperlipidemia 
LDL goal <130 E78.5

 

 [facility]  [address] [place] [address]  19 May, 2017  Type 2 diabetes 
mellitus without complications E11.9

 

 [facility]  [address] [facility] [id number] [address]  16 May, 2017   

 

 [facility]  [address] [place] [address]  08 May, 2017   

 

 [facility]  [address] [place] [address]  01 May, 2017   

 

 [facility]  [address] [place] [address]     

 

 [facility]  [address] [place] [address]    Primary 
generalized (osteo)arthritis M15.0 and Type 2 diabetes mellitus without 
complications E11.9

 

 [facility]  [address] [place] [address]    Old tear of 
lateral meniscus of left knee, unspecified tear type M23.201

 

 [facility]  [address] [place] [address]     

 

 [facility]  [address] [place] [address]     

 

 [facility]  [address] [place] [address]     

 

 [facility]  [address] [place] [address]  20 Mar, 2017  Type 2 diabetes 
mellitus without complications E11.9

 

 [facility]  [address] [place] [address]  13 Mar, 2017  Type 2 diabetes 
mellitus without complications E11.9

 

 [facility]  [address] [place] [address]  13 Mar, 2017  Type 2 diabetes 
mellitus without complications E11.9

 

 [facility]  [address] [place] [address]  04 Mar, 2017  Type 2 diabetes 
mellitus without complications E11.9

 

 [facility]  [address] [place] [address]     

 

 [facility]  [address] [place] [address]    Chronic 
obstructive pulmonary disease with acute exacerbation J44.1

 

 [facility] [address] [address]     

 

 [facility] [address]    Dysuria R30.0 ; 
Essential (primary) hypertension I10 ; Hypothyroidism (acquired) E03.9 ; Type 2 
diabetes mellitus without complications E11.9 and Mild episode of recurrent 
major depressive disorder F33.0

 

 [facility]  [address] [place] [address]     

 

 [facility]  [address] [place] [address]    Dysuria R30.0 ; 
Vaginal candidiasis B37.3 and Candidiasis B37.9

 

 [facility]  [address] [place] [address]     

 

 [facility]  [address] [place] [address]  10 Octaviano, 2017  COPD with 
exacerbation J44.1 and Dysphagia, unspecified type R13.10

 

 [facility]  [address] [place] [address]  27 Dec, 2016   

 

 [facility]  [address] [place] [address]  12 Dec, 2016  Essential (
primary) hypertension I10 ; Hypothyroidism (acquired) E03.9 ; Type 2 diabetes 
mellitus without complications E11.9 ; Primary generalized (osteo)arthritis 
M15.0 ; Major depressive disorder, single episode, unspecified F32.9 ; 
Unilateral primary osteoarthritis, left knee M17.12 ; Hyperlipidemia LDL goal <
130 E78.5 and Dysphagia, unspecified type R13.10

 

 [facility]  [address] [place] [address]  01 Dec, 2016   

 

 [facility]  [address] [place] [address]     

 

 [facility]  [address] [place] [address]     

 

 [facility]  [address] [place] [address]     

 

 [facility]  [address] [place] [address]     

 

 [facility]  [address] [place] [address]     

 

 [facility]  [address] [place] [address]    Type 2 diabetes 
mellitus without complications E11.9 ; Primary generalized (osteo)arthritis 
M15.0 ; Effusion, left knee M25.462 ; Old tear of lateral meniscus of left knee
, unspecified tear type M23.201 and Fatigue, unspecified type R53.83

 

 [facility]  [address] [place] [address]  26 Sep, 2016  Type 2 diabetes 
mellitus without complications E11.9 ; Claudication I73.9 ; Pain in right leg 
M79.604 and Pain of left leg M79.605

 

 [facility]  [address] [place] [address]  14 Sep, 2016   

 

 [facility]  [address] [place] [address]  12 Sep, 2016   

 

 [facility]  [address] [place] [address]  18 Aug, 2016  Type 2 diabetes 
mellitus with hyperglycemia E11.65 ; Long term current use of insulin Z79.4 ; 
Anxiety disorder, unspecified F41.9 ; Essential (primary) hypertension I10 ; 
Major depressive disorder, single episode, unspecified F32.9 and Peripheral 
arterial occlusive disease I77.9

 

 [facility]  [address] [place] [address]  17 Aug, 2016   

 

 [facility]  [address] [place] [address]  11 Aug, 2016   

 

 [facility] [address] [address]  11 Aug, 2016   

 

 [address] [address]  10 Aug, 2016   

 

 [address] [address]  05 Aug, 2016  Acute midline 
low back pain with right-sided sciatica M54.41

 

 [facility]  [address] [facility] [id number] [place] [address]  05 Aug, 2016  Chest pain, unspecified type R07.9 ; Palpitations R00.2 ; 
Claudication I73.9 ; Generalized pain R52 and Type 2 diabetes mellitus without 
complications E11.9

 

 [address] [address]    Acute midline 
low back pain with right-sided sciatica M54.41 ; Dysuria R30.0 ; Claudication 
I73.9 ; Peripheral arterial occlusive disease I77.9 ; Shortness of breath 
R06.02 ; Effusion, left knee M25.462 and Type 2 diabetes mellitus without 
complications E11.9

 

 [facility]  [address] [place] [address]     

 

 [address] [address]     

 

 [address] [address]     

 

 [address] [address]    Type 2 diabetes 
mellitus without complications E11.9 ; Other hyperlipidemia E78.4 ; Peripheral 
arterial occlusive disease I77.9 ; Essential (primary) hypertension I10 and 
Other fatigue R53.83

 

 [facility]  [address] [place] [address]  18 May, 2016   

 

 [facility]  [address] [place] [address]  06 May, 2016   

 

 [facility]  [address] [place] [address]  05 May, 2016  Chest pain, 
unspecified type R07.9 ; Peripheral arterial occlusive disease I77.9 ; Anxiety 
disorder, unspecified F41.9 ; Essential (primary) hypertension I10 ; Type 2 
diabetes mellitus without complications E11.9 and Other hyperlipidemia E78.4

 

 [facility]  [address] [place] [address]  04 May, 2016   

 

 [facility]  [address] [place] [address]     

 

 [facility]  [address] [place] [address]     

 

 [facility]  [address] [place] [address]    Type 2 diabetes 
mellitus without complications E11.9 ; Peripheral arterial occlusive disease 
I77.9 ; Primary generalized (osteo)arthritis M15.0 ; Major depressive disorder, 
single episode, unspecified F32.9 ; Anxiety disorder, unspecified F41.9 and 
Essential (primary) hypertension I10

 

 [facility]  [address] [place] [address]     

 

 [facility] [address] [address]     

 

 [facility]  [address] [facility] [id number] [place] [address]-
3016  02 Mar, 2016   

 

 [facility] [address] [address]     

 

 [facility] [address]    Bronchitis J40 ; 
Shortness of breath R06.02 and Wheezing R06.2

 

 [facility]  [address] [place] [address]     

 

 [facility] [address] [address]     

 

 [facility]  [address] [place] [address]     

 

 [facility]  [address] [place] [address]     

 

 [facility] [place] [address]    Type 2 diabetes 
mellitus without complications E11.9 ; Claudication I73.9 ; Other 
hyperlipidemia E78.4 ; Cataracts, bilateral H26.9 and Other fatigue R53.83

 

 [facility]  [address] [place] [address]  28 Oct, 2015   

 

 [facility]  [address] [place] [address]  22 Oct, 2015   

 

 [facility]  [address] [place] [address]  16 Oct, 2015   

 

 [facility]  [address] [place] [address]  14 Oct, 2015  Type 2 diabetes 
mellitus without complications E11.9 ; Other hyperlipidemia E78.4 ; Primary 
generalized (osteo)arthritis M15.0 and Claudication I73.9

 

 [facility]  [address] [place] [address]  12 Oct, 2015   

 

 [facility]  [address] [place] [address]  26 Aug, 2015   

 

 [facility]  [address] [place] [address]  12 Aug, 2015   

 

 [facility]  [address] [place] [address]  12 Aug, 2015   

 

 [facility]  [address] [place] [address]  10 Aug, 2015   

 

 [facility]  [address] [place] [address]  05 Aug, 2015   

 

 [facility] [address]  11 May, 2015  Diabetes 
mellitus without mention of complication, type II or unspecified type, not 
stated as uncontrolled 250.00 ; Bronchitis 490 and Effusion of lower leg joint 
719.06

 

 [address] [address]  01 May, 2015   

 

 [address] [address]  01 May, 2015  Bronchitis 490 
and Wheezing 786.07

 

 [facility]  [id number] [address]     

 

 [facility]  [id number] [address]-
1294     

 

 [facility]  [id number] [address]     

 

 [facility]  [address] [place] [address]  19 Mar, 2015   

 

 [facility]  [id number] [address]24  19 Mar, 2015   

 

 [facility]  [address] [place] [address]  09 Mar, 2015   

 

 [facility]  [address] [address]44  09 Mar, 2015   

 

 [facility]  [address] [place] [address]  ,    

 

 [facility]  [address], [place] [address]  ,    

 

 [facility]  [address] [address]369  10 Feb, 2015   

 

 [facility]  [address] [place] [address]  ,    

 

 [facility]  [address] [place] [address]  ,    

 

 [facility]  [address] [place] [address]-
1257  ,    

 

 [facility]  [address] [address]-
[address]     

 

 [facility]  [address] [place] [address]     

 

 [facility]  [address] [address]-
[address]     

 

 [facility]  [address] [place] [address]     

 

 [facility]  [address] [address]-
6193     

 

 [facility]  [address] [place] [address]  24 Dec, 2014   

 

 [facility]  [address] [address]-
3525  24 Dec, 2014   

 

 [facility]  [address] [place] [address]  18 Dec, 2014   

 

 [facility]  [address], [place] [address]-
6748  18 Dec, 2014   

 

 [facility]  [address] [place] [address]  09 Dec, 2014   

 

 [facility]  [address], [place] [address]-
4273  09 Dec, 2014   

 

 [facility]  [address] [place] [address]  02 Dec, 2014   

 

 [facility]  [address] [place] [address]-
9230  02 Dec, 2014   

 

 [facility]  [address] [place] [address]     

 

 [facility]  [address] [place] [address]-
0929     

 

 [facility]  [address] [place] [address]     

 

 [facility]  [address] [place] [address]-
1912     

 

 [facility]  [address] [place] [address]  31 Oct, 2014   

 

 [facility]  [address] [address]-
1737  31 Oct, 2014   

 

 [facility]  [address] [place] [address]  16 Oct, 2014   

 

 [facility]  [address] [address]-
2499  16 Oct, 2014   

 

 [facility]  [address] [place] [address]  10 Oct, 2014   

 

 [facility]  [address] [place] [address]-
8182  10 Oct, 2014   

 

 [facility]  [address] [place] [address]  26 Sep, 2014   

 

 [facility]  [address] [address]-
1810  26 Sep, 2014   

 

 [facility]  [address] [place] [address]  29 Aug, 2014   

 

 [facility]  [address] [facility] [id number] [address]-
9706  29 Aug, 2014   

 

 [facility]  [address] [place] [address]  25 Aug, 2014   

 

 [facility]  [address] [place] [address]  25 Aug, 2014   

 

 [facility]  [address] [place] [address]-
8029  25 Aug, 2014   

 

 [facility]  [id number] [place] [address]-
5849  25 Aug, 2014   

 

 [facility]  [address] [place] [address]  18 Aug, 2014   

 

 [facility]  [id number] [place] [address]-
8492  18 Aug, 2014   

 

 [facility]  [address] [place] [address]  14 Aug, 2014   

 

 [facility]  [id number] [address]-
0397  14 Aug, 2014   

 

 [facility]  [id number] [place] [address]-
6986  11 Aug, 2014   

 

 [facility]  [address] [place] [address]  11 Aug, 2014   

 

 [facility]  [id number], [place] [address]-
7750  11 Aug, 2014   

 

 [facility]  [address] [facility] [id number] [address]-
6282  11 Aug, 2014   

 

 [facility]  [address] [facility] [id number] [address]-
4633     

 

 [facility]  [address] [place] [address]     

 

 [facility]  [id number], [place] [address]-
3354     

 

 [facility]  [address], [place] [address]-
8919     

 

 [facility]  [address] [place] [address]     

 

 [facility]  [address], [place] [address]-
8146     

 

 [facility]  [address] [place] [address]  30 May, 2014   

 

 [facility]  [address] [place] [address]-
2604  30 May, 2014   

 

 [facility]  [address] [place] [address]  14 May, 2014   

 

 [facility]  [address] [place] [address]-
7878  14 May, 2014   

 

 [facility]  [address] [place] [address]  05 May, 2014   

 

 [facility]  [address] [place] [address]-
6612  05 May, 2014   

 

 [facility]  [address] [place] [address]     

 

 [facility]  [address] [place] [address]-
5175     

 

 [facility]  [address] [place] [address]     

 

 [facility]  [id number], [place] [address]     

 

 [facility]  [address] [place] [address]  10 Feb, 2014   

 

 [facility]  [id number], [place] [address]  10 Feb, 2014   







IMMUNIZATIONS

No Known Immunizations



SOCIAL HISTORY

Never Assessed



REASON FOR VISIT

med question



PLAN OF CARE





VITAL SIGNS





MEDICATIONS

Unknown Medications



RESULTS

No Results



PROCEDURES

No Known procedures



INSTRUCTIONS





MEDICATIONS ADMINISTERED

No Known Medications



MEDICAL (GENERAL) HISTORY







 Type  Description  Date

 

 Medical History  Diabetes mellitus without mention of complication, type II or 
unspecified type, not stated as uncontrolled   

 

 Medical History  Depressive disorder, not elsewhere classified   

 

 Medical History  Anxiety state, unspecified   

 

 Medical History  Effusion of lower leg joint   

 

 Medical History  Generalized osteoarthrosis, unspecified site   

 

 Medical History  Other and unspecified hyperlipidemia   

 

 Medical History  Abnormal weight gain   

 

 Medical History  Effusion of joint, site unspecified   

 

 Medical History  Esophageal reflux   

 

 Medical History  hypertension   

 

 Medical History  colonoscopy- inscreased polyp   

 

 Surgical History  Tonsillectomy   

 

 Surgical History  Orthopedic: Bilateral Knee x2   

 

 Surgical History  colonoscopy  2017

 

 Hospitalization History  Surgery(s)/Childbirth(s) only

## 2019-02-26 NOTE — XMS REPORT
[facility]

 Created on: 10/14/2018



TraeDebiDuyen

External Reference #: [id number]

: 1965

Sex: Female



Demographics







 Address  [address] [place] [address]  [address]

 

 Preferred Language  Unknown

 

 Marital Status  Unknown

 

 Faith Affiliation  Unknown

 

 Race  Unknown

 

 Ethnic Group  Unknown





Author







 Author  LIYAH LOWE

 

 [facility]  [facility]  [place]

 

 Address  [address] [place]  [address]



 

 Phone  [phone]







Care Team Providers







 Care Team Member Name  Role  Phone

 

 LIYAH LOWE  Unavailable  [phone]







PROBLEMS







 Type  Condition  ICD9-CM Code  GRW08-[place] Code  Onset Dates  Condition Status  
SNOMED Code

 

 Problem  Effusion of lower leg joint  719.06        Active  231271938

 

 Problem  Esophageal reflux  530.81        Active  086589826

 

 Problem  Other hyperlipidemia     E78.4     Active  01383273

 

 Problem  Type 2 diabetes mellitus without complications     E11.9     Active  
018519442

 

 Problem  Primary generalized (osteo)arthritis     M15.0     Active  024642089

 

 Problem  COPD with exacerbation     J44.1     Active  807265604

 

 Problem  Claudication     I73.9     Active  123619994

 

 Problem  Mild episode of recurrent major depressive disorder     F33.0     
Active  282178156

 

 Problem  Cataracts, bilateral     H26.9     Active  65220340

 

 Problem  Chronic obstructive pulmonary disease with acute exacerbation     
J44.1     Active  227017015

 

 Problem  Chronic renal insufficiency, stage II (mild)     N18.2     Active  
281505709

 

 Problem  Primary osteoarthritis of both knees     M17.0     Active  744254923

 

 Problem  Other chronic pain     G89.29     Active  05702788

 

 Problem  Lumbar degenerative disc disease     M51.36     Active  15455547

 

 Problem  Essential (primary) hypertension     I10     Active  67354655

 

 Problem  Anxiety disorder, unspecified     F41.9     Active  549173236

 

 Problem  Peripheral arterial occlusive disease     I77.9     Active  568482196

 

 Problem  Diabetic polyneuropathy associated with type 2 diabetes mellitus     
E11.42     Active  61955208

 

 Problem  Type 2 diabetes mellitus with diabetic neuropathy, without long-term 
current use of insulin     E11.40     Active  44085144

 

 Problem  Chronic obstructive pulmonary disease, unspecified COPD type     
J44.9     Active  05986714

 

 Problem  Long term current use of insulin     Z79.4     Active  646361188

 

 Problem  Type 2 diabetes mellitus with hyperglycemia     E11.65     Active  
04912001

 

 Problem  Old tear of lateral meniscus of left knee, unspecified tear type     
M23.201     Active  023120597

 

 Problem  Major depressive disorder, single episode, unspecified     F32.9     
Active  05974427

 

 Problem  Effusion, left knee     M25.462     Active  981041356

 

 Problem  Dysphagia, unspecified type     R13.10     Active  19507794

 

 Problem  Hyperlipidemia LDL goal <130     E78.5     Active  41975645

 

 Problem  Unilateral primary osteoarthritis, left knee     M17.12     Active  
753889232

 

 Problem  Hypothyroidism (acquired)     E03.9     Active  456105222







ALLERGIES

No Information



ENCOUNTERS







 Encounter  Location  Date  Diagnosis

 

 [facility]  [address] [place] [address]  18 Oct, 2018   

 

 [facility]  [address] [place], [place] [address], [place] [address]  12 Oct, 2018   

 

 [facility] [address] [place] [address]  10 Oct, 2018   

 

 [facility]  [address] [place] [address]  21 May, 2018  Pain in 
left knee M25.562

 

 [facility] [address] [address]  21 May, 2018  Pain in 
left knee M25.562 and Other chronic pain G89.29

 

 [id number] [address]  15 May, 2018   

 

 [facility] [place]  [address] [place] [address]  02 May, 2018  Type 2 
diabetes mellitus without complications E11.9 ; Long term current use of 
insulin Z79.4 ; Unilateral primary osteoarthritis, left knee M17.12 ; Lumbar 
degenerative disc disease M51.36 and Chronic obstructive pulmonary disease, 
unspecified COPD type J44.9

 

 [facility]  [address] [place] [address]     

 

 [facility] [address] [address]     

 

 [facility] [place]  [address] [place] [address]     

 

 [facility] [place]  [address] [place] [address]  23 Mar, 2018  Type 2 
diabetes mellitus without complications E11.9

 

 [facility] [place]  [address] [place] [address]  23 Mar, 2018   

 

 [facility]  [address] [facility] [id number] [place] [address]  13 Mar, 2018   

 

 [facility]  [address] [facility] [id number] [address]  09 Mar, 2018  Type 2 diabetes mellitus without complications E11.9

 

 [facility] [address] [address]     

 

 [facility] [address] [address]    Diabetic 
polyneuropathy associated with type 2 diabetes mellitus E11.42

 

 [facility] [address] [address]     

 

 [facility] [address] [address]  21 Dec, 2017  Type 2 
diabetes mellitus without complications E11.9 ; Long term current use of 
insulin Z79.4 ; High risk sexual behavior Z72.51 ; BMI 40.0-44.9, adult Z68.41 
and Encounter for immunization Z23

 

 [facility] [address] [address]  04 Dec, 2017   

 

 [facility] [address] [address]  04 Dec, 2017   

 

 [facility] [address] [address]    COPD with 
exacerbation J44.1 and BMI 40.0-44.9, adult Z68.41

 

 [facility] [address] [address]  09 Oct, 2017  Encounter 
for screening mammogram for malignant neoplasm of breast Z12.31 ; Well woman 
exam Z01.419 and High risk sexual behavior Z72.51

 

 [facility] [address] [address]  11 Sep, 2017   

 

 [facility] [address] [address]  11 Sep, 2017  Type 2 
diabetes mellitus without complications E11.9

 

 [facility] [address] [address]  11 Sep, 2017   

 

 [facility] [place] [address]  11 Sep, 2017  Old tear 
of lateral meniscus of left knee, unspecified tear type M23.201

 

 [facility] [place] [address]  11 Sep, 2017  Other type 
of osteoarthritis, unspecified site M19.90 ; Type 2 diabetes mellitus without 
complications E11.9 ; Primary osteoarthritis of both knees M17.0 ; Chronic 
renal insufficiency, stage II (mild) N18.2 ; Diabetic polyneuropathy associated 
with type 2 diabetes mellitus E11.42 and Exposure to hepatitis C Z20.5

 

 [facility] [place] [address]  06 Sep, 2017  Type 2 
diabetes mellitus without complications E11.9

 

 [facility] [place] [address]  30 Aug, 2017   

 

 [facility]  [address] [place] [address]  30 Aug, 2017   

 

 [facility] [place]  [address] [place] [address]  16 Aug, 2017  Other type 
of osteoarthritis, unspecified site M19.90

 

 [facility] [place] [address]  15 Aug, 2017  Type 2 
diabetes mellitus without complications E11.9

 

 [facility] [place] [address]  07 Aug, 2017   

 

 [facility] [place]  [address] [place] [address]     

 

 [facility] [place]  [address] [place] [address]     

 

 [facility] [place]  [address] [place] [address]    Type 2 
diabetes mellitus without complications E11.9 ; Type 2 diabetes mellitus with 
diabetic neuropathy, without long-term current use of insulin E11.40 ; Primary 
osteoarthritis of both knees M17.0 and Chronic renal insufficiency, stage II (
mild) N18.2

 

 [facility] [place] [address]     

 

 [facility] [place] [address]  30 May, 2017  
Hyperlipidemia LDL goal <130 E78.5

 

 [facility] [place]  [address] [place] [address]  19 May, 2017  Type 2 
diabetes mellitus without complications E11.9

 

 [facility]  [address] [facility] [id number] [place] [address]  16 May, 2017   

 

 [facility] [place] [address]  08 May, 2017   

 

 [facility] [place] [address]  01 May, 2017   

 

 [facility] [place] [address]     

 

 [facility] [place] [address]    Primary 
generalized (osteo)arthritis M15.0 and Type 2 diabetes mellitus without 
complications E11.9

 

 [facility] [place]  [address] [place] [address]    Old tear 
of lateral meniscus of left knee, unspecified tear type M23.201

 

 [facility] [place]  [address] [place] [address]     

 

 [facility] [place] [address]     

 

 [facility] [place] [address]     

 

 [facility] [place]  [address] [place] [address]  20 Mar, 2017  Type 2 
diabetes mellitus without complications E11.9

 

 [facility] [place] [address]  13 Mar, 2017  Type 2 
diabetes mellitus without complications E11.9

 

 [facility] [place] [address]  13 Mar, 2017  Type 2 
diabetes mellitus without complications E11.9

 

 [facility] [place] [address]  04 Mar, 2017  Type 2 
diabetes mellitus without complications E11.9

 

 [facility]  [address] [place] [address]     

 

 [facility] [address] [address]    Chronic 
obstructive pulmonary disease with acute exacerbation J44.1

 

 [facility]  [address] [place] [address]     

 

 [facility] [address] [address]    Dysuria 
R30.0 ; Essential (primary) hypertension I10 ; Hypothyroidism (acquired) E03.9 
; Type 2 diabetes mellitus without complications E11.9 and Mild episode of 
recurrent major depressive disorder F33.0

 

 [facility] [address] [address]     

 

 [facility] [address] [address]    Dysuria 
R30.0 ; Vaginal candidiasis B37.3 and Candidiasis B37.9

 

 [facility]  [address] [place] [address]     

 

 [facility] [address] [address]  10 Octaviano, 2017  COPD with 
exacerbation J44.1 and Dysphagia, unspecified type R13.10

 

 [facility]  [address] [place] [address]  27 Dec, 2016   

 

 [facility] [address] [address]  12 Dec, 2016  Essential (
primary) hypertension I10 ; Hypothyroidism (acquired) E03.9 ; Type 2 diabetes 
mellitus without complications E11.9 ; Primary generalized (osteo)arthritis 
M15.0 ; Major depressive disorder, single episode, unspecified F32.9 ; 
Unilateral primary osteoarthritis, left knee M17.12 ; Hyperlipidemia LDL goal <
130 E78.5 and Dysphagia, unspecified type R13.10

 

 [facility]  [address] [place] [address]  01 Dec, 2016   

 

 [address] [address]     

 

 [facility] [place] [address]     

 

 [facility] [place] [address]     

 

 [facility] [place] [address]     

 

 [facility] [place] [address]     

 

 [facility] [place]  [address] [place] [address]    Type 2 
diabetes mellitus without complications E11.9 ; Primary generalized (osteo)
arthritis M15.0 ; Effusion, left knee M25.462 ; Old tear of lateral meniscus of 
left knee, unspecified tear type M23.201 and Fatigue, unspecified type R53.83

 

 [facility]  [address] [place] [address]  26 Sep, 2016  Type 2 
diabetes mellitus without complications E11.9 ; Claudication I73.9 ; Pain in 
right leg M79.604 and Pain of left leg M79.605

 

 [facility] [place] [address]  14 Sep, 2016   

 

 [facility]  [address] [place] [address]  12 Sep, 2016   

 

 [facility]  [address] [place] [address]  18 Aug, 2016  Type 2 
diabetes mellitus with hyperglycemia E11.65 ; Long term current use of insulin 
Z79.4 ; Anxiety disorder, unspecified F41.9 ; Essential (primary) hypertension 
I10 ; Major depressive disorder, single episode, unspecified F32.9 and 
Peripheral arterial occlusive disease I77.9

 

 [facility] [place] [address]  17 Aug, 2016   

 

 [facility] [place] [address]  11 Aug, 2016   

 

 [facility] [place]  [address] [place] [address]  11 Aug, 2016   

 

 [facility] [place]  [address] [place] [address]  10 Aug, 2016   

 

 [facility]  [address] [place] [address]  05 Aug, 2016  Acute 
midline low back pain with right-sided sciatica M54.41

 

 [facility]  [address] [facility] [id number] [place] [address]  05 Aug, 2016  Chest pain, unspecified type R07.9 ; Palpitations R00.2 ; 
Claudication I73.9 ; Generalized pain R52 and Type 2 diabetes mellitus without 
complications E11.9

 

 [facility]  [address] [place] [address]    Acute 
midline low back pain with right-sided sciatica M54.41 ; Dysuria R30.0 ; 
Claudication I73.9 ; Peripheral arterial occlusive disease I77.9 ; Shortness of 
breath R06.02 ; Effusion, left knee M25.462 and Type 2 diabetes mellitus 
without complications E11.9

 

 [facility]  [address] [place] [address]     

 

 [facility]  [address] [place] [address]     

 

 [facility]  [address] [place] [address]     

 

 [facility]  [address] [place] [address]    Type 2 
diabetes mellitus without complications E11.9 ; Other hyperlipidemia E78.4 ; 
Peripheral arterial occlusive disease I77.9 ; Essential (primary) hypertension 
I10 and Other fatigue R53.83

 

 [facility]  [address] [place] [address]  18 May, 2016   

 

 [facility]  [address] [place] [address]  06 May, 2016   

 

 [facility]  [address] [place] [address]  05 May, 2016  Chest pain
, unspecified type R07.9 ; Peripheral arterial occlusive disease I77.9 ; 
Anxiety disorder, unspecified F41.9 ; Essential (primary) hypertension I10 ; 
Type 2 diabetes mellitus without complications E11.9 and Other hyperlipidemia 
E78.4

 

 [facility]  [address] [place] [address]  04 May, 2016   

 

 [facility]  [address] [place] [address]     

 

 [facility]  [address] [place] [address]     

 

 [facility]  [address] [place] [address]    Type 2 
diabetes mellitus without complications E11.9 ; Peripheral arterial occlusive 
disease I77.9 ; Primary generalized (osteo)arthritis M15.0 ; Major depressive 
disorder, single episode, unspecified F32.9 ; Anxiety disorder, unspecified 
F41.9 and Essential (primary) hypertension I10

 

 [facility]  [address] [place] [address]     

 

 [facility] [address] [address]     

 

 [facility]  [address] [facility] [id number] [address]  02 Mar, 2016   

 

 [facility]  [address] [place] [address]     

 

 [address] [address]    Bronchitis 
J40 ; Shortness of breath R06.02 and Wheezing R06.2

 

 [facility]  [address] [place] [address]     

 

 [facility]  [address] [place] [address]     

 

 [facility]  [address] [place] [address]     

 

 [facility]  [address] [place] [address]     

 

 [facility]  [address] [place] [address]    Type 2 
diabetes mellitus without complications E11.9 ; Claudication I73.9 ; Other 
hyperlipidemia E78.4 ; Cataracts, bilateral H26.9 and Other fatigue R53.83

 

 [facility] [place]  [address] [place] [address]  28 Oct, 2015   

 

 [facility] [place]  [address] [place] [address]  22 Oct, 2015   

 

 [facility] [place]  [address] [place] [address]  16 Oct, 2015   

 

 [facility] [place]  [address] [place] [address]  14 Oct, 2015  Type 2 
diabetes mellitus without complications E11.9 ; Other hyperlipidemia E78.4 ; 
Primary generalized (osteo)arthritis M15.0 and Claudication I73.9

 

 [facility] [place]  [address] [place] [address]  12 Oct, 2015   

 

 [facility] [place]  [address] [place] [address]  26 Aug, 2015   

 

 [facility] [place]  [address] [place] [address]  12 Aug, 2015   

 

 [facility] [place]  [address] [place] [address]  12 Aug, 2015   

 

 [facility] [place]  [address] [place] [address]  10 Aug, 2015   

 

 [facility] [place]  [address] [place] [address]  05 Aug, 2015   

 

 [facility] [place]  [address] [place] [address]  11 May, 2015  Diabetes 
mellitus without mention of complication, type II or unspecified type, not 
stated as uncontrolled 250.00 ; Bronchitis 490 and Effusion of lower leg joint 
719.06

 

 [facility] [place]  [address] [place] [address]  01 May, 2015   

 

 [facility] [place]  [address] [place] [address]  01 May, 2015  Bronchitis 
490 and Wheezing 786.07

 

 [facility]  [id number] [address]     

 

 [facility]  [address] [place] [id number] [address]     

 

 [facility]  [address] [id number], [place] [address]     

 

 [facility] [place] [address]  19 Mar, 2015   

 

 [facility]  [id number], [place] [address]  19 Mar, 2015   

 

 [facility] [place] [address]  09 Mar, 2015   

 

 [facility]  [id number], [place] [address]05  09 Mar, 2015   

 

 [facility] [place] [address]     

 

 [facility]  [id number], [place] [address]  ,    

 

 [facility]  [id number], [place] [address]  10 Feb, 2015   

 

 [facility] [place]  [address] [place] [address]     

 

 [facility] [place] [address]     

 

 [facility]  [id number], [place] [address]     

 

 [facility]  [id number], [place] [address]     

 

 [facility] [place] [address]     

 

 [facility]  [address] [id number], [place] [address]
4216     

 

 [facility] [place]  [address] [place] [address]     

 

 [facility]  [id number], [place] [address]170     

 

 [facility] [place]  [address] [place] [address]  24 Dec, 2014   

 

 [facility]  [id number], [place] [address]-
5140  24 Dec, 2014   

 

 [facility] [address]  18 Dec, 2014   

 

 [facility]  [id number], [place] [address]  18 Dec, 2014   

 

 [facility] [address]  09 Dec, 2014   

 

 [facility]  [address] [place] [address]-
[address]  09 Dec, 2014   

 

 [facility] [place] [address]  02 Dec, 2014   

 

 [facility]  [address] [place] [address]
33[address]  02 Dec, 2014   

 

 [facility] [place] [address]     

 

 [facility]  [id number], [place] [address]-
4220     

 

 [facility] [place] [address]     

 

 [facility]  [id number], [place] [address]     

 

 [facility] [place] [address]  31 Oct, 2014   

 

 [facility]  [id number], [place] [address]-
7926  31 Oct, 2014   

 

 [facility] [place] [address]  16 Oct, 2014   

 

 [facility]  [id number], [place] [address]
1757  16 Oct, 2014   

 

 [facility] [place] [address]  10 Oct, 2014   

 

 [facility]  [address] [place] [address]-
8613  10 Oct, 2014   

 

 [facility] [place] [address]  26 Sep, 2014   

 

 [facility]  [address] [id number], [place] [address]
4442  26 Sep, 2014   

 

 [facility] [place] [address]  29 Aug, 2014   

 

 [facility]  [address] [id number], [place] [address]
7102  29 Aug, 2014   

 

 [facility] [place] [address]  25 Aug, 2014   

 

 [facility] [place] [address]  25 Aug, 2014   

 

 [facility]  [address] [id number], [place] [address]
3171  25 Aug, 2014   

 

 [facility]  [address] [id number], [place] [address]
7934  25 Aug, 2014   

 

 [facility] [place] [address]  18 Aug, 2014   

 

 [facility]  [id number] [place] [address]-
2436  18 Aug, 2014   

 

 [facility] [place] [address]  14 Aug, 2014   

 

 [facility]  [id number], [place] [address]-
3851  14 Aug, 2014   

 

 [facility]  [id number], [place] [address]
1303  11 Aug, 2014   

 

 [facility] [address]  11 Aug, 2014   

 

 [facility]  [address] [id number] [address]
5839  11 Aug, 2014   

 

 [facility]  [address] [id number], [place] [address]-
7016  11 Aug, 2014   

 

 [facility]  [address] [id number], [place] [address]-
[address]04     

 

 [facility] [address]     

 

 [facility]  [address] [facility] [id number], [place] [address]-
9[address]     

 

 [facility]  [address] [id number] [address]-
6920     

 

 [facility] [place] [address]     

 

 [facility]  [address] [id number], [place] [address]-
7965     

 

 [facility] [address]  30 May, 2014   

 

 [facility]  [address] [id number] [address]
4635  30 May, 2014   

 

 [facility] [place] [address]  14 May, 2014   

 

 [facility]  [address] [id number] [address]-
8041  14 May, 2014   

 

 [facility] [place] [address]  05 May, 2014   

 

 [facility]  [address] [id number] [address]-
9636  05 May, 2014   

 

 [facility] [place] [address]     

 

 [facility]  [address] [id number], [place] [id number]     

 

 [id number] [address]     

 

 [facility]  [address] [id number], [place] [address]     

 

 [email or web address] [address]  10 Feb, 2014   

 

 [facility]  [address] [facility] [id number] [place] [address]-
1992  10 2014   







IMMUNIZATIONS

No Known Immunizations



SOCIAL HISTORY

Never Assessed



REASON FOR VISIT

Requests return call



PLAN OF CARE





VITAL SIGNS





MEDICATIONS







 Medication  Instructions  Dosage  Frequency  Start Date  End Date  Duration  
Status

 

 Lyrica 75MG  Orally twice a day  1 capsule  12h           Active







RESULTS

No Results



PROCEDURES

No Known procedures



INSTRUCTIONS





MEDICATIONS ADMINISTERED

No Known Medications



MEDICAL (GENERAL) HISTORY







 Type  Description  Date

 

 Medical History  Diabetes mellitus without mention of complication, type II or 
unspecified type, not stated as uncontrolled   

 

 Medical History  Depressive disorder, not elsewhere classified   

 

 Medical History  Anxiety state, unspecified   

 

 Medical History  Effusion of lower leg joint   

 

 Medical History  Generalized osteoarthrosis, unspecified site   

 

 Medical History  Other and unspecified hyperlipidemia   

 

 Medical History  Abnormal weight gain   

 

 Medical History  Effusion of joint, site unspecified   

 

 Medical History  Esophageal reflux   

 

 Medical History  hypertension   

 

 Medical History  colonoscopy- inscreased polyp   

 

 Surgical History  Tonsillectomy   

 

 Surgical History  Orthopedic: Bilateral Knee x2   

 

 Surgical History  colonoscopy  

 

 Hospitalization History  Surgery(s)/Childbirth(s) only

## 2019-02-26 NOTE — XMS REPORT
[facility]

 Created on: 2018



Duyen Larkin

External Reference #: [id number]

: 1965

Sex: Female



Demographics







 Address  [address] [place]  [address]

 

 Preferred Language  Unknown

 

 Marital Status  Unknown

 

 Faith Affiliation  Unknown

 

 Race  Unknown

 

 Ethnic Group  Unknown





Author







 Author  LIYAH LOWE

 

 [facility]

 

 Address  [address] [place]  [address]



 

 Phone  [phone]







Care Team Providers







 Care Team Member Name  Role  Phone

 

 LIYAH LOWE  Unavailable  [phone]







PROBLEMS







 Type  Condition  ICD9-CM Code  KCF92-CA Code  Onset Dates  Condition Status  
SNOMED Code

 

 Problem  Effusion of lower leg joint  719.06        Active  730306756

 

 Problem  Esophageal reflux  530.81        Active  079687385

 

 Problem  Dysphagia, unspecified type     R13.10     Active  46572602

 

 Problem  Other hyperlipidemia     E78.4     Active  47078791

 

 Problem  Unilateral primary osteoarthritis, left knee     M17.12     Active  
146643151

 

 Problem  Type 2 diabetes mellitus without complications     E11.9     Active  
311581270

 

 Problem  Hypothyroidism (acquired)     E03.9     Active  760476621

 

 Problem  Mild episode of recurrent major depressive disorder     F33.0     
Active  550928395

 

 Problem  COPD with exacerbation     J44.1     Active  814604299

 

 Problem  Long term current use of insulin     Z79.4     Active  401093050

 

 Problem  Diabetic polyneuropathy associated with type 2 diabetes mellitus     
E11.42     Active  74218228

 

 Problem  Cataracts, bilateral     H26.9     Active  55309446

 

 Problem  Claudication     I73.9     Active  736825423

 

 Problem  Primary generalized (osteo)arthritis     M15.0     Active  249871751

 

 Problem  Chronic renal insufficiency, stage II (mild)     N18.2     Active  
330595169

 

 Problem  Chronic obstructive pulmonary disease with acute exacerbation     
J44.1     Active  210349835

 

 Problem  Primary osteoarthritis of both knees     M17.0     Active  658660896

 

 Problem  Type 2 diabetes mellitus with diabetic neuropathy, without long-term 
current use of insulin     E11.40     Active  46862265

 

 Problem  Major depressive disorder, single episode, unspecified     F32.9     
Active  45543822

 

 Problem  Anxiety disorder, unspecified     F41.9     Active  663225340

 

 Problem  Essential (primary) hypertension     I10     Active  32076632

 

 Problem  Peripheral arterial occlusive disease     I77.9     Active  473872332

 

 Problem  Old tear of lateral meniscus of left knee, unspecified tear type     
M23.201     Active  432232420

 

 Problem  Hyperlipidemia LDL goal <130     E78.5     Active  22495905

 

 Problem  Effusion, left knee     M25.462     Active  779004598

 

 Problem  Type 2 diabetes mellitus with hyperglycemia     E11.65     Active  
62975255







ALLERGIES

No Information



ENCOUNTERS







 Encounter  Location  Date  Diagnosis

 

 [facility] [place]  [address] [facility] C [id number] [place] [id number]  23 Mar, 
2018  Type 2 diabetes mellitus without complications E11.9

 

 [facility] [place]  [address] [place] [address] C [id number] [place] [id number]  23 Mar, 
2018   

 

 [facility]  [address] [facility] [id number] [place] [address]  13 Mar, 2018   

 

 [facility]  [address] [facility] [id number] [place] [address]  09 Mar, 2018  Type 2 diabetes mellitus without complications E11.9

 

 [facility] [place]  [address] [place] [address] C [id number] [place] [id number]  09 2018   

 

 [facility] [place]  [address] [place] [address] C [id number] [place] [id number]    Diabetic polyneuropathy associated with type 2 diabetes mellitus E11.42

 

 [facility] [place]  [address] [place] [address] C [id number] [place] [id number]     

 

 [facility] [place]  [address] [place] [address] C [id number] [place], [place] [id number]  21 Dec, 
2017  Type 2 diabetes mellitus without complications E11.9 ; Long term current 
use of insulin Z79.4 ; High risk sexual behavior Z72.51 ; BMI 40.0-44.9, adult 
Z68.41 and Encounter for immunization Z23

 

 [facility] [place]  [address] [facility] C [id number] [place] [id number]  04 Dec, 
2017   

 

 [facility] [place]  [address] [facility] C [id number] [place] [id number]  04 Dec, 
2017   

 

 [facility] [place]  [address] [place] [address] C [id number] [place] [id number]    COPD with exacerbation J44.1 and BMI 40.0-44.9, adult Z68.41

 

 [facility] [place]  [address] [facility] [address] [id number] [place], [place] [address]56  09 Oct, 
2017  Encounter for screening mammogram for malignant neoplasm of breast Z12.31 
; Well woman exam Z01.419 and High risk sexual behavior Z72.51

 

 [facility] [place]  [address] [facility] [address] [id number] [place] [address]32  11 Sep, 
2017   

 

 [facility] [place]  [address] [facility] [address] [id number] [place] [id number]  11 Sep, 
2017  Type 2 diabetes mellitus without complications E11.9

 

 [facility] [place]  [address] [facility] [address] [id number] [place] [id number]  11 Sep, 
2017   

 

 [facility] [place]  [address] [place] [address] [address] [id number] [place], [place] [id number]  11 Sep, 
2017  Old tear of lateral meniscus of left knee, unspecified tear type M23.201

 

 [facility] [place]  [address] [place] [address] [address] [id number] [place], [place] [id number]  11 Sep, 
2017  Other type of osteoarthritis, unspecified site M19.90 ; Type 2 diabetes 
mellitus without complications E11.9 ; Primary osteoarthritis of both knees 
M17.0 ; Chronic renal insufficiency, stage II (mild) N18.2 ; Diabetic 
polyneuropathy associated with type 2 diabetes mellitus E11.42 and Exposure to 
hepatitis C Z20.5

 

 [facility] [place]  [address] [place] [address] [address] [id number] [place] [id number]  06 Sep, 
2017  Type 2 diabetes mellitus without complications E11.9

 

 [facility] [place]  [address] [place] [address] [address] [id number] [place] 407453894  30 Aug, 
2017   

 

 [facility] [place]  [address] [place] [address] [address] [id number] [place] [id number]  30 Aug, 
2017   

 

 [facility] [place]  [address] [facility] [address] [id number] [place], [place] [id number]  16 Aug, 
2017  Other type of osteoarthritis, unspecified site M19.90

 

 [facility] [place]  [address] [facility] [address] [id number] [place], [place] 638461925  15 Aug, 
2017  Type 2 diabetes mellitus without complications E11.9

 

 [facility] [place]  [address] [facility] [address] [id number] [place] 528805735  07 Aug, 
2017   

 

 [facility] [place]  [address] [facility] [address] [id number] [place] 648566728     

 

 [facility] [place]  [address] [facility] [address] [id number] [place] [id number]     

 

 [facility] [place]  [address] [facility] [address] [id number] [place], [place] 348122494    Type 2 diabetes mellitus without complications E11.9 ; Type 2 diabetes 
mellitus with diabetic neuropathy, without long-term current use of insulin 
E11.40 ; Primary osteoarthritis of both knees M17.0 and Chronic renal 
insufficiency, stage II (mild) N18.2

 

 [facility] [place]  [address] [facility] [address] [id number] [place] [id number]     

 

 [facility] [place]  [address] [facility] [address] [id number] [place], [place] [id number]  30 May, 
2017  Hyperlipidemia LDL goal <130 E78.5

 

 [facility] [place]  [address] [place] [address] C [id number] [place] [id number]  19 May, 
2017  Type 2 diabetes mellitus without complications E11.9

 

 [facility]  [address] [facility] [id number] [place] [address]-
[address]  16 May, 2017   

 

 [facility] [place]  [address] [facility] [address] [id number] [place], [place] 138293375  08 May, 
2017   

 

 [facility] [place]  [address] [place] [address] C [id number] [place], [place] [id number]  01 May, 
2017   

 

 [facility] [place]  [address] [place] [address] C [id number] [place] [id number]     

 

 [facility] [place]  [address] [place] [address] [address] [id number] [place] [id number]    Primary generalized (osteo)arthritis M15.0 and Type 2 diabetes mellitus 
without complications E11.9

 

 [facility] [place]  [address] [facility] [address] [id number] [place] 683880897    Old tear of lateral meniscus of left knee, unspecified tear type M23.201

 

 [facility] [place]  [address] [facility] [address] [id number] [place] [id number]     

 

 [facility] [place]  [address] [facility] [address] [id number] [place] [id number]     

 

 [facility] [place]  [address] [facility] [address] [id number] [place] 919828937     

 

 [facility] [place]  [address] [facility] [address] [id number] [place] [id number]  20 Mar, 
2017  Type 2 diabetes mellitus without complications E11.9

 

 [facility] [place]  [address] [facility] [address] [id number] [place], [place] [id number]  13 Mar, 
2017  Type 2 diabetes mellitus without complications E11.9

 

 [facility] [place]  [address] [facility] [address] [id number] [place] 173838758  13 Mar, 
2017  Type 2 diabetes mellitus without complications E11.9

 

 [facility] [place]  [address] [facility] [address] [id number] [place] 109035390  04 Mar, 
2017  Type 2 diabetes mellitus without complications E11.9

 

 [facility] [place]  [address] [place] [address] [address] [id number] [place] 979391018     

 

 [facility] [place]  [address] [facility] [address] [id number] [place] 036992404    Chronic obstructive pulmonary disease with acute exacerbation J44.1

 

 [facility] [place]  [address] [place] [address] [address] [id number] [place] [id number]     

 

 [facility] [place]  [address] [place] [address] [address] [id number] [place] [id number]    Dysuria R30.0 ; Essential (primary) hypertension I10 ; Hypothyroidism (
acquired) E03.9 ; Type 2 diabetes mellitus without complications E11.9 and Mild 
episode of recurrent major depressive disorder F33.0

 

 [facility] [place]  [address] [place] [address] [address] [id number] [place], [place] 913824529     

 

 [facility] [place]  [address] [place] [address] [address] [id number] [place], [place] 892198555    Dysuria R30.0 ; Vaginal candidiasis B37.3 and Candidiasis B37.9

 

 [facility] [place]  [address] [facility] [address] [id number] [place], [place] 374783081     

 

 [facility] [place]  [address] [facility] [address] [id number] [place] [id number]  10 Octaviano, 
2017  COPD with exacerbation J44.1 and Dysphagia, unspecified type R13.10

 

 [facility] [place]  [address] [facility] [address] [id number] [place], [place] 531109750  27 Dec, 
2016   

 

 [facility] [place]  [address] [facility] [address] [id number] [place] [id number]  12 Dec, 
2016  Essential (primary) hypertension I10 ; Hypothyroidism (acquired) E03.9 ; 
Type 2 diabetes mellitus without complications E11.9 ; Primary generalized (
osteo)arthritis M15.0 ; Major depressive disorder, single episode, unspecified 
F32.9 ; Unilateral primary osteoarthritis, left knee M17.12 ; Hyperlipidemia 
LDL goal <130 E78.5 and Dysphagia, unspecified type R13.10

 

 [facility] [place]  [address] [facility] [address] [id number] [place] [id number]  01 Dec, 
2016   

 

 [facility] [place]  [address] [facility] [address] [id number] [place] [id number]     

 

 [facility] [place]  [address] [facility] [address] [id number] [place] 146101750     

 

 [facility] [place]  [address] [place] [address] [address] [id number] [place] [id number]     

 

 [facility] [place]  [address] [facility] [address] [id number] [place] 605289231     

 

 [facility] [place]  [address] [place] [address] [address] [id number] [place] [id number]     

 

 [facility] [place]  [address] [facility] [address] [id number] [place], [place] 987206925    Type 2 diabetes mellitus without complications E11.9 ; Primary 
generalized (osteo)arthritis M15.0 ; Effusion, left knee M25.462 ; Old tear of 
lateral meniscus of left knee, unspecified tear type M23.201 and Fatigue, 
unspecified type R53.83

 

 [facility] [place]  [address] [facility] [address] [id number] [place], [place] [id number]  26 Sep, 
2016  Type 2 diabetes mellitus without complications E11.9 ; Claudication I73.9 
; Pain in right leg M79.604 and Pain of left leg M79.605

 

 [facility] [place]  [address] [facility] [address] [id number] [place] [id number]  14 Sep, 
2016   

 

 [facility] [place]  [address] [place] [address] C [id number] [place] 401356579  12 Sep, 
2016   

 

 [facility] [place]  [address] [place] [address] C [id number] [place], [place] 164372751  18 Aug, 
2016  Type 2 diabetes mellitus with hyperglycemia E11.65 ; Long term current 
use of insulin Z79.4 ; Anxiety disorder, unspecified F41.9 ; Essential (primary
) hypertension I10 ; Major depressive disorder, single episode, unspecified 
F32.9 and Peripheral arterial occlusive disease I77.9

 

 [facility] [place]  [address] [place] [address] [address] [id number] [place] [id number]  17 Aug, 
2016   

 

 [facility] [place]  [address] [place] [address] C [id number] [place] [id number]  11 Aug, 
2016   

 

 [facility] [place]  [address] [place] [address] C [id number] [place] 937473283  11 Aug, 
2016   

 

 [facility] [place]  [address] [place] [address] C [id number] [place] [id number]  10 Aug, 
2016   

 

 [facility] [place]  [address] [place] [address] C [id number] [place] [id number]  05 Aug, 
2016  Acute midline low back pain with right-sided sciatica M54.41

 

 [facility]  [address] [facility] [id number] [place] [address]-
4[address]  05 Aug, 2016  Chest pain, unspecified type R07.9 ; Palpitations R00.2 ; 
Claudication I73.9 ; Generalized pain R52 and Type 2 diabetes mellitus without 
complications E11.9

 

 [facility] [place]  [address] [place] [address] C [id number] [place], [place] 695153285    Acute midline low back pain with right-sided sciatica M54.41 ; Dysuria 
R30.0 ; Claudication I73.9 ; Peripheral arterial occlusive disease I77.9 ; 
Shortness of breath R06.02 ; Effusion, left knee M25.462 and Type 2 diabetes 
mellitus without complications E11.9

 

 [facility] [place]  [address] [place] [address] C [id number] [place] 330709932     

 

 [facility] [place]  [address] [place] [address] C [id number] [place] 915352803     

 

 [facility] [place]  [address] [facility] [address] [id number] [place], [place] 734031844     

 

 [facility] [place]  [address] [facility] [address] [id number] [place], [place] [id number]    Type 2 diabetes mellitus without complications E11.9 ; Other 
hyperlipidemia E78.4 ; Peripheral arterial occlusive disease I77.9 ; Essential (
primary) hypertension I10 and Other fatigue R53.83

 

 [facility] [place]  [address] [facility] [address] [id number] [place] [id number]  18 May, 
2016   

 

 [facility] [place]  [address] [facility] [address] [id number] [place] 493524004  06 May, 
2016   

 

 [facility] [place]  [address] [place] [address] [address] [id number] [place], [place] [id number]  05 May, 
2016  Chest pain, unspecified type R07.9 ; Peripheral arterial occlusive 
disease I77.9 ; Anxiety disorder, unspecified F41.9 ; Essential (primary) 
hypertension I10 ; Type 2 diabetes mellitus without complications E11.9 and 
Other hyperlipidemia E78.4

 

 [facility] [place]  [address] [place] [address] [address] [id number] [place] [id number]  04 May, 
2016   

 

 [facility] [place]  [address] [place] [address] [address] [id number] [place] [id number]     

 

 [facility] [place]  [address] [place] [address] [address] [id number] [place] 522349450     

 

 [facility] [place]  [address] [place] [address] [address] [id number] [place] [id number]    Type 2 diabetes mellitus without complications E11.9 ; Peripheral 
arterial occlusive disease I77.9 ; Primary generalized (osteo)arthritis M15.0 ; 
Major depressive disorder, single episode, unspecified F32.9 ; Anxiety disorder
, unspecified F41.9 and Essential (primary) hypertension I10

 

 [facility] [place]  [address] [facility] [address] [id number] [place] [id number]     

 

 [facility] [place]  [address] [place] [address] [address] [id number] [place] 366227635     

 

 [facility]  [address] [facility] [id number] [place] [address]-
2546  02 Mar, 2016   

 

 [facility] [place]  [address] [facility] [address] [id number] [place] 350328700     

 

 [facility] [place]  [address] [facility] [address] [id number] [place] [id number]    Bronchitis J40 ; Shortness of breath R06.02 and Wheezing R06.2

 

 [facility] [place]  [address] [place] [address] [address] [id number] [place] 881655321     

 

 [facility] [place]  [address] [facility] [address] [id number] [place] 472185139     

 

 [facility] [place]  [address] [place] [address] [address] [id number] [place] 216402297     

 

 [facility] [place]  [address] [place] [address] [address] [id number] [place] 860367806     

 

 [facility] [place]  [address] [place] [address] [address] [id number] [place] [id number]    Type 2 diabetes mellitus without complications E11.9 ; Claudication I73.9 
; Other hyperlipidemia E78.4 ; Cataracts, bilateral H26.9 and Other fatigue 
R53.83

 

 [facility] [place]  [address] [place] [address] [address] [id number] [place] 557773798  28 Oct, 
2015   

 

 [facility] [place]  [address] [place] [address] [address] [id number] [place] 695665873  22 Oct, 
2015   

 

 [facility] [place]  [address] [place] [address] [address] [id number] [place] 444738357  16 Oct, 
2015   

 

 [facility] [place]  [address] [place] [address] [address] [id number] [place], [place] [id number]  14 Oct, 
2015  Type 2 diabetes mellitus without complications E11.9 ; Other 
hyperlipidemia E78.4 ; Primary generalized (osteo)arthritis M15.0 and 
Claudication I73.9

 

 [facility] [place]  [address] [facility] [address] [id number] [place] 025761567  12 Oct, 
2015   

 

 [facility] [place]  [address] [facility] [address] [id number] [place] 087688671  26 Aug, 
2015   

 

 [facility] [place]  [address] [facility] [address] [id number] [place] [id number]  12 Aug, 
2015   

 

 [facility] [place]  [address] [facility] [address] [id number] [place] [id number]  12 Aug, 
2015   

 

 [facility] [place]  [address] [facility] [address] [id number] [place] 028468827  10 Aug, 
2015   

 

 [facility] [place]  [address] [facility] [address] [id number] [place] 441941774  05 Aug, 
2015   

 

 [facility] [place]  [address] [facility] [address] [id number] [place] 994838367  11 May, 
2015  Diabetes mellitus without mention of complication, type II or unspecified 
type, not stated as uncontrolled 250.00 ; Bronchitis 490 and Effusion of lower 
leg joint 719.06

 

 [facility] [place]  [address] [facility] [address] [id number] [place], [place] 605639065  01 May, 
2015   

 

 [facility] [place]  [address] [facility] [address] [id number] [place] 308572897  01 May, 
2015  Bronchitis 490 and Wheezing 786.07

 

 [facility]  [address] [id number] [address]-
[address]     

 

 [facility]  [address] [id number] [address]-
[address]199     

 

 [facility]  [address] [address]2757-
28[address]     

 

 [facility] [place]  [address] [facility] [address] [id number] [place] [id number]  19 Mar, 
2015   

 

 [facility]  [address] [id number] [address]-
43[address]  19 Mar, 2015   

 

 [facility] [place]  [address] [facility] [address] [id number] [place], [place] [id number]  09 Mar, 
2015   

 

 [facility]  [address] [id number] [address]-
280[address]  09 Mar, 2015   

 

 [facility] [place]  [address] [facility] [address] [id number] [place] [id number]     

 

 [facility]  [address] [id number] [address]-
9077     

 

 [facility] [place] [facility]  [address] [id number] [facility] [place] [address]  10 Feb, 2015   

 

 [facility] [place]  [address] [facility] [address] [id number] [place] 890599446     

 

 [facility] [place]  [address] [facility] [address] [id number] [place] [id number]     

 

 [facility] [place] [facility]  [address] [facility] [id number] [facility] [place] [address]-
[address]     

 

 [facility] [place] [facility]  [address] [facility] [id number] [facility] [place] [address]-
0640     

 

 [facility] [place]  [address] [facility] [address] [id number] [place] 448949405     

 

 [facility] [place] [facility]  [address] [facility] [id number] [facility] [place] [address]-
[address]     

 

 [facility] [place]  [address] [facility] [address] [id number] [place] [id number]     

 

 [facility] [place] [facility]  [address] [facility] [id number] [facility] [place] [address]-
6[address]     

 

 [facility] [place]  [address] [facility] [address] [id number] [place] 110793782  24 Dec, 
2014   

 

 [facility] [place] [facility]  [address] [facility] [id number] [facility] [place] [address]-
[address]  24 Dec, 2014   

 

 [facility] [place]  [address] [facility] [address] [id number] [place] [id number]  18 Dec, 
2014   

 

 [facility] [place] [facility]  [address] [facility] [id number] [place] [address]-
1250  18 Dec, 2014   

 

 [facility] [place]  [address] [facility] [address] [id number] [place] 822622682  09 Dec, 
2014   

 

 [facility] [place] [facility]  [address] [facility] [id number] [facility] [place] [address]-
[address]66  09 Dec, 2014   

 

 [facility] [place]  [address] [facility] [address] [id number] [place] [id number]  02 Dec, 
2014   

 

 [facility] [place] [facility]  [address] [id number] [place] [address]-
6977  02 Dec, 2014   

 

 [facility] [place]  [address] [facility] [address] [id number] [place] [id number]     

 

 [facility] [place] [facility]  [address] [id number] [place] [address]-
2040     

 

 [facility] [place]  [address] [facility] [address] [id number] [place] 478751668     

 

 [facility] [place] [facility]  [address] [id number] [place] 15[address]-
6109     

 

 [facility] [place]  [address] [facility] [address] [id number] [place], [place] 912281480  31 Oct, 
2014   

 

 [facility] [place] [facility]  [address] [id number] [place] 983890[address]-
5952  31 Oct, 2014   

 

 [facility] [place]  [address] [facility] [address] [id number] [place] [id number]  16 Oct, 
2014   

 

 [facility]  [address] [id number] [place] [address]-
[address]  16 Oct, 2014   

 

 [facility] [place]  [address] [facility] [address] [id number] [place] [id number]  10 Oct, 
2014   

 

 [facility] [place] [facility]  [address] [id number] [place] [address]-
[address]  10 Oct, 2014   

 

 [facility] [place]  [address] [facility] [address] [id number] [place] [phone]  26 Sep, 
2014   

 

 [facility] [place] [facility]  [address] [id number] [place] [phone]  26 Sep, 2014   

 

 [facility] [place]  [address] [facility] [address] [id number] [place] [id number]  29 Aug, 
2014   

 

 [facility] [place] [facility]  [address] [id number] [place] [id number]  29 Aug, 2014   

 

 [facility] [place]  [address] [facility] [address] [id number] [place] [id number]  25 Aug, 
2014   

 

 [facility] [place]  [address] [id number] [place] [id number]  25 Aug, 
2014   

 

 [facility] [place] [facility]  [address] [id number] [place] [address]-
[address]  25 Aug, 2014   

 

 [facility] [place] [facility]  [address] [id number] [facility] [place] [address]
6953  25 Aug, 2014   

 

 [facility] [place]  [address] [facility] [address] [id number] [place] [id number]  18 Aug, 
2014   

 

 [facility] [place] [facility]  [address] [id number] [place] [address]-
7927  18 Aug, 2014   

 

 [facility] [place]  [address] [facility] [address] [id number] [place] [id number]  14 Aug, 
2014   

 

 [facility] [place] [facility]  [address] [id number] [place] [address]-
29[address]  14 Aug, 2014   

 

 [facility] [place] [facility]  [address] [id number] [place] [place] [address]
93[address]  11 Aug, 2014   

 

 [facility] [place]  [address] [facility] [address] [id number] [place] [id number]  11 Aug, 
2014   

 

 [facility] [place] [facility]  [address] [id number] [place] [place] [address]
1752  11 Aug, 2014   

 

 [facility] [place] [facility]  [address] [facility] [id number] [place] [address]-
2665  11 Aug, 2014   

 

 [facility] [place] [facility]  [address] [id number] [facility] [place] [address]
2048     

 

 [facility] [place]  [address] [facility] [address] [id number] [place] [id number]     

 

 [facility] [place] [facility]  [address] [id number] [place] [address]-
49[address]     

 

 [facility] [place] [facility]  [address] [id number] [facility] [place] [address]-
1269     

 

 [facility] [place]  [address] [facility] [address] [id number] [place] [id number]     

 

 [facility] [place] [facility]  [address] [facility] [id number] [place] [address]
2546     

 

 [facility] [place]  [address] [facility] [address] [id number] [place] [id number]  30 May, 
2014   

 

 [facility]  [address] [facility] [id number] [place] [address]
2546  30 May, 2014   

 

 [facility] [place]  [address] [facility] [address] [id number] [place] [phone]  14 May, 
2014   

 

 [facility]  [address] [facility] [id number] [place] [address]
2546  14 May, 2014   

 

 [facility] [place]  [address] [facility] [address] [id number] [place] [phone]  05 May, 
2014   

 

 [facility]  [address] [facility] [id number] [place] [address]
2546  05 May, 2014   

 

 [facility] [place]  [address] [facility] [address] [id number] [place] [id number]     

 

 [facility]  [address] [facility] [id number] [place] [address]
2546     

 

 [facility] [place]  [address] [facility] [address] [id number] [place] [phone]     

 

 [facility]  [address] [facility] [id number] [place] [address]-
[address]     

 

 [facility] [place]  [address] [facility] [address] [id number] [place] [id number]  10 Feb, 
2014   

 

 [facility]  [address] [facility] [id number] [place] [address]-
[address]696  10 Feb, 2014   







IMMUNIZATIONS

No Known Immunizations



SOCIAL HISTORY

Never Assessed



REASON FOR VISIT

Medication refill request



PLAN OF CARE





VITAL SIGNS





MEDICATIONS

Unknown Medications



RESULTS

No Results



PROCEDURES

No Known procedures



INSTRUCTIONS





MEDICATIONS ADMINISTERED

No Known Medications



MEDICAL (GENERAL) HISTORY







 Type  Description  Date

 

 Medical History  Diabetes mellitus without mention of complication, type II or 
unspecified type, not stated as uncontrolled   

 

 Medical History  Depressive disorder, not elsewhere classified   

 

 Medical History  Anxiety state, unspecified   

 

 Medical History  Effusion of lower leg joint   

 

 Medical History  Generalized osteoarthrosis, unspecified site   

 

 Medical History  Other and unspecified hyperlipidemia   

 

 Medical History  Abnormal weight gain   

 

 Medical History  Effusion of joint, site unspecified   

 

 Medical History  Esophageal reflux   

 

 Medical History  hypertension   

 

 Medical History  colonoscopy- inscreased polyp   

 

 Surgical History  Tonsillectomy   

 

 Surgical History  Orthopedic: Bilateral Knee x2   

 

 Surgical History  colonoscopy  2017

 

 Hospitalization History  Surgery(s)/Childbirth(s) only

## 2019-02-26 NOTE — XMS REPORT
[facility]

 Created on: 2018



Duyen Larkin

External Reference #: [id number]

: 1965

Sex: Female



Demographics







 Address  [address] [place]  [address]

 

 Preferred Language  Unknown

 

 Marital Status  Unknown

 

 Hindu Affiliation  Unknown

 

 Race  Unknown

 

 Ethnic Group  Unknown





Author







 Author  ILYAH LOWE

 

 [facility] [place]

 

 Address  [address] [place]  [address]



 

 Phone  [phone]







Care Team Providers







 Care Team Member Name  Role  Phone

 

 LIYAH LOWE  Unavailable  [phone]







PROBLEMS







 Type  Condition  ICD9-CM Code  LQO77-BD Code  Onset Dates  Condition Status  
SNOMED Code

 

 Problem  Effusion of lower leg joint  719.06        Active  027496036

 

 Problem  Esophageal reflux  530.81        Active  900750833

 

 Problem  Other hyperlipidemia     E78.4     Active  02150679

 

 Problem  Type 2 diabetes mellitus without complications     E11.9     Active  
722975122

 

 Problem  Primary generalized (osteo)arthritis     M15.0     Active  958203670

 

 Problem  COPD with exacerbation     J44.1     Active  249702584

 

 Problem  Claudication     I73.9     Active  693778201

 

 Problem  Mild episode of recurrent major depressive disorder     F33.0     
Active  100689091

 

 Problem  Cataracts, bilateral     H26.9     Active  35361621

 

 Problem  Chronic obstructive pulmonary disease with acute exacerbation     
J44.1     Active  130263210

 

 Problem  Chronic renal insufficiency, stage II (mild)     N18.2     Active  
544484130

 

 Problem  Primary osteoarthritis of both knees     M17.0     Active  082497748

 

 Problem  Other chronic pain     G89.29     Active  02373215

 

 Problem  Lumbar degenerative disc disease     M51.36     Active  65007756

 

 Problem  Essential (primary) hypertension     I10     Active  70464396

 

 Problem  Anxiety disorder, unspecified     F41.9     Active  760663596

 

 Problem  Peripheral arterial occlusive disease     I77.9     Active  936475490

 

 Problem  Diabetic polyneuropathy associated with type 2 diabetes mellitus     
E11.42     Active  24339170

 

 Problem  Type 2 diabetes mellitus with diabetic neuropathy, without long-term 
current use of insulin     E11.40     Active  90350596

 

 Problem  Chronic obstructive pulmonary disease, unspecified COPD type     
J44.9     Active  94897859

 

 Problem  Long term current use of insulin     Z79.4     Active  603978832

 

 Problem  Type 2 diabetes mellitus with hyperglycemia     E11.65     Active  
86331446

 

 Problem  Old tear of lateral meniscus of left knee, unspecified tear type     
M23.201     Active  162855885

 

 Problem  Major depressive disorder, single episode, unspecified     F32.9     
Active  92548931

 

 Problem  Effusion, left knee     M25.462     Active  554941330

 

 Problem  Dysphagia, unspecified type     R13.10     Active  67850513

 

 Problem  Hyperlipidemia LDL goal <130     E78.5     Active  76335428

 

 Problem  Unilateral primary osteoarthritis, left knee     M17.12     Active  
803442341

 

 Problem  Hypothyroidism (acquired)     E03.9     Active  397480919







ALLERGIES







 Substance  Reaction  Event Type  Date  Status

 

 Lisinopril  Unknown  Drug Allergy  21 Dec, 2017  Active

 

 Gabapentin  dizzy/nausea  Drug Allergy  21 Dec, 2017  Active

 

 Demerol  Unknown  Drug Allergy  21 Dec, 2017  Active







ENCOUNTERS







 Encounter  Location  Date  Diagnosis

 

 [facility] [place]  [address] [facility] C [id number] [place] [id number]  21 May, 
2018  Pain in left knee M25.562

 

 [facility] [place]  [address] [place] [address] C [id number] [place], [place] [id number]  21 May, 
2018  Pain in left knee M25.562 and Other chronic pain G89.29

 

 [place] [address] [facility] [id number] [place], [place] [address]  15 May, 2018   

 

 [facility] [place]  [address] [facility] C [id number] [place] [id number]  02 May, 
2018  Type 2 diabetes mellitus without complications E11.9 ; Long term current 
use of insulin Z79.4 ; Unilateral primary osteoarthritis, left knee M17.12 ; 
Lumbar degenerative disc disease M51.36 and Chronic obstructive pulmonary 
disease, unspecified COPD type J44.9

 

 [facility] [place]  [address] [facility] C [id number] [place] [id number]     

 

 [facility] [place]  [address] [facility] C [id number] [place] [id number]     

 

 [facility] [place]  [address] [facility] [address] [id number] [place] [id number]     

 

 [facility] [place]  [address] [facility] C [id number] [place] [id number]  23 Mar, 
2018  Type 2 diabetes mellitus without complications E11.9

 

 [facility] [place]  [address] [facility] C [id number] [place] [id number]  23 Mar, 
2018   

 

 [facility]  [address] [facility] [id number] [place] [address]  13 Mar, 2018   

 

 [facility] [place]  [address] [facility] [id number] [place] [address]  09 Mar, 2018  Type 2 diabetes mellitus without complications E11.9

 

 [facility] [place]  [address] [place] [address] [address] [id number] [place] [address]     

 

 [facility] [place]  [address] [facility] [address] [id number] [place] [id number]    Diabetic polyneuropathy associated with type 2 diabetes mellitus E11.42

 

 [facility] [place]  [address] [place] [address] [address] [id number] [place] [id number]     

 

 [facility] [place]  [address] [place] [address] [address] [id number] [place] [id number]  21 Dec, 
2017  Type 2 diabetes mellitus without complications E11.9 ; Long term current 
use of insulin Z79.4 ; High risk sexual behavior Z72.51 ; BMI 40.0-44.9, adult 
Z68.41 and Encounter for immunization Z23

 

 [facility] [place]  [address] [place] [address] [address] [id number] [place], [place] [id number]  04 Dec, 
2017   

 

 [facility] [place]  [address] [place] [address] [address] [id number] [place] [id number]  04 Dec, 
2017   

 

 [facility] [place]  [address] [place] [address] C [id number] [place], [place] [id number]    COPD with exacerbation J44.1 and BMI 40.0-44.9, adult Z68.41

 

 [facility] [place]  [address] [place] [address] [address] [id number] [place], [place] [id number]  09 Oct, 
2017  Encounter for screening mammogram for malignant neoplasm of breast Z12.31 
; Well woman exam Z01.419 and High risk sexual behavior Z72.51

 

 [facility] [place]  [address] [place] [address] [address] [id number] [place] [id number]  11 Sep, 
2017   

 

 [facility] [place]  [address] [place] [address] [address] [id number] [place] [id number]  11 Sep, 
2017  Type 2 diabetes mellitus without complications E11.9

 

 [facility] [place]  [address] [facility] [address] [id number] [place] [address]  11 Sep, 
2017   

 

 [facility] [place]  [address] [facility] [address] [id number] [place] [id number]  11 Sep, 
2017  Old tear of lateral meniscus of left knee, unspecified tear type M23.201

 

 [facility] [place]  [address] [facility] [address] [id number] [place], [place] [id number]  11 Sep, 
2017  Other type of osteoarthritis, unspecified site M19.90 ; Type 2 diabetes 
mellitus without complications E11.9 ; Primary osteoarthritis of both knees 
M17.0 ; Chronic renal insufficiency, stage II (mild) N18.2 ; Diabetic 
polyneuropathy associated with type 2 diabetes mellitus E11.42 and Exposure to 
hepatitis C Z20.5

 

 [facility] [place]  [address] [facility] [address] [id number] [place] [address]  06 Sep, 
2017  Type 2 diabetes mellitus without complications E11.9

 

 [facility] [place]  [address] [facility] [address] [id number] [place] [id number]  30 Aug, 
2017   

 

 [facility] [place]  [address] [facility] [address] [id number] [place] [id number]  30 Aug, 
2017   

 

 [facility] [place]  [address] [facility] [address] [id number] [place] [address]  16 Aug, 
2017  Other type of osteoarthritis, unspecified site M19.90

 

 [facility] [place]  [address] [facility] [address] [id number] [place] [id number]  15 Aug, 
2017  Type 2 diabetes mellitus without complications E11.9

 

 [facility] [place]  [address] [facility] [address] [id number] [place] [id number]  07 Aug, 
2017   

 

 [facility] [place]  [address] [facility] [address] [id number] [place], [place] [id number]     

 

 [facility] [place]  [address] [facility] [address] [id number] [place] [address]     

 

 [facility] [place]  [address] [facility] [address] [id number] [place] [address]    Type 2 diabetes mellitus without complications E11.9 ; Type 2 diabetes 
mellitus with diabetic neuropathy, without long-term current use of insulin 
E11.40 ; Primary osteoarthritis of both knees M17.0 and Chronic renal 
insufficiency, stage II (mild) N18.2

 

 [facility] [place]  [address] [facility] [address] [id number] [place] [id number]     

 

 [facility] [place]  [address] [facility] [address] [id number] [place] [id number]  30 May, 
2017  Hyperlipidemia LDL goal <130 E78.5

 

 [facility] [place]  [address] [facility] [address] [id number] [place] [id number]  19 May, 
2017  Type 2 diabetes mellitus without complications E11.9

 

 [facility] [place]  [address] [facility] [id number] [place] [address]  16 May, 2017   

 

 [facility] [place]  [address] [facility] [address] [id number] [place] [address]  08 May, 
2017   

 

 [facility] [place]  [address] [facility] [address] [id number] [place] [id number]  01 May, 
2017   

 

 [facility] [place]  [address] [facility] [address] [id number] [place] [address]     

 

 [facility] [place]  [address] [facility] [address] [id number] [place] [id number]    Primary generalized (osteo)arthritis M15.0 and Type 2 diabetes mellitus 
without complications E11.9

 

 [facility] [place]  [address] [facility] [address] [id number] [place] [address]    Old tear of lateral meniscus of left knee, unspecified tear type M23.201

 

 [facility] [place]  [address] [facility] [address] [id number] [place] [id number]     

 

 [facility] [place]  [address] [facility] [address] [id number] [place], [place] [id number]     

 

 [facility] [place]  [address] [facility] [address] [id number] [place] [id number]     

 

 [facility] [place]  [address] [facility] [address] [id number] [place] [address]  20 Mar, 
2017  Type 2 diabetes mellitus without complications E11.9

 

 [facility] [place]  [address] [facility] [address] [id number] [place] [id number]  13 Mar, 
2017  Type 2 diabetes mellitus without complications E11.9

 

 [facility] [place]  [address] [facility] [address] [id number] [place] [id number]  13 Mar, 
2017  Type 2 diabetes mellitus without complications E11.9

 

 [facility] [place]  [address] [facility] [address] [id number] [place] [id number]  04 Mar, 
2017  Type 2 diabetes mellitus without complications E11.9

 

 [facility] [place]  [address] [place] [address] [address] [id number] [place] [id number]     

 

 [facility] [place]  [address] [place] [address] [address] [id number] [place] [id number]    Chronic obstructive pulmonary disease with acute exacerbation J44.1

 

 [facility] [place]  [address] [place] [address] [address] [id number] [place] [id number]     

 

 [facility] [place]  [address] [place] [address] C [id number] [place] [id number]    Dysuria R30.0 ; Essential (primary) hypertension I10 ; Hypothyroidism (
acquired) E03.9 ; Type 2 diabetes mellitus without complications E11.9 and Mild 
episode of recurrent major depressive disorder F33.0

 

 [facility] [place]  [address] [place] [address] C [id number] [place], [place] [id number]     

 

 [facility] [place]  [address] [place] [address] [address] [id number] [place], [place] [address]    Dysuria R30.0 ; Vaginal candidiasis B37.3 and Candidiasis B37.9

 

 [facility] [place]  [address] [place] [address] C [id number] [place], [place] 6[address]     

 

 [facility] [place]  [address] [place] [address] C [id number] [place], [place] [id number]  10 Octaviano, 
2017  COPD with exacerbation J44.1 and Dysphagia, unspecified type R13.10

 

 [facility] [place]  [address] [place] [address] [address] [id number] [place] [id number]  27 Dec, 
2016   

 

 [facility] [place]  [address] [place] [address] [address] [id number] [place], [place] [id number]  12 Dec, 
2016  Essential (primary) hypertension I10 ; Hypothyroidism (acquired) E03.9 ; 
Type 2 diabetes mellitus without complications E11.9 ; Primary generalized (
osteo)arthritis M15.0 ; Major depressive disorder, single episode, unspecified 
F32.9 ; Unilateral primary osteoarthritis, left knee M17.12 ; Hyperlipidemia 
LDL goal <130 E78.5 and Dysphagia, unspecified type R13.10

 

 [facility] [place]  [address] [facility] [address] [id number] [place] [id number]  01 Dec, 
2016   

 

 [facility] [place]  [address] [facility] [address] [id number] [place] [id number]     

 

 [facility] [place]  [address] [facility] [address] [id number] [place] [id number]     

 

 [facility] [place]  [address] [place] [address] C [id number] [place] 349944338     

 

 [facility] [place]  [address] [place] [address] C [id number] [place] [id number]     

 

 [facility] [place]  [address] [place] [address] C [id number] [place] [id number]     

 

 [facility] [place]  [address] [place] [address] C [id number] [place], [place] 807[address]    Type 2 diabetes mellitus without complications E11.9 ; Primary 
generalized (osteo)arthritis M15.0 ; Effusion, left knee M25.462 ; Old tear of 
lateral meniscus of left knee, unspecified tear type M23.201 and Fatigue, 
unspecified type R53.83

 

 [facility] [place]  [address] [place] [address] C [id number] [place], [place] [id number]  26 Sep, 
2016  Type 2 diabetes mellitus without complications E11.9 ; Claudication I73.9 
; Pain in right leg M79.604 and Pain of left leg M79.605

 

 [facility] [place]  [address] [place] [address] C [id number] [place], [place] [id number]  14 Sep, 
2016   

 

 [facility] [place]  [address] [place] [address] C [id number] [place] [id number]  12 Sep, 
2016   

 

 [facility] [place]  [address] [place] [address] C [id number] [place], [place] [id number]  18 Aug, 
2016  Type 2 diabetes mellitus with hyperglycemia E11.65 ; Long term current 
use of insulin Z79.4 ; Anxiety disorder, unspecified F41.9 ; Essential (primary
) hypertension I10 ; Major depressive disorder, single episode, unspecified 
F32.9 and Peripheral arterial occlusive disease I77.9

 

 [facility] [place]  [address] [place] [address] C [id number] [place] [id number]  17 Aug, 
2016   

 

 [facility] [place]  [address] [facility] [address] [id number] [place] [id number]  11 Aug, 
2016   

 

 [facility] [place]  [address] [facility] [address] [id number] [place] [id number]  11 Aug, 
2016   

 

 [facility] [place]  [address] [facility] [address] [id number] [place] [id number]  10 Aug, 
2016   

 

 [facility] [place]  [address] [place] [address] C [id number] [place] [id number]  05 Aug, 
2016  Acute midline low back pain with right-sided sciatica M54.41

 

 [facility]  [address] [facility] [id number] [place] [address]  05 Aug, 2016  Chest pain, unspecified type R07.9 ; Palpitations R00.2 ; 
Claudication I73.9 ; Generalized pain R52 and Type 2 diabetes mellitus without 
complications E11.9

 

 [facility] [place]  [address] [place] [address] C [id number] [place], [place] [id number]    Acute midline low back pain with right-sided sciatica M54.41 ; Dysuria 
R30.0 ; Claudication I73.9 ; Peripheral arterial occlusive disease I77.9 ; 
Shortness of breath R06.02 ; Effusion, left knee M25.462 and Type 2 diabetes 
mellitus without complications E11.9

 

 [facility] [place]  [address] [facility] [address] [id number] [place] [id number]     

 

 [facility] [place]  [address] [facility] [address] [id number] [place] [address]     

 

 [facility] [place]  [address] [place] [address] [address] [id number] [place] [id number]     

 

 [facility] [place]  [address] [facility] [address] [id number] [place] 656255752    Type 2 diabetes mellitus without complications E11.9 ; Other 
hyperlipidemia E78.4 ; Peripheral arterial occlusive disease I77.9 ; Essential (
primary) hypertension I10 and Other fatigue R53.83

 

 [facility] [place]  [address] [facility] [address] [id number] [place] [id number]  18 May, 
2016   

 

 [facility] [place]  [address] [facility] [address] [id number] [place] [id number]  06 May, 
2016   

 

 [facility] [place]  [address] [place] [address] [address] [id number] [place] [id number]  05 May, 
2016  Chest pain, unspecified type R07.9 ; Peripheral arterial occlusive 
disease I77.9 ; Anxiety disorder, unspecified F41.9 ; Essential (primary) 
hypertension I10 ; Type 2 diabetes mellitus without complications E11.9 and 
Other hyperlipidemia E78.4

 

 [facility] [place]  [address] [place] [address] [address] [id number] [place] [id number]  04 May, 
2016   

 

 [facility] [place]  [address] [place] [address] [address] [id number] [place] [id number]     

 

 [facility] [place]  [address] [place] [address] [address] [id number] [place] [id number]     

 

 [facility] [place]  [address] [place] [address] [address] [id number] [place] [id number]    Type 2 diabetes mellitus without complications E11.9 ; Peripheral 
arterial occlusive disease I77.9 ; Primary generalized (osteo)arthritis M15.0 ; 
Major depressive disorder, single episode, unspecified F32.9 ; Anxiety disorder
, unspecified F41.9 and Essential (primary) hypertension I10

 

 [facility] [place]  [address] [place] [address] [address] [id number] [place] [id number]     

 

 [facility] [place]  [address] [place] [address] [address] [id number] [place] 5525905[address]     

 

 [facility]  [address] [facility] [id number] [place] [address]-
[address]  02 Mar, 2016   

 

 [facility] [place]  [address] [place] [address] [address] [id number] [place] [id number]     

 

 [facility] [place]  [address] [place] [address] [address] [id number] [place], [place] [id number]    Bronchitis J40 ; Shortness of breath R06.02 and Wheezing R06.2

 

 [facility] [place]  [address] [place] [address] [address] [id number] [place] [id number]     

 

 [facility] [place]  [address] [place] [address] [address] [id number] [place] [id number]     

 

 [facility] [place]  [address] [facility] [address] [id number] [place] [id number]     

 

 [facility] [place]  [address] [facility] [address] [id number] [place] 527321853     

 

 [facility] [place]  [address] [facility] [address] [id number] [place] [id number]    Type 2 diabetes mellitus without complications E11.9 ; Claudication I73.9 
; Other hyperlipidemia E78.4 ; Cataracts, bilateral H26.9 and Other fatigue 
R53.83

 

 [facility] [place]  [address] [facility] [address] [id number] [place] [id number]  28 Oct, 
2015   

 

 [facility] [place]  [address] [facility] [address] [id number] [place] 201165085  22 Oct, 
2015   

 

 [facility] [place]  [address] [facility] [address] [id number] [place] [id number]  16 Oct, 
2015   

 

 [facility] [place]  [address] [facility] [address] [id number] [place] [id number]  14 Oct, 
2015  Type 2 diabetes mellitus without complications E11.9 ; Other 
hyperlipidemia E78.4 ; Primary generalized (osteo)arthritis M15.0 and 
Claudication I73.9

 

 [facility] [place]  [address] [facility] [address] [id number] [place], [place] [id number]  12 Oct, 
2015   

 

 [facility] [place]  [address] [facility] [address] [id number] [place] 209401917  26 Aug, 
2015   

 

 [facility] [place]  [address] [facility] [address] [id number] [place] [id number]  12 Aug, 
2015   

 

 [facility] [place]  [address] [facility] [address] [id number] [place] 897121633  12 Aug, 
2015   

 

 [facility] [place]  [address] [facility] [address] [id number] [place] 138069250  10 Aug, 
2015   

 

 [facility] [place]  [address] [facility] [address] [id number] [place] [id number]  05 Aug, 
2015   

 

 [facility] [place]  [address] [facility] [address] [id number] [place] [id number]  11 May, 
2015  Diabetes mellitus without mention of complication, type II or unspecified 
type, not stated as uncontrolled 250.00 ; Bronchitis 490 and Effusion of lower 
leg joint 719.06

 

 [facility] [place]  [address] [facility] [address] [id number] [place] 135768599  01 May, 
2015   

 

 [facility] [place]  [address] [facility] [address] [id number] [place] [id number]  01 May, 
2015  Bronchitis 490 and Wheezing 786.07

 

 [facility]  [address] [address]     

 

 [facility]  [address] [place] [id number], [place] [address]     

 

 [facility]  [address], [place] [address]     

 

 [facility] [place]  [address] [facility] [address] [id number] [place], [place] [id number]  19 Mar, 
2015   

 

 [facility]  [address], [place] [address]  19 Mar, 2015   

 

 [facility] [place]  [address] [facility] [address] [id number] [place], [place] [id number]  09 Mar, 
2015   

 

 [facility]  [address], [place] [address]46  09 Mar, 2015   

 

 [facility] [place]  [address] [facility] [address] [id number] [place], [place] [id number]     

 

 [facility]  [address] [place] [id number], [place] [address]-
[address]     

 

 [facility]  [address] [place] [id number], [place] [address]-
[address]  10 Feb, 2015   

 

 [facility] [place]  [address] [facility] [address] [id number] [place] [id number]     

 

 [facility] [place]  [address] [facility] [address] [id number] [place] 694153394     

 

 [facility]  [address] [place] [id number], [place] [address]-
[address]     

 

 [facility]  [address] [id number] [place] [address]-
[address]     

 

 [facility] [place]  [address] [id number] [place] 783837445     

 

 [facility]  [address] [id number] [place] [address]-
[address]     

 

 [facility] [place]  [address] [id number] [place] [id number]     

 

 [facility]  [address] [id number] [place] [address]-
[address]     

 

 [facility] [place]  [address] [id number] [place] [id number]  24 Dec, 
2014   

 

 [facility]  [address] [id number] [place] [address]-
[address]  24 Dec, 2014   

 

 [facility] [place]  [address] [id number] [place], [place] [id number]  18 Dec, 
2014   

 

 [facility] [facility]  [address] [id number] [place] [address]  18 Dec, 2014   

 

 [facility] [place]  [address] [id number] [place] [id number]  09 Dec, 
2014   

 

 [facility]  [address] [id number] [facility] [place] [address]  09 Dec, 2014   

 

 [facility] [place]  [address] [facility] [address] [facility] [address] [id number] [place] [id number]  02 Dec, 
2014   

 

 [facility]  [address] [id number] [place] [address]  02 Dec, 2014   

 

 [facility] [place]  [address] [id number] [place] [id number]     

 

 [facility] [place] [facility]  [address] [id number] [place] [address]-
[address]     

 

 [facility] [place]  [address] [facility] [address] [facility] [address] [id number] [place] [id number]     

 

 [facility] [place] [facility]  [address] [id number] [place] [address]     

 

 [facility] [place]  [address] [facility] [address] [id number] [place] [id number]  31 Oct, 
2014   

 

 [facility] [place] [facility]  [address] [facility] [id number] [place] [address]  31 Oct, 2014   

 

 [facility] [place]  [address] [facility] [address] [facility] [address] [id number] [place] [id number]  16 Oct, 
2014   

 

 [facility] [place] [facility]  [address] [facility] [id number] [place] [address]-
[address]  16 Oct, 2014   

 

 [facility] [place]  [address] [id number] [place] [id number]  10 Oct, 
2014   

 

 [facility] [place] [facility]  [address] [facility] [id number] [facility] [place] [address]
4[address]  10 Oct, 2014   

 

 [facility] [place]  [address] [facility] [address] [id number] [place] [id number]  26 Sep, 
2014   

 

 [facility] [place] [facility]  [address] [facility] [id number] [place] [address]-
[address]  26 Sep, 2014   

 

 [facility] [place]  [address] [facility] [address] [id number] [place] [id number]  29 Aug, 
2014   

 

 [facility] [place] [facility]  [address] [facility] [id number] [facility] [place] [address]  29 Aug, 2014   

 

 [facility] [place]  [address] [id number] [place] [id number]  25 Aug, 
2014   

 

 [facility] [place]  [address] [facility] [address] [id number] [place] [id number]  25 Aug, 
2014   

 

 [facility] [place] [facility]  [address] [facility] [id number] [facility] [place] [address]  25 Aug, 2014   

 

 [facility] [place] [facility]  [address] [id number] [facility] [place] [address]  25 Aug, 2014   

 

 [facility] [place]  [address] [facility] [address] [id number] [place] [id number]  18 Aug, 
2014   

 

 [facility] [place] [facility]  [address] [facility] [id number] [place] [address]-
[address]  18 Aug, 2014   

 

 [facility] [place]  [address] [id number] [place] [id number]  14 Aug, 
2014   

 

 [facility] [place] [facility]  [address] [id number] [place] [address]-
28[address]  14 Aug, 2014   

 

 [facility] [place] [facility]  [address] [id number] [place] [address]-
3293  11 Aug, 2014   

 

 [facility] [place]  [address] [id number] [place] [id number]  11 Aug, 
2014   

 

 [facility] [place] [facility]  [address] [id number] [place] [address]-
1631  11 Aug, 2014   

 

 [facility] [place] [facility]  [address] [id number] [place] [address]-
1860  11 Aug, 2014   

 

 [facility] [place] [facility]  [address] [id number] [place] [address]-
8501     

 

 [facility] [place]  [address] [id number] [place] [id number]     

 

 [facility] [place] [facility]  [address] [id number] [place] [address]-
[address]     

 

 [facility] [place] [facility]  [address] [id number] [place] [address]-
4026     

 

 [facility] [place]  [address] [id number] [place] [id number]     

 

 [facility] [place] [facility]  [address] [id number] [facility] [place] [address]-
6382     

 

 [facility] [place]  [address] [id number] [place] [id number]  30 May, 
2014   

 

 [facility] [place] [facility]  [address] [id number] [facility] [place] [address]
1873  30 May, 2014   

 

 [facility] [place]  [address] [id number] [place] [phone]  14 May, 
2014   

 

 [facility] [place] [facility]  [address] [id number] [place] [address]-
[address]  14 May, 2014   

 

 [facility] [place]  [address] [facility] [address] [id number] [place] [id number]  05 May, 
2014   

 

 [facility]  [address] [facility] [id number] [place] [address]-
2546  05 May, 2014   

 

 [facility] [place]  [address] [facility] [address] [id number] [place] [id number]     

 

 [facility]  [address] [facility] [id number] [place] [address]-
[address]46     

 

 [facility] [place]  [address] [facility] [address] [id number] [place] [id number]     

 

 [facility]  [address] [facility] [id number] [place] [address]-
[address]46     

 

 [facility] [place]  [address] [facility] [id number] [place] [id number]  10 Feb, 
2014   

 

 [facility]  [address] [facility] [id number] [place] [address]
2546  10 Feb, 2014   







IMMUNIZATIONS







 Vaccine  Route  Administration Date  Status

 

 FLUARIX QUAD (3 AND UP)   IM Intramuscular  Dec 21, 2017  Administered

 

 PPSV23 (PNEUMOVAX)  IM Intramuscular  Dec 21, 2017  Administered







SOCIAL HISTORY

Never Assessed



REASON FOR VISIT

[facility]



PL[facility] OF CARE







 Activity  Details









  









 Follow Up  3 Months Reason:rehceck DM







VITAL SIGNS







 Height  66 in  2017

 

 Weight  261.8 lbs  2017

 

 Temperature  97.8 degrees Fahrenheit  2017

 

 Heart Rate  88 bpm  2017

 

 Respiratory Rate  20   2017

 

 BMI  42.25 kg/m2  2017

 

 Blood pressure systolic  126 mmHg  2017

 

 Blood pressure diastolic  68 mmHg  2017







MEDICATIONS







 Medication  Instructions  Dosage  Frequency  Start Date  End Date  Duration  
Status

 

 Metformin HCl 500MG  Orally Twice a day  1 tablet with meals  12h        90 
days  Active

 

 Levothyroxine Sodium 25 MCG  Orally Once a day in the morning  Take 1 tablet 
on an empty stomach           30  Active

 

 Mobic 7.5 MG  Orally Once a day  1 tablet  24h        30  Active

 

 Fish Oil 1000 MG  Orally Once a day  1 capsule  24h           Active

 

 Voltaren 1 %  Transdermal 4 times a day PRN knee pain  4 grams     11 May, 
2015        Active

 

 trazodone 150 mg     take 1 Tablet by Oral route 1 time per day qhs     31 Oct
, 2014        Active

 

 Zoloft 50 MG  Orally Once a day  take 2 tablets (100 mg) by oral route once 
daily  24h  10 Feb, 2014        Active

 

 Lantus SoloStar 100UNIT/     INJECT 50 UNITS SUB-Q TWICE DAILY              
Active

 

 Albuterol Sulfate (2.5 MG/3ML) 0.083%  Inhalation every 6 hrs PRN wheezing/SOA
  3 ml     11 May, 2015        Active

 

 Alprazolam 0.5 MG     take 1 tablet (0.5 mg) by oral route 3 times per day     
10 Feb, 2014        Active

 

 Losartan Potassium 25 MG  Orally Once a day  1 tablet  24h        90 days  
Active

 

 Sucralfate 1 GM  Orally 4 times a day  1 tablet on an empty stomach  6h       
    Active

 

 Tramadol HCl 50 mg  Orally every 6 hrs  1 tablet as needed for pain  6h  14 Oct
, 2015        Active

 

 Ventolin HFA 90 mcg/actuation  by inhalation route every 4 hrs  1-2 puffs  4h  
26 Sep, 2014     90 days  Active

 

 Pantoprazole Sodium 40 mg  Orally Once a day  1 tablet  24h  27 Dec, 2016     
   Active

 

 Tramadol HCl 50 mg  Orally at bedtime for pain  1 tablet as needed             Active

 

 Cymbalta 30 mg     take 3 capsule by Oral route 1 time per day             Active

 

 Cyclobenzaprine HCl 10MG  Orally Three times a day for muscle spasms  1 tablet 
as needed           30  Active

 

 Lantus SoloStar 100 unit/mL (3 mL)  Subcutaneous 2 times a day  inject 50 
Units by Subcutaneous  12h  19 Mar, 2015     30 days  Active

 

 Lasix 20 mg     take 1 tablet (20 mg) by oral route once daily     31 Oct, 
2014        Active

 

 Colace 100 MG  Orally Once a day  1 capsule as needed  24h           Active

 

 Atorvastatin Calcium 40 mg  Orally Once a day  1 tablet  24h  30 May, 2017    
    Active

 

 B Complex -                    Active

 

 NovoLog Flexpen 100 UNIT/ML  Subcutaneous 3 times a day  Inject 25 Units by 
Subcutaneous route as per insulin sliding scale protocol  3 times per day  8h  
        Active

 

 NovoFine 30G X 8 MM     as directed             Active

 

 Lipitor 40 MG     1 tablet     17 2015     90 days  Active

 

 Tradjenta 5 mg  Orally Once a day  1 tablet  24h  13 2017     90 days  
Active

 

 Vitamin B 12 100 MCG                    Not-Taking

 

 Lyrica 75MG  Orally Twice a day  1 capsule  12h           Active

 

 Klor-Con 10 10 MEQ     take 1 tablet by Oral route with food  1 time per day  
           Active







RESULTS

No Results



PROCEDURES







 Procedure  Date Ordered  Result  Body Site

 

 [facility]  Dec [facility], 2017      

 

 PPSV23 (PNEUMOVAX)  Dec 21, 2017      

 

 VENIPUNCT, ROUTINE*  Dec 21, 2017      

 

 FLUARIX QUAD (3 AND UP) 2017  Dec 21, 2017      

 

 ADMN PNEUMCOC VAC NO FEE SCHED DAY  Dec 21, 2017      

 

 IMMUNIZATION ADMIN, EACH ADD (please include units)  Dec 21, 2017      

 

 SINGLE IMMUNIZATION ADMIN  Dec 21, 2017      







INSTRUCTIONS





MEDICATIONS ADMINISTERED

No Known Medications



MEDICAL (GENERAL) HISTORY







 Type  Description  Date

 

 Medical History  Diabetes mellitus without mention of complication, type II or 
unspecified type, not stated as uncontrolled   

 

 Medical History  Depressive disorder, not elsewhere classified   

 

 Medical History  Anxiety state, unspecified   

 

 Medical History  Effusion of lower leg joint   

 

 Medical History  Generalized osteoarthrosis, unspecified site   

 

 Medical History  Other and unspecified hyperlipidemia   

 

 Medical History  Abnormal weight gain   

 

 Medical History  Effusion of joint, site unspecified   

 

 Medical History  Esophageal reflux   

 

 Medical History  hypertension   

 

 Medical History  colonoscopy- inscreased polyp   

 

 Surgical History  Tonsillectomy   

 

 Surgical History  Orthopedic: Bilateral Knee x2   

 

 Surgical History  colonoscopy  2017

 

 Hospitalization History  Surgery(s)/Childbirth(s) only

## 2019-02-26 NOTE — XMS REPORT
[facility]

 Created on: 06/15/2018



Debi Larkinthia

External Reference #: [id number]

: 1965

Sex: Female



Demographics







 Address  [address] [place]  [address]

 

 Preferred Language  Unknown

 

 Marital Status  Unknown

 

 Druze Affiliation  Unknown

 

 Race  Unknown

 

 Ethnic Group  Unknown





Author







 Author  LIYAH LOWE

 

 [facility]

 

 Address  [address] [place]  [address]



 

 Phone  [phone]







Care Team Providers







 Care Team Member Name  Role  Phone

 

 LIYAH LOWE  Unavailable  [phone]







PROBLEMS







 Type  Condition  ICD9-CM Code  LPH43-OP Code  Onset Dates  Condition Status  
SNOMED Code

 

 Problem  Effusion of lower leg joint  719.06        Active  584473946

 

 Problem  Esophageal reflux  530.81        Active  915435776

 

 Problem  Other hyperlipidemia     E78.4     Active  81083581

 

 Problem  Type 2 diabetes mellitus without complications     E11.9     Active  
315258757

 

 Problem  Primary generalized (osteo)arthritis     M15.0     Active  936792263

 

 Problem  COPD with exacerbation     J44.1     Active  835495204

 

 Problem  Claudication     I73.9     Active  084379557

 

 Problem  Mild episode of recurrent major depressive disorder     F33.0     
Active  863903670

 

 Problem  Cataracts, bilateral     H26.9     Active  78081143

 

 Problem  Chronic obstructive pulmonary disease with acute exacerbation     
J44.1     Active  230068055

 

 Problem  Chronic renal insufficiency, stage II (mild)     N18.2     Active  
769455703

 

 Problem  Primary osteoarthritis of both knees     M17.0     Active  005918098

 

 Problem  Other chronic pain     G89.29     Active  56438949

 

 Problem  Lumbar degenerative disc disease     M51.36     Active  45078805

 

 Problem  Essential (primary) hypertension     I10     Active  90979410

 

 Problem  Anxiety disorder, unspecified     F41.9     Active  168884788

 

 Problem  Peripheral arterial occlusive disease     I77.9     Active  789771779

 

 Problem  Diabetic polyneuropathy associated with type 2 diabetes mellitus     
E11.42     Active  23980308

 

 Problem  Type 2 diabetes mellitus with diabetic neuropathy, without long-term 
current use of insulin     E11.40     Active  58919344

 

 Problem  Chronic obstructive pulmonary disease, unspecified COPD type     
J44.9     Active  88753268

 

 Problem  Long term current use of insulin     Z79.4     Active  664444717

 

 Problem  Type 2 diabetes mellitus with hyperglycemia     E11.65     Active  
05751609

 

 Problem  Old tear of lateral meniscus of left knee, unspecified tear type     
M23.201     Active  662395174

 

 Problem  Major depressive disorder, single episode, unspecified     F32.9     
Active  94678968

 

 Problem  Effusion, left knee     M25.462     Active  366414490

 

 Problem  Dysphagia, unspecified type     R13.10     Active  91415885

 

 Problem  Hyperlipidemia LDL goal <130     E78.5     Active  00418070

 

 Problem  Unilateral primary osteoarthritis, left knee     M17.12     Active  
650895858

 

 Problem  Hypothyroidism (acquired)     E03.9     Active  860258727







ALLERGIES

No Information



ENCOUNTERS







 Encounter  Location  Date  Diagnosis

 

 [facility] [place]  [address] [facility] C [id number] [place] [id number]  21 May, 
2018  Pain in left knee M25.562

 

 [facility] [place]  [address] [place] [address] C [id number] [place], [place] [id number]  21 May, 
2018  Pain in left knee M25.562 and Other chronic pain G89.29

 

 [facility]  [address] [id number] [address]  15 May, 2018   

 

 [facility] [place]  [address] [place] [address] C [id number] [place] [id number]  02 May, 
2018  Type 2 diabetes mellitus without complications E11.9 ; Long term current 
use of insulin Z79.4 ; Unilateral primary osteoarthritis, left knee M17.12 ; 
Lumbar degenerative disc disease M51.36 and Chronic obstructive pulmonary 
disease, unspecified COPD type J44.9

 

 [facility] [place]  [address] [facility] C [id number] [place] [id number]     

 

 [facility] [place]  [address] [facility] C [id number] [place] [id number]     

 

 [facility] [place]  [address] [place] [address] C [id number] [place], [place] [id number]     

 

 [facility] [place]  [address] [place] [address] C [id number] [place] [id number]  23 Mar, 
2018  Type 2 diabetes mellitus without complications E11.9

 

 [facility] [place]  [address] [place] [address] C [id number] [place] [id number]  23 Mar, 
2018   

 

 [facility]  [address] [facility] [id number] [address]  13 Mar, 2018   

 

 [facility]  [address] [id number] [address]  09 Mar, 2018  Type 2 diabetes mellitus without complications E11.9

 

 [facility] [place]  [address] [facility] C [id number] [place] [id number]     

 

 [facility] [place]  [address] [facility] C [id number] [place], [place] [id number]    Diabetic polyneuropathy associated with type 2 diabetes mellitus E11.42

 

 [facility] [place]  [address] [facility] C [id number] [place] [id number]     

 

 [facility] [place]  [address] [facility] C [id number] [place] [id number]  21 Dec, 
2017  Type 2 diabetes mellitus without complications E11.9 ; Long term current 
use of insulin Z79.4 ; High risk sexual behavior Z72.51 ; BMI 40.0-44.9, adult 
Z68.41 and Encounter for immunization Z23

 

 [facility] [place]  [address] [place] [address] C [id number] [place], [place] [id number]  04 Dec, 
2017   

 

 [facility] [place]  [address] [place] [address] C [id number] [place] [id number]  04 Dec, 
2017   

 

 [facility] [place]  [address] [place] [address] C [id number] [place], [place] [id number]    COPD with exacerbation J44.1 and BMI 40.0-44.9, adult Z68.41

 

 [facility] [place]  [address] [facility] C [id number] [place], [place] [id number]  09 Oct, 
2017  Encounter for screening mammogram for malignant neoplasm of breast Z12.31 
; Well woman exam Z01.419 and High risk sexual behavior Z72.51

 

 [facility] [place]  [address] [facility] [address] [id number] [place] [id number]  11 Sep, 
2017   

 

 [facility] [place]  [address] [facility] [address] [id number] [place] [id number]  11 Sep, 
2017  Type 2 diabetes mellitus without complications E11.9

 

 [facility] [place]  [address] [facility] [address] [id number] [place] [id number]  11 Sep, 
2017   

 

 [facility] [place]  [address] [facility] C [id number] [place], [place] [id number]  11 Sep, 
2017  Old tear of lateral meniscus of left knee, unspecified tear type M23.201

 

 [facility] [place]  [address] [facility] [address] [id number] [place], [place] [id number]  11 Sep, 
2017  Other type of osteoarthritis, unspecified site M19.90 ; Type 2 diabetes 
mellitus without complications E11.9 ; Primary osteoarthritis of both knees 
M17.0 ; Chronic renal insufficiency, stage II (mild) N18.2 ; Diabetic 
polyneuropathy associated with type 2 diabetes mellitus E11.42 and Exposure to 
hepatitis C Z20.5

 

 [facility] [place]  [address] [facility] [address] [id number] [place] [id number]  06 Sep, 
2017  Type 2 diabetes mellitus without complications E11.9

 

 [facility] [place]  [address] [facility] [address] [id number] [place] [id number]  30 Aug, 
2017   

 

 [facility] [place]  [address] [facility] [address] [id number] [place] [id number]  30 Aug, 
2017   

 

 [facility] [place]  [address] [facility] [address] [id number] [place] [id number]  16 Aug, 
2017  Other type of osteoarthritis, unspecified site M19.90

 

 [facility] [place]  [address] [facility] [address] [id number] [place] [id number]  15 Aug, 
2017  Type 2 diabetes mellitus without complications E11.9

 

 [facility] [place]  [address] [facility] [address] [id number] [place] [id number]  07 Aug, 
2017   

 

 [facility] [place]  [address] [facility] [address] [id number] [place] [address]     

 

 [facility] [place]  [address] [facility] [address] [id number] [place], [place] [id number]     

 

 [facility] [place]  [address] [facility] [address] [id number] [place], [place] [id number]    Type 2 diabetes mellitus without complications E11.9 ; Type 2 diabetes 
mellitus with diabetic neuropathy, without long-term current use of insulin 
E11.40 ; Primary osteoarthritis of both knees M17.0 and Chronic renal 
insufficiency, stage II (mild) N18.2

 

 [facility] [place]  [address] [facility] [address] [id number] [place], [place] [phone]     

 

 [facility] [place]  [address] [facility] [address] [id number] [place], [place] [address]79[address]  30 May, 
2017  Hyperlipidemia LDL goal <130 E78.5

 

 [facility] [place]  [address] [facility] [address] [id number] [place] [id number]  19 May, 
2017  Type 2 diabetes mellitus without complications E11.9

 

 [facility]  [address] [facility] [id number] [place] [address]  16 May, 2017   

 

 [facility] [place]  [address] [facility] [address] [id number] [place] [id number]  08 May, 
2017   

 

 [facility] [place]  [address] [facility] [address] [id number] [place] [id number]  01 May, 
2017   

 

 [facility] [place]  [address] [facility] [address] [id number] [place] [id number]     

 

 [facility] [place]  [address] [facility] [address] [id number] [place], [place] [id number]    Primary generalized (osteo)arthritis M15.0 and Type 2 diabetes mellitus 
without complications E11.9

 

 [facility] [place]  [address] [facility] [address] [id number] [place] [address]    Old tear of lateral meniscus of left knee, unspecified tear type M23.201

 

 [facility] [place]  [address] [facility] [address] [id number] [place], [place] [id number]     

 

 [facility] [place]  [address] [facility] [address] [id number] [place] [id number]     

 

 [facility] [place]  [address] [facility] [address] [id number] [place] [id number]     

 

 [facility] [place]  [address] [facility] [address] [id number] [place] [id number]  20 Mar, 
2017  Type 2 diabetes mellitus without complications E11.9

 

 [facility] [place]  [address] [facility] [address] [id number] [place], [place] [id number]  13 Mar, 
2017  Type 2 diabetes mellitus without complications E11.9

 

 [facility] [place]  [address] [facility] [address] [id number] [place] [id number]  13 Mar, 
2017  Type 2 diabetes mellitus without complications E11.9

 

 [facility] [place]  [address] [facility] [address] [id number] [place] [id number]  04 Mar, 
2017  Type 2 diabetes mellitus without complications E11.9

 

 [facility] [place]  [address] [place] [address] C [id number] [place], [place] [id number]     

 

 [facility] [place]  [address] [place] [address] C [id number] [place], [place] [id number]    Chronic obstructive pulmonary disease with acute exacerbation J44.1

 

 [facility] [place]  [address] [place] [address] C [id number] [place] [id number]     

 

 [facility] [place]  [address] [place] [address] C [id number] [place], [place] [id number]    Dysuria R30.0 ; Essential (primary) hypertension I10 ; Hypothyroidism (
acquired) E03.9 ; Type 2 diabetes mellitus without complications E11.9 and Mild 
episode of recurrent major depressive disorder F33.0

 

 [facility] [place]  [address] [place] [address] C [id number] [place], [place] [id number]     

 

 [facility] [place]  [address] [place] [address] C [id number] [place], [place] [id number]    Dysuria R30.0 ; Vaginal candidiasis B37.3 and Candidiasis B37.9

 

 [facility] [place]  [address] [place] [address] C [id number] [place], [place] 759419995     

 

 [facility] [place]  [address] [place] [address] C [id number] [place], [place] [id number]  10 Octaviano, 
2017  COPD with exacerbation J44.1 and Dysphagia, unspecified type R13.10

 

 [facility] [place]  [address] [place] [address] C [id number] [place] [id number]  27 Dec, 
2016   

 

 [facility] [place]  [address] [place] [address] C [id number] [place], [place] [id number]  12 Dec, 
2016  Essential (primary) hypertension I10 ; Hypothyroidism (acquired) E03.9 ; 
Type 2 diabetes mellitus without complications E11.9 ; Primary generalized (
osteo)arthritis M15.0 ; Major depressive disorder, single episode, unspecified 
F32.9 ; Unilateral primary osteoarthritis, left knee M17.12 ; Hyperlipidemia 
LDL goal <130 E78.5 and Dysphagia, unspecified type R13.10

 

 [facility] [place]  [address] [place] [address] C [id number] [place] [id number]  01 Dec, 
2016   

 

 [facility] [place]  [address] [facility] C [id number] [place] [id number]     

 

 [facility] [place]  [address] [facility] C [id number] [place] [id number]     

 

 [facility] [place]  [address] [facility] C [id number] [place] [id number]     

 

 [facility] [place]  [address] [place] [address] C [id number] [place] 469583212     

 

 [facility] [place]  [address] [place] [address] C [id number] [place], [place] [id number]     

 

 [facility] [place]  [address] [place] [address] C [id number] [place], [place] [id number]    Type 2 diabetes mellitus without complications E11.9 ; Primary 
generalized (osteo)arthritis M15.0 ; Effusion, left knee M25.462 ; Old tear of 
lateral meniscus of left knee, unspecified tear type M23.201 and Fatigue, 
unspecified type R53.83

 

 [facility] [place]  [address] [place] [address] C [id number] [place], [place] [id number]  26 Sep, 
2016  Type 2 diabetes mellitus without complications E11.9 ; Claudication I73.9 
; Pain in right leg M79.604 and Pain of left leg M79.605

 

 [facility] [place]  [address] [place] [address] C [id number] [place], [place] [id number]  14 Sep, 
2016   

 

 [facility] [place]  [address] [place] [address] C [id number] [place], [place] [id number]  12 Sep, 
2016   

 

 [facility] [place]  [address] [place] [address] C [id number] [place], [place] [id number]  18 Aug, 
2016  Type 2 diabetes mellitus with hyperglycemia E11.65 ; Long term current 
use of insulin Z79.4 ; Anxiety disorder, unspecified F41.9 ; Essential (primary
) hypertension I10 ; Major depressive disorder, single episode, unspecified 
F32.9 and Peripheral arterial occlusive disease I77.9

 

 [facility] [place]  [address] [place] [address] C [id number] [place] [id number]  17 Aug, 
2016   

 

 [facility] [place]  [address] [place] [address] C [id number] [place] [id number]  11 Aug, 
2016   

 

 [facility] [place]  [address] [facility] [address] [id number] [place] [id number]  11 Aug, 
2016   

 

 [facility] [place]  [address] [facility] C [id number] [place] [id number]  10 Aug, 
2016   

 

 [facility] [place]  [address] [place] [address] C [id number] [place], [place] [id number]  05 Aug, 
2016  Acute midline low back pain with right-sided sciatica M54.41

 

 [facility]  [address] [facility] [id number] [place] [address]-
[address]  05 Aug, 2016  Chest pain, unspecified type R07.9 ; Palpitations R00.2 ; 
Claudication I73.9 ; Generalized pain R52 and Type 2 diabetes mellitus without 
complications E11.9

 

 [facility] [place]  [address] [place] [address] C [id number] [place], [place] [id number]    Acute midline low back pain with right-sided sciatica M54.41 ; Dysuria 
R30.0 ; Claudication I73.9 ; Peripheral arterial occlusive disease I77.9 ; 
Shortness of breath R06.02 ; Effusion, left knee M25.462 and Type 2 diabetes 
mellitus without complications E11.9

 

 [facility] [place]  [address] [place] [address] [address] [id number] [place], [place] [id number]     

 

 [facility] [place]  [address] [place] [address] C [id number] [place] [id number]     

 

 [facility] [place]  [address] [place] [address] [address] [id number] [place] [id number]     

 

 [facility] [place]  [address] [place] [address] [address] [id number] [place], [place] [id number]    Type 2 diabetes mellitus without complications E11.9 ; Other 
hyperlipidemia E78.4 ; Peripheral arterial occlusive disease I77.9 ; Essential (
primary) hypertension I10 and Other fatigue R53.83

 

 [facility] [place]  [address] [facility] [address] [id number] [place] [id number]  18 May, 
2016   

 

 [facility] [place]  [address] [place] [address] C [id number] [place] [id number]  06 May, 
2016   

 

 [facility] [place]  [address] [facility] [address] [id number] [place], [place] [id number]  05 May, 
2016  Chest pain, unspecified type R07.9 ; Peripheral arterial occlusive 
disease I77.9 ; Anxiety disorder, unspecified F41.9 ; Essential (primary) 
hypertension I10 ; Type 2 diabetes mellitus without complications E11.9 and 
Other hyperlipidemia E78.4

 

 [facility] [place]  [address] [facility] [address] [id number] [place] [id number]  04 May, 
2016   

 

 [facility] [place]  [address] [place] [address] [address] [id number] [place] [id number]     

 

 [facility] [place]  [address] [place] [address] [address] [id number] [place] [id number]     

 

 [facility] [place]  [address] [place] [address] [address] [id number] [place], [place] [id number]    Type 2 diabetes mellitus without complications E11.9 ; Peripheral 
arterial occlusive disease I77.9 ; Primary generalized (osteo)arthritis M15.0 ; 
Major depressive disorder, single episode, unspecified F32.9 ; Anxiety disorder
, unspecified F41.9 and Essential (primary) hypertension I10

 

 [facility] [place]  [address] [place] [address] [address] [id number] [place] [id number]     

 

 [facility] [place]  [address] [place] [address] [address] [id number] [place] [id number]     

 

 [facility]  [address] [facility] [id number] [place] [address]-
[address]  02 Mar, 2016   

 

 [facility] [place]  [address] [place] [address] [address] [id number] [place], [place] [id number]     

 

 [facility] [place]  [address] [place] [address] [address] [id number] [place], [place] [id number]    Bronchitis J40 ; Shortness of breath R06.02 and Wheezing R06.2

 

 [facility] [place]  [address] [place] [address] [address] [id number] [place] [id number]     

 

 [facility] [place]  [address] [place] [address] [address] [id number] [place] [id number]     

 

 [facility] [place]  [address] [place] [address] [address] [id number] [place] [id number]     

 

 [facility] [place]  [address] [facility] [address] [id number] [place] [id number]     

 

 [facility] [place]  [address] [facility] [address] [id number] [place] [id number]    Type 2 diabetes mellitus without complications E11.9 ; Claudication I73.9 
; Other hyperlipidemia E78.4 ; Cataracts, bilateral H26.9 and Other fatigue 
R53.83

 

 [facility] [place]  [address] [facility] [address] [id number] [place] [id number]  28 Oct, 
2015   

 

 [facility] [place]  [address] [facility] [address] [id number] [place] [id number]  22 Oct, 
2015   

 

 [facility] [place]  [address] [facility] [address] [id number] [place] [id number]  16 Oct, 
2015   

 

 [facility] [place]  [address] [facility] [address] [id number] [place] [id number]  14 Oct, 
2015  Type 2 diabetes mellitus without complications E11.9 ; Other 
hyperlipidemia E78.4 ; Primary generalized (osteo)arthritis M15.0 and 
Claudication I73.9

 

 [facility] [place]  [address] [facility] [address] [id number] [place] [id number]  12 Oct, 
2015   

 

 [facility] [place]  [address] [facility] [address] [id number] [place], [place] [id number]  26 Aug, 
2015   

 

 [facility] [place]  [address] [facility] [address] [id number] [place] [id number]  12 Aug, 
2015   

 

 [facility] [place]  [address] [facility] [address] [id number] [place] [id number]  12 Aug, 
2015   

 

 [facility] [place]  [address] [facility] [address] [id number] [place] [id number]  10 Aug, 
2015   

 

 [facility] [place]  [address] [facility] [address] [id number] [place], [place] [id number]  05 Aug, 
2015   

 

 [facility] [place]  [address] [facility] [address] [id number] [place] [id number]  11 May, 
2015  Diabetes mellitus without mention of complication, type II or unspecified 
type, not stated as uncontrolled 250.00 ; Bronchitis 490 and Effusion of lower 
leg joint 719.06

 

 [facility] [place]  [address] [facility] [address] [id number] [place] 144218141  01 May, 
2015   

 

 [facility] [place]  [address] [facility] [address] [id number] [place] [id number]  01 May, 
2015  Bronchitis 490 and Wheezing 786.07

 

 [facility]  [address] [facility] [id number] [place] [address]-
[address]     

 

 [facility]  [address] [facility] [id number] [address]-
[address]     

 

 [facility]  [address] [facility] [id number] [address]-
[address]     

 

 [facility] [place]  [address] [facility] [address] [id number] [place] [id number]  19 Mar, 
2015   

 

 [facility]  [address] [facility] [id number] [address]-
[address]  19 Mar, 2015   

 

 [facility] [place]  [address] [facility] [address] [id number] [place] [id number]  09 Mar, 
2015   

 

 [facility]  [address] [facility] [id number] [place] [address]-
[address]  09 Mar, 2015   

 

 [facility] [place]  [address] [facility] [address] [id number] [place], [place] [id number]     

 

 [facility]  [address] [facility] [id number] [address]-
[address]     

 

 [facility]  [address] [facility] [id number] [address]-
[address]  10 Feb, 2015   

 

 [facility] [place]  [address] [facility] [address] [id number] [place] [id number]     

 

 [facility] [place]  [address] [facility] [address] [id number] [place] [id number]     

 

 [facility]  [address] [facility] [id number] [place] [address]     

 

 [facility]  [address] [facility] [id number] [place] [address]     

 

 [facility] [place]  [address] [id number] [place] [id number]     

 

 [facility] [place] [facility]  [address] [id number] [place] [address]-
7[address]     

 

 [facility] [place]  [address] [id number] [place] [id number]     

 

 [facility] [place] [facility]  [address] [id number] [place] [address]     

 

 [facility] [place]  [address] [id number] [place] [id number]  24 Dec, 
2014   

 

 [facility] [place] [facility]  [address] [id number] [place] [address]  24 Dec, 2014   

 

 [facility] [place]  [address] [id number] [place] [id number]  18 Dec, 
2014   

 

 [facility] [place] [facility]  [address] [id number] [place] [address]  18 Dec, 2014   

 

 [facility] [place]  [address] [id number] [place] [id number]  09 Dec, 
2014   

 

 [facility] [place] [facility]  [address] [id number] [place] [address]  09 Dec, 2014   

 

 [facility] [place]  [address] [facility] [address] [id number] [place] [id number]  02 Dec, 
2014   

 

 [facility] [place] [facility]  [address] [id number] [place] [address]  02 Dec, 2014   

 

 [facility] [place]  [address] [id number] [place] [id number]     

 

 [facility]  [address] [id number] [place] [address]-
3484     

 

 [facility] [place]  [address] [id number] [place] [id number]     

 

 [facility] [place] [facility]  [address] [id number] [place] [address]     

 

 [facility] [place]  [address] [id number] [place] [id number]  31 Oct, 
2014   

 

 [facility] [place] [facility]  [address] [id number] [place] [address]-
7256  31 Oct, 2014   

 

 [facility] [place]  [address] [facility] [address] [id number] [place] [id number]  16 Oct, 
2014   

 

 [facility] [place] [facility]  [address] [id number] [place] [address]-
9551  16 Oct, 2014   

 

 [facility] [place]  [address] [facility] [address] [id number] [place] [id number]  10 Oct, 
2014   

 

 [facility] [place] [facility]  [address] [id number] [place] [address]-
9038  10 Oct, 2014   

 

 [facility] [place]  [address] [facility] [address] [id number] [place] [id number]  26 Sep, 
2014   

 

 [facility] [place] [facility]  [address] [id number] [place] [address]-
6830  26 Sep, 2014   

 

 [facility] [place]  [address] [facility] [address] [id number] [place] [id number]  29 Aug, 
2014   

 

 [facility] [place] [facility]  [address] [id number] [place] [address]-
6957  29 Aug, 2014   

 

 [facility] [place]  [address] [facility] [address] [id number] [place] [id number]  25 Aug, 
2014   

 

 [facility] [place]  [address] [facility] [address] [id number] [place] [id number]  25 Aug, 
2014   

 

 [facility] [place] [facility]  [address] [id number] [place] [address]-
1506  25 Aug, 2014   

 

 [facility] [place] [facility]  [address] [id number] [place] [address]-
3477  25 Aug, 2014   

 

 [facility] [place]  [address] [facility] [address] [id number] [place] [id number]  18 Aug, 
2014   

 

 [facility] [place] [facility]  [address] [id number] [place] [address]-
4356  18 Aug, 2014   

 

 [facility] [place]  [address] [facility] [address] [id number] [place] [id number]  14 Aug, 
2014   

 

 [facility]  [address] [id number] [place] [address]  14 Aug, 2014   

 

 [facility] [place] [facility]  [address] [id number] [place] [address]-
2366  11 Aug, 2014   

 

 [facility] [place]  [address] [id number] [place] [id number]  11 Aug, 
2014   

 

 [facility] [place] [facility]  [address] [id number] [place] [address]-
7636  11 Aug, 2014   

 

 [facility]  [address] [id number] [place] [address]-
6115  11 Aug, 2014   

 

 [facility] [place] [facility]  [address] [id number] [place] [address]-
1559     

 

 [facility] [place]  [address] [id number] [place] [id number]     

 

 [facility] [place] [facility]  [address] [id number] [place] [address]-
5376     

 

 [facility] [place] [facility]  [address] [id number] [place] [address]-
4009     

 

 [facility] [place]  [address] [id number] [place] [id number]     

 

 [facility]  [address] [id number] [place] [address]-
[address]     

 

 [facility] [place]  [address] [id number] [place] [id number]  30 May, 
2014   

 

 [facility] [place] [facility]  [address] [id number] [place] [address]-
1726  30 May, 2014   

 

 [facility] [place]  [address] [id number] [place] [id number]  14 May, 
2014   

 

 [facility] [place] [facility]  [address] [id number] [place] [address]-
0576  14 May, 2014   

 

 [facility] [place]  [address] [id number] [place] [id number]  05 May, 
2014   

 

 [facility]  [address] [facility] [id number] [place] [address]-
2176  05 May, 2014   

 

 [facility] [place]  [address] [facility] [address] [id number] [place] [id number]     

 

 [facility]  [address] [facility] [id number] [place] [address]-
3446     

 

 [facility] [place]  [address] [facility] [address] [id number] [place] [id number]     

 

 [facility]  [address] [facility] [id number] [place] [address]-
1816     

 

 [facility] [place]  [address] [facility] [address] [id number] [place] [id number]  10 Feb, 
2014   

 

 [facility]  [address] [facility] [id number] [place] [address]-
[address]886  10 Feb, 2014   







IMMUNIZATIONS

No Known Immunizations



SOCIAL HISTORY

Never Assessed



REASON FOR VISIT

Medication refill request



PLAN OF CARE





VITAL SIGNS





MEDICATIONS







 Medication  Instructions  Dosage  Frequency  Start Date  End Date  Duration  
Status

 

 Lantus SoloStar 100 unit/ml     INJECT 50 UNITS SUB-Q TWICE DAILY              
Active

 

 Klor-Con 10 10 MEQ     take 1 tablet by Oral route with food  1 time per day  
           Active







RESULTS

No Results



PROCEDURES

No Known procedures



INSTRUCTIONS





MEDICATIONS ADMINISTERED

No Known Medications



MEDICAL (GENERAL) HISTORY







 Type  Description  Date

 

 Medical History  Diabetes mellitus without mention of complication, type II or 
unspecified type, not stated as uncontrolled   

 

 Medical History  Depressive disorder, not elsewhere classified   

 

 Medical History  Anxiety state, unspecified   

 

 Medical History  Effusion of lower leg joint   

 

 Medical History  Generalized osteoarthrosis, unspecified site   

 

 Medical History  Other and unspecified hyperlipidemia   

 

 Medical History  Abnormal weight gain   

 

 Medical History  Effusion of joint, site unspecified   

 

 Medical History  Esophageal reflux   

 

 Medical History  hypertension   

 

 Medical History  colonoscopy- inscreased polyp   

 

 Surgical History  Tonsillectomy   

 

 Surgical History  Orthopedic: Bilateral Knee x2   

 

 Surgical History  colonoscopy  2017

 

 Hospitalization History  Surgery(s)/Childbirth(s) only

## 2019-02-26 NOTE — XMS REPORT
[facility]

 Created on: 2018



Duyen Larkin

External Reference #: [id number]

: 1965

Sex: Female



Demographics







 Address  [address] [place]  [address]

 

 Preferred Language  Unknown

 

 Marital Status  Unknown

 

 [employer] Affiliation  Unknown

 

 Race  Unknown

 

 Ethnic Group  Unknown





Author







 Author  LIYAH LOWE

 

 [facility]

 

 Address  [address] [place]  [address]



 

 Phone  [phone]







Care Team Providers







 Care Team Member Name  Role  Phone

 

 LIYAH LOWE  Unavailable  [phone]







PROBLEMS







 Type  Condition  ICD9-CM Code  QDH33-FT Code  Onset Dates  Condition Status  
SNOMED Code

 

 Problem  Effusion of lower leg joint  719.06        Active  465784343

 

 Problem  Esophageal reflux  530.81        Active  719127060

 

 Problem  Dysphagia, unspecified type     R13.10     Active  76531287

 

 Problem  Other hyperlipidemia     E78.4     Active  89005092

 

 Problem  Unilateral primary osteoarthritis, left knee     M17.12     Active  
776257696

 

 Problem  Type 2 diabetes mellitus without complications     E11.9     Active  
461861130

 

 Problem  Hypothyroidism (acquired)     E03.9     Active  305158114

 

 Problem  Mild episode of recurrent major depressive disorder     F33.0     
Active  541512577

 

 Problem  COPD with exacerbation     J44.1     Active  201638142

 

 Problem  Long term current use of insulin     Z79.4     Active  317571207

 

 Problem  Diabetic polyneuropathy associated with type 2 diabetes mellitus     
E11.42     Active  50148266

 

 Problem  Cataracts, bilateral     H26.9     Active  97797713

 

 Problem  Claudication     I73.9     Active  409441796

 

 Problem  Primary generalized (osteo)arthritis     M15.0     Active  528915597

 

 Problem  Chronic renal insufficiency, stage II (mild)     N18.2     Active  
862147493

 

 Problem  Chronic obstructive pulmonary disease with acute exacerbation     
J44.1     Active  601808392

 

 Problem  Primary osteoarthritis of both knees     M17.0     Active  441463398

 

 Problem  Type 2 diabetes mellitus with diabetic neuropathy, without long-term 
current use of insulin     E11.40     Active  66093052

 

 Problem  Major depressive disorder, single episode, unspecified     F32.9     
Active  42279130

 

 Problem  Anxiety disorder, unspecified     F41.9     Active  761278769

 

 Problem  Essential (primary) hypertension     I10     Active  94921433

 

 Problem  Peripheral arterial occlusive disease     I77.9     Active  483316997

 

 Problem  Old tear of lateral meniscus of left knee, unspecified tear type     
M23.201     Active  585772453

 

 Problem  Hyperlipidemia LDL goal <130     E78.5     Active  46518937

 

 Problem  Effusion, left knee     M25.462     Active  687735114

 

 Problem  Type 2 diabetes mellitus with hyperglycemia     E11.65     Active  
09995263







ALLERGIES

No Information



ENCOUNTERS







 Encounter  Location  Date  Diagnosis

 

 [facility] [place]  [address] [facility] [address] [id number] [place], [place] [id number]  02 May, 
2018   

 

 [facility] [place]  [address] [facility] [address] [id number] [place] [id number]     

 

 [facility] [place]  [address] [facility] [address] [id number] [place] [id number]     

 

 [facility] [place]  [address] [facility] [address] [id number] [place] [id number]     

 

 [facility] [place]  [address] [facility] [address] [id number] [place] [id number]  23 Mar, 
2018  Type 2 diabetes mellitus without complications E11.9

 

 [facility] [place]  [address] [place] [address] [address] [id number] [place] [id number]  23 Mar, 
2018   

 

 [facility]  [address] [facility] [id number] [place] [address]  13 Mar, 2018   

 

 [facility]  [address] [facility] [id number] [place] [address]  09 Mar, 2018  Type 2 diabetes mellitus without complications E11.9

 

 [facility] [place]  [address] [place] [address] [address] [id number] [place] [id number]     

 

 [facility] [place]  [address] [facility] [address] [id number] [place] [id number]    Diabetic polyneuropathy associated with type 2 diabetes mellitus E11.42

 

 [facility] [place]  [address] [facility] [address] [id number] [place] [id number]     

 

 [facility] [place]  [address] [facility] [address] [id number] [place] [id number]  21 Dec, 
2017  Type 2 diabetes mellitus without complications E11.9 ; Long term current 
use of insulin Z79.4 ; High risk sexual behavior Z72.51 ; BMI 40.0-44.9, adult 
Z68.41 and Encounter for immunization Z23

 

 [facility] [place]  [address] [facility] [address] [id number] [place] [id number]  04 Dec, 
2017   

 

 [facility] [place]  [address] [facility] [address] [id number] [place] [id number]  04 Dec, 
2017   

 

 [facility] [place]  [address] [facility] [address] [id number] [place], [place] [id number]    COPD with exacerbation J44.1 and BMI 40.0-44.9, adult Z68.41

 

 [facility] [place]  [address] [facility] [address] [id number] [place] [id number]  09 Oct, 
2017  Encounter for screening mammogram for malignant neoplasm of breast Z12.31 
; Well woman exam Z01.419 and High risk sexual behavior Z72.51

 

 [facility] [place]  [address] [place] [address] [address] [id number] [place] [id number]  11 Sep, 
2017   

 

 [facility] [place]  [address] [place] [address] C [id number] [place] [id number]  11 Sep, 
2017  Type 2 diabetes mellitus without complications E11.9

 

 [facility] [place]  [address] [place] [address] C [id number] [place] [id number]  11 Sep, 
2017   

 

 [facility] [place]  [address] [place] [address] C [id number] [place], [place] [id number]  11 Sep, 
2017  Old tear of lateral meniscus of left knee, unspecified tear type M23.201

 

 [facility] [place]  [address] [place] [address] C [id number] [place], [place] [id number]  11 Sep, 
2017  Other type of osteoarthritis, unspecified site M19.90 ; Type 2 diabetes 
mellitus without complications E11.9 ; Primary osteoarthritis of both knees 
M17.0 ; Chronic renal insufficiency, stage II (mild) N18.2 ; Diabetic 
polyneuropathy associated with type 2 diabetes mellitus E11.42 and Exposure to 
hepatitis C Z20.5

 

 [facility] [place]  [address] [place] [address] [address] [id number] [place] [id number]  06 Sep, 
2017  Type 2 diabetes mellitus without complications E11.9

 

 [facility] [place]  [address] [facility] [address] [id number] [place] [id number]  30 Aug, 
2017   

 

 [facility] [place]  [address] [facility] [address] [id number] [place] [id number]  30 Aug, 
2017   

 

 [facility] [place]  [address] [place] [address] C [id number] [place] [id number]  16 Aug, 
2017  Other type of osteoarthritis, unspecified site M19.90

 

 [facility] [place]  [address] [facility] [address] [id number] [place] [id number]  15 Aug, 
2017  Type 2 diabetes mellitus without complications E11.9

 

 [facility] [place]  [address] [facility] [address] [id number] [place] [id number]  07 Aug, 
2017   

 

 [facility] [place]  [address] [facility] [address] [id number] [place] [id number]     

 

 [facility] [place]  [address] [facility] [address] [id number] [place] [id number]     

 

 [facility] [place]  [address] [place] [address] [address] [id number] [place], [place] [id number]    Type 2 diabetes mellitus without complications E11.9 ; Type 2 diabetes 
mellitus with diabetic neuropathy, without long-term current use of insulin 
E11.40 ; Primary osteoarthritis of both knees M17.0 and Chronic renal 
insufficiency, stage II (mild) N18.2

 

 [facility] [place]  [address] [place] [address] [address] [id number] [place] [id number]     

 

 [facility] [place]  [address] [place] [address] [address] [id number] [place] [id number]  30 May, 
2017  Hyperlipidemia LDL goal <130 E78.5

 

 [facility] [place]  [address] [place] [address] [address] [id number] [place] [id number]  19 May, 
2017  Type 2 diabetes mellitus without complications E11.9

 

 [facility]  [address] [facility] [id number] [place] [address]  16 May, 2017   

 

 [facility] [place]  [address] [facility] [address] [id number] [place] [id number]  08 May, 
2017   

 

 [facility] [place]  [address] [facility] [address] [id number] [place] [id number]  01 May, 
2017   

 

 [facility] [place]  [address] [place] [address] [address] [id number] [place], [place] [id number]     

 

 [facility] [place]  [address] [facility] [address] [id number] [place] [id number]    Primary generalized (osteo)arthritis M15.0 and Type 2 diabetes mellitus 
without complications E11.9

 

 [facility] [place]  [address] [facility] [address] [id number] [place], [place] [id number]    Old tear of lateral meniscus of left knee, unspecified tear type M23.201

 

 [facility] [place]  [address] [facility] [address] [id number] [place] [id number]     

 

 [facility] [place]  [address] [facility] [address] [id number] [place], [place] 349224531     

 

 [facility] [place]  [address] [facility] [address] [id number] [place] [id number]     

 

 [facility] [place]  [address] [facility] [address] [id number] [place], [place] [id number]  20 Mar, 
2017  Type 2 diabetes mellitus without complications E11.9

 

 [facility] [place]  [address] [place] [address] [address] [id number] [place] [id number]  13 Mar, 
2017  Type 2 diabetes mellitus without complications E11.9

 

 [facility] [place]  [address] [place] [address] [address] [id number] [place] [id number]  13 Mar, 
2017  Type 2 diabetes mellitus without complications E11.9

 

 [facility] [place]  [address] [place] [address] [address] [id number] [place] [id number]  04 Mar, 
2017  Type 2 diabetes mellitus without complications E11.9

 

 [facility] [place]  [address] [place] [address] [address] [id number] [place] [id number]     

 

 [facility] [place]  [address] [facility] [address] [id number] [place] [id number]    Chronic obstructive pulmonary disease with acute exacerbation J44.1

 

 [facility] [place]  [address] [place] [address] [address] [id number] [place] [id number]     

 

 [facility] [place]  [address] [place] [address] [address] [id number] [place] [id number]    Dysuria R30.0 ; Essential (primary) hypertension I10 ; Hypothyroidism (
acquired) E03.9 ; Type 2 diabetes mellitus without complications E11.9 and Mild 
episode of recurrent major depressive disorder F33.0

 

 [facility] [place]  [address] [facility] [address] [id number] [place] [id number]     

 

 [facility] [place]  [address] [facility] [address] [id number] [place], [place] [id number]    Dysuria R30.0 ; Vaginal candidiasis B37.3 and Candidiasis B37.9

 

 [facility] [place]  [address] [facility] [address] [id number] [place] [id number]     

 

 [facility] [place]  [address] [facility] [address] [id number] [place], [place] [id number]  10 Octaviano, 
2017  COPD with exacerbation J44.1 and Dysphagia, unspecified type R13.10

 

 [facility] [place]  [address] [facility] [address] [id number] [place], [place] [id number]  27 Dec, 
2016   

 

 [facility] [place]  [address] [place] [address] [address] [id number] [place] [id number]  12 Dec, 
2016  Essential (primary) hypertension I10 ; Hypothyroidism (acquired) E03.9 ; 
Type 2 diabetes mellitus without complications E11.9 ; Primary generalized (
osteo)arthritis M15.0 ; Major depressive disorder, single episode, unspecified 
F32.9 ; Unilateral primary osteoarthritis, left knee M17.12 ; Hyperlipidemia 
LDL goal <130 E78.5 and Dysphagia, unspecified type R13.10

 

 [facility] [place]  [address] [place] [address] [address] [id number] [place] [id number]  01 Dec, 
2016   

 

 [facility] [place]  [address] [place] [address] [address] [id number] [place] 103373381     

 

 [facility] [place]  [address] [facility] [address] [id number] [place] 522338056     

 

 [facility] [place]  [address] [place] [address] [address] [id number] [place] 857054570     

 

 [facility] [place]  [address] [place] [address] [address] [id number] [place] [id number]     

 

 [facility] [place]  [address] [facility] [address] [id number] [place] [id number]     

 

 [facility] [place]  [address] [facility] [address] [id number] [place], [place] [id number]    Type 2 diabetes mellitus without complications E11.9 ; Primary 
generalized (osteo)arthritis M15.0 ; Effusion, left knee M25.462 ; Old tear of 
lateral meniscus of left knee, unspecified tear type M23.201 and Fatigue, 
unspecified type R53.83

 

 [facility] [place]  [address] [place] [address] [id number] [place], [place] [id number]  26 Sep, 
2016  Type 2 diabetes mellitus without complications E11.9 ; Claudication I73.9 
; Pain in right leg M79.604 and Pain of left leg M79.605

 

 [facility] [place]  [address] [place] [address] C [id number] [place] [id number]  14 Sep, 
2016   

 

 [facility] [place]  [address] [place] [address] [id number] [place], [place] [id number]  12 Sep, 
2016   

 

 [facility] [place]  [address] [place] [address] [id number] [place] [id number]  18 Aug, 
2016  Type 2 diabetes mellitus with hyperglycemia E11.65 ; Long term current 
use of insulin Z79.4 ; Anxiety disorder, unspecified F41.9 ; Essential (primary
) hypertension I10 ; Major depressive disorder, single episode, unspecified 
F32.9 and Peripheral arterial occlusive disease I77.9

 

 [facility] [place]  [address] [place] [address] [id number] [place], [place] [id number]  17 Aug, 
2016   

 

 [facility] [place]  [address] [place] [address] C [id number] [place], [place] [id number]  11 Aug, 
2016   

 

 [facility] [place]  [address] [place] [address] [id number] [place], [place] [id number]  11 Aug, 
2016   

 

 [facility] [place]  [address] [place] [address] [id number] [place] [id number]  10 Aug, 
2016   

 

 [facility] [place]  [address] [place] [address] [id number] [place] [id number]  05 Aug, 
2016  Acute midline low back pain with right-sided sciatica M54.41

 

 [facility]  [address] [facility] [id number] [place] [phone]-
[address]  05 Aug, 2016  Chest pain, unspecified type R07.9 ; Palpitations R00.2 ; 
Claudication I73.9 ; Generalized pain R52 and Type 2 diabetes mellitus without 
complications E11.9

 

 [facility] [place]  [address] [place] [address] [id number] [place], [place] [id number]    Acute midline low back pain with right-sided sciatica M54.41 ; Dysuria 
R30.0 ; Claudication I73.9 ; Peripheral arterial occlusive disease I77.9 ; 
Shortness of breath R06.02 ; Effusion, left knee M25.462 and Type 2 diabetes 
mellitus without complications E11.9

 

 [facility] [place]  [address] [facility] [address] [id number] [place] [id number]     

 

 [facility] [place]  [address] [facility] [address] [id number] [place] [id number]     

 

 [facility] [place]  [address] [facility] [address] [id number] [place] [id number]     

 

 [facility] [place]  [address] [facility] [address] [id number] [place], [place] [id number]    Type 2 diabetes mellitus without complications E11.9 ; Other 
hyperlipidemia E78.4 ; Peripheral arterial occlusive disease I77.9 ; Essential (
primary) hypertension I10 and Other fatigue R53.83

 

 [facility] [place]  [address] [facility] [address] [id number] [place], [place] [id number]  18 May, 
2016   

 

 [facility] [place]  [address] [place] [address] [address] [id number] [place] [id number]  06 May, 
2016   

 

 [facility] [place]  [address] [place] [address] [address] [id number] [place] [id number]  05 May, 
2016  Chest pain, unspecified type R07.9 ; Peripheral arterial occlusive 
disease I77.9 ; Anxiety disorder, unspecified F41.9 ; Essential (primary) 
hypertension I10 ; Type 2 diabetes mellitus without complications E11.9 and 
Other hyperlipidemia E78.4

 

 [facility] [place]  [address] [facility] [address] [id number] [place] [id number]  04 May, 
2016   

 

 [facility] [place]  [address] [facility] [address] [id number] [place] [id number]     

 

 [facility] [place]  [address] [facility] [address] [id number] [place], [place] [id number]     

 

 [facility] [place]  [address] [facility] [address] [id number] [place], [place] [id number]    Type 2 diabetes mellitus without complications E11.9 ; Peripheral 
arterial occlusive disease I77.9 ; Primary generalized (osteo)arthritis M15.0 ; 
Major depressive disorder, single episode, unspecified F32.9 ; Anxiety disorder
, unspecified F41.9 and Essential (primary) hypertension I10

 

 [facility] [place]  [address] [place] [address] [address] [id number] [place] [id number]     

 

 [facility] [place]  [address] [place] [address] [address] [id number] [place] [id number]     

 

 [facility]  [address] [facility] [id number] [place] [address]-
[address]  02 Mar, 2016   

 

 [facility] [place]  [address] [place] [address] [address] [id number] [place] [id number]     

 

 [facility] [place]  [address] [place] [address] [address] [id number] [place], [place] [id number]    Bronchitis J40 ; Shortness of breath R06.02 and Wheezing R06.2

 

 [facility] [place]  [address] [place] [address] [address] [id number] [place] [id number]     

 

 [facility] [place]  [address] [place] [address] [address] [id number] [place] [id number]     

 

 [facility] [place]  [address] [place] [address] [address] [id number] [place] 750640084     

 

 [facility] [place]  [address] [place] [address] [address] [id number] [place] [id number]     

 

 [facility] [place]  [address] [place] [address] [address] [id number] [place] [id number]    Type 2 diabetes mellitus without complications E11.9 ; Claudication I73.9 
; Other hyperlipidemia E78.4 ; Cataracts, bilateral H26.9 and Other fatigue 
R53.83

 

 [facility] [place]  [address] [facility] [address] [id number] [place], [place] [id number]  28 Oct, 
2015   

 

 [facility] [place]  [address] [place] [address] [address] [id number] [place] [id number]  22 Oct, 
2015   

 

 [facility] [place]  [address] [place] [address] [address] [id number] [place] [id number]  16 Oct, 
2015   

 

 [facility] [place]  [address] [place] [address] [address] [id number] [place] [id number]  14 Oct, 
2015  Type 2 diabetes mellitus without complications E11.9 ; Other 
hyperlipidemia E78.4 ; Primary generalized (osteo)arthritis M15.0 and 
Claudication I73.9

 

 [facility] [place]  [address] [facility] [address] [id number] [place] [id number]  12 Oct, 
2015   

 

 [facility] [place]  [address] [facility] [address] [id number] [place] [id number]  26 Aug, 
2015   

 

 [facility] [place]  [address] [facility] [address] [id number] [place] [id number]  12 Aug, 
2015   

 

 [facility] [place]  [address] [facility] [address] [id number] [place] [id number]  12 Aug, 
2015   

 

 [facility] [place]  [address] [facility] [address] [id number] [place] [id number]  10 Aug, 
2015   

 

 [facility] [place]  [address] [facility] [address] [id number] [place] [id number]  05 Aug, 
2015   

 

 [facility] [place]  [address] [place] [address] [address] [id number] [place] [id number]  11 May, 
2015  Diabetes mellitus without mention of complication, type II or unspecified 
type, not stated as uncontrolled 250.00 ; Bronchitis 490 and Effusion of lower 
leg joint 719.06

 

 [facility] [place]  [address] [place] [address] [address] [id number] [place], [place] [id number]  01 May, 
2015   

 

 [facility] [place]  [address] [place] [address] C [id number] [place], [place] [id number]  01 May, 
2015  Bronchitis 490 and Wheezing 786.07

 

 [place] [address] [id number] [address]-
[address]     

 

 [place] [address] [facility] [id number][place] [address]-
2546     

 

 [place] [address] [id number] [address]
2546     

 

 [facility]  [address] [place] [address] C [id number] [place] [id number]  19 Mar, 
2015   

 

 [place] [address] [facility] [id number][place] [address]
2546  19 Mar, 2015   

 

 [facility] [place]  [address] [id number] [place] [id number]  09 Mar, 
2015   

 

 [facility] [place]  [address] [facility] [id number] [facility] [place] [address]-
[address]  09 Mar, 2015   

 

 [facility] [place]  [address] [id number] [place] [id number]     

 

 [facility] [place]  [address] [facility] [id number] [facility] [place] [address]-
[address]     

 

 [facility] [place]  [address] [facility] [id number] [facility] [place] [address]-
[address]  10 Feb, 2015   

 

 [facility] [place]  [address] [id number] [place] [id number]     

 

 [facility] [place]  [address] [facility] [address] [id number] [place], [place] [id number]     

 

 [facility] [place] [facility]  [address] [facility] [id number] [place] [address]-
[address]     

 

 [facility] [place] [facility]  [address] [facility] [id number] [facility] [place] [address]-
[address]     

 

 [facility] [place]  [address] [facility] [address] [id number] [place] [id number]     

 

 [facility] [place] [facility]  [address] [facility] [id number] [place] [address]-
[address]     

 

 [facility] [place]  [address] [facility] [address] [id number] [place] [id number]     

 

 [facility] [place] [facility]  [address] [facility] [id number] [facility] [place] [address]-
[address]     

 

 [facility] [place]  [address] [id number] [place] [id number]  24 Dec, 
2014   

 

 [facility]  [address] [facility] [id number] [place] [address]-
6666  24 Dec, 2014   

 

 [facility] [place]  [address] [facility] [address] [id number] [place] [id number]  18 Dec, 
2014   

 

 [facility] [place] [facility]  [address] [id number] [place] [address]-
[address]  18 Dec, 2014   

 

 [facility] [place]  [address] [facility] [address] [id number] [place] [id number]  09 Dec, 
2014   

 

 [facility] [place] [facility]  [address] [facility] [id number] [facility] [place] [address]  09 Dec, 2014   

 

 [facility] [place]  [address] [id number] [place] [id number]  02 Dec, 
2014   

 

 [facility] [place] [facility]  [address] [id number] [place] [address]-
[address]  02 Dec, 2014   

 

 [facility] [place]  [address] [id number] [place] [id number]     

 

 [facility] [place] [facility]  [address] [facility] [id number] [facility] [place] [address]     

 

 [facility] [place]  [address] [facility] [address] [id number] [place] [id number]     

 

 [facility] [place] [facility]  [address] [facility] [id number] [facility] [place] [address]     

 

 [facility] [place]  [address] [facility] [address] [id number] [place] [id number]  31 Oct, 
2014   

 

 [facility] [place] [facility]  [address] [facility] [id number] [facility] [place] [address]
2009  31 Oct, 2014   

 

 [facility] [place]  [address] [facility] [address] [id number] [place] [id number]  16 Oct, 
2014   

 

 [facility] [place] [facility]  [address] [id number] [place] [address]-
[address]  16 Oct, 2014   

 

 [facility] [place]  [address] [facility] [address] [id number] [place] [id number]  10 Oct, 
2014   

 

 [facility] [place] [facility]  [address] [id number] [facility] [place] [address]
14[address]  10 Oct, 2014   

 

 [facility] [place]  [address] [facility] [address] [id number] [place] [id number]  26 Sep, 
2014   

 

 [facility] [place] [facility]  [address] [id number] [place] [address]-
8752  26 Sep, 2014   

 

 [facility] [place]  [address] [id number] [place] [id number]  29 Aug, 
2014   

 

 [facility] [place] [facility]  [address] [facility] [id number] [facility] [place] [address]  29 Aug, 2014   

 

 [facility] [place]  [address] [id number] [place] [id number]  25 Aug, 
2014   

 

 [facility] [place]  [address] [id number] [place] [id number]  25 Aug, 
2014   

 

 [facility] [place] [facility]  [address] [id number] [place] [address]-
49[address]  25 Aug, 2014   

 

 [facility] [place] [facility]  [address] [facility] [id number] [place] [address]-
8522  25 Aug, 2014   

 

 [facility] [place]  [address] [facility] [address] [id number] [place] [id number]  18 Aug, 
2014   

 

 [facility] [place] [facility]  [address] [id number] [place] [address]-
46[address]  18 Aug, 2014   

 

 [facility] [place]  [address] [facility] [address] [id number] [place] [id number]  14 Aug, 
2014   

 

 [facility] [place] [facility]  [address] [facility] [id number] [place] [address]-
3568  14 Aug, 2014   

 

 [facility] [place] [facility]  [address] [facility] [id number] [place] [address]-
7803  11 Aug, 2014   

 

 [facility] [place]  [address] [facility] [address] [id number] [place] [id number]  11 Aug, 
2014   

 

 [facility] [place] [facility]  [address] [facility] [id number] [place] [address]-
6495  11 Aug, 2014   

 

 [facility] [place] [facility]  [address] [facility] [id number] [place] [address]-
5159  11 Aug, 2014   

 

 [facility] [place] [facility]  [address] [facility] [id number] [place] [address]-
3988     

 

 [facility] [place]  [address] [id number] [place] [id number]     

 

 [facility] [place] [facility]  [address] [id number] [facility] [place] [address]     

 

 [facility] [place]  [address] [id number] [facility] [place] [address]-
5106     

 

 [facility] [place]  [address] [id number] [place] [id number]     

 

 [facility] [place] [facility]  [address] [id number] [place] [address]-
[address]     

 

 [facility] [place]  [address] [id number] [place] [id number]  30 May, 
2014   

 

 [facility] [place] [facility]  [address] [id number] [place] [address]-
4116  30 May, 2014   

 

 [facility] [place]  [address] [id number] [place] [id number]  14 May, 
2014   

 

 [facility] [place] [facility]  [address] [id number] [place] [address]-
1139  14 May, 2014   

 

 [facility] [place]  [address] [id number] [place] [id number]  05 May, 
2014   

 

 [facility] [place] [facility]  [address] [id number] [place] [address]-
50[address]  05 May, 2014   

 

 [facility] [place]  [address] [id number] [place] [id number]     

 

 [facility] [place] [facility]  [address] [id number] [place] [address]-
[address]     

 

 [facility] [place]  [address] [id number] [place] [id number]     

 

 [facility] [place] [facility]  [address] [id number] [facility] [place] [address]     

 

 [facility] [place]  [address] [id number] [place] [id number]  10 Feb, 
2014   

 

 [facility] [place] [facility]  [address] [facility] [id number] [place] [address]-
7816  10 Feb, 2014   







IMMUNIZATIONS

No Known Immunizations



SOCIAL HISTORY

Never Assessed



REASON FOR VISIT

PALS



PLAN OF CARE





VITAL SIGNS





MEDICATIONS







 Medication  Instructions  Dosage  Frequency  Start Date  End Date  Duration  
Status

 

 Tradjenta 5 mg  Orally Once a day  1 tablet  24h       90 days  
Active







RESULTS

No Results



PROCEDURES

No Known procedures



INSTRUCTIONS





MEDICATIONS ADMINISTERED

No Known Medications



MEDICAL (GENERAL) HISTORY







 Type  Description  Date

 

 Medical History  Diabetes mellitus without mention of complication, type II or 
unspecified type, not stated as uncontrolled   

 

 Medical History  Depressive disorder, not elsewhere classified   

 

 Medical History  Anxiety state, unspecified   

 

 Medical History  Effusion of lower leg joint   

 

 Medical History  Generalized osteoarthrosis, unspecified site   

 

 Medical History  Other and unspecified hyperlipidemia   

 

 Medical History  Abnormal weight gain   

 

 Medical History  Effusion of joint, site unspecified   

 

 Medical History  Esophageal reflux   

 

 Medical History  hypertension   

 

 Medical History  colonoscopy- inscreased polyp   

 

 Surgical History  Tonsillectomy   

 

 Surgical History  Orthopedic: Bilateral Knee x2   

 

 Surgical History  colonoscopy  

 

 Hospitalization History  Surgery(s)/Childbirth(s) only

## 2019-02-26 NOTE — XMS REPORT
[facility]

 Created on: 2018



Duyen Larkin

External Reference #: [id number]

: 1965

Sex: Female



Demographics







 Address  [address] [place]  [address]

 

 Preferred Language  Unknown

 

 Marital Status  Unknown

 

 Taoism Affiliation  Unknown

 

 Race  Unknown

 

 Ethnic Group  Unknown





Author







 Author  LIYAH LOWE

 

 [facility] [place]

 

 Address  [address] [place]  [address]



 

 Phone  [phone]







Care Team Providers







 Care Team Member Name  Role  Phone

 

 LIYAH LOWE  Unavailable  [phone]







PROBLEMS







 Type  Condition  ICD9-CM Code  ZUA53-ET Code  Onset Dates  Condition Status  
SNOMED Code

 

 Problem  Effusion of lower leg joint  719.06        Active  048390906

 

 Problem  Esophageal reflux  530.81        Active  305271677

 

 Problem  Other hyperlipidemia     E78.4     Active  09318560

 

 Problem  Type 2 diabetes mellitus without complications     E11.9     Active  
134443208

 

 Problem  Primary generalized (osteo)arthritis     M15.0     Active  393839839

 

 Problem  COPD with exacerbation     J44.1     Active  544107065

 

 Problem  Claudication     I73.9     Active  621244308

 

 Problem  Mild episode of recurrent major depressive disorder     F33.0     
Active  739292105

 

 Problem  Cataracts, bilateral     H26.9     Active  86448871

 

 Problem  Chronic obstructive pulmonary disease with acute exacerbation     
J44.1     Active  982753603

 

 Problem  Chronic renal insufficiency, stage II (mild)     N18.2     Active  
444798339

 

 Problem  Primary osteoarthritis of both knees     M17.0     Active  826456536

 

 Problem  Other chronic pain     G89.29     Active  90904527

 

 Problem  Lumbar degenerative disc disease     M51.36     Active  69254345

 

 Problem  Essential (primary) hypertension     I10     Active  53807380

 

 Problem  Anxiety disorder, unspecified     F41.9     Active  419147948

 

 Problem  Peripheral arterial occlusive disease     I77.9     Active  855426955

 

 Problem  Diabetic polyneuropathy associated with type 2 diabetes mellitus     
E11.42     Active  11685148

 

 Problem  Type 2 diabetes mellitus with diabetic neuropathy, without long-term 
current use of insulin     E11.40     Active  23078214

 

 Problem  Chronic obstructive pulmonary disease, unspecified COPD type     
J44.9     Active  15985101

 

 Problem  Long term current use of insulin     Z79.4     Active  727873909

 

 Problem  Type 2 diabetes mellitus with hyperglycemia     E11.65     Active  
48531488

 

 Problem  Old tear of lateral meniscus of left knee, unspecified tear type     
M23.201     Active  758907843

 

 Problem  Major depressive disorder, single episode, unspecified     F32.9     
Active  17918234

 

 Problem  Effusion, left knee     M25.462     Active  789210682

 

 Problem  Dysphagia, unspecified type     R13.10     Active  30992556

 

 Problem  Hyperlipidemia LDL goal <130     E78.5     Active  80501673

 

 Problem  Unilateral primary osteoarthritis, left knee     M17.12     Active  
202975063

 

 Problem  Hypothyroidism (acquired)     E03.9     Active  351600285







ALLERGIES

No Information



ENCOUNTERS







 Encounter  Location  Date  Diagnosis

 

 [facility] [place]  [address] [facility] C [id number] [place] [id number]  21 May, 
2018  Pain in left knee M25.562

 

 [facility] [place]  [address] [place] [address] C [id number] [place] [id number]  21 May, 
2018  Pain in left knee M25.562 and Other chronic pain G89.29

 

 [facility]  [address] [id number] [address]  15 May, 2018   

 

 [facility] [place]  [address] [place] [address] C [id number] [place] [id number]  02 May, 
2018  Type 2 diabetes mellitus without complications E11.9 ; Long term current 
use of insulin Z79.4 ; Unilateral primary osteoarthritis, left knee M17.12 ; 
Lumbar degenerative disc disease M51.36 and Chronic obstructive pulmonary 
disease, unspecified COPD type J44.9

 

 [facility] [place]  [address] [facility] C [id number] [place] [id number]     

 

 [facility] [place]  [address] [facility] C [id number] [place] [address]80[address]     

 

 [facility] [place]  [address] [place] [address] C [id number] [place] 6[address]24     

 

 [facility] [place]  [address] [place] [address] C [id number] [place] [id number]  23 Mar, 
2018  Type 2 diabetes mellitus without complications E11.9

 

 [facility] [place]  [address] [place] [address] C [id number] [place] [id number]  23 Mar, 
2018   

 

 [facility]  [address] [facility] [id number] [place] [address]  13 Mar, 2018   

 

 [facility]  [address] [id number] [address]  09 Mar, 2018  Type 2 diabetes mellitus without complications E11.9

 

 [facility] [place]  [address] [facility] C [id number] [place] [id number]     

 

 [facility] [place]  [address] [facility] C [id number] [place] [id number]    Diabetic polyneuropathy associated with type 2 diabetes mellitus E11.42

 

 [facility] [place]  [address] [facility] C [id number] [place] [address]     

 

 [facility] [place]  [address] [facility] C [id number] [place] [id number]  21 Dec, 
2017  Type 2 diabetes mellitus without complications E11.9 ; Long term current 
use of insulin Z79.4 ; High risk sexual behavior Z72.51 ; BMI 40.0-44.9, adult 
Z68.41 and Encounter for immunization Z23

 

 [facility] [place]  [address] [place] [address] C [id number] [place] [id number]  04 Dec, 
2017   

 

 [facility] [place]  [address] [place] [address] C [id number] [place] 227[address]  04 Dec, 
2017   

 

 [facility] [place]  [address] [place] [address] C [id number] [place], [place] 639084465    COPD with exacerbation J44.1 and BMI 40.0-44.9, adult Z68.41

 

 [facility] [place]  [address] [facility] C [id number] [place] [id number]  09 Oct, 
2017  Encounter for screening mammogram for malignant neoplasm of breast Z12.31 
; Well woman exam Z01.419 and High risk sexual behavior Z72.51

 

 [facility] [place]  [address] [facility] [address] [id number] [place] [id number]  11 Sep, 
2017   

 

 [facility] [place]  [address] [facility] [address] [id number] [place] [address]  11 Sep, 
2017  Type 2 diabetes mellitus without complications E11.9

 

 [facility] [place]  [address] [facility] [address] [id number] [place] 580[address]70[address]  11 Sep, 
2017   

 

 [facility] [place]  [address] [facility] C [id number] [place] [id number]  11 Sep, 
2017  Old tear of lateral meniscus of left knee, unspecified tear type M23.201

 

 [facility] [place]  [address] [facility] [address] [id number] [place] [address]16  11 Sep, 
2017  Other type of osteoarthritis, unspecified site M19.90 ; Type 2 diabetes 
mellitus without complications E11.9 ; Primary osteoarthritis of both knees 
M17.0 ; Chronic renal insufficiency, stage II (mild) N18.2 ; Diabetic 
polyneuropathy associated with type 2 diabetes mellitus E11.42 and Exposure to 
hepatitis C Z20.5

 

 [facility] [place]  [address] [facility] [address] [id number] [place] [id number]  06 Sep, 
2017  Type 2 diabetes mellitus without complications E11.9

 

 [facility] [place]  [address] [facility] [address] [id number] [place], [place] [address]  30 Aug, 
2017   

 

 [facility] [place]  [address] [facility] [address] [id number] [place] 29[address]  30 Aug, 
2017   

 

 [facility] [place]  [address] [facility] [address] [id number] [place] [address]697[address]36  16 Aug, 
2017  Other type of osteoarthritis, unspecified site M19.90

 

 [facility] [place]  [address] [facility] [address] [id number] [place] 35[address]  15 Aug, 
2017  Type 2 diabetes mellitus without complications E11.9

 

 [facility] [place]  [address] [facility] [address] [id number] [place] 5822[address]432  07 Aug, 
2017   

 

 [facility] [place]  [address] [facility] [address] [id number] [place] [address]     

 

 [facility] [place]  [address] [facility] [address] [id number] [place] 936[address]     

 

 [facility] [place]  [address] [facility] [address] [id number] [place] 28[address]    Type 2 diabetes mellitus without complications E11.9 ; Type 2 diabetes 
mellitus with diabetic neuropathy, without long-term current use of insulin 
E11.40 ; Primary osteoarthritis of both knees M17.0 and Chronic renal 
insufficiency, stage II (mild) N18.2

 

 [facility] [place]  [address] [facility] [address] [id number] [place], [place] [address]15[address]     

 

 [facility] [place]  [address] [facility] [address] [id number] [place] 757[address]  30 May, 
2017  Hyperlipidemia LDL goal <130 E78.5

 

 [facility] [place]  [address] [facility] [address] [id number] [place] [address]  19 May, 
2017  Type 2 diabetes mellitus without complications E11.9

 

 [facility]  [address] [facility] [id number] [place] [address]  16 May, 2017   

 

 [facility] [place]  [address] [facility] [address] [id number] [place] [address]  08 May, 
2017   

 

 [facility] [place]  [address] [facility] [address] [id number] [place] 4007167[address]  01 May, 
2017   

 

 [facility] [place]  [address] [facility] [address] [id number] [place] 6398[address]     

 

 [facility] [place]  [address] [facility] [address] [id number] [place] [address]    Primary generalized (osteo)arthritis M15.0 and Type 2 diabetes mellitus 
without complications E11.9

 

 [facility] [place]  [address] [facility] [address] [id number] [place] [address]    Old tear of lateral meniscus of left knee, unspecified tear type M23.201

 

 [facility] [place]  [address] [facility] [address] [id number] [place] 936310443     

 

 [facility] [place]  [address] [facility] [address] [id number] [place] 44827[address]72     

 

 [facility] [place]  [address] [facility] [address] [id number] [place] 910709798     

 

 [facility] [place]  [address] [facility] [address] [id number] [place] [address]  20 Mar, 
2017  Type 2 diabetes mellitus without complications E11.9

 

 [facility] [place]  [address] [facility] [address] [id number] [place] 508092073  13 Mar, 
2017  Type 2 diabetes mellitus without complications E11.9

 

 [facility] [place]  [address] [facility] [address] [id number] [place] 680955477  13 Mar, 
2017  Type 2 diabetes mellitus without complications E11.9

 

 [facility] [place]  [address] [facility] [address] [id number] [place] 4167[address]871  04 Mar, 
2017  Type 2 diabetes mellitus without complications E11.9

 

 [facility] [place]  [address] [place] [address] C [id number] [place] [address]359     

 

 [facility] [place]  [address] [place] [address] C [id number] [place] 7885[address]    Chronic obstructive pulmonary disease with acute exacerbation J44.1

 

 [facility] [place]  [address] [place] [address] C [id number] [place], [place] 101009418     

 

 [facility] [place]  [address] [place] [address] C [id number] [place], [place] 967[address]    Dysuria R30.0 ; Essential (primary) hypertension I10 ; Hypothyroidism (
acquired) E03.9 ; Type 2 diabetes mellitus without complications E11.9 and Mild 
episode of recurrent major depressive disorder F33.0

 

 [facility] [place]  [address] [place] [address] C [id number] [place] 904058459     

 

 [facility] [place]  [address] [place] [address] C [id number] [place] 467135268    Dysuria R30.0 ; Vaginal candidiasis B37.3 and Candidiasis B37.9

 

 [facility] [place]  [address] [place] [address] C [id number] [place] 2826[address]     

 

 [facility] [place]  [address] [place] [address] C [id number] [place] [id number]  10 Octaviano, 
2017  COPD with exacerbation J44.1 and Dysphagia, unspecified type R13.10

 

 [facility] [place]  [address] [place] [address] C [id number] [place], [place] 718540175  27 Dec, 
2016   

 

 [facility] [place]  [address] [place] [address] C [id number] [place] [id number]  12 Dec, 
2016  Essential (primary) hypertension I10 ; Hypothyroidism (acquired) E03.9 ; 
Type 2 diabetes mellitus without complications E11.9 ; Primary generalized (
osteo)arthritis M15.0 ; Major depressive disorder, single episode, unspecified 
F32.9 ; Unilateral primary osteoarthritis, left knee M17.12 ; Hyperlipidemia 
LDL goal <130 E78.5 and Dysphagia, unspecified type R13.10

 

 [facility] [place]  [address] [place] [address] C [id number] [place] 118702270  01 Dec, 
2016   

 

 [facility] [place]  [address] [facility] [address] [id number] [place], [place] 390224192     

 

 [facility] [place]  [address] [facility] C [id number] [place] 263902207     

 

 [facility] [place]  [address] [facility] [address] [id number] [place] 783383675     

 

 [facility] [place]  [address] [place] [address] C [id number] [place] 525841194     

 

 [facility] [place]  [address] [place] [address] C [id number] [place], [place] 653842090     

 

 [facility] [place]  [address] [place] [address] C [id number] [place], [place] 313338977    Type 2 diabetes mellitus without complications E11.9 ; Primary 
generalized (osteo)arthritis M15.0 ; Effusion, left knee M25.462 ; Old tear of 
lateral meniscus of left knee, unspecified tear type M23.201 and Fatigue, 
unspecified type R53.83

 

 [facility] [place]  [address] [place] [address] C [id number] [place], [place] 799038885  26 Sep, 
2016  Type 2 diabetes mellitus without complications E11.9 ; Claudication I73.9 
; Pain in right leg M79.604 and Pain of left leg M79.605

 

 [facility] [place]  [address] [place] [address] C [id number] [place], [place] 749732247  14 Sep, 
2016   

 

 [facility] [place]  [address] [place] [address] C [id number] [place] 359870034  12 Sep, 
2016   

 

 [facility] [place]  [address] [place] [address] C [id number] [place] 8886[address]20  18 Aug, 
2016  Type 2 diabetes mellitus with hyperglycemia E11.65 ; Long term current 
use of insulin Z79.4 ; Anxiety disorder, unspecified F41.9 ; Essential (primary
) hypertension I10 ; Major depressive disorder, single episode, unspecified 
F32.9 and Peripheral arterial occlusive disease I77.9

 

 [facility] [place]  [address] [place] [address] C [id number] [place] 779518272  17 Aug, 
2016   

 

 [facility] [place]  [address] [place] [address] C [id number] [place] 102599350  11 Aug, 
2016   

 

 [facility] [place]  [address] [facility] [address] [id number] [place] 738179207  11 Aug, 
2016   

 

 [facility] [place]  [address] [facility] [address] [id number] [place] 266406833  10 Aug, 
2016   

 

 [facility] [place]  [address] [place] [address] C [id number] [place] 294499347  05 Aug, 
2016  Acute midline low back pain with right-sided sciatica M54.41

 

 [facility]  [address] [facility] [id number] [place] [address]-
[address]  05 Aug, 2016  Chest pain, unspecified type R07.9 ; Palpitations R00.2 ; 
Claudication I73.9 ; Generalized pain R52 and Type 2 diabetes mellitus without 
complications E11.9

 

 [facility] [place]  [address] [place] [address] C [id number] [place] [id number]    Acute midline low back pain with right-sided sciatica M54.41 ; Dysuria 
R30.0 ; Claudication I73.9 ; Peripheral arterial occlusive disease I77.9 ; 
Shortness of breath R06.02 ; Effusion, left knee M25.462 and Type 2 diabetes 
mellitus without complications E11.9

 

 [facility] [place]  [address] [place] [address] [address] [id number] [place] 904831260     

 

 [facility] [place]  [address] [place] [address] C [id number] [place] 437296325     

 

 [facility] [place]  [address] [place] [address] [address] [id number] [place] 602226428     

 

 [facility] [place]  [address] [place] [address] [address] [id number] [place] [id number]    Type 2 diabetes mellitus without complications E11.9 ; Other 
hyperlipidemia E78.4 ; Peripheral arterial occlusive disease I77.9 ; Essential (
primary) hypertension I10 and Other fatigue R53.83

 

 [facility] [place]  [address] [facility] [address] [id number] [place] 019683108  18 May, 
2016   

 

 [facility] [place]  [address] [place] [address] C [id number] [place] [id number]  06 May, 
2016   

 

 [facility] [place]  [address] [facility] [address] [id number] [place] 252596022  05 May, 
2016  Chest pain, unspecified type R07.9 ; Peripheral arterial occlusive 
disease I77.9 ; Anxiety disorder, unspecified F41.9 ; Essential (primary) 
hypertension I10 ; Type 2 diabetes mellitus without complications E11.9 and 
Other hyperlipidemia E78.4

 

 [facility] [place]  [address] [facility] [address] [id number] [place] 786774880  04 May, 
2016   

 

 [facility] [place]  [address] [place] [address] [address] [id number] [place] 102340719     

 

 [facility] [place]  [address] [place] [address] [address] [id number] [place] [id number]     

 

 [facility] [place]  [address] [place] [address] [address] [id number] [place] [id number]    Type 2 diabetes mellitus without complications E11.9 ; Peripheral 
arterial occlusive disease I77.9 ; Primary generalized (osteo)arthritis M15.0 ; 
Major depressive disorder, single episode, unspecified F32.9 ; Anxiety disorder
, unspecified F41.9 and Essential (primary) hypertension I10

 

 [facility] [place]  [address] [place] [address] [address] [id number] [place] 611434247     

 

 [facility] [place]  [address] [place] [address] [address] [id number] [place] 610918160     

 

 [facility]  [address] [facility] [id number] [place] [address]-
6[address]  02 Mar, 2016   

 

 [facility] [place]  [address] [place] [address] [address] [id number] [place] 137169739     

 

 [facility] [place]  [address] [place] [address] [address] [id number] [place], [place] [id number]    Bronchitis J40 ; Shortness of breath R06.02 and Wheezing R06.2

 

 [facility] [place]  [address] [place] [address] [address] [id number] [place] 952841566     

 

 [facility] [place]  [address] [place] [address] [address] [id number] [place] 825324867     

 

 [facility] [place]  [address] [place] [address] [address] [id number] [place] 991450273     

 

 [facility] [place]  [address] [facility] [address] [id number] [place] [id number]     

 

 [facility] [place]  [address] [facility] [address] [id number] [place] [id number]    Type 2 diabetes mellitus without complications E11.9 ; Claudication I73.9 
; Other hyperlipidemia E78.4 ; Cataracts, bilateral H26.9 and Other fatigue 
R53.83

 

 [facility] [place]  [address] [facility] [address] [id number] [place] 847084894  28 Oct, 
2015   

 

 [facility] [place]  [address] [facility] [address] [id number] [place] 918337191  22 Oct, 
2015   

 

 [facility] [place]  [address] [facility] [address] [id number] [place] 694585734  16 Oct, 
2015   

 

 [facility] [place]  [address] [facility] [address] [id number] [place] [id number]  14 Oct, 
2015  Type 2 diabetes mellitus without complications E11.9 ; Other 
hyperlipidemia E78.4 ; Primary generalized (osteo)arthritis M15.0 and 
Claudication I73.9

 

 [facility] [place]  [address] [facility] [address] [id number] [place] 136333045  12 Oct, 
2015   

 

 [facility] [place]  [address] [facility] [address] [id number] [place] 017905302  26 Aug, 
2015   

 

 [facility] [place]  [address] [facility] [address] [id number] [place] 563865628  12 Aug, 
2015   

 

 [facility] [place]  [address] [facility] [address] [id number] [place] 648986663  12 Aug, 
2015   

 

 [facility] [place]  [address] [facility] [address] [id number] [place] 678749863  10 Aug, 
2015   

 

 [facility] [place]  [address] [facility] [address] [id number] [place] 858879692  05 Aug, 
2015   

 

 [facility] [place]  [address] [facility] [address] [id number] [place] 218090077  11 May, 
2015  Diabetes mellitus without mention of complication, type II or unspecified 
type, not stated as uncontrolled 250.00 ; Bronchitis 490 and Effusion of lower 
leg joint 719.06

 

 [facility] [place]  [address] [facility] [address] [id number] [place] 797965071  01 May, 
2015   

 

 [facility] [place]  [address] [facility] [address] [id number] [place] 268217474  01 May, 
2015  Bronchitis 490 and Wheezing 786.07

 

 [facility]  [address] [facility] [id number] [place] [address]-
[address]     

 

 [facility] [place]  [address] [facility] [id number] [place] [address]-
40[address]     

 

 [facility]  [address] [facility] [id number] [place] [address]-
6153     

 

 [facility] [place]  [address] [facility] [address] [id number] [place] [id number]  19 Mar, 
2015   

 

 [facility]  [address] [facility] [id number] [place] [address]-
0945  19 Mar, 2015   

 

 [facility] [place]  [address] [facility] [address] [id number] [place] 839000260  09 Mar, 
2015   

 

 [facility]  [address] [facility] [id number] [place] [address]-
2240  09 Mar, 2015   

 

 [facility] [place]  [address] [facility] [address] [id number] [place] [id number]     

 

 [facility]  [address] [facility] [id number] [place] [address]-
1136     

 

 [facility]  [address] [facility] [id number] [place] [address]-
2546  10 Feb, 2015   

 

 [facility] [place]  [address] [facility] [address] [id number] [place] 031194829     

 

 [facility] [place]  [address] [facility] [address] [id number] [place] 627154638     

 

 [facility]  [address] [facility] [id number] [place] [address]-
3456     

 

 [facility]  [address] [facility] [id number] [place] [address]-
6826     

 

 [facility] [place]  [address] [id number] [place] 558774948     

 

 [facility]  [address] [id number] [place] [address]-
[address]     

 

 [facility] [place]  [address] [id number] [place] [id number]     

 

 [facility]  [address] [id number] [place] [place] [address]-
79[address]     

 

 [facility] [place]  [address] [id number] [place] [id number]  24 Dec, 
2014   

 

 [facility]  [address] [id number] [place] [place] [address]-
[phone]  24 Dec, 2014   

 

 [facility] [place]  [address] [id number] [place] [id number]  18 Dec, 
2014   

 

 [facility]  [address] [id number] [facility] [place] [address]  18 Dec, 2014   

 

 [facility] [place]  [address] [id number] [place] [id number]  09 Dec, 
2014   

 

 [facility]  [address] [id number] [facility] [place] [phone]  09 Dec, 2014   

 

 [facility] [place]  [address] [id number] [place], [place] [id number]  02 Dec, 
2014   

 

 [facility]  [address] [id number] [place] [address]  02 Dec, 2014   

 

 [facility] [place]  [address] [id number] [place] [id number]     

 

 [facility]  [address] [id number] [place] [address]-
[address]     

 

 [facility] [place]  [address] [id number] [place] [id number]     

 

 [facility] [place] [facility]  [address] [id number] [place] [place] [address]
6[address]     

 

 [facility] [place]  [address] [id number] [place] [id number]  31 Oct, 
2014   

 

 [facility] [place] [facility]  [address] [id number] [place] [address]-
[address]89  31 Oct, 2014   

 

 [facility] [place]  [address] [id number] [place] [id number]  16 Oct, 
2014   

 

 [facility] [place] [facility]  [address] [id number] [place] [address]-
1691  16 Oct, 2014   

 

 [facility] [place]  [address] [id number] [place] [id number]  10 Oct, 
2014   

 

 [facility] [place] [facility]  [address] [id number] [place] [address]-
40[address]  10 Oct, 2014   

 

 [facility] [place]  [address] [id number] [place] [id number]  26 Sep, 
2014   

 

 [facility] [place] [facility]  [address] [id number] [place] [address]-
9717  26 Sep, 2014   

 

 [facility] [place]  [address] [id number] [place] [id number]  29 Aug, 
2014   

 

 [facility] [place] [facility]  [address] [id number] [place] [address]-
7896  29 Aug, 2014   

 

 [facility] [place]  [address] [id number] [place] [id number]  25 Aug, 
2014   

 

 [facility] [place]  [address] [id number] [place] [id number]  25 Aug, 
2014   

 

 [facility] [place] [facility]  [address] [id number] [place] [address]-
6696  25 Aug, 2014   

 

 [facility] [place] [facility]  [address] [id number] [place] [address]-
3765  25 Aug, 2014   

 

 [facility] [place]  [address] [id number] [place] [id number]  18 Aug, 
2014   

 

 [facility] [place] [facility]  [address] [id number] [place] [address]-
6329  18 Aug, 2014   

 

 [facility] [place]  [address] [id number] [place] [id number]  14 Aug, 
2014   

 

 [facility]  [address] [id number] [place] [place] [address]
3786  14 Aug, 2014   

 

 [facility] [place] [facility]  [address] [id number] [place] [address]-
6526  11 Aug, 2014   

 

 [facility] [place]  [address] [id number] [place] [id number]  11 Aug, 
2014   

 

 [facility] [place] [facility]  [address] [id number] [place] [address]-
6126  11 Aug, 2014   

 

 [facility]  [address] [id number] [place] [address]-
[address]  11 Aug, 2014   

 

 [facility] [place] [facility]  [address] [id number] [place] [address]-
7919     

 

 [facility] [place]  [address] [id number] [place] [id number]     

 

 [facility] [place] [facility]  [address] [id number] [place] [address]-
4843     

 

 [facility] [place] [facility]  [address] [id number] [place] [address]-
8037     

 

 [facility] [place]  [address] [id number] [place] [id number]     

 

 [facility]  [address] [id number] [place] [place] [address]
8550     

 

 [facility] [place]  [address] [id number] [place] [id number]  30 May, 
2014   

 

 [facility] [place] [facility]  [address] [id number] [place] [address]-
7456  30 May, 2014   

 

 [facility] [place]  [address] [id number] [place] [id number]  14 May, 
2014   

 

 [facility] [place] [facility]  [address] [id number] [place] [address]-
6516  14 May, 2014   

 

 [facility] [place]  [address] [id number] [place] [id number]  05 May, 
2014   

 

 [facility]  [address] [facility] [id number] [place] [address]-
4676  05 May, 2014   

 

 [facility] [place]  [address] [facility] [address] [id number] [place] [id number]     

 

 [facility]  [address] [facility] [id number] [place] [address]-
3786     

 

 [facility] [place]  [address] [facility] [address] [id number] [place] [id number]     

 

 [facility]  [address] [facility] [id number] [place] [address]-
6796     

 

 [facility] [place]  [address] [facility] [address] [id number] [place] [id number]  10 Feb, 
2014   

 

 [facility]  [address] [facility] [id number] [place] [address]-
3419  10 Feb, 2014   







IMMUNIZATIONS

No Known Immunizations



SOCIAL HISTORY

Never Assessed



REASON FOR VISIT

Rx Request: Losartan



PLAN OF CARE





VITAL SIGNS





MEDICATIONS







 Medication  Instructions  Dosage  Frequency  Start Date  End Date  Duration  
Status

 

 Losartan Potassium 25 MG  Orally Once a day  1 tablet  24h        90 days  
Active







RESULTS

No Results



PROCEDURES

No Known procedures



INSTRUCTIONS





MEDICATIONS ADMINISTERED

No Known Medications



MEDICAL (GENERAL) HISTORY







 Type  Description  Date

 

 Medical History  Diabetes mellitus without mention of complication, type II or 
unspecified type, not stated as uncontrolled   

 

 Medical History  Depressive disorder, not elsewhere classified   

 

 Medical History  Anxiety state, unspecified   

 

 Medical History  Effusion of lower leg joint   

 

 Medical History  Generalized osteoarthrosis, unspecified site   

 

 Medical History  Other and unspecified hyperlipidemia   

 

 Medical History  Abnormal weight gain   

 

 Medical History  Effusion of joint, site unspecified   

 

 Medical History  Esophageal reflux   

 

 Medical History  hypertension   

 

 Medical History  colonoscopy- inscreased polyp   

 

 Surgical History  Tonsillectomy   

 

 Surgical History  Orthopedic: Bilateral Knee x2   

 

 Surgical History  colonoscopy  2017

 

 Hospitalization History  Surgery(s)/Childbirth(s) only

## 2019-02-26 NOTE — XMS REPORT
[facility]

 Created on: 2018



Duyen Larkin

External Reference #: [id number]

: 1965

Sex: Female



Demographics







 Address  [address] [place]  [address]

 

 Preferred Language  Unknown

 

 Marital Status  Unknown

 

 Hinduism Affiliation  Unknown

 

 Race  Unknown

 

 Ethnic Group  Unknown





Author







 Author  LIYAH LOWE

 

 [facility]

 

 Address  [address] [place]  [address]



 

 Phone  [phone]







Care Team Providers







 Care Team Member Name  Role  Phone

 

 LIYAH LOWE  Unavailable  [phone]







PROBLEMS







 Type  Condition  ICD9-CM Code  VIA80-ZF Code  Onset Dates  Condition Status  
SNOMED Code

 

 Problem  Effusion of lower leg joint  719.06        Active  024455683

 

 Problem  Esophageal reflux  530.81        Active  736643189

 

 Problem  Other hyperlipidemia     E78.4     Active  88704181

 

 Problem  Type 2 diabetes mellitus without complications     E11.9     Active  
807215629

 

 Problem  Primary generalized (osteo)arthritis     M15.0     Active  391063450

 

 Problem  COPD with exacerbation     J44.1     Active  462437256

 

 Problem  Claudication     I73.9     Active  235554664

 

 Problem  Mild episode of recurrent major depressive disorder     F33.0     
Active  918736832

 

 Problem  Cataracts, bilateral     H26.9     Active  81386470

 

 Problem  Chronic obstructive pulmonary disease with acute exacerbation     
J44.1     Active  742278630

 

 Problem  Chronic renal insufficiency, stage II (mild)     N18.2     Active  
255264996

 

 Problem  Primary osteoarthritis of both knees     M17.0     Active  165416994

 

 Problem  Other chronic pain     G89.29     Active  46784296

 

 Problem  Lumbar degenerative disc disease     M51.36     Active  32883600

 

 Problem  Essential (primary) hypertension     I10     Active  56097239

 

 Problem  Anxiety disorder, unspecified     F41.9     Active  421651940

 

 Problem  Peripheral arterial occlusive disease     I77.9     Active  543267954

 

 Problem  Diabetic polyneuropathy associated with type 2 diabetes mellitus     
E11.42     Active  93778097

 

 Problem  Type 2 diabetes mellitus with diabetic neuropathy, without long-term 
current use of insulin     E11.40     Active  47851013

 

 Problem  Chronic obstructive pulmonary disease, unspecified COPD type     
J44.9     Active  13562703

 

 Problem  Long term current use of insulin     Z79.4     Active  303002818

 

 Problem  Type 2 diabetes mellitus with hyperglycemia     E11.65     Active  
86622281

 

 Problem  Old tear of lateral meniscus of left knee, unspecified tear type     
M23.201     Active  077584727

 

 Problem  Major depressive disorder, single episode, unspecified     F32.9     
Active  94818878

 

 Problem  Effusion, left knee     M25.462     Active  397590384

 

 Problem  Dysphagia, unspecified type     R13.10     Active  68571032

 

 Problem  Hyperlipidemia LDL goal <130     E78.5     Active  30322769

 

 Problem  Unilateral primary osteoarthritis, left knee     M17.12     Active  
717241053

 

 Problem  Hypothyroidism (acquired)     E03.9     Active  158695030







ALLERGIES

No Information



ENCOUNTERS







 Encounter  Location  Date  Diagnosis

 

 [facility] [place]  [address] [facility] C [id number] [place], [place] [id number]  21 May, 
2018  Pain in left knee M25.562

 

 [facility] [place]  [address] [place] [address] C [id number] [place], [place] [id number]  21 May, 
2018  Pain in left knee M25.562 and Other chronic pain G89.29

 

 [facility]  [address] [id number], [place] [address]  15 May, 2018   

 

 [facility] [place]  [address] [place] [address] C [id number] [place] [id number]  02 May, 
2018  Type 2 diabetes mellitus without complications E11.9 ; Long term current 
use of insulin Z79.4 ; Unilateral primary osteoarthritis, left knee M17.12 ; 
Lumbar degenerative disc disease M51.36 and Chronic obstructive pulmonary 
disease, unspecified COPD type J44.9

 

 [facility] [place]  [address] [facility] C [id number] [place] [id number]     

 

 [facility] [place]  [address] [facility] C [id number] [place] [id number]     

 

 [facility] [place]  [address] [place] [address] C [id number] [place] [id number]     

 

 [facility] [place]  [address] [place] [address] C [id number] [place], [place] [id number]  23 Mar, 
2018  Type 2 diabetes mellitus without complications E11.9

 

 [facility] [place]  [address] [place] [address] C [id number] [place] [id number]  23 Mar, 
2018   

 

 [facility]  [address] [facility] [id number] [address]  13 Mar, 2018   

 

 [facility]  [address] [id number], [place] [address]  09 Mar, 2018  Type 2 diabetes mellitus without complications E11.9

 

 [facility] [place]  [address] [facility] C [id number] [place] [id number]     

 

 [facility] [place]  [address] [facility] C [id number] [place] [id number]    Diabetic polyneuropathy associated with type 2 diabetes mellitus E11.42

 

 [facility] [place]  [address] [facility] C [id number] [place] [id number]     

 

 [facility] [place]  [address] [facility] C [id number] [place] [id number]  21 Dec, 
2017  Type 2 diabetes mellitus without complications E11.9 ; Long term current 
use of insulin Z79.4 ; High risk sexual behavior Z72.51 ; BMI 40.0-44.9, adult 
Z68.41 and Encounter for immunization Z23

 

 [facility] [place]  [address] [place] [address] C [id number] [place], [place] [id number]  04 Dec, 
2017   

 

 [facility] [place]  [address] [place] [address] C [id number] [place] [id number]  04 Dec, 
2017   

 

 [facility] [place]  [address] [place] [address] C [id number] [place], [place] [id number]    COPD with exacerbation J44.1 and BMI 40.0-44.9, adult Z68.41

 

 [facility] [place]  [address] [facility] C [id number] [place], [place] [id number]  09 Oct, 
2017  Encounter for screening mammogram for malignant neoplasm of breast Z12.31 
; Well woman exam Z01.419 and High risk sexual behavior Z72.51

 

 [facility] [place]  [address] [facility] [address] [id number] [place] [id number]  11 Sep, 
2017   

 

 [facility] [place]  [address] [facility] [address] [id number] [place] [id number]  11 Sep, 
2017  Type 2 diabetes mellitus without complications E11.9

 

 [facility] [place]  [address] [facility] [address] [id number] [place] 752[address]33[address]  11 Sep, 
2017   

 

 [facility] [place]  [address] [facility] C [id number] [place] [id number]  11 Sep, 
2017  Old tear of lateral meniscus of left knee, unspecified tear type M23.201

 

 [facility] [place]  [address] [facility] [address] [id number] [place] [id number]  11 Sep, 
2017  Other type of osteoarthritis, unspecified site M19.90 ; Type 2 diabetes 
mellitus without complications E11.9 ; Primary osteoarthritis of both knees 
M17.0 ; Chronic renal insufficiency, stage II (mild) N18.2 ; Diabetic 
polyneuropathy associated with type 2 diabetes mellitus E11.42 and Exposure to 
hepatitis C Z20.5

 

 [facility] [place]  [address] [facility] [address] [id number] [place] [id number]  06 Sep, 
2017  Type 2 diabetes mellitus without complications E11.9

 

 [facility] [place]  [address] [facility] [address] [id number] [place] 834237568  30 Aug, 
2017   

 

 [facility] [place]  [address] [facility] [address] [id number] [place] [id number]  30 Aug, 
2017   

 

 [facility] [place]  [address] [facility] [address] [id number] [place] 977[address]252  16 Aug, 
2017  Other type of osteoarthritis, unspecified site M19.90

 

 [facility] [place]  [address] [facility] [address] [id number] [place], [place] [address]97  15 Aug, 
2017  Type 2 diabetes mellitus without complications E11.9

 

 [facility] [place]  [address] [facility] [address] [id number] [place] [address]16[address]  07 Aug, 
2017   

 

 [facility] [place]  [address] [facility] [address] [id number] [place] [address]97[address]     

 

 [facility] [place]  [address] [facility] [address] [id number] [place] [id number]     

 

 [facility] [place]  [address] [facility] [address] [id number] [place], [place] [id number]    Type 2 diabetes mellitus without complications E11.9 ; Type 2 diabetes 
mellitus with diabetic neuropathy, without long-term current use of insulin 
E11.40 ; Primary osteoarthritis of both knees M17.0 and Chronic renal 
insufficiency, stage II (mild) N18.2

 

 [facility] [place]  [address] [facility] [address] [id number] [place] [address]     

 

 [facility] [place]  [address] [facility] [address] [id number] [place] [address]  30 May, 
2017  Hyperlipidemia LDL goal <130 E78.5

 

 [facility] [place]  [address] [facility] [address] [id number] [place] [id number]  19 May, 
2017  Type 2 diabetes mellitus without complications E11.9

 

 [facility]  [address] [facility] [id number] [place] [address]  16 May, 2017   

 

 [facility] [place]  [address] [facility] [address] [id number] [place] [address]  08 May, 
2017   

 

 [facility] [place]  [address] [facility] [address] [id number] [place] 121669338  01 May, 
2017   

 

 [facility] [place]  [address] [facility] [address] [id number] [place], [place] [address]     

 

 [facility] [place]  [address] [facility] [address] [id number] [place] 6254[address]606    Primary generalized (osteo)arthritis M15.0 and Type 2 diabetes mellitus 
without complications E11.9

 

 [facility] [place]  [address] [facility] [address] [id number] [place], [place] [address]8445    Old tear of lateral meniscus of left knee, unspecified tear type M23.201

 

 [facility] [place]  [address] [facility] [address] [id number] [place] 288586542     

 

 [facility] [place]  [address] [facility] [address] [id number] [place] [address]     

 

 [facility] [place]  [address] [facility] [address] [id number] [place] [address]99[address]45     

 

 [facility] [place]  [address] [facility] [address] [id number] [place], [place] 994645271  20 Mar, 
2017  Type 2 diabetes mellitus without complications E11.9

 

 [facility] [place]  [address] [facility] [address] [id number] [place], [place] [id number]  13 Mar, 
2017  Type 2 diabetes mellitus without complications E11.9

 

 [facility] [place]  [address] [facility] [address] [id number] [place], [place] [address]78  13 Mar, 
2017  Type 2 diabetes mellitus without complications E11.9

 

 [facility] [place]  [address] [facility] [address] [id number] [place] [id number]  04 Mar, 
2017  Type 2 diabetes mellitus without complications E11.9

 

 [facility] [place]  [address] [place] [address] C [id number] [place], [place] [phone]     

 

 [facility] [place]  [address] [place] [address] C [id number] [place], [place] [address]86[address]0723    Chronic obstructive pulmonary disease with acute exacerbation J44.1

 

 [facility] [place]  [address] [place] [address] C [id number] [place], [place] 775905103     

 

 [facility] [place]  [address] [place] [address] C [id number] [place], [place] [id number]    Dysuria R30.0 ; Essential (primary) hypertension I10 ; Hypothyroidism (
acquired) E03.9 ; Type 2 diabetes mellitus without complications E11.9 and Mild 
episode of recurrent major depressive disorder F33.0

 

 [facility] [place]  [address] [place] [address] C [id number] [place], [place] 605753638     

 

 [facility] [place]  [address] [place] [address] C [id number] [place], [place] 095127754    Dysuria R30.0 ; Vaginal candidiasis B37.3 and Candidiasis B37.9

 

 [facility] [place]  [address] [place] [address] C [id number] [place], [place] 141296833     

 

 [facility] [place]  [address] [place] [address] C [id number] [place], [place] [id number]  10 Octaviano, 
2017  COPD with exacerbation J44.1 and Dysphagia, unspecified type R13.10

 

 [facility] [place]  [address] [place] [address] C [id number] [place] [id number]  27 Dec, 
2016   

 

 [facility] [place]  [address] [place] [address] C [id number] [place], [place] [id number]  12 Dec, 
2016  Essential (primary) hypertension I10 ; Hypothyroidism (acquired) E03.9 ; 
Type 2 diabetes mellitus without complications E11.9 ; Primary generalized (
osteo)arthritis M15.0 ; Major depressive disorder, single episode, unspecified 
F32.9 ; Unilateral primary osteoarthritis, left knee M17.12 ; Hyperlipidemia 
LDL goal <130 E78.5 and Dysphagia, unspecified type R13.10

 

 [facility] [place]  [address] [place] [address] C [id number] [place] [id number]  01 Dec, 
2016   

 

 [facility] [place]  [address] [facility] [address] [id number] [place] [id number]     

 

 [facility] [place]  [address] [facility] [address] [id number] [place] [id number]     

 

 [facility] [place]  [address] [facility] C [id number] [place] [id number]     

 

 [facility] [place]  [address] [place] [address] C [id number] [place], [place] 384996419     

 

 [facility] [place]  [address] [place] [address] C [id number] [place], [place] 867223030     

 

 [facility] [place]  [address] [place] [address] C [id number] [place], [place] 978158018    Type 2 diabetes mellitus without complications E11.9 ; Primary 
generalized (osteo)arthritis M15.0 ; Effusion, left knee M25.462 ; Old tear of 
lateral meniscus of left knee, unspecified tear type M23.201 and Fatigue, 
unspecified type R53.83

 

 [facility] [place]  [address] [place] [address] C [id number] [place], [place] [id number]  26 Sep, 
2016  Type 2 diabetes mellitus without complications E11.9 ; Claudication I73.9 
; Pain in right leg M79.604 and Pain of left leg M79.605

 

 [facility] [place]  [address] [place] [address] C [id number] [place], [place] [id number]  14 Sep, 
2016   

 

 [facility] [place]  [address] [place] [address] C [id number] [place], [place] 110372413  12 Sep, 
2016   

 

 [facility] [place]  [address] [place] [address] C [id number] [place], [place] [id number]  18 Aug, 
2016  Type 2 diabetes mellitus with hyperglycemia E11.65 ; Long term current 
use of insulin Z79.4 ; Anxiety disorder, unspecified F41.9 ; Essential (primary
) hypertension I10 ; Major depressive disorder, single episode, unspecified 
F32.9 and Peripheral arterial occlusive disease I77.9

 

 [facility] [place]  [address] [place] [address] C [id number] [place] [id number]  17 Aug, 
2016   

 

 [facility] [place]  [address] [place] [address] C [id number] [place], [place] [id number]  11 Aug, 
2016   

 

 [facility] [place]  [address] [facility] [address] [id number] [place], [place] 186414805  11 Aug, 
2016   

 

 [facility] [place]  [address] [facility] [address] [id number] [place] 360873543  10 Aug, 
2016   

 

 [facility] [place]  [address] [place] [address] C [id number] [place] 948271685  05 Aug, 
2016  Acute midline low back pain with right-sided sciatica M54.41

 

 [facility]  [address] [facility] [id number] [place] [address]-
[address]  05 Aug, 2016  Chest pain, unspecified type R07.9 ; Palpitations R00.2 ; 
Claudication I73.9 ; Generalized pain R52 and Type 2 diabetes mellitus without 
complications E11.9

 

 [facility] [place]  [address] [place] [address] C [id number] [place], [place] [id number]    Acute midline low back pain with right-sided sciatica M54.41 ; Dysuria 
R30.0 ; Claudication I73.9 ; Peripheral arterial occlusive disease I77.9 ; 
Shortness of breath R06.02 ; Effusion, left knee M25.462 and Type 2 diabetes 
mellitus without complications E11.9

 

 [facility] [place]  [address] [place] [address] [address] [id number] [place] [id number]     

 

 [facility] [place]  [address] [place] [address] C [id number] [place] [id number]     

 

 [facility] [place]  [address] [place] [address] [address] [id number] [place] 507620883     

 

 [facility] [place]  [address] [place] [address] [address] [id number] [place], [place] [id number]    Type 2 diabetes mellitus without complications E11.9 ; Other 
hyperlipidemia E78.4 ; Peripheral arterial occlusive disease I77.9 ; Essential (
primary) hypertension I10 and Other fatigue R53.83

 

 [facility] [place]  [address] [facility] [address] [id number] [place] [id number]  18 May, 
2016   

 

 [facility] [place]  [address] [place] [address] C [id number] [place], [place] [id number]  06 May, 
2016   

 

 [facility] [place]  [address] [facility] [address] [id number] [place], [place] [id number]  05 May, 
2016  Chest pain, unspecified type R07.9 ; Peripheral arterial occlusive 
disease I77.9 ; Anxiety disorder, unspecified F41.9 ; Essential (primary) 
hypertension I10 ; Type 2 diabetes mellitus without complications E11.9 and 
Other hyperlipidemia E78.4

 

 [facility] [place]  [address] [facility] [address] [id number] [place] [id number]  04 May, 
2016   

 

 [facility] [place]  [address] [place] [address] [address] [id number] [place] [id number]     

 

 [facility] [place]  [address] [place] [address] [address] [id number] [place] 229495399     

 

 [facility] [place]  [address] [place] [address] [address] [id number] [place] [id number]    Type 2 diabetes mellitus without complications E11.9 ; Peripheral 
arterial occlusive disease I77.9 ; Primary generalized (osteo)arthritis M15.0 ; 
Major depressive disorder, single episode, unspecified F32.9 ; Anxiety disorder
, unspecified F41.9 and Essential (primary) hypertension I10

 

 [facility] [place]  [address] [place] [address] [address] [id number] [place] [id number]     

 

 [facility] [place]  [address] [place] [address] [address] [id number] [place] [id number]     

 

 [facility]  [address] [facility] [id number] [place] [address]-
[address]  02 Mar, 2016   

 

 [facility] [place]  [address] [place] [address] [address] [id number] [place] [id number]     

 

 [facility] [place]  [address] [place] [address] [address] [id number] [place] 751909667    Bronchitis J40 ; Shortness of breath R06.02 and Wheezing R06.2

 

 [facility] [place]  [address] [place] [address] [address] [id number] [place] [id number]     

 

 [facility] [place]  [address] [place] [address] [address] [id number] [place] 026961118     

 

 [facility] [place]  [address] [place] [address] [address] [id number] [place] [id number]     

 

 [facility] [place]  [address] [facility] [address] [id number] [place] 958833897     

 

 [facility] [place]  [address] [facility] [address] [id number] [place] [id number]    Type 2 diabetes mellitus without complications E11.9 ; Claudication I73.9 
; Other hyperlipidemia E78.4 ; Cataracts, bilateral H26.9 and Other fatigue 
R53.83

 

 [facility] [place]  [address] [facility] [address] [id number] [place] [id number]  28 Oct, 
2015   

 

 [facility] [place]  [address] [facility] [address] [id number] [place] [id number]  22 Oct, 
2015   

 

 [facility] [place]  [address] [facility] [address] [id number] [place] 098273386  16 Oct, 
2015   

 

 [facility] [place]  [address] [facility] [address] [id number] [place], [place] [id number]  14 Oct, 
2015  Type 2 diabetes mellitus without complications E11.9 ; Other 
hyperlipidemia E78.4 ; Primary generalized (osteo)arthritis M15.0 and 
Claudication I73.9

 

 [facility] [place]  [address] [facility] [address] [id number] [place] 080415250  12 Oct, 
2015   

 

 [facility] [place]  [address] [facility] [address] [id number] [place] [id number]  26 Aug, 
2015   

 

 [facility] [place]  [address] [facility] [address] [id number] [place] 349807823  12 Aug, 
2015   

 

 [facility] [place]  [address] [facility] [address] [id number] [place] 762523608  12 Aug, 
2015   

 

 [facility] [place]  [address] [facility] [address] [id number] [place] 018650776  10 Aug, 
2015   

 

 [facility] [place]  [address] [facility] [address] [id number] [place] [id number]  05 Aug, 
2015   

 

 [facility] [place]  [address] [facility] [address] [id number] [place], [place] 027125692  11 May, 
2015  Diabetes mellitus without mention of complication, type II or unspecified 
type, not stated as uncontrolled 250.00 ; Bronchitis 490 and Effusion of lower 
leg joint 719.06

 

 [facility] [place]  [address] [facility] [address] [id number] [place] 406991210  01 May, 
2015   

 

 [facility] [place]  [address] [facility] [address] [id number] [place], [place] 188001675  01 May, 
2015  Bronchitis 490 and Wheezing 786.07

 

 [facility]  [address] [facility] [id number] [place] [address]-
[address]     

 

 [facility]  [address] [facility] [id number][place] [address]-
[address]     

 

 [facility]  [address] [facility] [id number], [place] [address]-
[address]     

 

 [facility] [place]  [address] [facility] [address] [id number] [place] [id number]  19 Mar, 
2015   

 

 [facility]  [address] [facility] [id number], [place] [address]-
[address]  19 Mar, 2015   

 

 [facility] [place]  [address] [facility] [address] [id number] [place] [id number]  09 Mar, 
2015   

 

 [facility]  [address] [facility] [id number][place] [address]-
[address]  09 Mar, 2015   

 

 [facility] [place]  [address] [facility] [address] [id number] [place] 020363604     

 

 [facility]  [address] [facility] [id number][place] [address]-
[address]     

 

 [facility]  [address] [facility] [id number][place] [address]-
[address]  10 Feb, 2015   

 

 [facility] [place]  [address] [facility] [address] [id number] [place] 830969509     

 

 [facility] [place]  [address] [facility] [address] [id number] [place] 087415949     

 

 [facility]  [address] [facility] [id number], [place] [address]-
3786     

 

 [facility]  [address] [facility] [id number], [place] [address]-
28[address]     

 

 [facility] [place]  [address] [id number] [place] 016231904     

 

 [facility] [place] [facility]  [address] [id number] [place] [address]-
[address]     

 

 [facility] [place]  [address] [id number] [place] [id number]     

 

 [facility] [place] [facility]  [address] [id number] [facility] [place] [address]-
[address]     

 

 [facility] [place]  [address] [id number] [place], [place] [id number]  24 Dec, 
2014   

 

 [facility] [place] [facility]  [address] [address] [id number] [facility] [place] [address]-
[address]  24 Dec, 2014   

 

 [facility] [place]  [address] [id number] [place] [id number]  18 Dec, 
2014   

 

 [facility] [place] [facility]  [address] [id number] [facility] [place] [place] [address]
6337  18 Dec, 2014   

 

 [facility] [place]  [address] [id number] [place] [id number]  09 Dec, 
2014   

 

 [facility] [place] [facility]  [address] [id number] [facility] [place] [address]
9173  09 Dec, 2014   

 

 [facility] [place]  [address] [id number] [place] [id number]  02 Dec, 
2014   

 

 [facility] [place] [facility]  [address] [id number] [facility] [place] [place] [address]
8428  02 Dec, 2014   

 

 [facility] [place]  [address] [id number] [place] [id number]     

 

 [facility]  [address] [id number] [place] [address]-
3948     

 

 [facility] [place]  [address] [id number] [place] [id number]     

 

 [facility] [place] [facility]  [address] [id number] [facility] [place] [place] [address]
1347     

 

 [facility] [place]  [address] [id number] [place] [id number]  31 Oct, 
2014   

 

 [facility] [place] [facility]  [address] [id number] [place] [address]-
2502  31 Oct, 2014   

 

 [facility] [place]  [address] [id number] [place] [id number]  16 Oct, 
2014   

 

 [facility] [place] [facility]  [address] [id number] [place] [address]-
8343  16 Oct, 2014   

 

 [facility] [place]  [address] [id number] [place] [id number]  10 Oct, 
2014   

 

 [facility] [place] [facility]  [address] [id number] [place] [address]-
5079  10 Oct, 2014   

 

 [facility] [place]  [address] [id number] [place] [id number]  26 Sep, 
2014   

 

 [facility] [place] [facility]  [address] [id number] [place] [address]-
9607  26 Sep, 2014   

 

 [facility] [place]  [address] [id number] [place] [id number]  29 Aug, 
2014   

 

 [facility] [place] [facility]  [address] [id number] [place] [address]-
1864  29 Aug, 2014   

 

 [facility] [place]  [address] [id number] [place] [id number]  25 Aug, 
2014   

 

 [facility] [place]  [address] [id number] [place] [id number]  25 Aug, 
2014   

 

 [facility] [place] [facility]  [address] [id number] [place] [address]-
9003  25 Aug, 2014   

 

 [facility] [place] [facility]  [address] [id number] [place] [address]-
4558  25 Aug, 2014   

 

 [facility] [place]  [address] [id number] [place] [id number]  18 Aug, 
2014   

 

 [facility] [place] [facility]  [address] [id number] [place] [address]-
8620  18 Aug, 2014   

 

 [facility] [place]  [address] [id number] [place] [id number]  14 Aug, 
2014   

 

 [facility]  [address] [id number] [facility] [place] [place] [address]-
1426  14 Aug, 2014   

 

 [facility] [place] [facility]  [address] [id number] [place] [address]-
2956  11 Aug, 2014   

 

 [facility] [place]  [address] [id number] [place] [id number]  11 Aug, 
2014   

 

 [facility] [place] [facility]  [address] [id number] [place] [address]-
5536  11 Aug, 2014   

 

 [facility]  [address] [id number] [place] [address]-
6300  11 Aug, 2014   

 

 [facility] [place] [facility]  [address] [id number] [place] [address]-
3208     

 

 [facility] [place]  [address] [id number] [place] [id number]     

 

 [facility] [place] [facility]  [address] [id number] [place] [address]-
3562     

 

 [facility] [place] [facility]  [address] [id number] [place] [address]-
6270     

 

 [facility] [place]  [address] [id number] [place] [id number]     

 

 [facility]  [address] [id number] [facility] [place] [place] [address]-
8508     

 

 [facility] [place]  [address] [id number] [place] [id number]  30 May, 
2014   

 

 [facility] [place] [facility]  [address] [id number] [place] [address]-
9696  30 May, 2014   

 

 [facility] [place]  [address] [id number] [place] [id number]  14 May, 
2014   

 

 [facility] [place] [facility]  [address] [id number] [place] [address]-
5556  14 May, 2014   

 

 [facility] [place]  [address] [id number] [place] [id number]  05 May, 
2014   

 

 [facility]  [address] [id number] [place] [address]-
2106  05 May, 2014   

 

 [facility] [place]  [address] [id number] [place] [phone]     

 

 [facility]  [address] [id number] [place] [address]-
1656     

 

 [facility] [place]  [address] [id number] [place] [id number]     

 

 [facility]  [address] [id number] [place] [address]-
9636     

 

 [facility] [place]  [address] [id number] [place] [id number]  10 Feb, 
2014   

 

 [facility]  [address] [id number] [place] [address]-
3016  10 Feb, 2014   







IMMUNIZATIONS

No Known Immunizations



SOCIAL HISTORY

Never Assessed



REASON FOR VISIT

Needs info faxed



PLAN OF CARE





VITAL SIGNS





MEDICATIONS







 Medication  Instructions  Dosage  Frequency  Start Date  End Date  Duration  
Status

 

 Lyrica 75 MG  Orally twice a day  1 capsule  12h       30 days  
Active







RESULTS

No Results



PROCEDURES

No Known procedures



INSTRUCTIONS





MEDICATIONS ADMINISTERED

No Known Medications



MEDICAL (GENERAL) HISTORY







 Type  Description  Date

 

 Medical History  Diabetes mellitus without mention of complication, type II or 
unspecified type, not stated as uncontrolled   

 

 Medical History  Depressive disorder, not elsewhere classified   

 

 Medical History  Anxiety state, unspecified   

 

 Medical History  Effusion of lower leg joint   

 

 Medical History  Generalized osteoarthrosis, unspecified site   

 

 Medical History  Other and unspecified hyperlipidemia   

 

 Medical History  Abnormal weight gain   

 

 Medical History  Effusion of joint, site unspecified   

 

 Medical History  Esophageal reflux   

 

 Medical History  hypertension   

 

 Medical History  colonoscopy- inscreased polyp   

 

 Surgical History  Tonsillectomy   

 

 Surgical History  Orthopedic: Bilateral Knee x2   

 

 Surgical History  colonoscopy  2017

 

 Hospitalization History  Surgery(s)/Childbirth(s) only

## 2019-02-26 NOTE — XMS REPORT
[facility]

 Created on: 2018



Duyen Larkin

External Reference #: [id number]

: 1965

Sex: Female



Demographics







 Address  [address] [place]  [address]

 

 Preferred Language  Unknown

 

 Marital Status  Unknown

 

 Judaism Affiliation  Unknown

 

 Race  Unknown

 

 Ethnic Group  Unknown





Author







 Author  MARY ANNE WALKER

 

 Organization  [facility]

 

 Address  [address] [place]  [address]



 

 Phone  [phone]







Care Team Providers







 Care Team Member Name  Role  Phone

 

 WALKERMARY ANNE  Unavailable  [phone]







PROBLEMS







 Type  Condition  ICD9-CM Code  OPA88-TJ Code  Onset Dates  Condition Status  
SNOMED Code

 

 Problem  Effusion of lower leg joint  719.06        Active  390971246

 

 Problem  Esophageal reflux  530.81        Active  639841301

 

 Problem  Other hyperlipidemia     E78.4     Active  05494201

 

 Problem  Type 2 diabetes mellitus without complications     E11.9     Active  
007432061

 

 Problem  Primary generalized (osteo)arthritis     M15.0     Active  252392029

 

 Problem  COPD with exacerbation     J44.1     Active  667669146

 

 Problem  Claudication     I73.9     Active  719136469

 

 Problem  Mild episode of recurrent major depressive disorder     F33.0     
Active  937044201

 

 Problem  Cataracts, bilateral     H26.9     Active  43447576

 

 Problem  Chronic obstructive pulmonary disease with acute exacerbation     
J44.1     Active  205086456

 

 Problem  Chronic renal insufficiency, stage II (mild)     N18.2     Active  
459938965

 

 Problem  Primary osteoarthritis of both knees     M17.0     Active  662384582

 

 Problem  Other chronic pain     G89.29     Active  39238907

 

 Problem  Lumbar degenerative disc disease     M51.36     Active  50302788

 

 Problem  Essential (primary) hypertension     I10     Active  65792516

 

 Problem  Anxiety disorder, unspecified     F41.9     Active  169307802

 

 Problem  Peripheral arterial occlusive disease     I77.9     Active  900071498

 

 Problem  Diabetic polyneuropathy associated with type 2 diabetes mellitus     
E11.42     Active  73840389

 

 Problem  Type 2 diabetes mellitus with diabetic neuropathy, without long-term 
current use of insulin     E11.40     Active  99481428

 

 Problem  Chronic obstructive pulmonary disease, unspecified COPD type     
J44.9     Active  39400095

 

 Problem  Long term current use of insulin     Z79.4     Active  380731788

 

 Problem  Type 2 diabetes mellitus with hyperglycemia     E11.65     Active  
78161145

 

 Problem  Old tear of lateral meniscus of left knee, unspecified tear type     
M23.201     Active  830916312

 

 Problem  Major depressive disorder, single episode, unspecified     F32.9     
Active  63845121

 

 Problem  Effusion, left knee     M25.462     Active  705896147

 

 Problem  Dysphagia, unspecified type     R13.10     Active  80564048

 

 Problem  Hyperlipidemia LDL goal <130     E78.5     Active  43541658

 

 Problem  Unilateral primary osteoarthritis, left knee     M17.12     Active  
617078443

 

 Problem  Hypothyroidism (acquired)     E03.9     Active  124907617







ALLERGIES







 Substance  Reaction  Event Type  Date  Status

 

 Lisinopril  Unknown  Drug Allergy  21 May, 2018  Active

 

 Gabapentin  dizzy/nausea  Drug Allergy  21 May, 2018  Active

 

 Demerol  Unknown  Drug Allergy  21 May, 2018  Active







ENCOUNTERS







 Encounter  Location  Date  Diagnosis

 

 [address] [address]  21 May, 2018  Pain in left 
knee M25.562

 

 [address] [address]  21 May, 2018  Pain in left 
knee M25.562 and Other chronic pain G89.29

 

 [place] [id number] [place] [address]  15 May, 2018   

 

 [address] [address]  02 May, 2018  Type 2 
diabetes mellitus without complications E11.9 ; Long term current use of 
insulin Z79.4 ; Unilateral primary osteoarthritis, left knee M17.12 ; Lumbar 
degenerative disc disease M51.36 and Chronic obstructive pulmonary disease, 
unspecified COPD type J44.9

 

 [address] [address]     

 

 [address] [address]     

 

 [address] [address]     

 

 [address] [address]  23 Mar, 2018  Type 2 
diabetes mellitus without complications E11.9

 

 [address] [address]  23 Mar, 2018   

 

 [address], [place] [address]  13 Mar, 2018   

 

 [place] [id number] [place] [address] [place] [address]  09 Mar, 2018  Type 2 diabetes mellitus without complications E11.9

 

 [address] [address]     

 

 [address] [address]    Diabetic 
polyneuropathy associated with type 2 diabetes mellitus E11.42

 

 [address] [address]     

 

 [address] [address]  21 Dec, 2017  Type 2 
diabetes mellitus without complications E11.9 ; Long term current use of 
insulin Z79.4 ; High risk sexual behavior Z72.51 ; BMI 40.0-44.9, adult Z68.41 
and Encounter for immunization Z23

 

 [address] [address]  04 Dec, 2017   

 

 [address] [address]  04 Dec, 2017   

 

 [address] [address]    COPD with 
exacerbation J44.1 and BMI 40.0-44.9, adult Z68.41

 

 [address] [address]  09 Oct, 2017  Encounter 
for screening mammogram for malignant neoplasm of breast Z12.31 ; Well woman 
exam Z01.419 and High risk sexual behavior Z72.51

 

 [address] [address]  11 Sep, 2017   

 

 [address] [address]  11 Sep, 2017  Type 2 
diabetes mellitus without complications E11.9

 

 [address] [address]  11 Sep, 2017   

 

 [address] [address]  11 Sep, 2017  Old tear of 
lateral meniscus of left knee, unspecified tear type M23.201

 

 [address] [address]  11 Sep, 2017  Other type 
of osteoarthritis, unspecified site M19.90 ; Type 2 diabetes mellitus without 
complications E11.9 ; Primary osteoarthritis of both knees M17.0 ; Chronic 
renal insufficiency, stage II (mild) N18.2 ; Diabetic polyneuropathy associated 
with type 2 diabetes mellitus E11.42 and Exposure to hepatitis C Z20.5

 

 [facility]  [address] [place] [address]  06 Sep, 2017  Type 2 
diabetes mellitus without complications E11.9

 

 [facility] [address]  30 Aug, 2017   

 

 [facility]  [address] [place] [address]  30 Aug, 2017   

 

 [facility]  [address] [place] [address]  16 Aug, 2017  Other type 
of osteoarthritis, unspecified site M19.90

 

 [facility] [address]  15 Aug, 2017  Type 2 
diabetes mellitus without complications E11.9

 

 [facility] [address] [address]  07 Aug, 2017   

 

 [facility] [address] [address]     

 

 [facility] [address] [address]     

 

 [facility] [address] [address]    Type 2 
diabetes mellitus without complications E11.9 ; Type 2 diabetes mellitus with 
diabetic neuropathy, without long-term current use of insulin E11.40 ; Primary 
osteoarthritis of both knees M17.0 and Chronic renal insufficiency, stage II (
mild) N18.2

 

 [facility] [address] [address]     

 

 [facility] [address] [address]  30 May, 2017  
Hyperlipidemia LDL goal <130 E78.5

 

 [facility] [address] [address]  19 May, 2017  Type 2 
diabetes mellitus without complications E11.9

 

 [facility]  [address] [facility] [id number] [place] [address]  16 May, 2017   

 

 [facility] [address] [address]  08 May, 2017   

 

 [facility] [address]  01 May, 2017   

 

 [facility]  [address] [place] [address]     

 

 [facility]  [address] [place] [address]    Primary 
generalized (osteo)arthritis M15.0 and Type 2 diabetes mellitus without 
complications E11.9

 

 [facility]  [address] [place] [address]    Old tear of 
lateral meniscus of left knee, unspecified tear type M23.201

 

 [facility] [address] [address]     

 

 [facility] [address]     

 

 [facility] [address]     

 

 [facility] [address]  20 Mar, 2017  Type 2 
diabetes mellitus without complications E11.9

 

 [facility] [address] [address]  13 Mar, 2017  Type 2 
diabetes mellitus without complications E11.9

 

 [facility] [address]  13 Mar, 2017  Type 2 
diabetes mellitus without complications E11.9

 

 [facility] [address] [address]  04 Mar, 2017  Type 2 
diabetes mellitus without complications E11.9

 

 [facility] [address]     

 

 [facility]  [address] [place] [address]    Chronic 
obstructive pulmonary disease with acute exacerbation J44.1

 

 [facility] [address] [address]     

 

 [facility] [address]    Dysuria 
R30.0 ; Essential (primary) hypertension I10 ; Hypothyroidism (acquired) E03.9 
; Type 2 diabetes mellitus without complications E11.9 and Mild episode of 
recurrent major depressive disorder F33.0

 

 [address] [address]     

 

 [address] [address]  08 2017  Dysuria 
R30.0 ; Vaginal candidiasis B37.3 and Candidiasis B37.9

 

 [address] [address]     

 

 [address] [address]  10 Octaviano, 2017  COPD with 
exacerbation J44.1 and Dysphagia, unspecified type R13.10

 

 [address] [address]  27 Dec, 2016   

 

 [address] [address]  12 Dec, 2016  Essential (
primary) hypertension I10 ; Hypothyroidism (acquired) E03.9 ; Type 2 diabetes 
mellitus without complications E11.9 ; Primary generalized (osteo)arthritis 
M15.0 ; Major depressive disorder, single episode, unspecified F32.9 ; 
Unilateral primary osteoarthritis, left knee M17.12 ; Hyperlipidemia LDL goal <
130 E78.5 and Dysphagia, unspecified type R13.10

 

 [facility]  [address] [place] [address]  01 Dec, 2016   

 

 [address] [address]     

 

 [address] [address]     

 

 [address] [address]     

 

 [address] [address]     

 

 [address] [address]     

 

 [address] [address]    Type 2 
diabetes mellitus without complications E11.9 ; Primary generalized (osteo)
arthritis M15.0 ; Effusion, left knee M25.462 ; Old tear of lateral meniscus of 
left knee, unspecified tear type M23.201 and Fatigue, unspecified type R53.83

 

 [address] [address]  26 Sep, 2016  Type 2 
diabetes mellitus without complications E11.9 ; Claudication I73.9 ; Pain in 
right leg M79.604 and Pain of left leg M79.605

 

 [address] [address]  14 Sep, 2016   

 

 [address] [address]  12 Sep, 2016   

 

 [address] [address]  18 Aug, 2016  Type 2 
diabetes mellitus with hyperglycemia E11.65 ; Long term current use of insulin 
Z79.4 ; Anxiety disorder, unspecified F41.9 ; Essential (primary) hypertension 
I10 ; Major depressive disorder, single episode, unspecified F32.9 and 
Peripheral arterial occlusive disease I77.9

 

 [address] [address]  17 Aug, 2016   

 

 [address] [address]  11 Aug, 2016   

 

 [address] [address]  11 Aug, 2016   

 

 [address] [address]  10 Aug, 2016   

 

 [address] [address]  05 Aug, 2016  Acute 
midline low back pain with right-sided sciatica M54.41

 

 [facility]  [address] [facility] [id number] [address]  05 Aug, 2016  Chest pain, unspecified type R07.9 ; Palpitations R00.2 ; 
Claudication I73.9 ; Generalized pain R52 and Type 2 diabetes mellitus without 
complications E11.9

 

 [address] [address]    Acute 
midline low back pain with right-sided sciatica M54.41 ; Dysuria R30.0 ; 
Claudication I73.9 ; Peripheral arterial occlusive disease I77.9 ; Shortness of 
breath R06.02 ; Effusion, left knee M25.462 and Type 2 diabetes mellitus 
without complications E11.9

 

 [address] [address]     

 

 [address] [address]     

 

 [address] [address]     

 

 [address] [address]    Type 2 
diabetes mellitus without complications E11.9 ; Other hyperlipidemia E78.4 ; 
Peripheral arterial occlusive disease I77.9 ; Essential (primary) hypertension 
I10 and Other fatigue R53.83

 

 [address] [address]  18 May, 2016   

 

 [address] [address]  06 May, 2016   

 

 [address] [address]  05 May, 2016  Chest pain, 
unspecified type R07.9 ; Peripheral arterial occlusive disease I77.9 ; Anxiety 
disorder, unspecified F41.9 ; Essential (primary) hypertension I10 ; Type 2 
diabetes mellitus without complications E11.9 and Other hyperlipidemia E78.4

 

 [address] [address]  04 May, 2016   

 

 [address] [address]     

 

 [address] [address]     

 

 [address] [address]    Type 2 
diabetes mellitus without complications E11.9 ; Peripheral arterial occlusive 
disease I77.9 ; Primary generalized (osteo)arthritis M15.0 ; Major depressive 
disorder, single episode, unspecified F32.9 ; Anxiety disorder, unspecified 
F41.9 and Essential (primary) hypertension I10

 

 [address] [address]     

 

 [address] [address]     

 

 [facility]  [address] [facility] [id number] [place] [address]  02 Mar, 2016   

 

 [address] [address]     

 

 [facility]  [address] [place] [address]    Bronchitis 
J40 ; Shortness of breath R06.02 and Wheezing R06.2

 

 [address] [address]     

 

 [facility]  [address] [place] [address]     

 

 [address] [address]     

 

 [address] [address]     

 

 [address] [address]    Type 2 
diabetes mellitus without complications E11.9 ; Claudication I73.9 ; Other 
hyperlipidemia E78.4 ; Cataracts, bilateral H26.9 and Other fatigue R53.83

 

 [address] [address]  28 Oct, 2015   

 

 [address] [address]  22 Oct, 2015   

 

 [address] [address]  16 Oct, 2015   

 

 [address] [address]  14 Oct, 2015  Type 2 
diabetes mellitus without complications E11.9 ; Other hyperlipidemia E78.4 ; 
Primary generalized (osteo)arthritis M15.0 and Claudication I73.9

 

 [address] [address]  12 Oct, 2015   

 

 [address] [address]  26 Aug, 2015   

 

 [address] [address]  12 Aug, 2015   

 

 [address] [address]  12 Aug, 2015   

 

 [facility]  [address] [place] [address]  10 Aug, 2015   

 

 [facility]  [address] [place] [address]  05 Aug, 2015   

 

 [address] [address]  11 May, 2015  Diabetes 
mellitus without mention of complication, type II or unspecified type, not 
stated as uncontrolled 250.00 ; Bronchitis 490 and Effusion of lower leg joint 
719.06

 

 [facility]  [address] [place] [address]  01 May, 2015   

 

 [address] [address]  01 May, 2015  Bronchitis 
490 and Wheezing 786.07

 

 [place] [id number] [place] [address]-
[address]     

 

 [place] [id number] [place] [address]-
[address]     

 

 [facility]  [address] [place], [place] [id number] [place] [address]     

 

 [facility]  [address] [place] [address]  19 Mar, 2015   

 

 [facility]  [address] [place], [place] [id number] [place] [address]-
12[address]  19 Mar, 2015   

 

 [facility]  [address] [place] [address]  09 Mar, 2015   

 

 [facility]  [address] [place], [place] [id number] [place] [address]  09 Mar, 2015   

 

 [address] [address]     

 

 [facility]  [address] [place], [place] [address] [place] [address]     

 

 [facility]  [address] [place], [place] [id number], [place] [address]  10 Feb, 2015   

 

 [facility]  [address] [place] [address]  ,    

 

 [facility]  [address] [place] [address]  ,    

 

 [facility] [place] [facility]  [address], [place] [address]  ,    

 

 [facility] [place] [facility]  [address] [place] [id number], [place] [address]  ,    

 

 [facility]  [address] [place] [address]     

 

 [facility] [place] [facility]  [address] [place] [id number] [address]     

 

 [facility]  [address] [place] [address]     

 

 [facility] [place] [facility]  [address], [place] [address]     

 

 [facility]  [address] [place] [address]  24 Dec, 2014   

 

 [facility] [place] [facility]  [address] [place] [id number], [place] [address]  24 Dec, 2014   

 

 [facility]  [address] [place] [address]  18 Dec, 2014   

 

 [facility] [place] [facility]  [address] [place] [id number], [place] [address]797  18 Dec, 2014   

 

 [facility]  [address] [place] [address]  09 Dec, 2014   

 

 [facility] [place] [facility]  [address] [place] [id number], [place] [address]
5271  09 Dec, 2014   

 

 [facility]  [address] [place] [address]  02 Dec, 2014   

 

 [facility] [place] [facility]  [address], [place] [address]  02 Dec, 2014   

 

 [facility]  [address] [place] [address]     

 

 [facility] [place] [facility]  [address] [place] [id number], [place] [address]     

 

 [facility]  [address] [place] [facility] [address]     

 

 [facility] [place] [facility]  [address] [facility] [id number] [address]     

 

 [facility]  [address] [place] [facility] [address]  31 Oct, 2014   

 

 [facility] [place] [facility]  [address] [place] [id number] [address]
6161  31 Oct, 2014   

 

 [facility] [place] [address]  16 Oct, 2014   

 

 [facility] [place] [facility]  [address] [place] [id number], [place] [address]
5053  16 Oct, 2014   

 

 [facility] [place] [address]  10 Oct, 2014   

 

 [facility] [place] [facility]  [address] [place] [id number], [place] [address]55  10 Oct, 2014   

 

 [facility] [place] [address]  26 Sep, 2014   

 

 [facility] [place] [facility]  [address] [place] [id number], [place] [address]-
3860  26 Sep, 2014   

 

 [facility] [place] [address]  29 Aug, 2014   

 

 [facility] [place] [facility]  [address] [place] [id number], [place] [address]70  29 Aug, 2014   

 

 [facility] [address]  25 Aug, 2014   

 

 [facility] [place] [address]  25 Aug, 2014   

 

 [facility] [place] [facility]  [address] [facility] [id number][place] [address]  25 Aug, 2014   

 

 [facility] [place] [facility]  [address] [place] [id number], [place] [address]
8676  25 Aug, 2014   

 

 [facility]  [address] [place] [address]  18 Aug, 2014   

 

 [facility] [place] [facility]  [address] [place] [id number], [place] [address]  18 Aug, 2014   

 

 [facility]  [address] [place] [facility] [address]  14 Aug, 2014   

 

 [facility] [facility]  [address] [facility] [id number] [place] [address]-
3846  14 Aug, 2014   

 

 [facility] [place] [facility]  [address] [facility] [id number] [place] [address]-
6836  11 Aug, 2014   

 

 [facility]  [address] [facility] [address]  11 Aug, 2014   

 

 [facility] [place] [facility]  [address] [facility] [id number] [place] [address]-
2310  11 Aug, 2014   

 

 [facility] [place] [facility]  [address] [facility] [id number] [place] [address]-
6354  11 Aug, 2014   

 

 [facility] [place] [facility]  [address] [facility] [id number] [place] [address]-
9[address]     

 

 [facility]  [address] [facility] [address]     

 

 [facility] [place] [facility]  [address] [facility] [id number] [place] [address]-
[address]     

 

 [facility] [place] [facility]  [address] [facility] [id number] [place] [address]-
[address]     

 

 [facility]  [address] [place] [address]     

 

 [facility] [place] [facility]  [address] [facility] [id number] [address]     

 

 [facility]  [address] [place] [address]  30 May, 2014   

 

 [facility] [place] [facility]  [address] [facility] [id number] [place] [address]-
2279  30 May, 2014   

 

 [facility]  [address] [place] [address]  14 May, 2014   

 

 [facility] [place] [facility]  [address] [facility] [id number][place] [address]-
1570  14 May, 2014   

 

 [facility]  [address] [place] [address]  05 May, 2014   

 

 [facility]  [address] [facility] [id number] [address]-
4672  05 May, 2014   

 

 [facility] [address]     

 

 [facility]  [address] [facility] [id number] [address]-
2766     

 

 [facility] [place] [address]     

 

 [facility]  [address] [facility] [id number][place] [address]-
[address]873     

 

 [facility] [address]  10 Feb, 2014   

 

 [facility]  [address] [place] [id number], [place] [address]-
8655  10 Feb, 2014   







IMMUNIZATIONS

No Known Immunizations



SOCIAL HISTORY

Never Assessed



REASON FOR VISIT

knee swelling, Got shots on wed, Cls[place]



PLAN OF CARE







 Activity  Details









  









 Follow Up  prn Reason:







VITAL SIGNS







 Height  66 in  2018

 

 Temperature  97. degrees Fahrenheit  2018

 

 Heart Rate  80 bpm  2018

 

 Respiratory Rate  20   2018

 

 Blood pressure systolic  119 mmHg  2018

 

 Blood pressure diastolic  72 mmHg  2018







MEDICATIONS







 Medication  Instructions  Dosage  Frequency  Start Date  End Date  Duration  
Status

 

 Ipratropium-Albuterol 0.5-2.5 (3) MG/3ML  Inhalation every 6 hrs  3 ml  6h  02 
May, 2018        Active

 

 Vitamin B 12 100 MCG                    Not-Taking

 

 Zoloft 50 MG  Orally Once a day  take 2 tablets (100 mg) by oral route once 
daily  24h  10 Feb, 2014        Active

 

 Lyrica 75MG  Orally twice a day  1 capsule  12h           Active

 

 Alprazolam 0.5 MG     take 1 tablet (0.5 mg) by oral route 3 times per day     
10 Feb, 2014        Active

 

 Mobic 7.5 MG  Orally Once a day  1 tablet  24h        30  Active

 

 Voltaren 1%  Transdermal 4 times a day PRN knee pain  4 grams              
Active

 

 Lasix 20 mg     take 1 tablet (20 mg) by oral route once daily     31 Oct, 
2014        Active

 

 Cyclobenzaprine HCl 10MG  Orally Three times a day for muscle spasms  1 tablet 
as needed              Active

 

 Januvia 100 MG  Orally Once a day  1 tablet  24h  23 Mar, 2018        Active

 

 Tramadol HCl 50 mg  Orally 2 times a day  1 tablet as needed  12h          Active

 

 Pantoprazole Sodium 40 mg  Orally Once a day  1 tablet  24h           Active

 

 Lipitor 40 MG     1 tablet     17 2015     90 days  Active

 

 PredniSONE 10 mg  Orally Once a day  4 tablet po x4 days, 2 tabs po x 4 days, 
1 tab po x 4 days  24h  21 May, 2018  2 Aj, 2018  12 days  Active

 

 NovoLog Flexpen 100 UNIT/ML  Subcutaneous 3 times a day  Inject 25 Units by 
Subcutaneous route as per insulin sliding scale protocol  3 times per day  8h  
        Active

 

 Klor-Con 10 10MEQ ER     take 1 tablet by Oral route with food  1 time per day
              Not-Taking

 

 Tramadol HCl 50 mg  Orally every 6 hrs  1 tablet as needed for pain  6h  14 Oct
, 2015        Not-Taking

 

 NovoLog Flexpen 100U/ML     INJECT 25 UNITS SUB-Q PER INSULIN SLIDING SCALE 
PROTOCOL THREE TIMES DAILY.              Active

 

 Metformin HCl 1000 MG  Orally Twice a day  1 tablet with meals  12h        90 
days  Active

 

 trazodone 150 mg     take 1 Tablet by Oral route 1 time per day qhs     31 Oct
, 2014        Active

 

 Colace 100 MG  Orally Once a day  1 capsule as needed  24h           Unknown

 

 Losartan Potassium 25 MG  Orally Once a day  1 tablet  24h        90 days  
Active

 

 Sucralfate 1 GM  Orally 4 times a day  1 tablet on an empty stomach  6h       
    Unknown

 

 Lantus SoloStar 100UNIT/     INJECT 50 UNITS SUB-Q TWICE DAILY              
Active

 

 NovoFine 30G X 8 MM     as directed             Unknown

 

 Cymbalta 30 mg     take 3 capsule by Oral route 1 time per day             Active

 

 Percocet 5-325 MG  Orally every 6 hrs as needed  1 tablet as needed     21 May
, 2018        Active

 

 Fish Oil 1000 MG  Orally Once a day  1 capsule  24h           Not-Taking

 

 Levothyroxine Sodium 25 MCG  Orally Once a day in the morning  Take 1 tablet 
on an empty stomach           30  Active

 

 B Complex -                    Not-Taking

 

 Albuterol Sulfate (2.5 MG/3ML) 0.083%  Inhalation every 6 hrs PRN wheezing/SOA
  3 ml     11 May, 2015        Active

 

 Ventolin HFA 90 mcg/actuation  by inhalation route every 4 hrs  1-2 puffs  4h  
26 Sep, 2014     90 days  Active

 

 Atorvastatin Calcium 40 mg  Orally Once a day  1 tablet  24h  30 May, 2017    
    Active

 

 Klor-Con 10 10 MEQ     take 1 tablet by Oral route with food  1 time per day  
           Active







RESULTS







 Name  Result  Date  Reference Range

 

 Xray : Knee, Left     2018   







PROCEDURES

No Known procedures



INSTRUCTIONS





MEDICATIONS ADMINISTERED

No Known Medications



MEDICAL (GENERAL) HISTORY







 Type  Description  Date

 

 Medical History  Diabetes mellitus without mention of complication, type II or 
unspecified type, not stated as uncontrolled   

 

 Medical History  Depressive disorder, not elsewhere classified   

 

 Medical History  Anxiety state, unspecified   

 

 Medical History  Effusion of lower leg joint   

 

 Medical History  Generalized osteoarthrosis, unspecified site   

 

 Medical History  Other and unspecified hyperlipidemia   

 

 Medical History  Abnormal weight gain   

 

 Medical History  Effusion of joint, site unspecified   

 

 Medical History  Esophageal reflux   

 

 Medical History  hypertension   

 

 Medical History  colonoscopy- inscreased polyp   

 

 Surgical History  Tonsillectomy   

 

 Surgical History  Orthopedic: Bilateral Knee x2   

 

 Surgical History  colonoscopy  

 

 Hospitalization History  Surgery(s)/Childbirth(s) only

## 2019-02-26 NOTE — PROGRESS NOTE-POST OPERATIVE
Post-Operative Progess Note


Surgeon (s)/Assistant (s)


Surgeon


NELSON MAI DO


Assistant:  na





Pre-Operative Diagnosis


history of polyps





Post-Operative Diagnosis





colon polyps





Procedure & Operative Findings


Date of Procedure


2/26/19


Procedure Performed/Findings


colonoscopy with hot bx polypectomy x 2


Anesthesia Type


per crna





Estimated Blood Loss


Estimated blood loss (mL):  min





Specimens/Packing


Specimens Removed


colon polyps hepatic flexure











NELSON MAI DO Feb 26, 2019 13:59

## 2019-02-26 NOTE — XMS REPORT
[facility]

 Created on: 2018



TraeDebiDuyen

External Reference #: [id number]

: 1965

Sex: Female



Demographics







 Address  [address] [place] [address]  [address]

 

 Preferred Language  Unknown

 

 Marital Status  Unknown

 

 Church Affiliation  Unknown

 

 Race  Unknown

 

 Ethnic Group  Unknown





Author







 Author  LIYAH LOWE

 

 Organization  [facility]  [place]

 

 Address  [address] [place]  [address]



 

 Phone  [phone]







Care Team Providers







 Care Team Member Name  Role  Phone

 

 LIYAH LOWE  Unavailable  [phone]







PROBLEMS







 Type  Condition  ICD9-CM Code  ZVG07-JO Code  Onset Dates  Condition Status  
SNOMED Code

 

 Problem  Effusion of lower leg joint  719.06        Active  841752374

 

 Problem  Esophageal reflux  530.81        Active  941038302

 

 Problem  Other hyperlipidemia     E78.4     Active  68154101

 

 Problem  Type 2 diabetes mellitus without complications     E11.9     Active  
838938142

 

 Problem  Primary generalized (osteo)arthritis     M15.0     Active  936728373

 

 Problem  COPD with exacerbation     J44.1     Active  745394809

 

 Problem  Claudication     I73.9     Active  178228557

 

 Problem  Mild episode of recurrent major depressive disorder     F33.0     
Active  992620703

 

 Problem  Cataracts, bilateral     H26.9     Active  10313662

 

 Problem  Chronic obstructive pulmonary disease with acute exacerbation     
J44.1     Active  482260521

 

 Problem  Chronic renal insufficiency, stage II (mild)     N18.2     Active  
995980254

 

 Problem  Primary osteoarthritis of both knees     M17.0     Active  216673571

 

 Problem  Other chronic pain     G89.29     Active  95839334

 

 Problem  Lumbar degenerative disc disease     M51.36     Active  77636295

 

 Problem  Essential (primary) hypertension     I10     Active  50525516

 

 Problem  Anxiety disorder, unspecified     F41.9     Active  402213838

 

 Problem  Peripheral arterial occlusive disease     I77.9     Active  117332986

 

 Problem  Diabetic polyneuropathy associated with type 2 diabetes mellitus     
E11.42     Active  61184789

 

 Problem  Type 2 diabetes mellitus with diabetic neuropathy, without long-term 
current use of insulin     E11.40     Active  96723840

 

 Problem  Chronic obstructive pulmonary disease, unspecified COPD type     
J44.9     Active  95184089

 

 Problem  Long term current use of insulin     Z79.4     Active  932401021

 

 Problem  Type 2 diabetes mellitus with hyperglycemia     E11.65     Active  
42396325

 

 Problem  Old tear of lateral meniscus of left knee, unspecified tear type     
M23.201     Active  187178081

 

 Problem  Major depressive disorder, single episode, unspecified     F32.9     
Active  79359590

 

 Problem  Effusion, left knee     M25.462     Active  378608152

 

 Problem  Dysphagia, unspecified type     R13.10     Active  52570735

 

 Problem  Hyperlipidemia LDL goal <130     E78.5     Active  82655250

 

 Problem  Unilateral primary osteoarthritis, left knee     M17.12     Active  
634331926

 

 Problem  Hypothyroidism (acquired)     E03.9     Active  700310242







ALLERGIES

No Information



ENCOUNTERS







 Encounter  Location  Date  Diagnosis

 

 [address] [address]  21 May, 2018  Pain in left 
knee M25.562

 

 [address] [address]  21 May, 2018  Pain in left 
knee M25.562 and Other chronic pain G89.29

 

 [place] [address] [address] [place] [address]  15 May, 2018   

 

 [address] [address]  02 May, 2018  Type 2 diabetes 
mellitus without complications E11.9 ; Long term current use of insulin Z79.4 ; 
Unilateral primary osteoarthritis, left knee M17.12 ; Lumbar degenerative disc 
disease M51.36 and Chronic obstructive pulmonary disease, unspecified COPD type 
J44.9

 

 [address] [address]     

 

 [address] [address]     

 

 [address] [address]     

 

 [address] [address]  23 Mar, 2018  Type 2 diabetes 
mellitus without complications E11.9

 

 [address] [address]  23 Mar, 2018   

 

 [facility]  [id number] [place] [address]
47[address]  13 Mar, 2018   

 

 [facility]  [address] [place] [address]-
[address]  09 Mar, 2018  Type 2 diabetes mellitus without complications E11.9

 

 [address] [address]     

 

 [address] [place] [address]    Diabetic 
polyneuropathy associated with type 2 diabetes mellitus E11.42

 

 [facility]  [address] [place] [address]     

 

 [facility] [address] [address]  21 Dec, 2017  Type 2 diabetes 
mellitus without complications E11.9 ; Long term current use of insulin Z79.4 ; 
High risk sexual behavior Z72.51 ; BMI 40.0-44.9, adult Z68.41 and Encounter 
for immunization Z23

 

 [facility] [address] [address]  04 Dec, 2017   

 

 [facility]  [address] [place] [address]  04 Dec, 2017   

 

 [facility] [address] [address]  17 2017  COPD with 
exacerbation J44.1 and BMI 40.0-44.9, adult Z68.41

 

 [facility] [address] [address]  09 Oct, 2017  Encounter for 
screening mammogram for malignant neoplasm of breast Z12.31 ; Well woman exam 
Z01.419 and High risk sexual behavior Z72.51

 

 [facility] [address] [address]  11 Sep, 2017   

 

 [facility] [address] [address]  11 Sep, 2017  Type 2 diabetes 
mellitus without complications E11.9

 

 [address] [address]  11 Sep, 2017   

 

 [facility] [address] [address]  11 Sep, 2017  Old tear of 
lateral meniscus of left knee, unspecified tear type M23.201

 

 [facility] [address] [address]  11 Sep, 2017  Other type of 
osteoarthritis, unspecified site M19.90 ; Type 2 diabetes mellitus without 
complications E11.9 ; Primary osteoarthritis of both knees M17.0 ; Chronic 
renal insufficiency, stage II (mild) N18.2 ; Diabetic polyneuropathy associated 
with type 2 diabetes mellitus E11.42 and Exposure to hepatitis C Z20.5

 

 [facility] [address] [address]  06 Sep, 2017  Type 2 diabetes 
mellitus without complications E11.9

 

 [facility]  [address] [place] [address]  30 Aug, 2017   

 

 [facility]  [address] [place] [address]  30 Aug, 2017   

 

 [facility]  [address] [place] [address]  16 Aug, 2017  Other type of 
osteoarthritis, unspecified site M19.90

 

 [facility]  [address] [place] [address]  15 Aug, 2017  Type 2 diabetes 
mellitus without complications E11.9

 

 [facility]  [address] [place] [address]  07 Aug, 2017   

 

 [facility]  [address] [place] [address]     

 

 [facility]  [address] [place] [address]     

 

 [facility]  [address] [place] [address]    Type 2 diabetes 
mellitus without complications E11.9 ; Type 2 diabetes mellitus with diabetic 
neuropathy, without long-term current use of insulin E11.40 ; Primary 
osteoarthritis of both knees M17.0 and Chronic renal insufficiency, stage II (
mild) N18.2

 

 [facility]  [address] [place] [address]     

 

 [facility]  [address] [place] [address]  30 May, 2017  Hyperlipidemia 
LDL goal <130 E78.5

 

 [facility]  [address] [place] [address]  19 May, 2017  Type 2 diabetes 
mellitus without complications E11.9

 

 [facility]  [address] [facility] [id number] [address]  16 May, 2017   

 

 [facility]  [address] [place] [address]  08 May, 2017   

 

 [facility]  [address] [place] [address]  01 May, 2017   

 

 [facility]  [address] [place] [address]     

 

 [facility]  [address] [place] [address]    Primary 
generalized (osteo)arthritis M15.0 and Type 2 diabetes mellitus without 
complications E11.9

 

 [facility]  [address] [place] [address]    Old tear of 
lateral meniscus of left knee, unspecified tear type M23.201

 

 [facility]  [address] [place] [address]     

 

 [facility]  [address] [place] [address]     

 

 [facility]  [address] [place] [address]     

 

 [facility]  [address] [place] [address]  20 Mar, 2017  Type 2 diabetes 
mellitus without complications E11.9

 

 [facility]  [address] [place] [address]  13 Mar, 2017  Type 2 diabetes 
mellitus without complications E11.9

 

 [facility]  [address] [place] [address]  13 Mar, 2017  Type 2 diabetes 
mellitus without complications E11.9

 

 [facility]  [address] [place] [address]  04 Mar, 2017  Type 2 diabetes 
mellitus without complications E11.9

 

 [facility]  [address] [place] [address]     

 

 [facility]  [address] [place] [address]    Chronic 
obstructive pulmonary disease with acute exacerbation J44.1

 

 [facility] [address] [address]     

 

 [facility] [address] [address]    Dysuria R30.0 ; 
Essential (primary) hypertension I10 ; Hypothyroidism (acquired) E03.9 ; Type 2 
diabetes mellitus without complications E11.9 and Mild episode of recurrent 
major depressive disorder F33.0

 

 [facility]  [address] [place] [address]     

 

 [facility]  [address] [place] [address]    Dysuria R30.0 ; 
Vaginal candidiasis B37.3 and Candidiasis B37.9

 

 [facility]  [address] [place] [address]     

 

 [facility]  [address] [place] [address]  10 Octaviano, 2017  COPD with 
exacerbation J44.1 and Dysphagia, unspecified type R13.10

 

 [facility]  [address] [place] [address]  27 Dec, 2016   

 

 [facility]  [address] [place] [address]  12 Dec, 2016  Essential (
primary) hypertension I10 ; Hypothyroidism (acquired) E03.9 ; Type 2 diabetes 
mellitus without complications E11.9 ; Primary generalized (osteo)arthritis 
M15.0 ; Major depressive disorder, single episode, unspecified F32.9 ; 
Unilateral primary osteoarthritis, left knee M17.12 ; Hyperlipidemia LDL goal <
130 E78.5 and Dysphagia, unspecified type R13.10

 

 [facility]  [address] [place] [address]  01 Dec, 2016   

 

 [facility]  [address] [place] [address]     

 

 [facility]  [address] [place] [address]     

 

 [facility]  [address] [place] [address]     

 

 [facility]  [address] [place] [address]     

 

 [facility]  [address] [place] [address]     

 

 [facility]  [address] [place] [address]    Type 2 diabetes 
mellitus without complications E11.9 ; Primary generalized (osteo)arthritis 
M15.0 ; Effusion, left knee M25.462 ; Old tear of lateral meniscus of left knee
, unspecified tear type M23.201 and Fatigue, unspecified type R53.83

 

 [facility]  [address] [place] [address]  26 Sep, 2016  Type 2 diabetes 
mellitus without complications E11.9 ; Claudication I73.9 ; Pain in right leg 
M79.604 and Pain of left leg M79.605

 

 [facility]  [address] [place] [address]  14 Sep, 2016   

 

 [facility]  [address] [place] [address]  12 Sep, 2016   

 

 [facility]  [address] [place] [address]  18 Aug, 2016  Type 2 diabetes 
mellitus with hyperglycemia E11.65 ; Long term current use of insulin Z79.4 ; 
Anxiety disorder, unspecified F41.9 ; Essential (primary) hypertension I10 ; 
Major depressive disorder, single episode, unspecified F32.9 and Peripheral 
arterial occlusive disease I77.9

 

 [facility]  [address] [place] [address]  17 Aug, 2016   

 

 [facility]  [address] [place] [address]  11 Aug, 2016   

 

 [facility] [address] [address]  11 Aug, 2016   

 

 [address] [address]  10 Aug, 2016   

 

 [address] [address]  05 Aug, 2016  Acute midline 
low back pain with right-sided sciatica M54.41

 

 [facility]  [address] [facility] [id number] [place] [address]  05 Aug, 2016  Chest pain, unspecified type R07.9 ; Palpitations R00.2 ; 
Claudication I73.9 ; Generalized pain R52 and Type 2 diabetes mellitus without 
complications E11.9

 

 [address] [address]    Acute midline 
low back pain with right-sided sciatica M54.41 ; Dysuria R30.0 ; Claudication 
I73.9 ; Peripheral arterial occlusive disease I77.9 ; Shortness of breath 
R06.02 ; Effusion, left knee M25.462 and Type 2 diabetes mellitus without 
complications E11.9

 

 [facility]  [address] [place] [address]     

 

 [address] [address]     

 

 [address] [address]     

 

 [address] [address]    Type 2 diabetes 
mellitus without complications E11.9 ; Other hyperlipidemia E78.4 ; Peripheral 
arterial occlusive disease I77.9 ; Essential (primary) hypertension I10 and 
Other fatigue R53.83

 

 [facility]  [address] [place] [address]  18 May, 2016   

 

 [facility]  [address] [place] [address]  06 May, 2016   

 

 [facility]  [address] [place] [address]  05 May, 2016  Chest pain, 
unspecified type R07.9 ; Peripheral arterial occlusive disease I77.9 ; Anxiety 
disorder, unspecified F41.9 ; Essential (primary) hypertension I10 ; Type 2 
diabetes mellitus without complications E11.9 and Other hyperlipidemia E78.4

 

 [facility]  [address] [place] [address]  04 May, 2016   

 

 [facility]  [address] [place] [address]     

 

 [facility]  [address] [place] [address]     

 

 [facility]  [address] [place] [address]    Type 2 diabetes 
mellitus without complications E11.9 ; Peripheral arterial occlusive disease 
I77.9 ; Primary generalized (osteo)arthritis M15.0 ; Major depressive disorder, 
single episode, unspecified F32.9 ; Anxiety disorder, unspecified F41.9 and 
Essential (primary) hypertension I10

 

 [facility]  [address] [place] [address]     

 

 [facility] [address] [address]     

 

 [facility]  [address] [facility] [id number] [place] [address]-
[address]  02 Mar, 2016   

 

 [facility] [address] [address]     

 

 [facility] [address] [address]    Bronchitis J40 ; 
Shortness of breath R06.02 and Wheezing R06.2

 

 [facility]  [address] [place] [address]     

 

 [facility] [address] [address]     

 

 [facility]  [address] [place] [address]     

 

 [facility]  [address] [place] [address]     

 

 [facility] [address] [place] [address]    Type 2 diabetes 
mellitus without complications E11.9 ; Claudication I73.9 ; Other 
hyperlipidemia E78.4 ; Cataracts, bilateral H26.9 and Other fatigue R53.83

 

 [facility]  [address] [place] [address]  28 Oct, 2015   

 

 [facility]  [address] [place] [address]  22 Oct, 2015   

 

 [facility]  [address] [place] [address]  16 Oct, 2015   

 

 [facility]  [address] [place] [address]  14 Oct, 2015  Type 2 diabetes 
mellitus without complications E11.9 ; Other hyperlipidemia E78.4 ; Primary 
generalized (osteo)arthritis M15.0 and Claudication I73.9

 

 [facility]  [address] [place] [address]  12 Oct, 2015   

 

 [facility]  [address] [place] [address]  26 Aug, 2015   

 

 [facility]  [address] [place] [address]  12 Aug, 2015   

 

 [facility]  [address] [place] [address]  12 Aug, 2015   

 

 [facility]  [address] [place] [address]  10 Aug, 2015   

 

 [facility]  [address] [place] [address]  05 Aug, 2015   

 

 [facility] [address] [address]  11 May, 2015  Diabetes 
mellitus without mention of complication, type II or unspecified type, not 
stated as uncontrolled 250.00 ; Bronchitis 490 and Effusion of lower leg joint 
719.06

 

 [address] [address]  01 May, 2015   

 

 [address] [address]  01 May, 2015  Bronchitis 490 
and Wheezing 786.07

 

 [facility]  [id number] [address]
1593     

 

 [facility]  [id number] [address]     

 

 [facility]  [id number] [address]-
[address]     

 

 [facility]  [address] [place] [address]  19 Mar, 2015   

 

 [facility]  [id number] [address]
68[address]  19 Mar, 2015   

 

 [facility]  [address] [place] [address]  09 Mar, 2015   

 

 [facility]  [address] [address]  09 Mar, 2015   

 

 [facility]  [address] [place] [address]  ,    

 

 [facility]  [address] [address]  ,    

 

 [facility]  [address] [address]-
2228  10 Feb, 2015   

 

 [facility]  [address] [place] [address]  ,    

 

 [facility]  [address] [place] [address]  ,    

 

 [facility]  [address] [place] [address]-
[address]  ,    

 

 [facility]  [address] [address]-
[address]472     

 

 [facility]  [address] [place] [address]     

 

 [facility]  [address], [place] [address]-
[address]     

 

 [facility]  [address] [place] [address]     

 

 [facility]  [address], [place] [address]-
[address]     

 

 [facility]  [address] [place] [address]  24 Dec, 2014   

 

 [facility]  [address] [address]-
5008  24 Dec, 2014   

 

 [facility]  [address] [place] [address]  18 Dec, 2014   

 

 [facility]  [address] [address]-
2163  18 Dec, 2014   

 

 [facility]  [address] [facility] [place] [address]  09 Dec, 2014   

 

 [facility]  [address] [address]-
[address]  09 Dec, 2014   

 

 [facility]  [address] [facility] [place] [address]  02 Dec, 2014   

 

 [facility]  [address] [place] [address]-
7656  02 Dec, 2014   

 

 [facility]  [address] [facility] [place] [address]     

 

 [facility]  [address] [place] [address]-
46[address]     

 

 [facility]  [address] [facility] [place] [address]     

 

 [facility]  [address] [place] [address]-
[address]     

 

 [facility]  [address] [facility] [place] [address]  31 Oct, 2014   

 

 [facility]  [address], [place] [address]-
3444  31 Oct, 2014   

 

 [facility]  [address] [facility] [place] [address]  16 Oct, 2014   

 

 [facility]  [address] [address]
9102  16 Oct, 2014   

 

 [facility]  [address] [place] [address]  10 Oct, 2014   

 

 [facility]  [address] [place] [address]-
8973  10 Oct, 2014   

 

 [facility]  [address] [place] [address]  26 Sep, 2014   

 

 [facility]  [address], [place] [address]-
2778  26 Sep, 2014   

 

 [facility]  [address] [facility] [place] [address]  29 Aug, 2014   

 

 [facility]  [address] [facility] [id number] [address]-
2041  29 Aug, 2014   

 

 [facility]  [address] [place] [address]  25 Aug, 2014   

 

 [facility]  [address] [facility] [place] [address]  25 Aug, 2014   

 

 [facility]  [address] [place] [address]-
5453  25 Aug, 2014   

 

 [facility]  [id number] [place] [address]-
6936  25 Aug, 2014   

 

 [facility]  [address] [facility] [place] [address]  18 Aug, 2014   

 

 [facility]  [id number] [place] [address]-
8679  18 Aug, 2014   

 

 [facility]  [address] [facility] [place] [address]  14 Aug, 2014   

 

 [facility]  [id number] [address]-
2059  14 Aug, 2014   

 

 [facility]  [id number] [place] [address]-
8364  11 Aug, 2014   

 

 [facility]  [address] [place] [address]  11 Aug, 2014   

 

 [facility]  [id number] [address]-
1564  11 Aug, 2014   

 

 [facility]  [address] [facility] [id number] [address]-
4201  11 Aug, 2014   

 

 [facility]  [address] [facility] [id number] [address]-
8433     

 

 [facility]  [address] [place] [address]     

 

 [facility]  [id number] [address]-
7735     

 

 [facility]  [id number], [place] [address]-
3052     

 

 [facility]  [address] [place] [address]     

 

 [facility]  [address] [address]-
8770     

 

 [facility] [facility]  [address] [place] [address]  30 May, 2014   

 

 [facility]  [id number] [place] [phone]  30 May, 2014   

 

 [facility]  [address] [place] [address]  14 May, 2014   

 

 [facility]  [address] [place] [phone]  14 May, 2014   

 

 [facility]  [address] [place] [address]  05 May, 2014   

 

 [facility]  [address] [place] [phone]
[phone]  05 May, 2014   

 

 [facility]  [address] [place] [address]     

 

 [facility]  [address] [place] [phone]
[phone]     

 

 [facility]  [address] [place] [address]     

 

 [facility]  [id number] [phone]     

 

 [facility]  [address] [place] [address]  10 Feb, 2014   

 

 [facility]  [id number] [phone]-
[phone]  10 Feb, 2014   







IMMUNIZATIONS

No Known Immunizations



SOCIAL HISTORY

Never Assessed



REASON FOR VISIT

Refill request



PLAN OF CARE





VITAL SIGNS





MEDICATIONS

Unknown Medications



RESULTS

No Results



PROCEDURES

No Known procedures



INSTRUCTIONS





MEDICATIONS ADMINISTERED

No Known Medications



MEDICAL (GENERAL) HISTORY







 Type  Description  Date

 

 Medical History  Diabetes mellitus without mention of complication, type II or 
unspecified type, not stated as uncontrolled   

 

 Medical History  Depressive disorder, not elsewhere classified   

 

 Medical History  Anxiety state, unspecified   

 

 Medical History  Effusion of lower leg joint   

 

 Medical History  Generalized osteoarthrosis, unspecified site   

 

 Medical History  Other and unspecified hyperlipidemia   

 

 Medical History  Abnormal weight gain   

 

 Medical History  Effusion of joint, site unspecified   

 

 Medical History  Esophageal reflux   

 

 Medical History  hypertension   

 

 Medical History  colonoscopy- inscreased polyp   

 

 Surgical History  Tonsillectomy   

 

 Surgical History  Orthopedic: Bilateral Knee x2   

 

 Surgical History  colonoscopy  2017

 

 Hospitalization History  Surgery(s)/Childbirth(s) only

## 2019-02-26 NOTE — XMS REPORT
[facility]

 Created on: 2018



Duyen Larkin

External Reference #: [id number]

: 1965

Sex: Female



Demographics







 Address  [address] [place]  [address]

 

 Preferred Language  Unknown

 

 Marital Status  Unknown

 

 Baptism Affiliation  Unknown

 

 Race  Unknown

 

 Ethnic Group  Unknown





Author







 Author  LIYAH LOWE

 

 [facility] [place]

 

 Address  [address] [place]  [address]



 

 Phone  [phone]







Care Team Providers







 Care Team Member Name  Role  Phone

 

 LIYAH LOWE  Unavailable  [phone]







PROBLEMS







 Type  Condition  ICD9-CM Code  ZMN20-SY Code  Onset Dates  Condition Status  
SNOMED Code

 

 Problem  Effusion of lower leg joint  719.06        Active  997445859

 

 Problem  Esophageal reflux  530.81        Active  973101819

 

 Problem  Dysphagia, unspecified type     R13.10     Active  43599944

 

 Problem  Other hyperlipidemia     E78.4     Active  46405653

 

 Problem  Unilateral primary osteoarthritis, left knee     M17.12     Active  
595508421

 

 Problem  Type 2 diabetes mellitus without complications     E11.9     Active  
711435454

 

 Problem  Hypothyroidism (acquired)     E03.9     Active  400055612

 

 Problem  Mild episode of recurrent major depressive disorder     F33.0     
Active  911076560

 

 Problem  COPD with exacerbation     J44.1     Active  980449685

 

 Problem  Long term current use of insulin     Z79.4     Active  305312600

 

 Problem  Diabetic polyneuropathy associated with type 2 diabetes mellitus     
E11.42     Active  57414951

 

 Problem  Cataracts, bilateral     H26.9     Active  95362903

 

 Problem  Claudication     I73.9     Active  776298115

 

 Problem  Primary generalized (osteo)arthritis     M15.0     Active  880232521

 

 Problem  Chronic renal insufficiency, stage II (mild)     N18.2     Active  
587429052

 

 Problem  Chronic obstructive pulmonary disease with acute exacerbation     
J44.1     Active  506447246

 

 Problem  Primary osteoarthritis of both knees     M17.0     Active  386055242

 

 Problem  Type 2 diabetes mellitus with diabetic neuropathy, without long-term 
current use of insulin     E11.40     Active  55950808

 

 Problem  Major depressive disorder, single episode, unspecified     F32.9     
Active  45175862

 

 Problem  Anxiety disorder, unspecified     F41.9     Active  541827318

 

 Problem  Essential (primary) hypertension     I10     Active  09959186

 

 Problem  Peripheral arterial occlusive disease     I77.9     Active  088888426

 

 Problem  Old tear of lateral meniscus of left knee, unspecified tear type     
M23.201     Active  561759670

 

 Problem  Hyperlipidemia LDL goal <130     E78.5     Active  54019650

 

 Problem  Effusion, left knee     M25.462     Active  887276772

 

 Problem  Type 2 diabetes mellitus with hyperglycemia     E11.65     Active  
23851164







ALLERGIES

No Information



ENCOUNTERS







 Encounter  Location  Date  Diagnosis

 

 [facility] [place]  [address] [facility] [address] [id number] [place] [id number]  02 May, 
2018   

 

 [facility] [place]  [address] [facility] [address] [id number] [place] [id number]     

 

 [facility] [place]  [address] [facility] [address] [id number] [place] [id number]     

 

 [facility] [place]  [address] [facility] [address] [id number] [place] [id number]     

 

 [facility] [place]  [address] [facility] [address] [id number] [place] [id number]  23 Mar, 
2018  Type 2 diabetes mellitus without complications E11.9

 

 [facility] [place]  [address] [place] [address] [address] [id number] [place] [id number]  23 Mar, 
2018   

 

 [facility]  [address] [facility] [id number] [place] [address]  13 Mar, 2018   

 

 [facility]  [address] [facility] [id number] [place] [address]  09 Mar, 2018  Type 2 diabetes mellitus without complications E11.9

 

 [facility] [place]  [address] [place] [address] [address] [id number] [place] [id number]     

 

 [facility] [place]  [address] [facility] [address] [id number] [place] [id number]    Diabetic polyneuropathy associated with type 2 diabetes mellitus E11.42

 

 [facility] [place]  [address] [facility] [address] [id number] [place] [id number]     

 

 [facility] [place]  [address] [facility] [address] [id number] [place] [id number]  21 Dec, 
2017  Type 2 diabetes mellitus without complications E11.9 ; Long term current 
use of insulin Z79.4 ; High risk sexual behavior Z72.51 ; BMI 40.0-44.9, adult 
Z68.41 and Encounter for immunization Z23

 

 [facility] [place]  [address] [facility] [address] [id number] [place] [id number]  04 Dec, 
2017   

 

 [facility] [place]  [address] [facility] [address] [id number] [place] [id number]  04 Dec, 
2017   

 

 [facility] [place]  [address] [facility] [address] [id number] [place], [place] [id number]    COPD with exacerbation J44.1 and BMI 40.0-44.9, adult Z68.41

 

 [facility] [place]  [address] [facility] [address] [id number] [place] [id number]  09 Oct, 
2017  Encounter for screening mammogram for malignant neoplasm of breast Z12.31 
; Well woman exam Z01.419 and High risk sexual behavior Z72.51

 

 [facility] [place]  [address] [place] [address] [address] [id number] [place] [id number]  11 Sep, 
2017   

 

 [facility] [place]  [address] [place] [address] C [id number] [place] [id number]  11 Sep, 
2017  Type 2 diabetes mellitus without complications E11.9

 

 [facility] [place]  [address] [place] [address] C [id number] [place], [place] [id number]  11 Sep, 
2017   

 

 [facility] [place]  [address] [place] [address] C [id number] [place] [id number]  11 Sep, 
2017  Old tear of lateral meniscus of left knee, unspecified tear type M23.201

 

 [facility] [place]  [address] [place] [address] C [id number] [place], [place] [id number]  11 Sep, 
2017  Other type of osteoarthritis, unspecified site M19.90 ; Type 2 diabetes 
mellitus without complications E11.9 ; Primary osteoarthritis of both knees 
M17.0 ; Chronic renal insufficiency, stage II (mild) N18.2 ; Diabetic 
polyneuropathy associated with type 2 diabetes mellitus E11.42 and Exposure to 
hepatitis C Z20.5

 

 [facility] [place]  [address] [place] [address] [address] [id number] [place] [id number]  06 Sep, 
2017  Type 2 diabetes mellitus without complications E11.9

 

 [facility] [place]  [address] [facility] [address] [id number] [place] [id number]  30 Aug, 
2017   

 

 [facility] [place]  [address] [facility] [address] [id number] [place] [id number]  30 Aug, 
2017   

 

 [facility] [place]  [address] [place] [address] C [id number] [place] [id number]  16 Aug, 
2017  Other type of osteoarthritis, unspecified site M19.90

 

 [facility] [place]  [address] [facility] [address] [id number] [place] [id number]  15 Aug, 
2017  Type 2 diabetes mellitus without complications E11.9

 

 [facility] [place]  [address] [facility] [address] [id number] [place] 599[address]  07 Aug, 
2017   

 

 [facility] [place]  [address] [facility] [address] [id number] [place] [id number]     

 

 [facility] [place]  [address] [facility] [address] [id number] [place] [id number]     

 

 [facility] [place]  [address] [place] [address] [address] [id number] [place] [id number]    Type 2 diabetes mellitus without complications E11.9 ; Type 2 diabetes 
mellitus with diabetic neuropathy, without long-term current use of insulin 
E11.40 ; Primary osteoarthritis of both knees M17.0 and Chronic renal 
insufficiency, stage II (mild) N18.2

 

 [facility] [place]  [address] [place] [address] [address] [id number] [place] [id number]     

 

 [facility] [place]  [address] [place] [address] [address] [id number] [place], [place] [id number]  30 May, 
2017  Hyperlipidemia LDL goal <130 E78.5

 

 [facility] [place]  [address] [place] [address] [address] [id number] [place] [id number]  19 May, 
2017  Type 2 diabetes mellitus without complications E11.9

 

 [facility]  [address] [facility] [id number] [place] [address]  16 May, 2017   

 

 [facility] [place]  [address] [facility] [address] [id number] [place] [id number]  08 May, 
2017   

 

 [facility] [place]  [address] [facility] [address] [id number] [place] [id number]  01 May, 
2017   

 

 [facility] [place]  [address] [place] [address] [address] [id number] [place], [place] [id number]     

 

 [facility] [place]  [address] [facility] [address] [id number] [place] [id number]    Primary generalized (osteo)arthritis M15.0 and Type 2 diabetes mellitus 
without complications E11.9

 

 [facility] [place]  [address] [facility] [address] [id number] [place] [address]    Old tear of lateral meniscus of left knee, unspecified tear type M23.201

 

 [facility] [place]  [address] [facility] [address] [id number] [place] [id number]     

 

 [facility] [place]  [address] [facility] [address] [id number] [place] [address]     

 

 [facility] [place]  [address] [facility] [address] [id number] [place] [id number]     

 

 [facility] [place]  [address] [facility] [address] [id number] [place] [id number]  20 Mar, 
2017  Type 2 diabetes mellitus without complications E11.9

 

 [facility] [place]  [address] [place] [address] [address] [id number] [place], [place] [id number]  13 Mar, 
2017  Type 2 diabetes mellitus without complications E11.9

 

 [facility] [place]  [address] [place] [address] [address] [id number] [place] [id number]  13 Mar, 
2017  Type 2 diabetes mellitus without complications E11.9

 

 [facility] [place]  [address] [place] [address] [address] [id number] [place] 796227069  04 Mar, 
2017  Type 2 diabetes mellitus without complications E11.9

 

 [facility] [place]  [address] [place] [address] [address] [id number] [place] 595902918     

 

 [facility] [place]  [address] [facility] [address] [id number] [place] [id number]    Chronic obstructive pulmonary disease with acute exacerbation J44.1

 

 [facility] [place]  [address] [place] [address] [address] [id number] [place] [id number]     

 

 [facility] [place]  [address] [place] [address] [address] [id number] [place], [place] [id number]    Dysuria R30.0 ; Essential (primary) hypertension I10 ; Hypothyroidism (
acquired) E03.9 ; Type 2 diabetes mellitus without complications E11.9 and Mild 
episode of recurrent major depressive disorder F33.0

 

 [facility] [place]  [address] [facility] [address] [id number] [place] [id number]     

 

 [facility] [place]  [address] [facility] [address] [id number] [place] [id number]    Dysuria R30.0 ; Vaginal candidiasis B37.3 and Candidiasis B37.9

 

 [facility] [place]  [address] [facility] [address] [id number] [place] [id number]     

 

 [facility] [place]  [address] [facility] [address] [id number] [place] [id number]  10 Octaviano, 
2017  COPD with exacerbation J44.1 and Dysphagia, unspecified type R13.10

 

 [facility] [place]  [address] [facility] [address] [id number] [place] [id number]  27 Dec, 
2016   

 

 [facility] [place]  [address] [place] [address] [address] [id number] [place] [id number]  12 Dec, 
2016  Essential (primary) hypertension I10 ; Hypothyroidism (acquired) E03.9 ; 
Type 2 diabetes mellitus without complications E11.9 ; Primary generalized (
osteo)arthritis M15.0 ; Major depressive disorder, single episode, unspecified 
F32.9 ; Unilateral primary osteoarthritis, left knee M17.12 ; Hyperlipidemia 
LDL goal <130 E78.5 and Dysphagia, unspecified type R13.10

 

 [facility] [place]  [address] [place] [address] [address] [id number] [place], [place] [id number]  01 Dec, 
2016   

 

 [facility] [place]  [address] [place] [address] [address] [id number] [place] [id number]     

 

 [facility] [place]  [address] [facility] [address] [id number] [place] [id number]     

 

 [facility] [place]  [address] [place] [address] [address] [id number] [place] [id number]     

 

 [facility] [place]  [address] [place] [address] [address] [id number] [place] [id number]     

 

 [facility] [place]  [address] [facility] [address] [id number] [place] 238923292     

 

 [facility] [place]  [address] [facility] [address] [id number] [place], [place] [id number]    Type 2 diabetes mellitus without complications E11.9 ; Primary 
generalized (osteo)arthritis M15.0 ; Effusion, left knee M25.462 ; Old tear of 
lateral meniscus of left knee, unspecified tear type M23.201 and Fatigue, 
unspecified type R53.83

 

 [facility] [place]  [address] [place] [address] [id number] [place], [place] [id number]  26 Sep, 
2016  Type 2 diabetes mellitus without complications E11.9 ; Claudication I73.9 
; Pain in right leg M79.604 and Pain of left leg M79.605

 

 [facility] [place]  [address] [place] [address] C [id number] [place] [id number]  14 Sep, 
2016   

 

 [facility] [place]  [address] [place] [address] [id number] [place] [id number]  12 Sep, 
2016   

 

 [facility] [place]  [address] [place] [address] [id number] [place] [id number]  18 Aug, 
2016  Type 2 diabetes mellitus with hyperglycemia E11.65 ; Long term current 
use of insulin Z79.4 ; Anxiety disorder, unspecified F41.9 ; Essential (primary
) hypertension I10 ; Major depressive disorder, single episode, unspecified 
F32.9 and Peripheral arterial occlusive disease I77.9

 

 [facility] [place]  [address] [place] [address] [id number] [place] [id number]  17 Aug, 
2016   

 

 [facility] [place]  [address] [place] [address] C [id number] [place] [id number]  11 Aug, 
2016   

 

 [facility] [place]  [address] [place] [address] [id number] [place], [place] [id number]  11 Aug, 
2016   

 

 [facility] [place]  [address] [place] [address] [id number] [place] [id number]  10 Aug, 
2016   

 

 [facility] [place]  [address] [place] [address] [id number] [place], [place] [id number]  05 Aug, 
2016  Acute midline low back pain with right-sided sciatica M54.41

 

 [facility]  [address] [facility] [id number] [place], [place] [address]-
[address]  05 Aug, 2016  Chest pain, unspecified type R07.9 ; Palpitations R00.2 ; 
Claudication I73.9 ; Generalized pain R52 and Type 2 diabetes mellitus without 
complications E11.9

 

 [facility] [place]  [address] [place] [address] [id number] [place], [place] [id number]    Acute midline low back pain with right-sided sciatica M54.41 ; Dysuria 
R30.0 ; Claudication I73.9 ; Peripheral arterial occlusive disease I77.9 ; 
Shortness of breath R06.02 ; Effusion, left knee M25.462 and Type 2 diabetes 
mellitus without complications E11.9

 

 [facility] [place]  [address] [facility] [address] [id number] [place] [id number]     

 

 [facility] [place]  [address] [facility] [address] [id number] [place] [id number]     

 

 [facility] [place]  [address] [facility] [address] [id number] [place] [id number]     

 

 [facility] [place]  [address] [facility] [address] [id number] [place] [id number]    Type 2 diabetes mellitus without complications E11.9 ; Other 
hyperlipidemia E78.4 ; Peripheral arterial occlusive disease I77.9 ; Essential (
primary) hypertension I10 and Other fatigue R53.83

 

 [facility] [place]  [address] [facility] [address] [id number] [place] [id number]  18 May, 
2016   

 

 [facility] [place]  [address] [place] [address] [address] [id number] [place] 157986975  06 May, 
2016   

 

 [facility] [place]  [address] [place] [address] [address] [id number] [place] [id number]  05 May, 
2016  Chest pain, unspecified type R07.9 ; Peripheral arterial occlusive 
disease I77.9 ; Anxiety disorder, unspecified F41.9 ; Essential (primary) 
hypertension I10 ; Type 2 diabetes mellitus without complications E11.9 and 
Other hyperlipidemia E78.4

 

 [facility] [place]  [address] [facility] [address] [id number] [place] [id number]  04 May, 
2016   

 

 [facility] [place]  [address] [facility] [address] [id number] [place] [id number]     

 

 [facility] [place]  [address] [facility] [address] [id number] [place] [id number]     

 

 [facility] [place]  [address] [facility] [address] [id number] [place] [id number]    Type 2 diabetes mellitus without complications E11.9 ; Peripheral 
arterial occlusive disease I77.9 ; Primary generalized (osteo)arthritis M15.0 ; 
Major depressive disorder, single episode, unspecified F32.9 ; Anxiety disorder
, unspecified F41.9 and Essential (primary) hypertension I10

 

 [facility] [place]  [address] [place] [address] [address] [id number] [place] [id number]     

 

 [facility] [place]  [address] [place] [address] [address] [id number] [place] [id number]     

 

 [facility]  [address] [facility] [id number] [place] [address]-
[address]  02 Mar, 2016   

 

 [facility] [place]  [address] [place] [address] [address] [id number] [place] [id number]     

 

 [facility] [place]  [address] [place] [address] [address] [id number] [place] [id number]    Bronchitis J40 ; Shortness of breath R06.02 and Wheezing R06.2

 

 [facility] [place]  [address] [place] [address] [address] [id number] [place] [id number]     

 

 [facility] [place]  [address] [place] [address] [address] [id number] [place] [id number]     

 

 [facility] [place]  [address] [place] [address] [address] [id number] [place] [id number]     

 

 [facility] [place]  [address] [place] [address] [address] [id number] [place] [id number]     

 

 [facility] [place]  [address] [place] [address] [address] [id number] [place] [id number]    Type 2 diabetes mellitus without complications E11.9 ; Claudication I73.9 
; Other hyperlipidemia E78.4 ; Cataracts, bilateral H26.9 and Other fatigue 
R53.83

 

 [facility] [place]  [address] [facility] [address] [id number] [place] [id number]  28 Oct, 
2015   

 

 [facility] [place]  [address] [place] [address] [address] [id number] [place] [id number]  22 Oct, 
2015   

 

 [facility] [place]  [address] [place] [address] [address] [id number] [place] [id number]  16 Oct, 
2015   

 

 [facility] [place]  [address] [place] [address] [address] [id number] [place] [id number]  14 Oct, 
2015  Type 2 diabetes mellitus without complications E11.9 ; Other 
hyperlipidemia E78.4 ; Primary generalized (osteo)arthritis M15.0 and 
Claudication I73.9

 

 [facility] [place]  [address] [facility] [address] [id number] [place] [id number]  12 Oct, 
2015   

 

 [facility] [place]  [address] [facility] [address] [id number] [place] 437772770  26 Aug, 
2015   

 

 [facility] [place]  [address] [facility] [address] [id number] [place] 405128862  12 Aug, 
2015   

 

 [facility] [place]  [address] [facility] [address] [id number] [place] [id number]  12 Aug, 
2015   

 

 [facility] [place]  [address] [facility] [address] [id number] [place] [id number]  10 Aug, 
2015   

 

 [facility] [place]  [address] [facility] [address] [id number] [place] 417431055  05 Aug, 
2015   

 

 [facility] [place]  [address] [place] [address] [address] [id number] [place] [id number]  11 May, 
2015  Diabetes mellitus without mention of complication, type II or unspecified 
type, not stated as uncontrolled 250.00 ; Bronchitis 490 and Effusion of lower 
leg joint 719.06

 

 [facility] [place]  [address] [place] [address] [address] [id number] [place] [id number]  01 May, 
2015   

 

 [facility] [place]  [address] [place] [address] C [id number] [place] [id number]  01 May, 
2015  Bronchitis 490 and Wheezing 786.07

 

 [facility] [address] [id number] [address]     

 

 [facility] [place] [address] [facility] [id number] [address]-
[address]     

 

 [facility] [place] [address] [id number] [address]     

 

 [facility]  [address] [place] [address] C [id number] [place] [id number]  19 Mar, 
2015   

 

 [place] [address] [facility] [id number][place] [address]46  19 Mar, 2015   

 

 [facility] [place]  [address] [id number] [place] [id number]  09 Mar, 
2015   

 

 [facility]  [address] [facility] [id number] [facility] [address]  09 Mar, 2015   

 

 [facility] [place]  [address] [id number] [place] [id number]     

 

 [facility]  [address] [facility] [id number] [facility] [address]-
[address]     

 

 [facility]  [address] [facility] [id number] [facility] [place] [address]-
87[address]  10 Feb, 2015   

 

 [facility] [place]  [address] [id number] [place] [id number]     

 

 [facility] [place]  [address] [facility] [address] [id number] [place] [id number]     

 

 [facility] [place] [facility]  [address] [facility] [id number] [place] [address]-
[address]     

 

 [facility] [place] [facility]  [address] [facility] [id number] [facility] [address]-
7[address]     

 

 [facility] [place]  [address] [facility] [address] [id number] [place] [id number]     

 

 [facility] [place] [facility]  [address] [facility] [id number] [place] [address]-
1516     

 

 [facility] [place]  [address] [facility] [address] [id number] [place] [id number]     

 

 [facility] [place] [facility]  [address] [facility] [id number] [facility] [address]-
8986     

 

 [facility] [place]  [address] [id number] [place] [id number]  24 Dec, 
2014   

 

 [facility]  [address] [facility] [id number] [place] [address]-
4166  24 Dec, 2014   

 

 [facility] [place]  [address] [facility] [address] [id number] [place] [id number]  18 Dec, 
2014   

 

 [facility] [place] [facility]  [address] [id number] [place] [address]-
5294  18 Dec, 2014   

 

 [facility] [place]  [address] [facility] [address] [id number] [place] [id number]  09 Dec, 
2014   

 

 [facility] [place] [facility]  [address] [facility] [id number] [facility] [address]-
5690  09 Dec, 2014   

 

 [facility] [place]  [address] [facility] [address] [id number] [place] [id number]  02 Dec, 
2014   

 

 [facility] [place] [facility]  [address] [id number] [place] [address]-
[address]60  02 Dec, 2014   

 

 [facility] [place]  [address] [facility] [address] [id number] [place] [id number]     

 

 [facility] [place] [facility]  [address] [facility] [id number] [facility] [address]-
84[address]     

 

 [facility] [place]  [address] [facility] [address] [id number] [place] [id number]     

 

 [facility]  [address] [facility] [id number] [facility] [address]-
[address]05     

 

 [facility] [place]  [address] [facility] [address] [id number] [place] [id number]  31 Oct, 
2014   

 

 [facility] [place] [facility]  [address] [facility] [id number] [facility] [address]-
[address]80  31 Oct, 2014   

 

 [facility] [place]  [address] [facility] [address] [id number] [place] [id number]  16 Oct, 
2014   

 

 [facility] [place] [facility]  [address] [id number] [place] [address]-
[address]  16 Oct, 2014   

 

 [facility] [place]  [address] [facility] [address] [id number] [place] [id number]  10 Oct, 
2014   

 

 [facility] [place] [facility]  [address] [id number] [facility] [place] [address]  10 Oct, 2014   

 

 [facility] [place]  [address] [facility] [address] [id number] [place] [id number]  26 Sep, 
2014   

 

 [facility] [place] [facility]  [address] [id number] [place] [address]-
3969  26 Sep, 2014   

 

 [facility] [place]  [address] [id number] [place] [id number]  29 Aug, 
2014   

 

 [facility] [place] [facility]  [address] [facility] [id number] [facility] [address]-
53[address]  29 Aug, 2014   

 

 [facility] [place]  [address] [id number] [place] [id number]  25 Aug, 
2014   

 

 [facility] [place]  [address] [id number] [place] [id number]  25 Aug, 
2014   

 

 [facility] [place] [facility]  [address] [id number] [place] [address]-
6352  25 Aug, 2014   

 

 [facility] [place] [facility]  [address] [facility] [id number] [place] [address]-
1253  25 Aug, 2014   

 

 [facility] [place]  [address] [facility] [address] [id number] [place] [id number]  18 Aug, 
2014   

 

 [facility] [place] [facility]  [address] [id number] [place] [address]-
9455  18 Aug, 2014   

 

 [facility] [place]  [address] [facility] [address] [id number] [place] [id number]  14 Aug, 
2014   

 

 [facility] [place] [facility]  [address] [facility] [id number] [place] [address]-
4036  14 Aug, 2014   

 

 [facility] [place] [facility]  [address] [facility] [id number] [place] [address]-
8722  11 Aug, 2014   

 

 [facility] [place]  [address] [facility] [address] [id number] [place] [id number]  11 Aug, 
2014   

 

 [facility] [place] [facility]  [address] [facility] [id number] [place] [address]-
58[address]  11 Aug, 2014   

 

 [facility] [place] [facility]  [address] [facility] [id number] [place] [address]-
0539  11 Aug, 2014   

 

 [facility] [place] [facility]  [address] [facility] [id number] [place] [address]-
3026     

 

 [facility] [place]  [address] [facility] [address] [id number] [place] [id number]     

 

 [facility] [place] [facility]  [address] [id number] [facility] [address]-
[address]     

 

 [facility] [place] [facility]  [address] [id number] [facility] [address]-
[address]     

 

 [facility] [place]  [address] [id number] [place] [id number]     

 

 [facility] [place] [facility]  [address] [id number] [place] [address]-
0916     

 

 [facility] [place]  [address] [id number] [place] [id number]  30 May, 
2014   

 

 [facility] [place] [facility]  [address] [id number] [place] [address]-
9776  30 May, 2014   

 

 [facility] [place]  [address] [id number] [place] [id number]  14 May, 
2014   

 

 [facility] [place] [facility]  [address] [id number] [place] [address]-
7615  14 May, 2014   

 

 [facility] [place]  [address] [id number] [place] [id number]  05 May, 
2014   

 

 [facility] [place] [facility]  [address] [id number] [place] [address]-
3824  05 May, 2014   

 

 [facility] [place]  [address] [id number] [place] [id number]     

 

 [facility] [place] [facility]  [address] [id number] [place] [address]-
67[address]     

 

 [facility] [place]  [address] [id number] [place] [id number]     

 

 [facility] [place] [facility]  [address] [id number] [facility] [address]-
[address]     

 

 [facility] [place]  [address] [id number] [place] [id number]  10 Feb, 
2014   

 

 [facility] [place] [facility]  [address] [facility] [id number] [place] [address]-
4556  10 Feb, 2014   







IMMUNIZATIONS

No Known Immunizations



SOCIAL HISTORY

Never Assessed



REASON FOR VISIT

Waiting for call back



PLAN OF CARE





VITAL SIGNS





MEDICATIONS

Unknown Medications



RESULTS

No Results



PROCEDURES

No Known procedures



INSTRUCTIONS





MEDICATIONS ADMINISTERED

No Known Medications



MEDICAL (GENERAL) HISTORY







 Type  Description  Date

 

 Medical History  Diabetes mellitus without mention of complication, type II or 
unspecified type, not stated as uncontrolled   

 

 Medical History  Depressive disorder, not elsewhere classified   

 

 Medical History  Anxiety state, unspecified   

 

 Medical History  Effusion of lower leg joint   

 

 Medical History  Generalized osteoarthrosis, unspecified site   

 

 Medical History  Other and unspecified hyperlipidemia   

 

 Medical History  Abnormal weight gain   

 

 Medical History  Effusion of joint, site unspecified   

 

 Medical History  Esophageal reflux   

 

 Medical History  hypertension   

 

 Medical History  colonoscopy- inscreased polyp   

 

 Surgical History  Tonsillectomy   

 

 Surgical History  Orthopedic: Bilateral Knee x2   

 

 Surgical History  colonoscopy  2017

 

 Hospitalization History  Surgery(s)/Childbirth(s) only

## 2019-02-26 NOTE — XMS REPORT
[facility]

 Created on: 2018



Duyen Larkin

External Reference #: [id number]

: 1965

Sex: Female



Demographics







 Address  [address] [place]  [address]

 

 Preferred Language  Unknown

 

 Marital Status  Unknown

 

 Restoration Affiliation  Unknown

 

 Race  Unknown

 

 Ethnic Group  Unknown





Author







 Author  LIYAH LOWE

 

 [facility]  [place]

 

 Address  [address] [place]  [address]



 

 Phone  [phone]







Care Team Providers







 Care Team Member Name  Role  Phone

 

 LIYAH LOWE  Unavailable  [phone]







PROBLEMS







 Type  Condition  ICD9-CM Code  SNL11-EC Code  Onset Dates  Condition Status  
SNOMED Code

 

 Problem  Effusion of lower leg joint  719.06        Active  380035474

 

 Problem  Esophageal reflux  530.81        Active  130179467

 

 Problem  Other hyperlipidemia     E78.4     Active  76330297

 

 Problem  Type 2 diabetes mellitus without complications     E11.9     Active  
818477375

 

 Problem  Primary generalized (osteo)arthritis     M15.0     Active  156822937

 

 Problem  COPD with exacerbation     J44.1     Active  420664213

 

 Problem  Claudication     I73.9     Active  243920074

 

 Problem  Mild episode of recurrent major depressive disorder     F33.0     
Active  370444623

 

 Problem  Cataracts, bilateral     H26.9     Active  58748066

 

 Problem  Chronic obstructive pulmonary disease with acute exacerbation     
J44.1     Active  860987737

 

 Problem  Chronic renal insufficiency, stage II (mild)     N18.2     Active  
074234001

 

 Problem  Primary osteoarthritis of both knees     M17.0     Active  301126795

 

 Problem  Other chronic pain     G89.29     Active  98959817

 

 Problem  Lumbar degenerative disc disease     M51.36     Active  79467202

 

 Problem  Essential (primary) hypertension     I10     Active  17371025

 

 Problem  Anxiety disorder, unspecified     F41.9     Active  986246374

 

 Problem  Peripheral arterial occlusive disease     I77.9     Active  845958486

 

 Problem  Diabetic polyneuropathy associated with type 2 diabetes mellitus     
E11.42     Active  86864371

 

 Problem  Type 2 diabetes mellitus with diabetic neuropathy, without long-term 
current use of insulin     E11.40     Active  62795512

 

 Problem  Chronic obstructive pulmonary disease, unspecified COPD type     
J44.9     Active  96064483

 

 Problem  Long term current use of insulin     Z79.4     Active  349420023

 

 Problem  Type 2 diabetes mellitus with hyperglycemia     E11.65     Active  
30340111

 

 Problem  Old tear of lateral meniscus of left knee, unspecified tear type     
M23.201     Active  232993230

 

 Problem  Major depressive disorder, single episode, unspecified     F32.9     
Active  09463048

 

 Problem  Effusion, left knee     M25.462     Active  656517778

 

 Problem  Dysphagia, unspecified type     R13.10     Active  90615733

 

 Problem  Hyperlipidemia LDL goal <130     E78.5     Active  60077049

 

 Problem  Unilateral primary osteoarthritis, left knee     M17.12     Active  
794500079

 

 Problem  Hypothyroidism (acquired)     E03.9     Active  444816941







ALLERGIES

No Information



ENCOUNTERS







 Encounter  Location  Date  Diagnosis

 

 [address] [address]     

 

 [address] [address]  21 May, 2018  Pain in left 
knee M25.562

 

 [address] [address]  21 May, 2018  Pain in left 
knee M25.562 and Other chronic pain G89.29

 

 [facility]  [address] [place] [address]  15 May, 2018   

 

 [address] [address]  02 May, 2018  Type 2 diabetes 
mellitus without complications E11.9 ; Long term current use of insulin Z79.4 ; 
Unilateral primary osteoarthritis, left knee M17.12 ; Lumbar degenerative disc 
disease M51.36 and Chronic obstructive pulmonary disease, unspecified COPD type 
J44.9

 

 [address] [address]     

 

 [address] [address]     

 

 [address] [address]     

 

 [address] [address]  23 Mar, 2018  Type 2 diabetes 
mellitus without complications E11.9

 

 [address] [address]  23 Mar, 2018   

 

 [facility]  [address] [place] [address]  13 Mar, 2018   

 

 [facility]  [address] [place] [address]  09 Mar, 2018  Type 2 diabetes mellitus without complications E11.9

 

 [address] [place] [address]     

 

 [facility]  [address] [place] [address]    Diabetic 
polyneuropathy associated with type 2 diabetes mellitus E11.42

 

 [facility] [place]  [address] [place] [address]     

 

 [facility] [place]  [address] [place] [address]  21 Dec, 2017  Type 2 diabetes 
mellitus without complications E11.9 ; Long term current use of insulin Z79.4 ; 
High risk sexual behavior Z72.51 ; BMI 40.0-44.9, adult Z68.41 and Encounter 
for immunization Z23

 

 [facility] [address] [address]  04 Dec, 2017   

 

 [facility] [place]  [address] [place] [address]  04 Dec, 2017   

 

 [facility] [place]  [address] [place] [address]  17 2017  COPD with 
exacerbation J44.1 and BMI 40.0-44.9, adult Z68.41

 

 [facility] [address]  09 Oct, 2017  Encounter for 
screening mammogram for malignant neoplasm of breast Z12.31 ; Well woman exam 
Z01.419 and High risk sexual behavior Z72.51

 

 [facility] [address] [address]  11 Sep, 2017   

 

 [facility] [address] [address]  11 Sep, 2017  Type 2 diabetes 
mellitus without complications E11.9

 

 [facility] [address] [address]  11 Sep, 2017   

 

 [facility] [address] [address]  11 Sep, 2017  Old tear of 
lateral meniscus of left knee, unspecified tear type M23.201

 

 [facility] [place]  [address] [place] [address]  11 Sep, 2017  Other type of 
osteoarthritis, unspecified site M19.90 ; Type 2 diabetes mellitus without 
complications E11.9 ; Primary osteoarthritis of both knees M17.0 ; Chronic 
renal insufficiency, stage II (mild) N18.2 ; Diabetic polyneuropathy associated 
with type 2 diabetes mellitus E11.42 and Exposure to hepatitis C Z20.5

 

 [facility] [place]  [address] [place] [address]  06 Sep, 2017  Type 2 diabetes 
mellitus without complications E11.9

 

 [facility] [place]  [address] [place] [address]  30 Aug, 2017   

 

 [facility] [place]  [address] [place] [address]  30 Aug, 2017   

 

 [facility] [place]  [address] [place] [address]  16 Aug, 2017  Other type of 
osteoarthritis, unspecified site M19.90

 

 [facility] [place]  [address] [place] [address]  15 Aug, 2017  Type 2 diabetes 
mellitus without complications E11.9

 

 [facility] [place]  [address] [place] [address]  07 Aug, 2017   

 

 [facility] [place]  [address] [place] [address]     

 

 [facility] [place]  [address] [place] [address]     

 

 [facility] [place]  [address] [place] [address]    Type 2 diabetes 
mellitus without complications E11.9 ; Type 2 diabetes mellitus with diabetic 
neuropathy, without long-term current use of insulin E11.40 ; Primary 
osteoarthritis of both knees M17.0 and Chronic renal insufficiency, stage II (
mild) N18.2

 

 [facility] [place]  [address] [place] [address]     

 

 [facility] [place]  [address] [place] [address]  30 May, 2017  Hyperlipidemia 
LDL goal <130 E78.5

 

 [facility]  [address] [place] [address]  19 May, 2017  Type 2 diabetes 
mellitus without complications E11.9

 

 [facility]  [address] [facility] [id number] [place] [address]  16 May, 2017   

 

 [facility] [place]  [address] [place] [address]  08 May, 2017   

 

 [facility]  [address] [place] [address]  01 May, 2017   

 

 [facility] [place]  [address] [place] [address]     

 

 [facility] [place]  [address] [place] [address]    Primary 
generalized (osteo)arthritis M15.0 and Type 2 diabetes mellitus without 
complications E11.9

 

 [facility] [place]  [address] [place] [address]    Old tear of 
lateral meniscus of left knee, unspecified tear type M23.201

 

 [facility] [place]  [address] [place] [address]     

 

 [facility] [place]  [address] [place] [address]     

 

 [facility] [place]  [address] [place] [address]     

 

 [facility] [place]  [address] [place] [address]  20 Mar, 2017  Type 2 diabetes 
mellitus without complications E11.9

 

 [facility] [place]  [address] [place] [address]  13 Mar, 2017  Type 2 diabetes 
mellitus without complications E11.9

 

 [facility] [place]  [address] [place] [address]  13 Mar, 2017  Type 2 diabetes 
mellitus without complications E11.9

 

 [facility] [place]  [address] [place] [address]  04 Mar, 2017  Type 2 diabetes 
mellitus without complications E11.9

 

 [facility] [address]     

 

 [facility] [place]  [address] [place] [address]    Chronic 
obstructive pulmonary disease with acute exacerbation J44.1

 

 [facility] [address] [address]     

 

 [facility] [address]    Dysuria R30.0 ; 
Essential (primary) hypertension I10 ; Hypothyroidism (acquired) E03.9 ; Type 2 
diabetes mellitus without complications E11.9 and Mild episode of recurrent 
major depressive disorder F33.0

 

 [facility] [place]  [address] [place] [address]     

 

 [facility] [place]  [address] [place] [address]  08 2017  Dysuria R30.0 ; 
Vaginal candidiasis B37.3 and Candidiasis B37.9

 

 [facility] [address] [address]     

 

 [facility] [place]  [address] [place] [address]  10 Octaviano, 2017  COPD with 
exacerbation J44.1 and Dysphagia, unspecified type R13.10

 

 [facility] [place]  [address] [place] [address]  27 Dec, 2016   

 

 [facility] [place]  [address] [place] [address]  12 Dec, 2016  Essential (
primary) hypertension I10 ; Hypothyroidism (acquired) E03.9 ; Type 2 diabetes 
mellitus without complications E11.9 ; Primary generalized (osteo)arthritis 
M15.0 ; Major depressive disorder, single episode, unspecified F32.9 ; 
Unilateral primary osteoarthritis, left knee M17.12 ; Hyperlipidemia LDL goal <
130 E78.5 and Dysphagia, unspecified type R13.10

 

 [facility] [place]  [address] [place] [address]  01 Dec, 2016   

 

 [facility] [place]  [address] [place] [address]     

 

 [facility] [place]  [address] [place] [address]     

 

 [facility] [place]  [address] [place] [address]     

 

 [facility] [place]  [address] [place] [address]     

 

 [facility] [place]  [address] [place] [address]     

 

 [facility] [place]  [address] [place] [address]    Type 2 diabetes 
mellitus without complications E11.9 ; Primary generalized (osteo)arthritis 
M15.0 ; Effusion, left knee M25.462 ; Old tear of lateral meniscus of left knee
, unspecified tear type M23.201 and Fatigue, unspecified type R53.83

 

 [facility] [place]  [address] [place] [address]  26 Sep, 2016  Type 2 diabetes 
mellitus without complications E11.9 ; Claudication I73.9 ; Pain in right leg 
M79.604 and Pain of left leg M79.605

 

 [facility] [place]  [address] [place] [address]  14 Sep, 2016   

 

 [facility] [place]  [address] [place] [address]  12 Sep, 2016   

 

 [facility] [place]  [address] [place] [address]  18 Aug, 2016  Type 2 diabetes 
mellitus with hyperglycemia E11.65 ; Long term current use of insulin Z79.4 ; 
Anxiety disorder, unspecified F41.9 ; Essential (primary) hypertension I10 ; 
Major depressive disorder, single episode, unspecified F32.9 and Peripheral 
arterial occlusive disease I77.9

 

 [address] [address]  17 Aug, 2016   

 

 [address] [address]  11 Aug, 2016   

 

 [address] [address]  11 Aug, 2016   

 

 [address] [address]  10 Aug, 2016   

 

 [address] [address]  05 Aug, 2016  Acute midline 
low back pain with right-sided sciatica M54.41

 

 [facility]  [address] [facility] [id number] [place] [address]  05 Aug, 2016  Chest pain, unspecified type R07.9 ; Palpitations R00.2 ; 
Claudication I73.9 ; Generalized pain R52 and Type 2 diabetes mellitus without 
complications E11.9

 

 [address] [address]    Acute midline 
low back pain with right-sided sciatica M54.41 ; Dysuria R30.0 ; Claudication 
I73.9 ; Peripheral arterial occlusive disease I77.9 ; Shortness of breath 
R06.02 ; Effusion, left knee M25.462 and Type 2 diabetes mellitus without 
complications E11.9

 

 [address] [address]     

 

 [address] [address]     

 

 [address] [address]     

 

 [address] [address]    Type 2 diabetes 
mellitus without complications E11.9 ; Other hyperlipidemia E78.4 ; Peripheral 
arterial occlusive disease I77.9 ; Essential (primary) hypertension I10 and 
Other fatigue R53.83

 

 [facility] [place]  [address] [place] [address]  18 May, 2016   

 

 [facility] [place]  [address] [place] [address]  06 May, 2016   

 

 [facility] [place]  [address] [place] [address]  05 May, 2016  Chest pain, 
unspecified type R07.9 ; Peripheral arterial occlusive disease I77.9 ; Anxiety 
disorder, unspecified F41.9 ; Essential (primary) hypertension I10 ; Type 2 
diabetes mellitus without complications E11.9 and Other hyperlipidemia E78.4

 

 [facility]  [address] [place] [address]  04 May, 2016   

 

 [facility] [place]  [address] [place] [address]     

 

 [facility] [address] [address]     

 

 [facility] [address] [address]    Type 2 diabetes 
mellitus without complications E11.9 ; Peripheral arterial occlusive disease 
I77.9 ; Primary generalized (osteo)arthritis M15.0 ; Major depressive disorder, 
single episode, unspecified F32.9 ; Anxiety disorder, unspecified F41.9 and 
Essential (primary) hypertension I10

 

 [facility] [address] [address]     

 

 [facility] [address] [address]     

 

 [facility]  [address] [facility] [id number] [place] [address]  02 Mar, 2016   

 

 [facility] [address] [address]     

 

 [facility] [address] [address]    Bronchitis J40 ; 
Shortness of breath R06.02 and Wheezing R06.2

 

 [facility] [address] [address]     

 

 [facility] [address] [address]     

 

 [facility] [address] [address]     

 

 [facility] [address] [place] [address]     

 

 [facility] [place]  [address] [place] [address]    Type 2 diabetes 
mellitus without complications E11.9 ; Claudication I73.9 ; Other 
hyperlipidemia E78.4 ; Cataracts, bilateral H26.9 and Other fatigue R53.83

 

 [facility] [place]  [address] [place] [address]  28 Oct, 2015   

 

 [facility] [place]  [address] [place] [address]  22 Oct, 2015   

 

 [facility] [place]  [address] [place] [address]  16 Oct, 2015   

 

 [facility] [place]  [address] [place] [address]  14 Oct, 2015  Type 2 diabetes 
mellitus without complications E11.9 ; Other hyperlipidemia E78.4 ; Primary 
generalized (osteo)arthritis M15.0 and Claudication I73.9

 

 [facility] [place]  [address] [place] [address]  12 Oct, 2015   

 

 [facility] [place]  [address] [place] [address]  26 Aug, 2015   

 

 [facility] [place]  [address] [place] [address]  12 Aug, 2015   

 

 [facility] [place]  [address] [place] [address]  12 Aug, 2015   

 

 [facility] [place]  [address] [place] [address]  10 Aug, 2015   

 

 [facility] [place]  [address] [place] [address]  05 Aug, 2015   

 

 [facility] [place]  [address] [place] [address]  11 May, 2015  Diabetes 
mellitus without mention of complication, type II or unspecified type, not 
stated as uncontrolled 250.00 ; Bronchitis 490 and Effusion of lower leg joint 
719.06

 

 [facility]  [address] [place] [address]  01 May, 2015   

 

 [facility] [address]  01 May, 2015  Bronchitis 490 
and Wheezing 786.07

 

 [facility]  [address] [facility] [id number] [address]     

 

 [facility]  [address], [place] [address]  14 2015   

 

 [facility]  [address], [place] [address]     

 

 [facility] [place]  [address] [place] [address]  19 Mar, 2015   

 

 [facility]  [address], [place] [address]  19 Mar, 2015   

 

 [facility] [place]  [address] [place] [address]  09 Mar, 2015   

 

 [facility]  [address], [place] [address]  09 Mar, 2015   

 

 [facility] [place]  [address] [place] [address]  ,    

 

 [facility]  [address]65100[place], [place] [address]-
[address]  ,    

 

 [facility]  [address] [place] [address]-
[address]  10 Feb,    

 

 [facility] [place]  [address] [place] [address]     

 

 [facility]  [address] [place] [address]     

 

 [facility]  [address]65100[place], [place] [address]     

 

 [facility]  [address], [place] [address]-
29[address]     

 

 [facility]  [address] [place] [address]     

 

 [facility]  [address], [place] [address]     

 

 [facility]  [address] [place] [address]     

 

 [facility]  [address]65[address], [place] [address]     

 

 [facility]  [address] [place] [address]  24 Dec, 2014   

 

 [facility] [place] [facility]  [address] [facility] [id number][place] [address]  24 Dec, 2014   

 

 [facility]  [address] [place] [address]  18 Dec, 2014   

 

 [facility] [place] [facility]  [id number] [address]  18 Dec, 2014   

 

 [facility]  [address] [place] [address]  09 Dec, 2014   

 

 [facility]  [id number], [place] [address]  09 Dec, 2014   

 

 [facility]  [address] [place] [address]  02 Dec, 2014   

 

 [facility] [place] [facility]  [address], [place] [address]  02 Dec, 2014   

 

 [facility]  [address] [place] [address]     

 

 [facility] [place] [facility]  [id number] [address]     

 

 [facility]  [address] [place] [address]     

 

 [facility] [place]  [id number] [address]     

 

 [facility]  [address] [place] [address]  31 Oct, 2014   

 

 [facility] [place] [facility]  [id number] [address]  31 Oct, 2014   

 

 [facility]  [address] [place] [address]  16 Oct, 2014   

 

 [facility] [place] [facility]  [id number] [address]
2151  16 Oct, 2014   

 

 [facility]  [address] [facility] [place] [address]  10 Oct, 2014   

 

 [facility] [place] [facility]  [id number] [address]
3313  10 Oct, 2014   

 

 [facility]  [address] [place] [address]  26 Sep, 2014   

 

 [facility] [place] [facility]  [id number] [place] [address]-
7175  26 Sep, 2014   

 

 [facility]  [address] [place] [address]  29 Aug, 2014   

 

 [facility] [place] [facility]  [address] [id number] [place] [address]-
[address]  29 Aug, 2014   

 

 [facility]  [address] [place] [address]  25 Aug, 2014   

 

 [facility]  [address] [place] [address]  25 Aug, 2014   

 

 [facility] [place] [facility]  [address] [facility] [id number] [place] [address]-
3536  25 Aug, 2014   

 

 [facility] [place] [facility]  [address] [id number] [place] [address]-
6967  25 Aug, 2014   

 

 [facility]  [address] [place] [address]  18 Aug, 2014   

 

 [facility] [place] [facility]  [id number] [place] [address]-
[address]10  18 Aug, 2014   

 

 [facility]  [address] [place] [address]  14 Aug, 2014   

 

 [facility] [place] [facility]  [address] [facility] [id number] [place] [address]-
0571  14 Aug, 2014   

 

 [facility] [place] [facility]  [address] [facility] [id number] [place] [address]-
9557  11 Aug, 2014   

 

 [facility]  [address] [place] [address]  11 Aug, 2014   

 

 [facility] [place] [facility]  [address] [facility] [id number][place] [address]-
9640  11 Aug, 2014   

 

 [facility] [place] [facility]  [address] [facility] [id number] [place] [address]-
1716  11 Aug, 2014   

 

 [facility] [place] [facility]  [address] [facility] [id number] [place] [address]-
4024     

 

 [facility]  [address] [facility] [place] [address]     

 

 [facility] [place] [facility]  [address] [facility] [id number][place] [address]-
2817     

 

 [facility]  [address]65[address], [place] [address]-
[address]697     

 

 [facility]  [address] [place] [address]     

 

 [facility]  [address]65100[place], [place] [address]
7753     

 

 [facility]  [address] [place] [address]  30 May, 2014   

 

 [facility]  [address] [place] [address]-
9018  30 May, 2014   

 

 [facility]  [address] [place] [address]  14 May, 2014   

 

 [facility]  [id number] [place] [address]-
7822  14 May, 2014   

 

 [facility]  [address] [place] [address]  05 May, 2014   

 

 [facility]  [id number] [place] [phone]  05 May, 2014   

 

 [facility]  [address] [place] [address]     

 

 [facility]  [id number] [place] [phone]     

 

 [facility]  [address] [place] [address]     

 

 [facility]  [id number], [place] [phone]     

 

 [facility]  [address] [place] [address]  10 Feb, 2014   

 

 [facility]  [id number], [place] [phone]  10 Feb, 2014   







IMMUNIZATIONS

No Known Immunizations



SOCIAL HISTORY

Never Assessed



REASON FOR VISIT

new medication 



PLAN OF CARE





VITAL SIGNS





MEDICATIONS







 Medication  Instructions  Dosage  Frequency  Start Date  End Date  Duration  
Status

 

 Januvia 50 mg  Orally Once a day  1 tablet  24h  23 Mar, 2018        Active

 

 Voltaren 1 %  Transdermal 4 times a day PRN knee pain  4 grams              
Active







RESULTS

No Results



PROCEDURES

No Known procedures



INSTRUCTIONS





MEDICATIONS ADMINISTERED

No Known Medications



MEDICAL (GENERAL) HISTORY







 Type  Description  Date

 

 Medical History  Diabetes mellitus without mention of complication, type II or 
unspecified type, not stated as uncontrolled   

 

 Medical History  Depressive disorder, not elsewhere classified   

 

 Medical History  Anxiety state, unspecified   

 

 Medical History  Effusion of lower leg joint   

 

 Medical History  Generalized osteoarthrosis, unspecified site   

 

 Medical History  Other and unspecified hyperlipidemia   

 

 Medical History  Abnormal weight gain   

 

 Medical History  Effusion of joint, site unspecified   

 

 Medical History  Esophageal reflux   

 

 Medical History  hypertension   

 

 Medical History  colonoscopy- inscreased polyp   

 

 Surgical History  Tonsillectomy   

 

 Surgical History  Orthopedic: Bilateral Knee x2   

 

 Surgical History  colonoscopy  2017

 

 Hospitalization History  Surgery(s)/Childbirth(s) only

## 2019-02-26 NOTE — XMS REPORT
[facility]

 Created on: 2018



Duyen Larkin

External Reference #: [id number]

: 1965

Sex: Female



Demographics







 Address  [address] [place]  [address]

 

 Preferred Language  Unknown

 

 Marital Status  Unknown

 

 Ep[employer]opalian Affiliation  Unknown

 

 Race  Unknown

 

 Ethnic Group  Unknown





Author







 Author  LIYAH LOWE

 

 [facility]

 

 Address  [address] [place]  [address]



 

 Phone  [phone]







Care Team Providers







 Care Team Member Name  Role  Phone

 

 LIYAH LOWE  Unavailable  [phone]







PROBLEMS







 Type  Condition  ICD9-CM Code  PID13-YA Code  Onset Dates  Condition Status  
SNOMED Code

 

 Problem  Effusion of lower leg joint  719.06        Active  740183149

 

 Problem  Esophageal reflux  530.81        Active  639780577

 

 Problem  Dysphagia, unspecified type     R13.10     Active  35118618

 

 Problem  Other hyperlipidemia     E78.4     Active  12913405

 

 Problem  Unilateral primary osteoarthritis, left knee     M17.12     Active  
709710665

 

 Problem  Type 2 diabetes mellitus without complications     E11.9     Active  
661634560

 

 Problem  Hypothyroidism (acquired)     E03.9     Active  235885403

 

 Problem  Mild episode of recurrent major depressive disorder     F33.0     
Active  068321999

 

 Problem  COPD with exacerbation     J44.1     Active  573151830

 

 Problem  Long term current use of insulin     Z79.4     Active  088219388

 

 Problem  Diabetic polyneuropathy associated with type 2 diabetes mellitus     
E11.42     Active  55450622

 

 Problem  Cataracts, bilateral     H26.9     Active  10772955

 

 Problem  Claudication     I73.9     Active  139009626

 

 Problem  Primary generalized (osteo)arthritis     M15.0     Active  166568247

 

 Problem  Chronic renal insufficiency, stage II (mild)     N18.2     Active  
368266390

 

 Problem  Chronic obstructive pulmonary disease with acute exacerbation     
J44.1     Active  567124497

 

 Problem  Primary osteoarthritis of both knees     M17.0     Active  476618676

 

 Problem  Type 2 diabetes mellitus with diabetic neuropathy, without long-term 
current use of insulin     E11.40     Active  13930241

 

 Problem  Major depressive disorder, single episode, unspecified     F32.9     
Active  84854908

 

 Problem  Anxiety disorder, unspecified     F41.9     Active  845014588

 

 Problem  Essential (primary) hypertension     I10     Active  18352876

 

 Problem  Peripheral arterial occlusive disease     I77.9     Active  617679164

 

 Problem  Old tear of lateral meniscus of left knee, unspecified tear type     
M23.201     Active  735840774

 

 Problem  Hyperlipidemia LDL goal <130     E78.5     Active  71239622

 

 Problem  Effusion, left knee     M25.462     Active  087803996

 

 Problem  Type 2 diabetes mellitus with hyperglycemia     E11.65     Active  
83144931







ALLERGIES

No Information



ENCOUNTERS







 Encounter  Location  Date  Diagnosis

 

 [facility] [place]  [address] [facility] [address] [id number] [place] [id number]  02 May, 
2018   

 

 [facility] [place]  [address] [facility] [address] [id number] [place] [id number]     

 

 [facility] [place]  [address] [facility] [address] [id number] [place] [id number]     

 

 [facility] [place]  [address] [facility] [address] [id number] [place] [id number]     

 

 [facility] [place]  [address] [facility] [address] [id number] [place] [id number]  23 Mar, 
2018  Type 2 diabetes mellitus without complications E11.9

 

 [facility] [place]  [address] [place] [address] [address] [id number] [place] [id number]  23 Mar, 
2018   

 

 [facility]  [address] [facility] [id number] [place] [address]  13 Mar, 2018   

 

 [facility]  [address] [facility] [id number] [place] [address]  09 Mar, 2018  Type 2 diabetes mellitus without complications E11.9

 

 [facility] [place]  [address] [place] [address] [address] [id number] [place] [id number]     

 

 [facility] [place]  [address] [facility] [address] [id number] [place] [id number]    Diabetic polyneuropathy associated with type 2 diabetes mellitus E11.42

 

 [facility] [place]  [address] [facility] [address] [id number] [place] [id number]     

 

 [facility] [place]  [address] [facility] [address] [id number] [place], [place] [id number]  21 Dec, 
2017  Type 2 diabetes mellitus without complications E11.9 ; Long term current 
use of insulin Z79.4 ; High risk sexual behavior Z72.51 ; BMI 40.0-44.9, adult 
Z68.41 and Encounter for immunization Z23

 

 [facility] [place]  [address] [facility] [address] [id number] [place] [id number]  04 Dec, 
2017   

 

 [facility] [place]  [address] [facility] [address] [id number] [place] [id number]  04 Dec, 
2017   

 

 [facility] [place]  [address] [facility] [address] [id number] [place] [id number]    COPD with exacerbation J44.1 and BMI 40.0-44.9, adult Z68.41

 

 [facility] [place]  [address] [facility] [address] [id number] [place], [place] [id number]  09 Oct, 
2017  Encounter for screening mammogram for malignant neoplasm of breast Z12.31 
; Well woman exam Z01.419 and High risk sexual behavior Z72.51

 

 [facility] [place]  [address] [place] [address] [address] [id number] [place] [id number]  11 Sep, 
2017   

 

 [facility] [place]  [address] [place] [address] C [id number] [place] [id number]  11 Sep, 
2017  Type 2 diabetes mellitus without complications E11.9

 

 [facility] [place]  [address] [place] [address] C [id number] [place] [id number]  11 Sep, 
2017   

 

 [facility] [place]  [address] [place] [address] C [id number] [place] [address]3655  11 Sep, 
2017  Old tear of lateral meniscus of left knee, unspecified tear type M23.201

 

 [facility] [place]  [address] [place] [address] C [id number] [place], [place] [id number]  11 Sep, 
2017  Other type of osteoarthritis, unspecified site M19.90 ; Type 2 diabetes 
mellitus without complications E11.9 ; Primary osteoarthritis of both knees 
M17.0 ; Chronic renal insufficiency, stage II (mild) N18.2 ; Diabetic 
polyneuropathy associated with type 2 diabetes mellitus E11.42 and Exposure to 
hepatitis C Z20.5

 

 [facility] [place]  [address] [place] [address] [address] [id number] [place] [address]  06 Sep, 
2017  Type 2 diabetes mellitus without complications E11.9

 

 [facility] [place]  [address] [facility] [address] [id number] [place] [address]  30 Aug, 
2017   

 

 [facility] [place]  [address] [facility] [address] [id number] [place] [id number]  30 Aug, 
2017   

 

 [facility] [place]  [address] [place] [address] C [id number] [place] 5406[address]463  16 Aug, 
2017  Other type of osteoarthritis, unspecified site M19.90

 

 [facility] [place]  [address] [facility] [address] [id number] [place] 10[address]85  15 Aug, 
2017  Type 2 diabetes mellitus without complications E11.9

 

 [facility] [place]  [address] [facility] [address] [id number] [place] [id number]  07 Aug, 
2017   

 

 [facility] [place]  [address] [facility] [address] [id number] [place] [id number]     

 

 [facility] [place]  [address] [facility] [address] [id number] [place] [id number]     

 

 [facility] [place]  [address] [place] [address] [address] [id number] [place] [id number]    Type 2 diabetes mellitus without complications E11.9 ; Type 2 diabetes 
mellitus with diabetic neuropathy, without long-term current use of insulin 
E11.40 ; Primary osteoarthritis of both knees M17.0 and Chronic renal 
insufficiency, stage II (mild) N18.2

 

 [facility] [place]  [address] [place] [address] [address] [id number] [place] 537237856     

 

 [facility] [place]  [address] [place] [address] [address] [id number] [place], [place] [address]  30 May, 
2017  Hyperlipidemia LDL goal <130 E78.5

 

 [facility] [place]  [address] [place] [address] [address] [id number] [place] [address]  19 May, 
2017  Type 2 diabetes mellitus without complications E11.9

 

 [facility]  [address] [facility] [id number] [place] [address]  16 May, 2017   

 

 [facility] [place]  [address] [facility] [address] [id number] [place] [id number]  08 May, 
2017   

 

 [facility] [place]  [address] [facility] [address] [id number] [place] 688[address]  01 May, 
2017   

 

 [facility] [place]  [address] [place] [address] [address] [id number] [place], [place] [address]     

 

 [facility] [place]  [address] [facility] [address] [id number] [place] [address]    Primary generalized (osteo)arthritis M15.0 and Type 2 diabetes mellitus 
without complications E11.9

 

 [facility] [place]  [address] [facility] [address] [id number] [place] [address]    Old tear of lateral meniscus of left knee, unspecified tear type M23.201

 

 [facility] [place]  [address] [facility] [address] [id number] [place] [address]607[address]     

 

 [facility] [place]  [address] [facility] [address] [id number] [place] [id number]     

 

 [facility] [place]  [address] [facility] [address] [id number] [place] [address]     

 

 [facility] [place]  [address] [facility] [address] [id number] [place] [id number]  20 Mar, 
2017  Type 2 diabetes mellitus without complications E11.9

 

 [facility] [place]  [address] [place] [address] [address] [id number] [place], [place] 522[address]1942  13 Mar, 
2017  Type 2 diabetes mellitus without complications E11.9

 

 [facility] [place]  [address] [place] [address] [address] [id number] [place] [id number]  13 Mar, 
2017  Type 2 diabetes mellitus without complications E11.9

 

 [facility] [place]  [address] [place] [address] [address] [id number] [place] 950878216  04 Mar, 
2017  Type 2 diabetes mellitus without complications E11.9

 

 [facility] [place]  [address] [place] [address] [address] [id number] [place] [address]     

 

 [facility] [place]  [address] [facility] [address] [id number] [place] 6[address]    Chronic obstructive pulmonary disease with acute exacerbation J44.1

 

 [facility] [place]  [address] [place] [address] [address] [id number] [place] 303554342     

 

 [facility] [place]  [address] [place] [address] [address] [id number] [place], [place] 733[address]1341    Dysuria R30.0 ; Essential (primary) hypertension I10 ; Hypothyroidism (
acquired) E03.9 ; Type 2 diabetes mellitus without complications E11.9 and Mild 
episode of recurrent major depressive disorder F33.0

 

 [facility] [place]  [address] [facility] [address] [id number] [place] 6504[address]     

 

 [facility] [place]  [address] [facility] [address] [id number] [place] 5830[address]854    Dysuria R30.0 ; Vaginal candidiasis B37.3 and Candidiasis B37.9

 

 [facility] [place]  [address] [facility] [address] [id number] [place] [id number]     

 

 [facility] [place]  [address] [facility] [address] [id number] [place] [id number]  10 Octaviano, 
2017  COPD with exacerbation J44.1 and Dysphagia, unspecified type R13.10

 

 [facility] [place]  [address] [facility] [address] [id number] [place] [id number]  27 Dec, 
2016   

 

 [facility] [place]  [address] [place] [address] [address] [id number] [place] [id number]  12 Dec, 
2016  Essential (primary) hypertension I10 ; Hypothyroidism (acquired) E03.9 ; 
Type 2 diabetes mellitus without complications E11.9 ; Primary generalized (
osteo)arthritis M15.0 ; Major depressive disorder, single episode, unspecified 
F32.9 ; Unilateral primary osteoarthritis, left knee M17.12 ; Hyperlipidemia 
LDL goal <130 E78.5 and Dysphagia, unspecified type R13.10

 

 [facility] [place]  [address] [place] [address] [address] [id number] [place] 110018379  01 Dec, 
2016   

 

 [facility] [place]  [address] [place] [address] [address] [id number] [place] 598513632     

 

 [facility] [place]  [address] [facility] [address] [id number] [place] 931926558     

 

 [facility] [place]  [address] [place] [address] [address] [id number] [place] 287781804     

 

 [facility] [place]  [address] [place] [address] [address] [id number] [place] [id number]     

 

 [facility] [place]  [address] [facility] [address] [id number] [place] 925272952     

 

 [facility] [place]  [address] [facility] [address] [id number] [place] [id number]    Type 2 diabetes mellitus without complications E11.9 ; Primary 
generalized (osteo)arthritis M15.0 ; Effusion, left knee M25.462 ; Old tear of 
lateral meniscus of left knee, unspecified tear type M23.201 and Fatigue, 
unspecified type R53.83

 

 [facility] [place]  [address] [place] [address] [id number] [place], [place] 375551841  26 Sep, 
2016  Type 2 diabetes mellitus without complications E11.9 ; Claudication I73.9 
; Pain in right leg M79.604 and Pain of left leg M79.605

 

 [facility] [place]  [address] [place] [address] C [id number] [place] 762634368  14 Sep, 
2016   

 

 [facility] [place]  [address] [place] [address] [id number] [place] 533569698  12 Sep, 
2016   

 

 [facility] [place]  [address] [place] [address] [id number] [place] 468120727  18 Aug, 
2016  Type 2 diabetes mellitus with hyperglycemia E11.65 ; Long term current 
use of insulin Z79.4 ; Anxiety disorder, unspecified F41.9 ; Essential (primary
) hypertension I10 ; Major depressive disorder, single episode, unspecified 
F32.9 and Peripheral arterial occlusive disease I77.9

 

 [facility] [place]  [address] [place] [address] [id number] [place] 183987698  17 Aug, 
2016   

 

 [facility] [place]  [address] [place] [address] C [id number] [place] 246175028  11 Aug, 
2016   

 

 [facility] [place]  [address] [place] [address] [id number] [place] [id number]  11 Aug, 
2016   

 

 [facility] [place]  [address] [place] [address] [id number] [place], [place] 508761736  10 Aug, 
2016   

 

 [facility] [place]  [address] [place] [address] [id number] [place] 767102211  05 Aug, 
2016  Acute midline low back pain with right-sided sciatica M54.41

 

 [facility]  [address] [facility] [id number] [place], [place] [address]-
[address]  05 Aug, 2016  Chest pain, unspecified type R07.9 ; Palpitations R00.2 ; 
Claudication I73.9 ; Generalized pain R52 and Type 2 diabetes mellitus without 
complications E11.9

 

 [facility] [place]  [address] [place] [address] [id number] [place] [id number]    Acute midline low back pain with right-sided sciatica M54.41 ; Dysuria 
R30.0 ; Claudication I73.9 ; Peripheral arterial occlusive disease I77.9 ; 
Shortness of breath R06.02 ; Effusion, left knee M25.462 and Type 2 diabetes 
mellitus without complications E11.9

 

 [facility] [place]  [address] [facility] [address] [id number] [place] [id number]     

 

 [facility] [place]  [address] [facility] [address] [id number] [place] 893909293     

 

 [facility] [place]  [address] [facility] [address] [id number] [place] 916543098     

 

 [facility] [place]  [address] [facility] [address] [id number] [place] 994718976    Type 2 diabetes mellitus without complications E11.9 ; Other 
hyperlipidemia E78.4 ; Peripheral arterial occlusive disease I77.9 ; Essential (
primary) hypertension I10 and Other fatigue R53.83

 

 [facility] [place]  [address] [facility] [address] [id number] [place] [id number]  18 May, 
2016   

 

 [facility] [place]  [address] [place] [address] [address] [id number] [place] 850245346  06 May, 
2016   

 

 [facility] [place]  [address] [place] [address] [address] [id number] [place] 189442323  05 May, 
2016  Chest pain, unspecified type R07.9 ; Peripheral arterial occlusive 
disease I77.9 ; Anxiety disorder, unspecified F41.9 ; Essential (primary) 
hypertension I10 ; Type 2 diabetes mellitus without complications E11.9 and 
Other hyperlipidemia E78.4

 

 [facility] [place]  [address] [facility] [address] [id number] [place] [id number]  04 May, 
2016   

 

 [facility] [place]  [address] [facility] [address] [id number] [place] 024110096     

 

 [facility] [place]  [address] [facility] [address] [id number] [place] 695704028     

 

 [facility] [place]  [address] [facility] [address] [id number] [place] [id number]    Type 2 diabetes mellitus without complications E11.9 ; Peripheral 
arterial occlusive disease I77.9 ; Primary generalized (osteo)arthritis M15.0 ; 
Major depressive disorder, single episode, unspecified F32.9 ; Anxiety disorder
, unspecified F41.9 and Essential (primary) hypertension I10

 

 [facility] [place]  [address] [place] [address] [address] [id number] [place] 796704828     

 

 [facility] [place]  [address] [place] [address] [address] [id number] [place] 755649032     

 

 [facility]  [address] [facility] [id number] [place] [address]-
[address]  02 Mar, 2016   

 

 [facility] [place]  [address] [place] [address] [address] [id number] [place] 738940447     

 

 [facility] [place]  [address] [place] [address] [address] [id number] [place] 595623728    Bronchitis J40 ; Shortness of breath R06.02 and Wheezing R06.2

 

 [facility] [place]  [address] [place] [address] [address] [id number] [place] [id number]     

 

 [facility] [place]  [address] [place] [address] [address] [id number] [place] 911103461     

 

 [facility] [place]  [address] [place] [address] [address] [id number] [place] [id number]     

 

 [facility] [place]  [address] [place] [address] [address] [id number] [place] 874615116     

 

 [facility] [place]  [address] [place] [address] [address] [id number] [place] 749645346    Type 2 diabetes mellitus without complications E11.9 ; Claudication I73.9 
; Other hyperlipidemia E78.4 ; Cataracts, bilateral H26.9 and Other fatigue 
R53.83

 

 [facility] [place]  [address] [facility] [address] [id number] [place] 599237823  28 Oct, 
2015   

 

 [facility] [place]  [address] [place] [address] [address] [id number] [place] 938840158  22 Oct, 
2015   

 

 [facility] [place]  [address] [place] [address] [address] [id number] [place] 004851744  16 Oct, 
2015   

 

 [facility] [place]  [address] [place] [address] [address] [id number] [place] 723859732  14 Oct, 
2015  Type 2 diabetes mellitus without complications E11.9 ; Other 
hyperlipidemia E78.4 ; Primary generalized (osteo)arthritis M15.0 and 
Claudication I73.9

 

 [facility] [place]  [address] [facility] [address] [id number] [place] 636331499  12 Oct, 
2015   

 

 [facility] [place]  [address] [facility] [address] [id number] [place] [id number]  26 Aug, 
2015   

 

 [facility] [place]  [address] [facility] [address] [id number] [place] [id number]  12 Aug, 
2015   

 

 [facility] [place]  [address] [facility] [address] [id number] [place] 050261591  12 Aug, 
2015   

 

 [facility] [place]  [address] [facility] [address] [id number] [place], [place] 724758052  10 Aug, 
2015   

 

 [facility] [place]  [address] [facility] [address] [id number] [place] 287006936  05 Aug, 
2015   

 

 [facility] [place]  [address] [place] [address] [address] [id number] [place] 153683690  11 May, 
2015  Diabetes mellitus without mention of complication, type II or unspecified 
type, not stated as uncontrolled 250.00 ; Bronchitis 490 and Effusion of lower 
leg joint 719.06

 

 [facility] [place]  [address] [place] [address] [address] [id number] [place] 544250474  01 May, 
2015   

 

 [facility] [place]  [address] [place] [address] C [id number] [place] 177800076  01 May, 
2015  Bronchitis 490 and Wheezing 786.07

 

 [place] [address] [id number] [address]-
[address]     

 

 [facility] [place] [address] [facility] [id number] [place] [address]-
2546     

 

 [address] [id number] [address]
2546     

 

 [facility]  [address] [place] [address] C [id number] [place] 355658624  19 Mar, 
2015   

 

 [place] [address] [facility] [id number] [address]
2546  19 Mar, 2015   

 

 [facility] [place]  [address] [id number] [place] 564572890  09 Mar, 
2015   

 

 [facility]  [address] [facility] [id number] [place] [address]-
[address]  09 Mar, 2015   

 

 [facility] [place]  [address] [id number] [place] 834956230     

 

 [facility]  [address] [facility] [id number] [place] [address]-
[address]     

 

 [facility]  [address] [facility] [id number] [place] [address]-
[address]  10 Feb, 2015   

 

 [facility] [place]  [address] [id number] [place] 177895233     

 

 [facility] [place]  [address] [facility] [address] [id number] [place] 955237407     

 

 [facility] [place] [facility]  [address] [facility] [id number] [place] [address]-
119[address]     

 

 [facility] [place] [facility]  [address] [facility] [id number] [place] [address]-
5[address]     

 

 [facility] [place]  [address] [facility] [address] [id number] [place] [id number]     

 

 [facility] [place] [facility]  [address] [facility] [id number] [place] [address]-
68[address]     

 

 [facility] [place]  [address] [facility] [address] [id number] [place] [id number]     

 

 [facility] [place] [facility]  [address] [facility] [id number] [place] [address]-
[address]     

 

 [facility] [place]  [address] [id number] [place] [id number]  24 Dec, 
2014   

 

 [facility]  [address] [facility] [id number] [place] [address]-
1106  24 Dec, 2014   

 

 [facility] [place]  [address] [facility] [address] [id number] [place] 138868564  18 Dec, 
2014   

 

 [facility] [place] [facility]  [address] [id number] [place] [address]-
6[address]  18 Dec, 2014   

 

 [facility] [place]  [address] [facility] [address] [id number] [place], [place] 720234431  09 Dec, 
2014   

 

 [facility] [place] [facility]  [address] [facility] [id number] [place] [address]-
6[address]  09 Dec, 2014   

 

 [facility] [place]  [address] [facility] [address] [id number] [place] 029375121  02 Dec, 
2014   

 

 [facility] [place] [facility]  [address] [id number] [place] [address]-
0974  02 Dec, 2014   

 

 [facility] [place]  [address] [facility] [address] [id number] [place] [id number]     

 

 [facility] [place] [facility]  [address] [facility] [id number] [place] [id number]     

 

 [facility] [place]  [address] [facility] [address] [id number] [place] [id number]     

 

 [facility]  [address] [facility] [id number] [place] [id number]     

 

 [facility] [place]  [address] [facility] [address] [id number] [place] [id number]  31 Oct, 
2014   

 

 [facility] [place] [facility]  [address] [facility] [id number] [facility] [place] [address]-
0209  31 Oct, 2014   

 

 [facility] [place]  [address] [facility] [address] [id number] [place] [id number]  16 Oct, 
2014   

 

 [facility] [place] [facility]  [address] [id number] [place] [address]-
[address]  16 Oct, 2014   

 

 [facility] [place]  [address] [facility] [address] [id number] [place] [id number]  10 Oct, 
2014   

 

 [facility] [place] [facility]  [address] [id number] [place] [address]  10 Oct, 2014   

 

 [facility] [place]  [address] [facility] [address] [id number] [place] [id number]  26 Sep, 
2014   

 

 [facility] [place] [facility]  [address] [id number] [place] [address]-
[address]68  26 Sep, 2014   

 

 [facility] [place]  [address] [id number] [place] [id number]  29 Aug, 
2014   

 

 [facility] [place] [facility]  [address] [facility] [id number] [place] [address]  29 Aug, 2014   

 

 [facility] [place]  [address] [id number] [place] [id number]  25 Aug, 
2014   

 

 [facility] [place]  [address] [id number] [place] [id number]  25 Aug, 
2014   

 

 [facility] [place] [facility]  [address] [id number] [place] [address]-
[address]  25 Aug, 2014   

 

 [facility] [place] [facility]  [address] [facility] [id number] [place] [address]-
6744  25 Aug, 2014   

 

 [facility] [place]  [address] [facility] [address] [id number] [place] [id number]  18 Aug, 
2014   

 

 [facility] [place] [facility]  [address] [id number] [place] [address]-
[address]687  18 Aug, 2014   

 

 [facility] [place]  [address] [facility] [address] [id number] [place] [id number]  14 Aug, 
2014   

 

 [facility] [place] [facility]  [address] [facility] [id number] [place] [address]-
2610  14 Aug, 2014   

 

 [facility] [place] [facility]  [address] [facility] [id number] [place] [address]-
2966  11 Aug, 2014   

 

 [facility] [place]  [address] [facility] [address] [id number] [place] [id number]  11 Aug, 
2014   

 

 [facility] [place] [facility]  [address] [facility] [id number] [place] [address]-
6545  11 Aug, 2014   

 

 [facility] [place] [facility]  [address] [facility] [id number] [place] [address]-
5846  11 Aug, 2014   

 

 [facility] [place] [facility]  [address] [facility] [id number] [place] [address]-
7911     

 

 [facility] [place]  [address] [facility] [address] [id number] [place] [id number]     

 

 [facility]  [address] [id number] [place] [address]     

 

 [facility]  [address] [id number] [place] [address]-
[address]     

 

 [facility] [place]  [address] [id number] [place] [id number]     

 

 [facility]  [address] [id number] [place] [address]     

 

 [facility] [place]  [address] [id number] [place] [id number]  30 May, 
2014   

 

 [facility]  [address] [id number] [place] [address]-
7676  30 May, 2014   

 

 [facility] [place]  [address] [id number] [place] [id number]  14 May, 
2014   

 

 [facility] [place] [facility]  [address] [id number] [place] [address]-
6442  14 May, 2014   

 

 [facility] [place]  [address] [id number] [place] [id number]  05 May, 
2014   

 

 [facility] [place] [facility]  [address] [id number] [place] [address]-
6942  05 May, 2014   

 

 [facility] [place]  [address] [id number] [place] [id number]     

 

 [facility] [place] [facility]  [address] [id number] [place] [address]-
[address]     

 

 [facility] [place]  [address] [id number] [place] [id number]     

 

 [facility]  [address] [id number] [place] [address]-
[address]     

 

 [facility] [place]  [address] [id number] [place] [id number]  10 Feb, 
2014   

 

 [facility]  [address] [facility] [id number] [place] [address]-
3626  10 Feb, 2014   







IMMUNIZATIONS

No Known Immunizations



SOCIAL HISTORY

Never Assessed



REASON FOR VISIT

Rx Request: Micardis



PLAN OF CARE





VITAL SIGNS





MEDICATIONS







 Medication  Instructions  Dosage  Frequency  Start Date  End Date  Duration  
Status

 

 Losartan Potassium 25 MG  Orally Once a day  1 tablet  24h  12 Sep, 2017     
30 day(s)  Active







RESULTS

No Results



PROCEDURES

No Known procedures



INSTRUCTIONS





MEDICATIONS ADMINISTERED

No Known Medications



MEDICAL (GENERAL) HISTORY







 Type  Description  Date

 

 Medical History  Diabetes mellitus without mention of complication, type II or 
unspecified type, not stated as uncontrolled   

 

 Medical History  Depressive disorder, not elsewhere classified   

 

 Medical History  Anxiety state, unspecified   

 

 Medical History  Effusion of lower leg joint   

 

 Medical History  Generalized osteoarthrosis, unspecified site   

 

 Medical History  Other and unspecified hyperlipidemia   

 

 Medical History  Abnormal weight gain   

 

 Medical History  Effusion of joint, site unspecified   

 

 Medical History  Esophageal reflux   

 

 Medical History  hypertension   

 

 Medical History  colonoscopy- inscreased polyp   

 

 Surgical History  Tonsillectomy   

 

 Surgical History  Orthopedic: Bilateral Knee x2   

 

 Surgical History  colonoscopy  

 

 Hospitalization History  Surgery(s)/Childbirth(s) only

## 2019-02-26 NOTE — XMS REPORT
[facility]

 Created on: 2018



Duyen Larkin

External Reference #: [id number]

: 1965

Sex: Female



Demographics







 Address  [address] [place]  [address]

 

 Preferred Language  Unknown

 

 Marital Status  Unknown

 

 [employer] Affiliation  Unknown

 

 Race  Unknown

 

 Ethnic Group  Unknown





Author







 Author  LIYAH LOWE

 

 Organization  [facility]

 

 Address  [address] [place]  [address]



 

 Phone  [phone]







Care Team Providers







 Care Team Member Name  Role  Phone

 

 LIYAH LOWE  Unavailable  [phone]







PROBLEMS







 Type  Condition  ICD9-CM Code  NUO38-YU Code  Onset Dates  Condition Status  
SNOMED Code

 

 Problem  Effusion of lower leg joint  719.06        Active  870695618

 

 Problem  Esophageal reflux  530.81        Active  885232211

 

 Problem  Other hyperlipidemia     E78.4     Active  62543988

 

 Problem  Type 2 diabetes mellitus without complications     E11.9     Active  
839615027

 

 Problem  Primary generalized (osteo)arthritis     M15.0     Active  056674909

 

 Problem  COPD with exacerbation     J44.1     Active  363649168

 

 Problem  Claudication     I73.9     Active  756980802

 

 Problem  Mild episode of recurrent major depressive disorder     F33.0     
Active  217248807

 

 Problem  Cataracts, bilateral     H26.9     Active  37516839

 

 Problem  Chronic obstructive pulmonary disease with acute exacerbation     
J44.1     Active  837796975

 

 Problem  Chronic renal insufficiency, stage II (mild)     N18.2     Active  
056691299

 

 Problem  Primary osteoarthritis of both knees     M17.0     Active  935838061

 

 Problem  Other chronic pain     G89.29     Active  34049826

 

 Problem  Lumbar degenerative disc disease     M51.36     Active  46833260

 

 Problem  Essential (primary) hypertension     I10     Active  12560646

 

 Problem  Anxiety disorder, unspecified     F41.9     Active  645072510

 

 Problem  Peripheral arterial occlusive disease     I77.9     Active  627769965

 

 Problem  Diabetic polyneuropathy associated with type 2 diabetes mellitus     
E11.42     Active  87330819

 

 Problem  Type 2 diabetes mellitus with diabetic neuropathy, without long-term 
current use of insulin     E11.40     Active  19340526

 

 Problem  Chronic obstructive pulmonary disease, unspecified COPD type     
J44.9     Active  07979019

 

 Problem  Long term current use of insulin     Z79.4     Active  338115850

 

 Problem  Type 2 diabetes mellitus with hyperglycemia     E11.65     Active  
75216382

 

 Problem  Old tear of lateral meniscus of left knee, unspecified tear type     
M23.201     Active  983208452

 

 Problem  Major depressive disorder, single episode, unspecified     F32.9     
Active  97064503

 

 Problem  Effusion, left knee     M25.462     Active  239511957

 

 Problem  Dysphagia, unspecified type     R13.10     Active  66397404

 

 Problem  Hyperlipidemia LDL goal <130     E78.5     Active  62494082

 

 Problem  Unilateral primary osteoarthritis, left knee     M17.12     Active  
986845861

 

 Problem  Hypothyroidism (acquired)     E03.9     Active  937687754







ALLERGIES

No Information



ENCOUNTERS







 Encounter  Location  Date  Diagnosis

 

 [address] [address]     

 

 [address] [address]  21 May, 2018  Pain in left 
knee M25.562

 

 [address] [address]  21 May, 2018  Pain in left 
knee M25.562 and Other chronic pain G89.29

 

 [facility]  [address] [place] [address]  15 May, 2018   

 

 [address] [address]  02 May, 2018  Type 2 diabetes 
mellitus without complications E11.9 ; Long term current use of insulin Z79.4 ; 
Unilateral primary osteoarthritis, left knee M17.12 ; Lumbar degenerative disc 
disease M51.36 and Chronic obstructive pulmonary disease, unspecified COPD type 
J44.9

 

 [address] [address]     

 

 [address] [address]     

 

 [address] [address]     

 

 [address] [address]  23 Mar, 2018  Type 2 diabetes 
mellitus without complications E11.9

 

 [address] [address]  23 Mar, 2018   

 

 [facility]  [address] [place] [address]  13 Mar, 2018   

 

 [facility]  [address] [place] [address]  09 Mar, 2018  Type 2 diabetes mellitus without complications E11.9

 

 [address] [place] [address]     

 

 [facility]  [address] [place] [address]    Diabetic 
polyneuropathy associated with type 2 diabetes mellitus E11.42

 

 [facility]  [address] [place] [address]     

 

 [facility]  [address] [place] [address]  21 Dec, 2017  Type 2 diabetes 
mellitus without complications E11.9 ; Long term current use of insulin Z79.4 ; 
High risk sexual behavior Z72.51 ; BMI 40.0-44.9, adult Z68.41 and Encounter 
for immunization Z23

 

 [facility] [address] [address]  04 Dec, 2017   

 

 [facility]  [address] [place] [address]  04 Dec, 2017   

 

 [facility]  [address] [place] [address]  17 2017  COPD with 
exacerbation J44.1 and BMI 40.0-44.9, adult Z68.41

 

 [facility] [address]  09 Oct, 2017  Encounter for 
screening mammogram for malignant neoplasm of breast Z12.31 ; Well woman exam 
Z01.419 and High risk sexual behavior Z72.51

 

 [facility] [address]  11 Sep, 2017   

 

 [facility] [address] [address]  11 Sep, 2017  Type 2 diabetes 
mellitus without complications E11.9

 

 [facility] [address] [address]  11 Sep, 2017   

 

 [facility] [address]  11 Sep, 2017  Old tear of 
lateral meniscus of left knee, unspecified tear type M23.201

 

 [facility]  [address] [place] [address]  11 Sep, 2017  Other type of 
osteoarthritis, unspecified site M19.90 ; Type 2 diabetes mellitus without 
complications E11.9 ; Primary osteoarthritis of both knees M17.0 ; Chronic 
renal insufficiency, stage II (mild) N18.2 ; Diabetic polyneuropathy associated 
with type 2 diabetes mellitus E11.42 and Exposure to hepatitis C Z20.5

 

 [facility]  [address] [place] [address]  06 Sep, 2017  Type 2 diabetes 
mellitus without complications E11.9

 

 [facility]  [address] [place] [address]  30 Aug, 2017   

 

 [facility]  [address] [place] [address]  30 Aug, 2017   

 

 [facility]  [address] [place] [address]  16 Aug, 2017  Other type of 
osteoarthritis, unspecified site M19.90

 

 [facility]  [address] [place] [address]  15 Aug, 2017  Type 2 diabetes 
mellitus without complications E11.9

 

 [facility]  [address] [place] [address]  07 Aug, 2017   

 

 [facility]  [address] [place] [address]     

 

 [facility]  [address] [place] [address]     

 

 [facility]  [address] [place] [address]    Type 2 diabetes 
mellitus without complications E11.9 ; Type 2 diabetes mellitus with diabetic 
neuropathy, without long-term current use of insulin E11.40 ; Primary 
osteoarthritis of both knees M17.0 and Chronic renal insufficiency, stage II (
mild) N18.2

 

 [facility]  [address] [place] [address]     

 

 [facility]  [address] [place] [address]  30 May, 2017  Hyperlipidemia 
LDL goal <130 E78.5

 

 [facility]  [address] [place] [address]  19 May, 2017  Type 2 diabetes 
mellitus without complications E11.9

 

 [facility]  [address] [facility] [id number] [place] [address]  16 May, 2017   

 

 [facility]  [address] [place] [address]  08 May, 2017   

 

 [facility]  [address] [place] [address]  01 May, 2017   

 

 [facility]  [address] [place] [address]     

 

 [facility]  [address] [place] [address]    Primary 
generalized (osteo)arthritis M15.0 and Type 2 diabetes mellitus without 
complications E11.9

 

 [facility]  [address] [place] [address]    Old tear of 
lateral meniscus of left knee, unspecified tear type M23.201

 

 [facility]  [address] [place] [address]     

 

 [facility]  [address] [place] [address]     

 

 [facility]  [address] [place] [address]     

 

 [facility]  [address] [place] [address]  20 Mar, 2017  Type 2 diabetes 
mellitus without complications E11.9

 

 [facility]  [address] [place] [address]  13 Mar, 2017  Type 2 diabetes 
mellitus without complications E11.9

 

 [facility]  [address] [place] [address]  13 Mar, 2017  Type 2 diabetes 
mellitus without complications E11.9

 

 [facility]  [address] [place] [address]  04 Mar, 2017  Type 2 diabetes 
mellitus without complications E11.9

 

 [facility] [address]     

 

 [facility]  [address] [place] [address]    Chronic 
obstructive pulmonary disease with acute exacerbation J44.1

 

 [facility] [address] [address]     

 

 [facility] [address]    Dysuria R30.0 ; 
Essential (primary) hypertension I10 ; Hypothyroidism (acquired) E03.9 ; Type 2 
diabetes mellitus without complications E11.9 and Mild episode of recurrent 
major depressive disorder F33.0

 

 [facility]  [address] [place] [address]     

 

 [facility]  [address] [place] [address]  08 2017  Dysuria R30.0 ; 
Vaginal candidiasis B37.3 and Candidiasis B37.9

 

 [facility] [address] [address]     

 

 [facility]  [address] [place] [address]  10 Octaviano, 2017  COPD with 
exacerbation J44.1 and Dysphagia, unspecified type R13.10

 

 [facility]  [address] [place] [address]  27 Dec, 2016   

 

 [facility]  [address] [place] [address]  12 Dec, 2016  Essential (
primary) hypertension I10 ; Hypothyroidism (acquired) E03.9 ; Type 2 diabetes 
mellitus without complications E11.9 ; Primary generalized (osteo)arthritis 
M15.0 ; Major depressive disorder, single episode, unspecified F32.9 ; 
Unilateral primary osteoarthritis, left knee M17.12 ; Hyperlipidemia LDL goal <
130 E78.5 and Dysphagia, unspecified type R13.10

 

 [facility]  [address] [place] [address]  01 Dec, 2016   

 

 [facility]  [address] [place] [address]     

 

 [facility]  [address] [place] [address]     

 

 [facility]  [address] [place] [address]     

 

 [facility]  [address] [place] [address]     

 

 [facility]  [address] [place] [address]     

 

 [facility]  [address] [place] [address]    Type 2 diabetes 
mellitus without complications E11.9 ; Primary generalized (osteo)arthritis 
M15.0 ; Effusion, left knee M25.462 ; Old tear of lateral meniscus of left knee
, unspecified tear type M23.201 and Fatigue, unspecified type R53.83

 

 [facility]  [address] [place] [address]  26 Sep, 2016  Type 2 diabetes 
mellitus without complications E11.9 ; Claudication I73.9 ; Pain in right leg 
M79.604 and Pain of left leg M79.605

 

 [facility]  [address] [place] [address]  14 Sep, 2016   

 

 [facility]  [address] [place] [address]  12 Sep, 2016   

 

 [facility]  [address] [place] [address]  18 Aug, 2016  Type 2 diabetes 
mellitus with hyperglycemia E11.65 ; Long term current use of insulin Z79.4 ; 
Anxiety disorder, unspecified F41.9 ; Essential (primary) hypertension I10 ; 
Major depressive disorder, single episode, unspecified F32.9 and Peripheral 
arterial occlusive disease I77.9

 

 [address] [address]  17 Aug, 2016   

 

 [address] [address]  11 Aug, 2016   

 

 [address] [address]  11 Aug, 2016   

 

 [address] [address]  10 Aug, 2016   

 

 [address] [address]  05 Aug, 2016  Acute midline 
low back pain with right-sided sciatica M54.41

 

 [facility]  [address] [facility] [id number] [place] [address]  05 Aug, 2016  Chest pain, unspecified type R07.9 ; Palpitations R00.2 ; 
Claudication I73.9 ; Generalized pain R52 and Type 2 diabetes mellitus without 
complications E11.9

 

 [address] [address]    Acute midline 
low back pain with right-sided sciatica M54.41 ; Dysuria R30.0 ; Claudication 
I73.9 ; Peripheral arterial occlusive disease I77.9 ; Shortness of breath 
R06.02 ; Effusion, left knee M25.462 and Type 2 diabetes mellitus without 
complications E11.9

 

 [address] [address]     

 

 [address] [address]     

 

 [address] [address]     

 

 [address] [address]    Type 2 diabetes 
mellitus without complications E11.9 ; Other hyperlipidemia E78.4 ; Peripheral 
arterial occlusive disease I77.9 ; Essential (primary) hypertension I10 and 
Other fatigue R53.83

 

 [facility]  [address] [place] [address]  18 May, 2016   

 

 [facility]  [address] [place] [address]  06 May, 2016   

 

 [facility]  [address] [place] [address]  05 May, 2016  Chest pain, 
unspecified type R07.9 ; Peripheral arterial occlusive disease I77.9 ; Anxiety 
disorder, unspecified F41.9 ; Essential (primary) hypertension I10 ; Type 2 
diabetes mellitus without complications E11.9 and Other hyperlipidemia E78.4

 

 [facility]  [address] [place] [address]  04 May, 2016   

 

 [facility]  [address] [place] [address]     

 

 [facility] [address] [address]     

 

 [facility] [address]    Type 2 diabetes 
mellitus without complications E11.9 ; Peripheral arterial occlusive disease 
I77.9 ; Primary generalized (osteo)arthritis M15.0 ; Major depressive disorder, 
single episode, unspecified F32.9 ; Anxiety disorder, unspecified F41.9 and 
Essential (primary) hypertension I10

 

 [facility] [address] [address]     

 

 [facility] [address] [address]     

 

 [facility]  [address] [facility] [id number] [place] [address]  02 Mar, 2016   

 

 [facility] [address]     

 

 [facility] [address]    Bronchitis J40 ; 
Shortness of breath R06.02 and Wheezing R06.2

 

 [facility] [address] [address]     

 

 [facility] [address]     

 

 [facility] [address]     

 

 [facility] [address] [place] [address]     

 

 [facility]  [address] [place] [address]    Type 2 diabetes 
mellitus without complications E11.9 ; Claudication I73.9 ; Other 
hyperlipidemia E78.4 ; Cataracts, bilateral H26.9 and Other fatigue R53.83

 

 [facility]  [address] [place] [address]  28 Oct, 2015   

 

 [facility]  [address] [place] [address]  22 Oct, 2015   

 

 [facility]  [address] [place] [address]  16 Oct, 2015   

 

 [facility]  [address] [place] [address]  14 Oct, 2015  Type 2 diabetes 
mellitus without complications E11.9 ; Other hyperlipidemia E78.4 ; Primary 
generalized (osteo)arthritis M15.0 and Claudication I73.9

 

 [facility]  [address] [place] [address]  12 Oct, 2015   

 

 [facility]  [address] [place] [address]  26 Aug, 2015   

 

 [facility]  [address] [place] [address]  12 Aug, 2015   

 

 [facility]  [address] [place] [address]  12 Aug, 2015   

 

 [facility]  [address] [place] [address]  10 Aug, 2015   

 

 [facility]  [address] [place] [address]  05 Aug, 2015   

 

 [facility]  [address] [place] [address]  11 May, 2015  Diabetes 
mellitus without mention of complication, type II or unspecified type, not 
stated as uncontrolled 250.00 ; Bronchitis 490 and Effusion of lower leg joint 
719.06

 

 [facility]  [address] [place] [address]  01 May, 2015   

 

 [facility] [address]  01 May, 2015  Bronchitis 490 
and Wheezing 786.07

 

 [facility]  [address] [facility] [id number] [address]     

 

 [facility]  [address]65[place] [address]-
[address]  14 2015   

 

 [facility]  [address]65[place] [address]     

 

 [facility]  [address] [place] [address]  19 Mar, 2015   

 

 [facility]  [address]65[address], [place] [address]  19 Mar, 2015   

 

 [facility]  [address] [place] [address]  09 Mar, 2015   

 

 [facility]  [address]65[address] [address]  09 Mar, 2015   

 

 [facility]  [address] [place] [address]  ,    

 

 [facility]  [address]00565[address], [place] [address]  ,    

 

 [facility]  [address] [place] [address]-
[address]  10 Feb,    

 

 [facility]  [address] [place] [address]     

 

 [facility]  [address] [place] [address]     

 

 [facility]  [address]00565[address], [place] [address]     

 

 [facility]  [address]65[address], [place] [address]     

 

 [facility]  [address] [place] [address]     

 

 [facility]  [address]00565100[place], [place] [address]     

 

 [facility]  [address] [place] [address]     

 

 [facility]  [address]00565100[place], [place] [address]     

 

 [facility]  [address] [place] [address]  24 Dec, 2014   

 

 [facility] [place] [facility]  [address] [facility] [id number][place] [address]  24 Dec, 2014   

 

 [facility]  [address] [place] [address]  18 Dec, 2014   

 

 [facility]  [address] [id number] [address]  18 Dec, 2014   

 

 [facility]  [address] [facility] [address]  09 Dec, 2014   

 

 [facility]  [address] [id number], [place] [address]  09 Dec, 2014   

 

 [facility]  [address] [facility] [address]  02 Dec, 2014   

 

 [facility] [place]  [address]65[address] [address]  02 Dec, 2014   

 

 [facility]  [address] [facility] [address]     

 

 [facility] [place]  [address] [id number], [place] [address]-
[address]     

 

 [facility]  [address] [facility] [address]     

 

 [facility] [place]  [address] [id number] [address]     

 

 [facility]  [address] [facility] [address]  31 Oct, 2014   

 

 [facility]  [address] [id number] [address]  31 Oct, 2014   

 

 [facility]  [address] [facility] [address]  16 Oct, 2014   

 

 [facility] [place] [facility]  [address] [id number] [address]  16 Oct, 2014   

 

 [facility]  [address] [place] [address]  10 Oct, 2014   

 

 [facility] [place] [facility]  [address] [id number], [place] [address]-
[address]288  10 Oct, 2014   

 

 [facility]  [address] [facility] [address]  26 Sep, 2014   

 

 [facility] [place] [facility]  [address] [id number] [place] [address]-
3658  26 Sep, 2014   

 

 [facility]  [address] [facility] [address]  29 Aug, 2014   

 

 [facility] [place] [facility]  [address] [id number] [place] [address]-
[address]  29 Aug, 2014   

 

 [facility]  [address] [facility] [address]  25 Aug, 2014   

 

 [facility]  [address] [facility] [address]  25 Aug, 2014   

 

 [facility] [place] [facility]  [address] [facility] [id number] [place] [address]-
[address]  25 Aug, 2014   

 

 [facility] [place] [facility]  [address] [id number] [place] [address]-
[address]  25 Aug, 2014   

 

 [facility]  [address] [facility] [address]  18 Aug, 2014   

 

 [facility] [place] [facility]  [address] [id number] [place] [address]-
1026  18 Aug, 2014   

 

 [facility]  [address] [facility] [address]  14 Aug, 2014   

 

 [facility] [place] [facility]  [address] [facility] [id number] [place] [address]-
4088  14 Aug, 2014   

 

 [facility] [place] [facility]  [address] [facility] [id number] [place] [address]-
[address]32  11 Aug, 2014   

 

 [facility]  [address] [facility] [address]  11 Aug, 2014   

 

 [facility] [place] [facility]  [address] [facility] [id number], [place] [address]-
5580  11 Aug, 2014   

 

 [facility] [place] [facility]  [address] [facility] [id number] [place] [address]-
9556  11 Aug, 2014   

 

 [facility] [place] [facility]  [address] [facility] [id number] [place] [address]-
3059     

 

 [facility]  [address] [place] [address]     

 

 [facility] [place] [facility]  [address] [facility] [id number], [place] [address]     

 

 [facility]  [address]00565100[place] [address]     

 

 [facility]  [address] [place] [address]     

 

 [facility]  [address]00565100[place] [address]-
9102     

 

 [facility]  [address] [place] [address]  30 May, 2014   

 

 [facility]  [address]00565100[place], [place] [address]24  30 May, 2014   

 

 [facility]  [address] [place] [address]  14 May, 2014   

 

 [facility]  [address] [place] [address]-
2755  14 May, 2014   

 

 [facility]  [address] [place] [address]  05 May, 2014   

 

 [facility]  [address] [place] [address]-
[address]  05 May, 2014   

 

 [facility]  [address] [place] [address]     

 

 [facility]  [address]00565100[place], [place] [address]-
[address]     

 

 [facility]  [address] [place] [address]     

 

 [facility]  [address]00565100[place], [place] [address]-
7370     

 

 [facility]  [address] [place] [address]  10 Feb, 2014   

 

 [facility]  [id number], [place] [address]  10 Feb, 2014   







IMMUNIZATIONS

No Known Immunizations



SOCIAL HISTORY

Never Assessed



REASON FOR VISIT

Medication question



PLAN OF CARE





VITAL SIGNS





MEDICATIONS

Unknown Medications



RESULTS

No Results



PROCEDURES

No Known procedures



INSTRUCTIONS





MEDICATIONS ADMINISTERED

No Known Medications



MEDICAL (GENERAL) HISTORY







 Type  Description  Date

 

 Medical History  Diabetes mellitus without mention of complication, type II or 
unspecified type, not stated as uncontrolled   

 

 Medical History  Depressive disorder, not elsewhere classified   

 

 Medical History  Anxiety state, unspecified   

 

 Medical History  Effusion of lower leg joint   

 

 Medical History  Generalized osteoarthrosis, unspecified site   

 

 Medical History  Other and unspecified hyperlipidemia   

 

 Medical History  Abnormal weight gain   

 

 Medical History  Effusion of joint, site unspecified   

 

 Medical History  Esophageal reflux   

 

 Medical History  hypertension   

 

 Medical History  colonoscopy- inscreased polyp   

 

 Surgical History  Tonsillectomy   

 

 Surgical History  Orthopedic: Bilateral Knee x2   

 

 Surgical History  colonoscopy  

 

 Hospitalization History  Surgery(s)/Childbirth(s) only

## 2019-02-26 NOTE — DISCHARGE INST-SIMPLE/STANDARD
Discharge Inst-Standard


Patient Instructions/Follow Up


Plan of Care/Instructions/FU:  


2 weeks Ronald


Activity as Tolerated:  Yes


Discharge Diet:  Regular Diet (high fiber)











NELSON MAI DO Feb 26, 2019 14:00

## 2019-02-26 NOTE — XMS REPORT
[facility]

 Created on: 2018



Debi Larkinthia

External Reference #: [id number]

: 1965

Sex: Female



Demographics







 Address  [address] [place] [address]  [address]

 

 Preferred Language  Unknown

 

 Marital Status  Unknown

 

 Spiritism Affiliation  Unknown

 

 Race  Unknown

 

 Ethnic Group  Unknown





Author







 Author  LIYAH LOWE

 

 Organization  [facility]  [place]

 

 Address  [address] [place]  [address]



 

 Phone  [phone]







Care Team Providers







 Care Team Member Name  Role  Phone

 

 LIYAH LOWE  Unavailable  [phone]







PROBLEMS







 Type  Condition  ICD9-CM Code  PSY15-FB Code  Onset Dates  Condition Status  
SNOMED Code

 

 Problem  Effusion of lower leg joint  719.06        Active  408521135

 

 Problem  Esophageal reflux  530.81        Active  946863786

 

 Problem  Other hyperlipidemia     E78.4     Active  46317951

 

 Problem  Type 2 diabetes mellitus without complications     E11.9     Active  
977291872

 

 Problem  Primary generalized (osteo)arthritis     M15.0     Active  270425659

 

 Problem  COPD with exacerbation     J44.1     Active  524993501

 

 Problem  Claudication     I73.9     Active  634128277

 

 Problem  Mild episode of recurrent major depressive disorder     F33.0     
Active  955209295

 

 Problem  Cataracts, bilateral     H26.9     Active  22339868

 

 Problem  Chronic obstructive pulmonary disease with acute exacerbation     
J44.1     Active  566343548

 

 Problem  Chronic renal insufficiency, stage II (mild)     N18.2     Active  
321986941

 

 Problem  Primary osteoarthritis of both knees     M17.0     Active  826990255

 

 Problem  Other chronic pain     G89.29     Active  03806989

 

 Problem  Lumbar degenerative disc disease     M51.36     Active  67654335

 

 Problem  Essential (primary) hypertension     I10     Active  49085696

 

 Problem  Anxiety disorder, unspecified     F41.9     Active  553588034

 

 Problem  Peripheral arterial occlusive disease     I77.9     Active  269651190

 

 Problem  Diabetic polyneuropathy associated with type 2 diabetes mellitus     
E11.42     Active  67789052

 

 Problem  Type 2 diabetes mellitus with diabetic neuropathy, without long-term 
current use of insulin     E11.40     Active  05528149

 

 Problem  Chronic obstructive pulmonary disease, unspecified COPD type     
J44.9     Active  50913877

 

 Problem  Long term current use of insulin     Z79.4     Active  433575299

 

 Problem  Type 2 diabetes mellitus with hyperglycemia     E11.65     Active  
34879435

 

 Problem  Old tear of lateral meniscus of left knee, unspecified tear type     
M23.201     Active  423559799

 

 Problem  Major depressive disorder, single episode, unspecified     F32.9     
Active  85002386

 

 Problem  Effusion, left knee     M25.462     Active  301170315

 

 Problem  Dysphagia, unspecified type     R13.10     Active  51391756

 

 Problem  Hyperlipidemia LDL goal <130     E78.5     Active  17551285

 

 Problem  Unilateral primary osteoarthritis, left knee     M17.12     Active  
930920156

 

 Problem  Hypothyroidism (acquired)     E03.9     Active  075325447







ALLERGIES

No Information



ENCOUNTERS







 Encounter  Location  Date  Diagnosis

 

 [address] [address]  21 May, 2018  Pain in left 
knee M25.562

 

 [address] [address]  21 May, 2018  Pain in left 
knee M25.562 and Other chronic pain G89.29

 

 [address] [place] [address]  15 May, 2018   

 

 [address] [address]  02 May, 2018  Type 2 
diabetes mellitus without complications E11.9 ; Long term current use of 
insulin Z79.4 ; Unilateral primary osteoarthritis, left knee M17.12 ; Lumbar 
degenerative disc disease M51.36 and Chronic obstructive pulmonary disease, 
unspecified COPD type J44.9

 

 [address] [address]     

 

 [address] [address]     

 

 [address] [address]     

 

 [address] [address]  23 Mar, 2018  Type 2 
diabetes mellitus without complications E11.9

 

 [address] [address]  23 Mar, 2018   

 

 [place] [id number] [place] [address]-
3944  13 Mar, 2018   

 

 [place] [id number] [place] [address]  09 Mar, 2018  Type 2 diabetes mellitus without complications E11.9

 

 [address] [address]     

 

 [address] [address]    Diabetic 
polyneuropathy associated with type 2 diabetes mellitus E11.42

 

 [address] [address]     

 

 [address] [address]  21 Dec, 2017  Type 2 
diabetes mellitus without complications E11.9 ; Long term current use of 
insulin Z79.4 ; High risk sexual behavior Z72.51 ; BMI 40.0-44.9, adult Z68.41 
and Encounter for immunization Z23

 

 [address] [address]  04 Dec, 2017   

 

 [address] [address]  04 Dec, 2017   

 

 [address] [address]    COPD with 
exacerbation J44.1 and BMI 40.0-44.9, adult Z68.41

 

 [address] [address]  09 Oct, 2017  Encounter 
for screening mammogram for malignant neoplasm of breast Z12.31 ; Well woman 
exam Z01.419 and High risk sexual behavior Z72.51

 

 [address] [address]  11 Sep, 2017   

 

 [address] [address]  11 Sep, 2017  Type 2 
diabetes mellitus without complications E11.9

 

 [address] [address]  11 Sep, 2017   

 

 [address] [address]  11 Sep, 2017  Old tear of 
lateral meniscus of left knee, unspecified tear type M23.201

 

 [address] [address]  11 Sep, 2017  Other type 
of osteoarthritis, unspecified site M19.90 ; Type 2 diabetes mellitus without 
complications E11.9 ; Primary osteoarthritis of both knees M17.0 ; Chronic 
renal insufficiency, stage II (mild) N18.2 ; Diabetic polyneuropathy associated 
with type 2 diabetes mellitus E11.42 and Exposure to hepatitis C Z20.5

 

 [facility] [place]  [address] [place] [address]  06 Sep, 2017  Type 2 
diabetes mellitus without complications E11.9

 

 [facility] [address] [address]  30 Aug, 2017   

 

 [facility] [address] [address]  30 Aug, 2017   

 

 [facility] [address] [address]  16 Aug, 2017  Other type 
of osteoarthritis, unspecified site M19.90

 

 [facility] [address] [address]  15 Aug, 2017  Type 2 
diabetes mellitus without complications E11.9

 

 [facility] [address] [address]  07 Aug, 2017   

 

 [facility] [address] [address]     

 

 [facility] [address] [address]     

 

 [facility] [address] [address]    Type 2 
diabetes mellitus without complications E11.9 ; Type 2 diabetes mellitus with 
diabetic neuropathy, without long-term current use of insulin E11.40 ; Primary 
osteoarthritis of both knees M17.0 and Chronic renal insufficiency, stage II (
mild) N18.2

 

 [facility] [address] [address]     

 

 [facility] [address] [address]  30 May, 2017  
Hyperlipidemia LDL goal <130 E78.5

 

 [address] [address]  19 May, 2017  Type 2 
diabetes mellitus without complications E11.9

 

 [facility]  [address] [id number] [place] [address]  16 May, 2017   

 

 [address] [address]  08 May, 2017   

 

 [address] [address]  01 May, 2017   

 

 [facility] [place]  [address] [place] [address]     

 

 [facility] [place]  [address] [place] [address]    Primary 
generalized (osteo)arthritis M15.0 and Type 2 diabetes mellitus without 
complications E11.9

 

 [facility] [place]  [address] [place] [address]    Old tear of 
lateral meniscus of left knee, unspecified tear type M23.201

 

 [facility] [place]  [address] [place] [address]     

 

 [facility] [place]  [address] [place] [address]     

 

 [facility] [place]  [address] [place] [address]     

 

 [facility] [place]  [address] [place] [address]  20 Mar, 2017  Type 2 
diabetes mellitus without complications E11.9

 

 [facility] [place]  [address] [place] [address]  13 Mar, 2017  Type 2 
diabetes mellitus without complications E11.9

 

 [facility] [place] [address]  13 Mar, 2017  Type 2 
diabetes mellitus without complications E11.9

 

 [facility] [place]  [address] [place] [address]  04 Mar, 2017  Type 2 
diabetes mellitus without complications E11.9

 

 [facility] [place] [address]     

 

 [facility] [place]  [address] [place] [address]    Chronic 
obstructive pulmonary disease with acute exacerbation J44.1

 

 [facility] [place]  [address] [place] [address]     

 

 [facility] [place]  [address] [place] [address]    Dysuria 
R30.0 ; Essential (primary) hypertension I10 ; Hypothyroidism (acquired) E03.9 
; Type 2 diabetes mellitus without complications E11.9 and Mild episode of 
recurrent major depressive disorder F33.0

 

 [facility] [place]  [address] [place] [address]  09 2017   

 

 [address] [address]    Dysuria 
R30.0 ; Vaginal candidiasis B37.3 and Candidiasis B37.9

 

 [address] [address]     

 

 [address] [address]  10 Octaviano, 2017  COPD with 
exacerbation J44.1 and Dysphagia, unspecified type R13.10

 

 [address] [address]  27 Dec, 2016   

 

 [address] [address]  12 Dec, 2016  Essential (
primary) hypertension I10 ; Hypothyroidism (acquired) E03.9 ; Type 2 diabetes 
mellitus without complications E11.9 ; Primary generalized (osteo)arthritis 
M15.0 ; Major depressive disorder, single episode, unspecified F32.9 ; 
Unilateral primary osteoarthritis, left knee M17.12 ; Hyperlipidemia LDL goal <
130 E78.5 and Dysphagia, unspecified type R13.10

 

 [address] [address]  01 Dec, 2016   

 

 [address] [address]     

 

 [address] [address]     

 

 [address] [address]     

 

 [address] [address]     

 

 [address] [address]     

 

 [address] [address]    Type 2 
diabetes mellitus without complications E11.9 ; Primary generalized (osteo)
arthritis M15.0 ; Effusion, left knee M25.462 ; Old tear of lateral meniscus of 
left knee, unspecified tear type M23.201 and Fatigue, unspecified type R53.83

 

 [address] [address]  26 Sep, 2016  Type 2 
diabetes mellitus without complications E11.9 ; Claudication I73.9 ; Pain in 
right leg M79.604 and Pain of left leg M79.605

 

 [address] [address]  14 Sep, 2016   

 

 [address] [address]  12 Sep, 2016   

 

 [address] [address]  18 Aug, 2016  Type 2 
diabetes mellitus with hyperglycemia E11.65 ; Long term current use of insulin 
Z79.4 ; Anxiety disorder, unspecified F41.9 ; Essential (primary) hypertension 
I10 ; Major depressive disorder, single episode, unspecified F32.9 and 
Peripheral arterial occlusive disease I77.9

 

 [address] [address]  17 Aug, 2016   

 

 [address] [address]  11 Aug, 2016   

 

 [address] [address]  11 Aug, 2016   

 

 [address] [address]  10 Aug, 2016   

 

 [address] [address]  05 Aug, 2016  Acute 
midline low back pain with right-sided sciatica M54.41

 

 [facility]  [address] [id number] [place] [address]  05 Aug, 2016  Chest pain, unspecified type R07.9 ; Palpitations R00.2 ; 
Claudication I73.9 ; Generalized pain R52 and Type 2 diabetes mellitus without 
complications E11.9

 

 [address] [address]    Acute 
midline low back pain with right-sided sciatica M54.41 ; Dysuria R30.0 ; 
Claudication I73.9 ; Peripheral arterial occlusive disease I77.9 ; Shortness of 
breath R06.02 ; Effusion, left knee M25.462 and Type 2 diabetes mellitus 
without complications E11.9

 

 [address] [place] [address]     

 

 [facility]  [address] [place] [address]     

 

 [facility]  [address] [place] [address]     

 

 [facility]  [address] [place] [address]    Type 2 
diabetes mellitus without complications E11.9 ; Other hyperlipidemia E78.4 ; 
Peripheral arterial occlusive disease I77.9 ; Essential (primary) hypertension 
I10 and Other fatigue R53.83

 

 [facility]  [address] [place] [address]  18 May, 2016   

 

 [facility]  [address] [place] [address]  06 May, 2016   

 

 [facility]  [address] [place] [address]  05 May, 2016  Chest pain, 
unspecified type R07.9 ; Peripheral arterial occlusive disease I77.9 ; Anxiety 
disorder, unspecified F41.9 ; Essential (primary) hypertension I10 ; Type 2 
diabetes mellitus without complications E11.9 and Other hyperlipidemia E78.4

 

 [facility]  [address] [place] [address]  04 May, 2016   

 

 [facility]  [address] [place] [address]     

 

 [facility]  [address] [place] [address]     

 

 [facility]  [address] [place] [address]    Type 2 
diabetes mellitus without complications E11.9 ; Peripheral arterial occlusive 
disease I77.9 ; Primary generalized (osteo)arthritis M15.0 ; Major depressive 
disorder, single episode, unspecified F32.9 ; Anxiety disorder, unspecified 
F41.9 and Essential (primary) hypertension I10

 

 [facility]  [address] [place] [address]     

 

 [facility]  [address] [place] [address]     

 

 [facility]  [address] [facility] [id number] [address]  02 Mar, 2016   

 

 [facility] [place]  [address] [place] [address]     

 

 [facility]  [address] [place] [address]    Bronchitis 
J40 ; Shortness of breath R06.02 and Wheezing R06.2

 

 [facility]  [address] [place] [address]     

 

 [facility]  [address] [place] [address]     

 

 [facility]  [address] [place] [address]     

 

 [address] [address]     

 

 [address] [address]    Type 2 
diabetes mellitus without complications E11.9 ; Claudication I73.9 ; Other 
hyperlipidemia E78.4 ; Cataracts, bilateral H26.9 and Other fatigue R53.83

 

 [address] [address]  28 Oct, 2015   

 

 [address] [address]  22 Oct, 2015   

 

 [address] [address]  16 Oct, 2015   

 

 [address] [address]  14 Oct, 2015  Type 2 
diabetes mellitus without complications E11.9 ; Other hyperlipidemia E78.4 ; 
Primary generalized (osteo)arthritis M15.0 and Claudication I73.9

 

 [facility]  [address] [place] [address]  12 Oct, 2015   

 

 [facility] [place] [address]  26 Aug, 2015   

 

 [facility] [address]  12 Aug, 2015   

 

 [facility] [place]  [address] [place] [address]  12 Aug, 2015   

 

 [facility]  [address] [place] [address]  10 Aug, 2015   

 

 [facility]  [address] [place] [address]  05 Aug, 2015   

 

 [facility]  [address] [place] [address]  11 May, 2015  Diabetes 
mellitus without mention of complication, type II or unspecified type, not 
stated as uncontrolled 250.00 ; Bronchitis 490 and Effusion of lower leg joint 
719.06

 

 [facility]  [address] [place] [address]  01 May, 2015   

 

 [facility]  [address] [place] [address]  01 May, 2015  Bronchitis 
490 and Wheezing 786.07

 

 [facility]  [address] [place], [place] [id number] [place] [address]-
4113     

 

 [facility]  [address] [place] [address] [place] [address]-
0926     

 

 [facility]  [address] [place] [address] [place] [address]-
2363     

 

 [facility]  [address] [place] [address]  19 Mar, 2015   

 

 [facility]  [address] [place] [id number] [place] [address]-
6352  19 Mar, 2015   

 

 [facility]  [address] [place] [address]  09 Mar, 2015   

 

 [facility]  [address] [place] [id number] [place] [address]-
9682  09 Mar, 2015   

 

 [facility]  [address] [place] [address]     

 

 [facility]  [address] [place], [place] [address] [place] [address]-
[address]     

 

 [facility]  [address] [place] [address] [place] [address]-
1423  10 Feb, 2015   

 

 [address] [address]     

 

 [facility] [place]  [address] [place] [address]     

 

 [facility] [place] [facility]  [address] [facility] [id number] [address]  ,    

 

 [facility] [place] [facility]  [address] [id number] [address]     

 

 [facility] [place]  [address] [place] [address]     

 

 [facility] [place] [facility]  [address] [id number], [place] [address]-
[address]     

 

 [facility] [place]  [address] [place] [address]     

 

 [facility] [place] [facility]  [address] [place] [id number], [place] [address]-
[address]     

 

 [facility] [place]  [address] [place] [address]  24 Dec, 2014   

 

 [facility] [place] [facility]  [address]100[place] [address]126  24 Dec, 2014   

 

 [facility] [place]  [address] [place] [address]  18 Dec, 2014   

 

 [facility] [place] [facility]  [address] [place] [id number] [address]
9349  18 Dec, 2014   

 

 [facility] [place]  [address] [place] [address]  09 Dec, 2014   

 

 [facility] [place] [facility]  [address] [id number], [place] [address]
4257  09 Dec, 2014   

 

 [facility] [place]  [address] [place] [address]  02 Dec, 2014   

 

 [facility] [place] [facility]  [address] [id number], [place] [address]-
7126  02 Dec, 2014   

 

 [facility] [place]  [address] [place] [address]     

 

 [facility] [place] [facility]  [address] [id number], [place] [address]-
[address]     

 

 [facility] [place]  [address] [place] [address]     

 

 [facility] [place] [facility]  [address]65100[place] [address]-
[address]     

 

 [facility] [place]  [address] [place] [address]  31 Oct, 2014   

 

 [facility] [place] [facility]  [address] [id number], [place] [address]-
7936  31 Oct, 2014   

 

 [facility] [place]  [address] [place] [address]  16 Oct, 2014   

 

 [facility] [place] [facility]  [address] [id number], [place] [address]-
2611  16 Oct, 2014   

 

 [facility] [place]  [address] [place] [address]  10 Oct, 2014   

 

 [facility] [place] [facility]  [address] [id number], [place] [address]-
[address]81  10 Oct, 2014   

 

 [facility] [place]  [address] [place] [address]  26 Sep, 2014   

 

 [facility] [place] [facility]  [address] [id number], [place] [address]-
9898  26 Sep, 2014   

 

 [facility] [place]  [address] [place] [address]  29 Aug, 2014   

 

 [facility] [place] [facility]  [id number], [place] [address]-
5327  29 Aug, 2014   

 

 [facility] [place]  [address] [place] [address]  25 Aug, 2014   

 

 [facility] [place]  [address] [place] [address]  25 Aug, 2014   

 

 [facility] [place] [facility]  [address] [id number], [place] [address]-
2529  25 Aug, 2014   

 

 [facility] [place] [facility]  [id number], [place] [address]-
5319  25 Aug, 2014   

 

 [facility] [place]  [address] [place] [address]  18 Aug, 2014   

 

 [facility] [place] [facility]  [address] [place] [id number], [place] [address]-
6595  18 Aug, 2014   

 

 [facility] [place] [address]  14 Aug, 2014   

 

 [facility] [place] [facility]  [address] [facility] [id number] [address]-
1485  14 Aug, 2014   

 

 [facility] [place] [facility]  [address] [facility] [id number] [place] [address]-
9055  11 Aug, 2014   

 

 [facility] [place]  [address] [place] [address]  11 Aug, 2014   

 

 [facility] [place] [facility]  [address] [facility] [id number] [place] [address]-
9735  11 Aug, 2014   

 

 [facility] [place] [facility]  [address] [facility] [id number] [place] [address]-
8890  11 Aug, 2014   

 

 [facility] [place] [facility]  [address] [facility] [id number] [place] [address]-
1737     

 

 [facility] [place]  [address] [place] [address]     

 

 [facility] [place] [facility]  [address] [id number] [address]-
5686     

 

 [facility] [place] [facility]  [address] [id number] [place] [address]-
2417     

 

 [facility] [place]  [address] [place] [address]     

 

 [facility] [place] [facility]  [address] [id number] [address]-
2516     

 

 [facility] [place]  [address] [place] [address]  30 May, 2014   

 

 [facility] [place] [facility]  [address] [id number], [place] [address]-
7567  30 May, 2014   

 

 [facility] [place]  [address] [place] [address]  14 May, 2014   

 

 [facility] [place] [facility]  [address] [facility] [id number] [address]-
0471  14 May, 2014   

 

 [facility] [place]  [address] [place] [address]  05 May, 2014   

 

 [facility] [place] [facility]  [address] [id number] [address]-
5470  05 May, 2014   

 

 [facility] [place] [address]     

 

 [facility]  [address] [id number] [address]-
4734     

 

 [place] [address] [place] [address]     

 

 [facility]  [address] [id number], [place] [address]-
5599     

 

 [facility] [place] [address]  10 Feb, 2014   

 

 [place] [address]65100[place], [place] [address]-
9185  10 Feb, 2014   







IMMUNIZATIONS

No Known Immunizations



SOCIAL HISTORY

Never Assessed



REASON FOR VISIT

medication question



PLAN OF CARE





VITAL SIGNS





MEDICATIONS

Unknown Medications



RESULTS

No Results



PROCEDURES

No Known procedures



INSTRUCTIONS





MEDICATIONS ADMINISTERED

No Known Medications



MEDICAL (GENERAL) HISTORY







 Type  Description  Date

 

 Medical History  Diabetes mellitus without mention of complication, type II or 
unspecified type, not stated as uncontrolled   

 

 Medical History  Depressive disorder, not elsewhere classified   

 

 Medical History  Anxiety state, unspecified   

 

 Medical History  Effusion of lower leg joint   

 

 Medical History  Generalized osteoarthrosis, unspecified site   

 

 Medical History  Other and unspecified hyperlipidemia   

 

 Medical History  Abnormal weight gain   

 

 Medical History  Effusion of joint, site unspecified   

 

 Medical History  Esophageal reflux   

 

 Medical History  hypertension   

 

 Medical History  colonoscopy- inscreased polyp   

 

 Surgical History  Tonsillectomy   

 

 Surgical History  Orthopedic: Bilateral Knee x2   

 

 Surgical History  colonoscopy  

 

 Hospitalization History  Surgery(s)/Childbirth(s) only

## 2019-02-26 NOTE — XMS REPORT
[facility]

 Created on: 2018



Debi Larkinthia

External Reference #: [id number]

: 1965

Sex: Female



Demographics







 Address  [address] [place] [address]  [address]

 

 Preferred Language  Unknown

 

 Marital Status  Unknown

 

 Restoration Affiliation  Unknown

 

 Race  Unknown

 

 Ethnic Group  Unknown





Author







 Author  LIYAH LOWE

 

 Organization  [facility]  [place]

 

 Address  [address] [place]  [address]



 

 Phone  [phone]







Care Team Providers







 Care Team Member Name  Role  Phone

 

 LIYAH LOWE  Unavailable  [phone]







PROBLEMS







 Type  Condition  ICD9-CM Code  TFC60-OB Code  Onset Dates  Condition Status  
SNOMED Code

 

 Problem  Esophageal reflux  530.81        Active  911830557

 

 Problem  Effusion of lower leg joint  719.06        Active  636670421

 

 Problem  Other hyperlipidemia     E78.4     Active  41070346

 

 Problem  Type 2 diabetes mellitus without complications     E11.9     Active  
867322716

 

 Problem  Primary generalized (osteo)arthritis     M15.0     Active  941510387

 

 Problem  Claudication     I73.9     Active  672037290

 

 Problem  Cataracts, bilateral     H26.9     Active  72385015

 

 Problem  Chronic obstructive pulmonary disease with acute exacerbation     
J44.1     Active  745612468

 

 Problem  Peripheral arterial occlusive disease     I77.9     Active  038059860

 

 Problem  Primary osteoarthritis of both knees     M17.0     Active  294922384

 

 Problem  Anxiety disorder, unspecified     F41.9     Active  729120413

 

 Problem  Chronic renal insufficiency, stage II (mild)     N18.2     Active  
154415169

 

 Problem  Diabetic polyneuropathy associated with type 2 diabetes mellitus     
E11.42     Active  05611486

 

 Problem  Type 2 diabetes mellitus with diabetic neuropathy, without long-term 
current use of insulin     E11.40     Active  68990642

 

 Problem  COPD mixed type     J44.9     Active  45745154

 

 Problem  Thrombocytopenia     D69.6     Active  132163234

 

 Problem  Effusion, left knee     M25.462     Active  141712622

 

 Problem  Major depressive disorder, single episode, unspecified     F32.9     
Active  69226400

 

 Problem  Essential (primary) hypertension     I10     Active  01685539

 

 Problem  Lumbar degenerative disc disease     M51.36     Active  97321995

 

 Problem  Long term current use of insulin     Z79.4     Active  460071274

 

 Problem  Other chronic pain     G89.29     Active  12857466

 

 Problem  Chronic obstructive pulmonary disease, unspecified COPD type     
J44.9     Active  06264231

 

 Problem  Unilateral primary osteoarthritis, left knee     M17.12     Active  
688678235

 

 Problem  Hypothyroidism (acquired)     E03.9     Active  000880269

 

 Problem  Type 2 diabetes mellitus with hyperglycemia     E11.65     Active  
35216572

 

 Problem  Old tear of lateral meniscus of left knee, unspecified tear type     
M23.201     Active  505101648

 

 Problem  COPD with exacerbation     J44.1     Active  358594022

 

 Problem  Mild episode of recurrent major depressive disorder     F33.0     
Active  502556378

 

 Problem  Hyperlipidemia LDL goal <130     E78.5     Active  25258286

 

 Problem  Dysphagia, unspecified type     R13.10     Active  34044967







ALLERGIES

No Information



ENCOUNTERS







 Encounter  Location  Date  Diagnosis

 

 [address]  [address] [place] [address]  07 Dec, 2018  Primary 
generalized (osteo)arthritis M15.0

 

 [address] [address]  07 Dec, 2018   

 

 [facility]  [address] [place], [place] [address] [place] [address]     

 

 [address] [address]  18 Oct, 2018  Type 2 
diabetes mellitus without complications E11.9 ; Primary generalized (osteo)
arthritis M15.0 ; Chronic obstructive pulmonary disease, unspecified COPD type 
J44.9 ; Chronic renal insufficiency, stage II (mild) N18.2 ; Hyperlipidemia LDL 
goal <130 E78.5 ; Hypothyroidism (acquired) E03.9 and Thrombocytopenia D69.6

 

 [address] [address]  17 Oct, 2018   

 

 [address] [place] [address]-
5[address]  12 Oct, 2018   

 

 [address] [address]  10 Oct, 2018   

 

 [address] [address]  21 May, 2018  Pain in 
left knee M25.562

 

 [address] [address]  21 May, 2018  Pain in 
left knee M25.562 and Other chronic pain G89.29

 

 [address] [place] [address]  15 May, 2018   

 

 [facility] [address]  02 May, 2018  Type 2 
diabetes mellitus without complications E11.9 ; Long term current use of 
insulin Z79.4 ; Unilateral primary osteoarthritis, left knee M17.12 ; Lumbar 
degenerative disc disease M51.36 and Chronic obstructive pulmonary disease, 
unspecified COPD type J44.9

 

 [facility] [address]     

 

 [facility] [address]     

 

 [facility] [address]     

 

 [facility] [place]  [address] [place] [address]  23 Mar, 2018  Type 2 
diabetes mellitus without complications E11.9

 

 [facility] [address]  23 Mar, 2018   

 

 [facility]  [address] [place], [place] [address], [place] [address]  13 Mar, 2018   

 

 [facility]  [id number], [place] [address]  09 Mar, 2018  Type 2 diabetes mellitus without complications E11.9

 

 [facility] [place]  [address] [place] [address]     

 

 [facility] [place]  [address] [place] [address]    Diabetic 
polyneuropathy associated with type 2 diabetes mellitus E11.42

 

 [facility] [place]  [address] [place] [address]     

 

 [facility] [place]  [address] [place] [address]  21 Dec, 2017  Type 2 
diabetes mellitus without complications E11.9 ; Long term current use of 
insulin Z79.4 ; High risk sexual behavior Z72.51 ; BMI 40.0-44.9, adult Z68.41 
and Encounter for immunization Z23

 

 [facility] [place]  [address] [place] [address]  04 Dec, 2017   

 

 [facility] [place]  [address] [place] [address]  04 Dec, 2017   

 

 [facility]  [address] [place] [address]  17 2017  COPD with 
exacerbation J44.1 and BMI 40.0-44.9, adult Z68.41

 

 [facility] [place]  [address] [place] [address]  09 Oct, 2017  Encounter 
for screening mammogram for malignant neoplasm of breast Z12.31 ; Well woman 
exam Z01.419 and High risk sexual behavior Z72.51

 

 [facility] [place]  [address] [place] [address]  11 Sep, 2017   

 

 [facility] [place]  [address] [place] [address]  11 Sep, 2017  Type 2 
diabetes mellitus without complications E11.9

 

 [facility] [place]  [address] [place] [address]  11 Sep, 2017   

 

 [facility] [place]  [address] [place] [address]  11 Sep, 2017  Old tear 
of lateral meniscus of left knee, unspecified tear type M23.201

 

 [facility] [place]  [address] [place] [address]  11 Sep, 2017  Other type 
of osteoarthritis, unspecified site M19.90 ; Type 2 diabetes mellitus without 
complications E11.9 ; Primary osteoarthritis of both knees M17.0 ; Chronic 
renal insufficiency, stage II (mild) N18.2 ; Diabetic polyneuropathy associated 
with type 2 diabetes mellitus E11.42 and Exposure to hepatitis C Z20.5

 

 [facility] [place]  [address] [place] [address]  06 Sep, 2017  Type 2 
diabetes mellitus without complications E11.9

 

 [facility] [place]  [address] [place] [address]  30 Aug, 2017   

 

 [facility] [place]  [address] [place] [address]  30 Aug, 2017   

 

 [facility] [place]  [address] [place] [address]  16 Aug, 2017  Other type 
of osteoarthritis, unspecified site M19.90

 

 [facility] [place]  [address] [place] [address]  15 Aug, 2017  Type 2 
diabetes mellitus without complications E11.9

 

 [facility] [address]  07 Aug, 2017   

 

 [facility] [place]  [address] [place] [address]     

 

 [facility] [place]  [address] [place] [address]     

 

 [facility] [place]  [address] [place] [address]    Type 2 
diabetes mellitus without complications E11.9 ; Type 2 diabetes mellitus with 
diabetic neuropathy, without long-term current use of insulin E11.40 ; Primary 
osteoarthritis of both knees M17.0 and Chronic renal insufficiency, stage II (
mild) N18.2

 

 [facility] [place]  [address] [place] [address]     

 

 [facility] [place]  [address] [place] [address]  30 May, 2017  
Hyperlipidemia LDL goal <130 E78.5

 

 [facility] [place]  [address] [place] [address]  19 May, 2017  Type 2 
diabetes mellitus without complications E11.9

 

 [facility]  [address] [facility] [id number] [place] [address]  16 May, 2017   

 

 [facility] [place]  [address] [place] [address]  08 May, 2017   

 

 [facility] [place]  [address] [place] [address]  01 May, 2017   

 

 [facility] [place]  [address] [place] [address]     

 

 [facility] [place]  [address] [place] [address]    Primary 
generalized (osteo)arthritis M15.0 and Type 2 diabetes mellitus without 
complications E11.9

 

 [facility] [place]  [address] [place] [address]    Old tear 
of lateral meniscus of left knee, unspecified tear type M23.201

 

 [facility] [address]     

 

 [facility] [address]     

 

 [facility] [address]     

 

 [facility] [place]  [address] [place] [address]  20 Mar, 2017  Type 2 
diabetes mellitus without complications E11.9

 

 [facility] [address]  13 Mar, 2017  Type 2 
diabetes mellitus without complications E11.9

 

 [facility] [place]  [address] [place] [address]  13 Mar, 2017  Type 2 
diabetes mellitus without complications E11.9

 

 [facility] [place]  [address] [place] [address]  04 Mar, 2017  Type 2 
diabetes mellitus without complications E11.9

 

 [facility] [place]  [address] [place] [address]     

 

 [facility] [place]  [address] [place] [address]    Chronic 
obstructive pulmonary disease with acute exacerbation J44.1

 

 [facility]  [address] [place] [address]     

 

 [facility] [place]  [address] [place] [address]    Dysuria 
R30.0 ; Essential (primary) hypertension I10 ; Hypothyroidism (acquired) E03.9 
; Type 2 diabetes mellitus without complications E11.9 and Mild episode of 
recurrent major depressive disorder F33.0

 

 [place] [address]     

 

 [facility] [place]  [address] [place] [address]    Dysuria 
R30.0 ; Vaginal candidiasis B37.3 and Candidiasis B37.9

 

 [facility] [place]  [address] [place] [address]     

 

 [facility] [place]  [address] [place] [address]  10 Octaviano, 2017  COPD with 
exacerbation J44.1 and Dysphagia, unspecified type R13.10

 

 [facility] [place]  [address] [place] [address]  27 Dec, 2016   

 

 [facility] [place]  [address] [place] [address]  12 Dec, 2016  Essential (
primary) hypertension I10 ; Hypothyroidism (acquired) E03.9 ; Type 2 diabetes 
mellitus without complications E11.9 ; Primary generalized (osteo)arthritis 
M15.0 ; Major depressive disorder, single episode, unspecified F32.9 ; 
Unilateral primary osteoarthritis, left knee M17.12 ; Hyperlipidemia LDL goal <
130 E78.5 and Dysphagia, unspecified type R13.10

 

 [facility] [place]  [address] [place] [address]  01 Dec, 2016   

 

 [facility] [place]  [address] [place] [address]     

 

 [facility] [place]  [address] [place] [address]     

 

 [facility] [place]  [address] [place] [address]     

 

 [facility] [place]  [address] [place] [address]     

 

 [facility] [place]  [address] [place] [address]     

 

 [facility] [place]  [address] [place] [address]    Type 2 
diabetes mellitus without complications E11.9 ; Primary generalized (osteo)
arthritis M15.0 ; Effusion, left knee M25.462 ; Old tear of lateral meniscus of 
left knee, unspecified tear type M23.201 and Fatigue, unspecified type R53.83

 

 [facility] [place]  [address] [place] [address]  26 Sep, 2016  Type 2 
diabetes mellitus without complications E11.9 ; Claudication I73.9 ; Pain in 
right leg M79.604 and Pain of left leg M79.605

 

 [facility]  [address] [place] [address]  14 Sep, 2016   

 

 [facility] [place] [address]  12 Sep, 2016   

 

 [facility]  [address] [place] [address]  18 Aug, 2016  Type 2 
diabetes mellitus with hyperglycemia E11.65 ; Long term current use of insulin 
Z79.4 ; Anxiety disorder, unspecified F41.9 ; Essential (primary) hypertension 
I10 ; Major depressive disorder, single episode, unspecified F32.9 and 
Peripheral arterial occlusive disease I77.9

 

 [facility] [place] [address]  17 Aug, 2016   

 

 [facility] [place]  [address] [place] [address]  11 Aug, 2016   

 

 [facility]  [address] [place] [address]  11 Aug, 2016   

 

 [facility]  [address] [place] [address]  10 Aug, 2016   

 

 [facility]  [address] [place] [address]  05 Aug, 2016  Acute 
midline low back pain with right-sided sciatica M54.41

 

 [facility]  [address] [facility] [id number] [place] [address]  05 Aug, 2016  Chest pain, unspecified type R07.9 ; Palpitations R00.2 ; 
Claudication I73.9 ; Generalized pain R52 and Type 2 diabetes mellitus without 
complications E11.9

 

 [facility] [place] [address]    Acute 
midline low back pain with right-sided sciatica M54.41 ; Dysuria R30.0 ; 
Claudication I73.9 ; Peripheral arterial occlusive disease I77.9 ; Shortness of 
breath R06.02 ; Effusion, left knee M25.462 and Type 2 diabetes mellitus 
without complications E11.9

 

 [facility]  [address] [place] [address]     

 

 [facility]  [address] [place] [address]     

 

 [facility]  [address] [place] [address]     

 

 [facility] [place]  [address] [place] [address]    Type 2 
diabetes mellitus without complications E11.9 ; Other hyperlipidemia E78.4 ; 
Peripheral arterial occlusive disease I77.9 ; Essential (primary) hypertension 
I10 and Other fatigue R53.83

 

 [facility] [place] [address]  18 May, 2016   

 

 [facility] [place]  [address] [place] [address]  06 May, 2016   

 

 [facility] [place]  [address] [place] [address]  05 May, 2016  Chest pain
, unspecified type R07.9 ; Peripheral arterial occlusive disease I77.9 ; 
Anxiety disorder, unspecified F41.9 ; Essential (primary) hypertension I10 ; 
Type 2 diabetes mellitus without complications E11.9 and Other hyperlipidemia 
E78.4

 

 [facility] [address]  04 May, 2016   

 

 [facility] [place]  [address] [place] [address]     

 

 [facility] [place]  [address] [place] [address]     

 

 [facility] [place]  [address] [place] [address]    Type 2 
diabetes mellitus without complications E11.9 ; Peripheral arterial occlusive 
disease I77.9 ; Primary generalized (osteo)arthritis M15.0 ; Major depressive 
disorder, single episode, unspecified F32.9 ; Anxiety disorder, unspecified 
F41.9 and Essential (primary) hypertension I10

 

 [facility] [address]     

 

 [facility] [place]  [address] [place] [address]     

 

 [facility]  [address] [facility] [id number] [place] [address]-
[address]  02 Mar, 2016   

 

 [facility] [place]  [address] [place] [address]     

 

 [facility] [address]    Bronchitis 
J40 ; Shortness of breath R06.02 and Wheezing R06.2

 

 [facility] [place]  [address] [place] [address]     

 

 [facility] [place]  [address] [place] [address]     

 

 [facility] [place]  [address] [place] [address]     

 

 [facility] [place]  [address] [place] [address]     

 

 [facility] [place]  [address] [place] [address]    Type 2 
diabetes mellitus without complications E11.9 ; Claudication I73.9 ; Other 
hyperlipidemia E78.4 ; Cataracts, bilateral H26.9 and Other fatigue R53.83

 

 [facility] [place]  [address] [place] [address]  28 Oct, 2015   

 

 [facility] [place]  [address] [place] [address]  22 Oct, 2015   

 

 [facility] [place]  [address] [place] [address]  16 Oct, 2015   

 

 [facility] [place]  [address] [place] [address]  14 Oct, 2015  Type 2 
diabetes mellitus without complications E11.9 ; Other hyperlipidemia E78.4 ; 
Primary generalized (osteo)arthritis M15.0 and Claudication I73.9

 

 [facility] [place]  [address] [place] [address]  12 Oct, 2015   

 

 [facility] [place]  [address] [place] [address]  26 Aug, 2015   

 

 [facility] [place]  [address] [place] [address]  12 Aug, 2015   

 

 [facility] [place]  [address] [place] [address]  12 Aug, 2015   

 

 [facility] [place]  [address] [place] [address]  10 Aug, 2015   

 

 [facility] [place] [place] [address]  05 Aug, 2015   

 

 [facility] [place]  [address] [place] [address]  11 May, 2015  Diabetes 
mellitus without mention of complication, type II or unspecified type, not 
stated as uncontrolled 250.00 ; Bronchitis 490 and Effusion of lower leg joint 
719.06

 

 [facility] [address]  01 May, 2015   

 

 [facility] [place]  [address] [place] [address]  01 May, 2015  Bronchitis 
490 and Wheezing 786.07

 

 [facility]  [address] [place], [place] [address] [place] [address]-
[address]     

 

 [facility]  [address] [place], [place] [address] [place] [address]-
9[address]     

 

 [facility]  [address] [place], [place] [address] [place] [address]-
[address]     

 

 [facility] [place] [address]  19 Mar, 2015   

 

 [facility]  [address] [place], [place] [address] [place] [address]-
3[address]  19 Mar, 2015   

 

 [facility] [place]  [address] [place] [address]  09 Mar, 2015   

 

 [facility]  [address] [place], [place] [address] [place] [address]-
[address]  09 Mar, 2015   

 

 [facility] [place] [address]     

 

 [facility]  [address] [place], [place] [address] [place] [address]-
[address]     

 

 [facility]  [address] [place], [place] [address] [place] [address]-
3818  10 Feb, 2015   

 

 [facility] [place] [address]     

 

 [facility] [place]  [address] [place] [address]  ,    

 

 [facility]  [address], [place] [address]  ,    

 

 [facility]  [address]100[place], [place] [address]67  ,    

 

 [facility] [place] [address]     

 

 [facility]  [address] [place] [address]-
4864     

 

 [facility] [place] [address]     

 

 [facility]  [address] [place] [address]-
5278     

 

 [facility] [place] [address]  24 Dec, 2014   

 

 [facility]  [address] [place] [address]-
2420  24 Dec, 2014   

 

 [facility] [place] [address]  18 Dec, 2014   

 

 [facility]  [address]100[place], [place] [address]-
7477  18 Dec, 2014   

 

 [facility] [place] [address]  09 Dec, 2014   

 

 [facility]  [address], [place] [address]  09 Dec, 2014   

 

 [facility] [place] [address]  02 Dec, 2014   

 

 [facility]  [address] [place] [address]-
9444  02 Dec, 2014   

 

 [facility] [place] [address]     

 

 [facility]  [address]100[place], [place] [address]-
[address]164     

 

 [facility] [place] [facility] [place] [place] [address]     

 

 [facility]  [address] [facility] [id number], [place] [address]
1253     

 

 [facility] [place] [facility] [place] [address]  31 Oct, 2014   

 

 [facility]  [id number], [place] [address]
5820  31 Oct, 2014   

 

 [facility] [place] [facility] [place] [address]  16 Oct, 2014   

 

 [facility]  [id number], [place] [address]-
[address]39  16 Oct, 2014   

 

 [facility] [place] [facility] [place] [address]  10 Oct, 2014   

 

 [facility]  [id number], [place] [address]
2616  10 Oct, 2014   

 

 [facility] [place] [facility] [place] [address]  26 Sep, 2014   

 

 [facility]  [id number], [place] [address]-
0950  26 Sep, 2014   

 

 [facility] [place] [facility] [place] [address]  29 Aug, 2014   

 

 [facility]  [id number], [place] [address]-
1438  29 Aug, 2014   

 

 [facility] [place] [facility] [place] [address]  25 Aug, 2014   

 

 [facility] [place] [facility] [place] [address]  25 Aug, 2014   

 

 [facility]  [address] [facility] [id number], [place] [address]-
9896  25 Aug, 2014   

 

 [facility]  [id number] [place] [address]-
8988  25 Aug, 2014   

 

 [facility] [place] [facility] [place] [address]  18 Aug, 2014   

 

 [facility]  [address] [facility] [id number], [place] [address]-
5383  18 Aug, 2014   

 

 [facility] [place]  [address] [place] [address]  14 Aug, 2014   

 

 [facility]  [address] [facility] [id number] [address]-
4181  14 Aug, 2014   

 

 [facility]  [address] [facility] [id number], [place] [address]-
7288  11 Aug, 2014   

 

 [facility] [place]  [address] [place] [facility] [place] [address]  11 Aug, 2014   

 

 [facility]  [address] [facility] [id number], [place] [address]-
8969  11 Aug, 2014   

 

 [facility]  [address] [facility] [id number], [place] [address]-
6075  11 Aug, 2014   

 

 [facility] [facility]  [address] [id number], [place] [address]     

 

 [facility]  [address] [place] [address]     

 

 [facility]  [address] [facility] [id number] [address]     

 

 [facility]  [address] [id number], [place] [address]     

 

 [facility] [place]  [address] [place] [address]     

 

 [facility]  [address] [id number], [place] [address]-
[address]     

 

 [facility] [place]  [address] [place] [address]  30 May, 2014   

 

 [facility]  [address] [id number], [place] [address]-
[address]  30 May, 2014   

 

 [facility] [place]  [address] [place] [address]  14 May, 2014   

 

 [facility]  [address] [id number], [place] [address]  14 May, 2014   

 

 [facility] [place] [address]  05 May, 2014   

 

 [facility]  [id number], [place] [address]  05 May, 2014   

 

 [facility] [place] [address]     

 

 [facility]  [id number], [place] [address]     

 

 [facility] [place] [address]     

 

 [facility]  [id number], [place] [address]-
[address]73     

 

 [facility] [place] [address]  10 Feb, 2014   

 

 [address] [id number], [place] [address]-
7103  10 Feb, 2014   







IMMUNIZATIONS

No Known Immunizations



SOCIAL HISTORY

Never Assessed



REASON FOR VISIT

controlled refill request



PLAN OF CARE





VITAL SIGNS





MEDICATIONS







 Medication  Instructions  Dosage  Frequency  Start Date  End Date  Duration  
Status

 

 Lyrica 75MG  Orally twice a day  1 capsule  12h           Active







RESULTS

No Results



PROCEDURES

No Known procedures



INSTRUCTIONS





MEDICATIONS ADMINISTERED

No Known Medications



MEDICAL (GENERAL) HISTORY







 Type  Description  Date

 

 Medical History  Diabetes mellitus without mention of complication, type II or 
unspecified type, not stated as uncontrolled   

 

 Medical History  Depressive disorder, not elsewhere classified   

 

 Medical History  Anxiety state, unspecified   

 

 Medical History  Effusion of lower leg joint   

 

 Medical History  Generalized osteoarthrosis, unspecified site   

 

 Medical History  Other and unspecified hyperlipidemia   

 

 Medical History  Abnormal weight gain   

 

 Medical History  Effusion of joint, site unspecified   

 

 Medical History  Esophageal reflux   

 

 Medical History  hypertension   

 

 Medical History  colonoscopy- inscreased polyp   

 

 Surgical History  Tonsillectomy   

 

 Surgical History  Orthopedic: Bilateral Knee x2   

 

 Surgical History  colonoscopy  2017

 

 Hospitalization History  Surgery(s)/Childbirth(s) only

## 2019-02-26 NOTE — XMS REPORT
[facility]

 Created on: 2018



Duyen Larkin

External Reference #: [id number]

: 1965

Sex: Female



Demographics







 Address  [address] [place]  [address]

 

 Preferred Language  Unknown

 

 Marital Status  Unknown

 

 Anglican Affiliation  Unknown

 

 Race  Unknown

 

 Ethnic Group  Unknown





Author







 Author  LIYAH LOWE

 

 [facility]

 

 Address  [address] [place]  [address]



 

 Phone  [phone]







Care Team Providers







 Care Team Member Name  Role  Phone

 

 LIYAH LOWE  Unavailable  [phone]







PROBLEMS







 Type  Condition  ICD9-CM Code  FUE33-WR Code  Onset Dates  Condition Status  
SNOMED Code

 

 Problem  Effusion of lower leg joint  719.06        Active  932109919

 

 Problem  Esophageal reflux  530.81        Active  928778321

 

 Problem  Dysphagia, unspecified type     R13.10     Active  97216451

 

 Problem  Other hyperlipidemia     E78.4     Active  96263556

 

 Problem  Unilateral primary osteoarthritis, left knee     M17.12     Active  
863664163

 

 Problem  Type 2 diabetes mellitus without complications     E11.9     Active  
108077035

 

 Problem  Hypothyroidism (acquired)     E03.9     Active  865615825

 

 Problem  Mild episode of recurrent major depressive disorder     F33.0     
Active  951908525

 

 Problem  COPD with exacerbation     J44.1     Active  777911151

 

 Problem  Long term current use of insulin     Z79.4     Active  478390951

 

 Problem  Diabetic polyneuropathy associated with type 2 diabetes mellitus     
E11.42     Active  50934390

 

 Problem  Cataracts, bilateral     H26.9     Active  97245585

 

 Problem  Claudication     I73.9     Active  210315996

 

 Problem  Primary generalized (osteo)arthritis     M15.0     Active  988495187

 

 Problem  Chronic renal insufficiency, stage II (mild)     N18.2     Active  
601580330

 

 Problem  Chronic obstructive pulmonary disease with acute exacerbation     
J44.1     Active  166442407

 

 Problem  Primary osteoarthritis of both knees     M17.0     Active  667760866

 

 Problem  Type 2 diabetes mellitus with diabetic neuropathy, without long-term 
current use of insulin     E11.40     Active  94426357

 

 Problem  Major depressive disorder, single episode, unspecified     F32.9     
Active  20028724

 

 Problem  Anxiety disorder, unspecified     F41.9     Active  568004568

 

 Problem  Essential (primary) hypertension     I10     Active  32227965

 

 Problem  Peripheral arterial occlusive disease     I77.9     Active  156496645

 

 Problem  Old tear of lateral meniscus of left knee, unspecified tear type     
M23.201     Active  719016420

 

 Problem  Hyperlipidemia LDL goal <130     E78.5     Active  77067802

 

 Problem  Effusion, left knee     M25.462     Active  724466842

 

 Problem  Type 2 diabetes mellitus with hyperglycemia     E11.65     Active  
07442963







ALLERGIES

No Information



ENCOUNTERS







 Encounter  Location  Date  Diagnosis

 

 [facility] [place]  [address] [facility] [address] [id number] [place] [id number]  02 May, 
2018   

 

 [facility] [place]  [address] [facility] [address] [id number] [place] [id number]     

 

 [facility] [place]  [address] [facility] [address] [id number] [place] [id number]     

 

 [facility] [place]  [address] [facility] [address] [id number] [place] [id number]     

 

 [facility] [place]  [address] [facility] [address] [id number] [place] [id number]  23 Mar, 
2018  Type 2 diabetes mellitus without complications E11.9

 

 [facility] [place]  [address] [place] [address] [address] [id number] [place] [id number]  23 Mar, 
2018   

 

 [facility]  [address] [facility] [id number] [place] [address]  13 Mar, 2018   

 

 [facility]  [address] [facility] [id number] [place] [address]  09 Mar, 2018  Type 2 diabetes mellitus without complications E11.9

 

 [facility] [place]  [address] [place] [address] [address] [id number] [place] [id number]     

 

 [facility] [place]  [address] [facility] [address] [id number] [place] [id number]    Diabetic polyneuropathy associated with type 2 diabetes mellitus E11.42

 

 [facility] [place]  [address] [facility] [address] [id number] [place] [address]     

 

 [facility] [place]  [address] [facility] [address] [id number] [place] [id number]  21 Dec, 
2017  Type 2 diabetes mellitus without complications E11.9 ; Long term current 
use of insulin Z79.4 ; High risk sexual behavior Z72.51 ; BMI 40.0-44.9, adult 
Z68.41 and Encounter for immunization Z23

 

 [facility] [place]  [address] [facility] [address] [id number] [place] [id number]  04 Dec, 
2017   

 

 [facility] [place]  [address] [facility] [address] [id number] [place] [address]  04 Dec, 
2017   

 

 [facility] [place]  [address] [facility] [address] [id number] [place] [id number]    COPD with exacerbation J44.1 and BMI 40.0-44.9, adult Z68.41

 

 [facility] [place]  [address] [facility] [address] [id number] [place] [id number]  09 Oct, 
2017  Encounter for screening mammogram for malignant neoplasm of breast Z12.31 
; Well woman exam Z01.419 and High risk sexual behavior Z72.51

 

 [facility] [place]  [address] [place] [address] [address] [id number] [place] [id number]  11 Sep, 
2017   

 

 [facility] [place]  [address] [place] [address] C [id number] [place] [id number]  11 Sep, 
2017  Type 2 diabetes mellitus without complications E11.9

 

 [facility] [place]  [address] [place] [address] C [id number] [place] [id number]  11 Sep, 
2017   

 

 [facility] [place]  [address] [place] [address] C [id number] [place] [id number]  11 Sep, 
2017  Old tear of lateral meniscus of left knee, unspecified tear type M23.201

 

 [facility] [place]  [address] [place] [address] C [id number] [place] [id number]  11 Sep, 
2017  Other type of osteoarthritis, unspecified site M19.90 ; Type 2 diabetes 
mellitus without complications E11.9 ; Primary osteoarthritis of both knees 
M17.0 ; Chronic renal insufficiency, stage II (mild) N18.2 ; Diabetic 
polyneuropathy associated with type 2 diabetes mellitus E11.42 and Exposure to 
hepatitis C Z20.5

 

 [facility] [place]  [address] [place] [address] [address] [id number] [place] [address]59[address]68  06 Sep, 
2017  Type 2 diabetes mellitus without complications E11.9

 

 [facility] [place]  [address] [facility] [address] [id number] [place] [id number]  30 Aug, 
2017   

 

 [facility] [place]  [address] [facility] [address] [id number] [place] 8[address]  30 Aug, 
2017   

 

 [facility] [place]  [address] [place] [address] C [id number] [place] [id number]  16 Aug, 
2017  Other type of osteoarthritis, unspecified site M19.90

 

 [facility] [place]  [address] [facility] [address] [id number] [place] [address]  15 Aug, 
2017  Type 2 diabetes mellitus without complications E11.9

 

 [facility] [place]  [address] [facility] [address] [id number] [place] [id number]  07 Aug, 
2017   

 

 [facility] [place]  [address] [facility] [address] [id number] [place] [address]86     

 

 [facility] [place]  [address] [facility] [address] [id number] [place] [id number]     

 

 [facility] [place]  [address] [place] [address] [address] [id number] [place], [place] [phone]    Type 2 diabetes mellitus without complications E11.9 ; Type 2 diabetes 
mellitus with diabetic neuropathy, without long-term current use of insulin 
E11.40 ; Primary osteoarthritis of both knees M17.0 and Chronic renal 
insufficiency, stage II (mild) N18.2

 

 [facility] [place]  [address] [place] [address] [address] [id number] [place] [id number]     

 

 [facility] [place]  [address] [place] [address] [address] [id number] [place] [id number]  30 May, 
2017  Hyperlipidemia LDL goal <130 E78.5

 

 [facility] [place]  [address] [place] [address] [address] [id number] [place] [id number]  19 May, 
2017  Type 2 diabetes mellitus without complications E11.9

 

 [facility]  [address] [facility] [id number] [place] [address]  16 May, 2017   

 

 [facility] [place]  [address] [facility] [address] [id number] [place] [id number]  08 May, 
2017   

 

 [facility] [place]  [address] [facility] [address] [id number] [place] [address]  01 May, 
2017   

 

 [facility] [place]  [address] [place] [address] [address] [id number] [place] 2723[address]     

 

 [facility] [place]  [address] [facility] [address] [id number] [place] [address]    Primary generalized (osteo)arthritis M15.0 and Type 2 diabetes mellitus 
without complications E11.9

 

 [facility] [place]  [address] [facility] [address] [id number] [place] 930[address]    Old tear of lateral meniscus of left knee, unspecified tear type M23.201

 

 [facility] [place]  [address] [facility] [address] [id number] [place] [address]     

 

 [facility] [place]  [address] [facility] [address] [id number] [place] [address]     

 

 [facility] [place]  [address] [facility] [address] [id number] [place] [address]     

 

 [facility] [place]  [address] [facility] [address] [id number] [place] [id number]  20 Mar, 
2017  Type 2 diabetes mellitus without complications E11.9

 

 [facility] [place]  [address] [place] [address] [address] [id number] [place] [id number]  13 Mar, 
2017  Type 2 diabetes mellitus without complications E11.9

 

 [facility] [place]  [address] [place] [address] [address] [id number] [place] [id number]  13 Mar, 
2017  Type 2 diabetes mellitus without complications E11.9

 

 [facility] [place]  [address] [place] [address] [address] [id number] [place], [place] [id number]  04 Mar, 
2017  Type 2 diabetes mellitus without complications E11.9

 

 [facility] [place]  [address] [place] [address] [address] [id number] [place] [id number]     

 

 [facility] [place]  [address] [facility] [address] [id number] [place] [id number]    Chronic obstructive pulmonary disease with acute exacerbation J44.1

 

 [facility] [place]  [address] [place] [address] [address] [id number] [place] 5657[address]234     

 

 [facility] [place]  [address] [place] [address] [address] [id number] [place] [id number]    Dysuria R30.0 ; Essential (primary) hypertension I10 ; Hypothyroidism (
acquired) E03.9 ; Type 2 diabetes mellitus without complications E11.9 and Mild 
episode of recurrent major depressive disorder F33.0

 

 [facility] [place]  [address] [facility] [address] [id number] [place] [id number]     

 

 [facility] [place]  [address] [facility] [address] [id number] [place] [id number]    Dysuria R30.0 ; Vaginal candidiasis B37.3 and Candidiasis B37.9

 

 [facility] [place]  [address] [facility] [address] [id number] [place] [address]167[address]     

 

 [facility] [place]  [address] [facility] [address] [id number] [place] [id number]  10 Octaviano, 
2017  COPD with exacerbation J44.1 and Dysphagia, unspecified type R13.10

 

 [facility] [place]  [address] [facility] [address] [id number] [place] 8284[address]674  27 Dec, 
2016   

 

 [facility] [place]  [address] [place] [address] [address] [id number] [place] [id number]  12 Dec, 
2016  Essential (primary) hypertension I10 ; Hypothyroidism (acquired) E03.9 ; 
Type 2 diabetes mellitus without complications E11.9 ; Primary generalized (
osteo)arthritis M15.0 ; Major depressive disorder, single episode, unspecified 
F32.9 ; Unilateral primary osteoarthritis, left knee M17.12 ; Hyperlipidemia 
LDL goal <130 E78.5 and Dysphagia, unspecified type R13.10

 

 [facility] [place]  [address] [place] [address] [address] [id number] [place] [id number]  01 Dec, 
2016   

 

 [facility] [place]  [address] [place] [address] [address] [id number] [place] [id number]     

 

 [facility] [place]  [address] [facility] [address] [id number] [place] 598224698     

 

 [facility] [place]  [address] [place] [address] [address] [id number] [place] [id number]     

 

 [facility] [place]  [address] [place] [address] [address] [id number] [place] [id number]     

 

 [facility] [place]  [address] [facility] [address] [id number] [place] 123359344     

 

 [facility] [place]  [address] [facility] [address] [id number] [place], [place] 6[address]    Type 2 diabetes mellitus without complications E11.9 ; Primary 
generalized (osteo)arthritis M15.0 ; Effusion, left knee M25.462 ; Old tear of 
lateral meniscus of left knee, unspecified tear type M23.201 and Fatigue, 
unspecified type R53.83

 

 [facility] [place]  [address] [place] [address] [id number] [place], [place] [id number]  26 Sep, 
2016  Type 2 diabetes mellitus without complications E11.9 ; Claudication I73.9 
; Pain in right leg M79.604 and Pain of left leg M79.605

 

 [facility] [place]  [address] [place] [address] C [id number] [place], [place] [address]  14 Sep, 
2016   

 

 [facility] [place]  [address] [place] [address] [id number] [place], [place] 038860440  12 Sep, 
2016   

 

 [facility] [place]  [address] [place] [address] [id number] [place] 5436[address]38  18 Aug, 
2016  Type 2 diabetes mellitus with hyperglycemia E11.65 ; Long term current 
use of insulin Z79.4 ; Anxiety disorder, unspecified F41.9 ; Essential (primary
) hypertension I10 ; Major depressive disorder, single episode, unspecified 
F32.9 and Peripheral arterial occlusive disease I77.9

 

 [facility] [place]  [address] [place] [address] [id number] [place] 020926650  17 Aug, 
2016   

 

 [facility] [place]  [address] [place] [address] C [id number] [place] [id number]  11 Aug, 
2016   

 

 [facility] [place]  [address] [place] [address] [id number] [place], [place] [id number]  11 Aug, 
2016   

 

 [facility] [place]  [address] [place] [address] [id number] [place], [place] [id number]  10 Aug, 
2016   

 

 [facility] [place]  [address] [place] [address] [id number] [place] [id number]  05 Aug, 
2016  Acute midline low back pain with right-sided sciatica M54.41

 

 [facility]  [address] [facility] [id number] [place] [phone]-
[address]  05 Aug, 2016  Chest pain, unspecified type R07.9 ; Palpitations R00.2 ; 
Claudication I73.9 ; Generalized pain R52 and Type 2 diabetes mellitus without 
complications E11.9

 

 [facility] [place]  [address] [place] [address] [id number] [place], [place] [id number]    Acute midline low back pain with right-sided sciatica M54.41 ; Dysuria 
R30.0 ; Claudication I73.9 ; Peripheral arterial occlusive disease I77.9 ; 
Shortness of breath R06.02 ; Effusion, left knee M25.462 and Type 2 diabetes 
mellitus without complications E11.9

 

 [facility] [place]  [address] [facility] [address] [id number] [place] [id number]     

 

 [facility] [place]  [address] [facility] [address] [id number] [place] 823442410     

 

 [facility] [place]  [address] [facility] [address] [id number] [place], [place] 439062722     

 

 [facility] [place]  [address] [facility] [address] [id number] [place] [id number]    Type 2 diabetes mellitus without complications E11.9 ; Other 
hyperlipidemia E78.4 ; Peripheral arterial occlusive disease I77.9 ; Essential (
primary) hypertension I10 and Other fatigue R53.83

 

 [facility] [place]  [address] [facility] [address] [id number] [place] [id number]  18 May, 
2016   

 

 [facility] [place]  [address] [place] [address] [address] [id number] [place] 366671681  06 May, 
2016   

 

 [facility] [place]  [address] [place] [address] [address] [id number] [place], [place] [id number]  05 May, 
2016  Chest pain, unspecified type R07.9 ; Peripheral arterial occlusive 
disease I77.9 ; Anxiety disorder, unspecified F41.9 ; Essential (primary) 
hypertension I10 ; Type 2 diabetes mellitus without complications E11.9 and 
Other hyperlipidemia E78.4

 

 [facility] [place]  [address] [facility] [address] [id number] [place] [id number]  04 May, 
2016   

 

 [facility] [place]  [address] [facility] [address] [id number] [place] 605680529     

 

 [facility] [place]  [address] [facility] [address] [id number] [place] 416[address]4614     

 

 [facility] [place]  [address] [facility] [address] [id number] [place] [id number]    Type 2 diabetes mellitus without complications E11.9 ; Peripheral 
arterial occlusive disease I77.9 ; Primary generalized (osteo)arthritis M15.0 ; 
Major depressive disorder, single episode, unspecified F32.9 ; Anxiety disorder
, unspecified F41.9 and Essential (primary) hypertension I10

 

 [facility] [place]  [address] [place] [address] [address] [id number] [place] [id number]     

 

 [facility] [place]  [address] [place] [address] [address] [id number] [place] [id number]     

 

 [facility]  [address] [facility] [id number] [place] [address]-
1886  02 Mar, 2016   

 

 [facility] [place]  [address] [place] [address] [address] [id number] [place] 934435482     

 

 [facility] [place]  [address] [place] [address] [address] [id number] [place] [id number]    Bronchitis J40 ; Shortness of breath R06.02 and Wheezing R06.2

 

 [facility] [place]  [address] [place] [address] [address] [id number] [place] 924132316     

 

 [facility] [place]  [address] [place] [address] [address] [id number] [place] [id number]     

 

 [facility] [place]  [address] [place] [address] [address] [id number] [place] 897002434     

 

 [facility] [place]  [address] [place] [address] [address] [id number] [place] 501892952     

 

 [facility] [place]  [address] [place] [address] [address] [id number] [place] [id number]    Type 2 diabetes mellitus without complications E11.9 ; Claudication I73.9 
; Other hyperlipidemia E78.4 ; Cataracts, bilateral H26.9 and Other fatigue 
R53.83

 

 [facility] [place]  [address] [facility] [address] [id number] [place] 474268841  28 Oct, 
2015   

 

 [facility] [place]  [address] [place] [address] [address] [id number] [place] [id number]  22 Oct, 
2015   

 

 [facility] [place]  [address] [place] [address] [address] [id number] [place] [id number]  16 Oct, 
2015   

 

 [facility] [place]  [address] [place] [address] [address] [id number] [place] 055342781  14 Oct, 
2015  Type 2 diabetes mellitus without complications E11.9 ; Other 
hyperlipidemia E78.4 ; Primary generalized (osteo)arthritis M15.0 and 
Claudication I73.9

 

 [facility] [place]  [address] [facility] [address] [id number] [place] [id number]  12 Oct, 
2015   

 

 [facility] [place]  [address] [facility] [address] [id number] [place] [id number]  26 Aug, 
2015   

 

 [facility] [place]  [address] [facility] [address] [id number] [place] 247373549  12 Aug, 
2015   

 

 [facility] [place]  [address] [facility] [address] [id number] [place] 611775289  12 Aug, 
2015   

 

 [facility] [place]  [address] [facility] [address] [id number] [place] 549151269  10 Aug, 
2015   

 

 [facility] [place]  [address] [facility] [address] [id number] [place] [id number]  05 Aug, 
2015   

 

 [facility] [place]  [address] [place] [address] [address] [id number] [place] [id number]  11 May, 
2015  Diabetes mellitus without mention of complication, type II or unspecified 
type, not stated as uncontrolled 250.00 ; Bronchitis 490 and Effusion of lower 
leg joint 719.06

 

 [facility] [place]  [address] [place] [address] [address] [id number] [place] [id number]  01 May, 
2015   

 

 [facility] [place]  [address] [place] [address] C [id number] [place] 128826406  01 May, 
2015  Bronchitis 490 and Wheezing 786.07

 

 [place] [address] [id number] [address]-
[address]     

 

 [place] [address] [facility] [id number] [address]-
2546     

 

 [place] [address] [id number] [address]
2546     

 

 [facility]  [address] [place] [address] C [id number] [place], [place] [id number]  19 Mar, 
2015   

 

 [place] [address] [facility] [id number][place] [address]
2546  19 Mar, 2015   

 

 [facility] [place]  [address] [id number] [place] [id number]  09 Mar, 
2015   

 

 [facility]  [address] [facility] [id number] [facility] [place] [address]-
[address]  09 Mar, 2015   

 

 [facility] [place]  [address] [id number] [place] [id number]     

 

 [facility]  [address] [facility] [id number] [facility] [place] [address]-
[address]     

 

 [facility]  [address] [facility] [id number] [facility] [place] [address]-
[address]  10 Feb, 2015   

 

 [facility] [place]  [address] [id number] [place] [id number]     

 

 [facility] [place]  [address] [facility] [address] [id number] [place] 020514753     

 

 [facility] [place] [facility]  [address] [facility] [id number] [place] [address]-
[address]     

 

 [facility] [place] [facility]  [address] [facility] [id number] [facility] [place] [address]-
[address]     

 

 [facility] [place]  [address] [facility] [address] [id number] [place] [id number]     

 

 [facility] [place] [facility]  [address] [facility] [id number] [place] [address]-
8[address]     

 

 [facility] [place]  [address] [facility] [address] [id number] [place] [id number]     

 

 [facility] [place] [facility]  [address] [facility] [id number] [facility] [place] [address]-
[address]     

 

 [facility] [place]  [address] [id number] [place], [place] [id number]  24 Dec, 
2014   

 

 [facility]  [address] [facility] [id number] [place] [address]-
3226  24 Dec, 2014   

 

 [facility] [place]  [address] [facility] [address] [id number] [place] [id number]  18 Dec, 
2014   

 

 [facility] [place] [facility]  [address] [id number] [place] [address]-
[address]  18 Dec, 2014   

 

 [facility] [place]  [address] [facility] [address] [id number] [place] [id number]  09 Dec, 
2014   

 

 [facility] [place] [facility]  [address] [facility] [id number] [facility] [place] [address]-
8867  09 Dec, 2014   

 

 [facility] [place]  [address] [facility] [address] [id number] [place] [id number]  02 Dec, 
2014   

 

 [facility] [place] [facility]  [address] [id number] [place] [address]-
[address]  02 Dec, 2014   

 

 [facility] [place]  [address] [id number] [place] [id number]     

 

 [facility] [place] [facility]  [address] [id number] [facility] [place] [address]     

 

 [facility] [place]  [address] [id number] [place] [id number]     

 

 [facility]  [address] [id number] [facility] [place] [phone]     

 

 [facility] [place]  [address] [id number] [place] [id number]  31 Oct, 
2014   

 

 [facility] [place] [facility]  [address] [id number] [facility] [place] [phone]  31 Oct, 2014   

 

 [facility] [place]  [address] [id number] [place] [id number]  16 Oct, 
2014   

 

 [facility] [place] [facility]  [address] [id number] [place] [address]  16 Oct, 2014   

 

 [facility] [place]  [address] [facility] [address] [id number] [place] [id number]  10 Oct, 
2014   

 

 [facility] [place] [facility]  [address] [id number] [facility] [place] [address]  10 Oct, 2014   

 

 [facility] [place]  [address] [facility] [address] [id number] [place] [id number]  26 Sep, 
2014   

 

 [facility] [place] [facility]  [address] [id number] [place] [address]-
[address]  26 Sep, 2014   

 

 [facility] [place]  [address] [id number] [place] [id number]  29 Aug, 
2014   

 

 [facility] [place] [facility]  [address] [facility] [id number] [facility] [place] [address]  29 Aug, 2014   

 

 [facility] [place]  [address] [id number] [place] [id number]  25 Aug, 
2014   

 

 [facility] [place]  [address] [id number] [place] [id number]  25 Aug, 
2014   

 

 [facility] [place] [facility]  [address] [id number] [place] [address]-
79[address]  25 Aug, 2014   

 

 [facility] [place] [facility]  [address] [facility] [id number] [place] [address]-
1372  25 Aug, 2014   

 

 [facility] [place]  [address] [facility] [address] [id number] [place] [id number]  18 Aug, 
2014   

 

 [facility] [place] [facility]  [address] [id number] [place] [address]-
4637  18 Aug, 2014   

 

 [facility] [place]  [address] [facility] [address] [id number] [place] [id number]  14 Aug, 
2014   

 

 [facility] [place] [facility]  [address] [facility] [id number] [place] [address]-
3616  14 Aug, 2014   

 

 [facility] [place] [facility]  [address] [facility] [id number] [place] [address]-
9846  11 Aug, 2014   

 

 [facility] [place]  [address] [facility] [address] [id number] [place] [id number]  11 Aug, 
2014   

 

 [facility] [place] [facility]  [address] [facility] [id number] [place] [address]-
3357  11 Aug, 2014   

 

 [facility] [place] [facility]  [address] [facility] [id number] [place] [address]-
5620  11 Aug, 2014   

 

 [facility] [place] [facility]  [address] [facility] [id number] [place] [address]-
7078     

 

 [facility] [place]  [address] [facility] [address] [id number] [place] [id number]     

 

 [facility] [place] [facility]  [address] [id number] [facility] [place] [address]-
5453     

 

 [facility]  [address] [id number] [facility] [place] [address]-
5736     

 

 [facility] [place]  [address] [id number] [place] [id number]     

 

 [facility]  [address] [id number] [place] [address]     

 

 [facility] [place]  [address] [id number] [place] [id number]  30 May, 
2014   

 

 [facility]  [address] [id number] [place] [address]-
9056  30 May, 2014   

 

 [facility] [place]  [address] [id number] [place] [id number]  14 May, 
2014   

 

 [facility] [place] [facility]  [address] [id number] [place] [address]-
[address]30  14 May, 2014   

 

 [facility] [place]  [address] [id number] [place] [id number]  05 May, 
2014   

 

 [facility] [place] [facility]  [address] [id number] [place] [address]-
5[address]  05 May, 2014   

 

 [facility] [place]  [address] [id number] [place] [id number]     

 

 [facility] [place] [facility]  [address] [id number] [place] [address]-
8636     

 

 [facility] [place]  [address] [id number] [place] [id number]     

 

 [facility]  [address] [id number] [facility] [place] [address]-
4216     

 

 [facility] [place]  [address] [id number] [place] [id number]  10 Feb, 
2014   

 

 [facility]  [address] [facility] [id number] [place] [address]-
2206  10 Feb, 2014   







IMMUNIZATIONS

No Known Immunizations



SOCIAL HISTORY

Never Assessed



REASON FOR VISIT

PA for Lyrica



PLAN OF CARE





VITAL SIGNS





MEDICATIONS

Unknown Medications



RESULTS

No Results



PROCEDURES

No Known procedures



INSTRUCTIONS





MEDICATIONS ADMINISTERED

No Known Medications



MEDICAL (GENERAL) HISTORY







 Type  Description  Date

 

 Medical History  Diabetes mellitus without mention of complication, type II or 
unspecified type, not stated as uncontrolled   

 

 Medical History  Depressive disorder, not elsewhere classified   

 

 Medical History  Anxiety state, unspecified   

 

 Medical History  Effusion of lower leg joint   

 

 Medical History  Generalized osteoarthrosis, unspecified site   

 

 Medical History  Other and unspecified hyperlipidemia   

 

 Medical History  Abnormal weight gain   

 

 Medical History  Effusion of joint, site unspecified   

 

 Medical History  Esophageal reflux   

 

 Medical History  hypertension   

 

 Medical History  colonoscopy- inscreased polyp   

 

 Surgical History  Tonsillectomy   

 

 Surgical History  Orthopedic: Bilateral Knee x2   

 

 Surgical History  colonoscopy  

 

 Hospitalization History  Surgery(s)/Childbirth(s) only

## 2019-02-26 NOTE — XMS REPORT
Continuity of Care Document

 Created on: 2019



YELENA CHILDERS

External Reference #: [id number]

: 1965

Sex: Female



Demographics







 Address  [address] [place] [address]

 

 Home Phone  [phone] x

 

 Preferred Language  Unknown

 

 Marital Status  Unknown

 

 Confucianist Affiliation  Unknown

 

 Race  Unknown

 

 Ethnic Group  Unknown





Author







 Author  [facility]

 

 Address  Unknown

 

 Phone  Unavailable



              



Allergies

      





 Active            Description            Code            Type            
Severity            Reaction            Onset            Reported/Identified   
         Relationship to Patient            Clinical Status        

 

 Yes            Demerol                         Drug Allergy            N/A    
        N/A                         02/10/2014                                  

 

 Yes            gabapentin                         Drug Allergy            N/A 
           N/A                         02/10/2014                              
    

 

 Yes            lisinopril                         Drug Allergy            N/A 
           N/A                         02/10/2014                              
    

 

 Yes            gabapentin            [id number]            Drug Allergy       
     Moderate            DIZZYNESS, GI U                         2017    
                              

 

 Yes            lisinopril            [id number]            Drug Allergy       
     Unknown            N/A                         2017                 
                 

 

 Yes            meperidine            [id number]            Drug Allergy       
     Unknown            N/A                         2017                 
                 

 

 Yes            meperidine            [id number]            Drug Allergy       
     Severe            B/P ISSUES, SEV                         2019      
                            

 

 Yes            lisinopril            [id number]            Drug Allergy       
     Mild            COUGH                         2019                  
                



                                



Medications

      



There is no data.                  



Problems

      





 Date Dx Coded            Attending            Type            Code            
Diagnosis            Diagnosed By        

 

 02/10/2014            ENOCH KRAFT DO                         250.00         
   DIABETES II CONTROLLED (UNCOMPLICATED)                     

 

 02/10/2014            ENOCH KRAFT DO                         300.00         
   ANXIETY UNSPEC                     

 

 02/10/2014            ENOCH KRAFT DO                         311            
DEPRESSIVE DISORDER NOS                     

 

 02/10/2014            ENOCH KRAFT DO                         401.9          
  HYPERTENSION, UNSPECIFIED ESSENTIAL                     

 

 02/10/2014            ADELFO BALDWIN MD                         250.00        
    DIABETES II CONTROLLED (UNCOMPLICATED)                     

 

 02/10/2014            ADELFO BALDWIN MD                         300.00        
    ANXIETY UNSPEC                     

 

 02/10/2014            ADELFO BALDWIN MD                         311            
DEPRESSIVE DISORDER NOS                     

 

 02/10/2014            ADELFO BALDWIN MD                         401.9         
   HYPERTENSION, UNSPECIFIED ESSENTIAL                     

 

 02/10/2014            ADELFO BALDWIN MD                         250.00        
    DIABETES II CONTROLLED (UNCOMPLICATED)                     

 

 02/10/2014            ADELFO BALDWIN MD                         300.00        
    ANXIETY UNSPEC                     

 

 02/10/2014            ADELFO BALDWIN MD                         311            
DEPRESSIVE DISORDER NOS                     

 

 02/10/2014            ADELFO BALDWIN MD                         401.9         
   HYPERTENSION, UNSPECIFIED ESSENTIAL                     

 

 02/10/2014            ADELFO BALDWIN MD                         250.00        
    DIABETES II CONTROLLED (UNCOMPLICATED)                     

 

 02/10/2014            ADELFO BALDWIN MD                         300.00        
    ANXIETY UNSPEC                     

 

 02/10/2014            ADELFO BALDWIN MD                         311            
DEPRESSIVE DISORDER NOS                     

 

 02/10/2014            ADELFO BALDWIN MD                         401.9         
   HYPERTENSION, UNSPECIFIED ESSENTIAL                     

 

 02/10/2014            ADELFO BALDWIN MD                         250.00        
    DIABETES II CONTROLLED (UNCOMPLICATED)                     

 

 02/10/2014            ADELFO BALDWIN MD                         300.00        
    ANXIETY UNSPEC                     

 

 02/10/2014            ADELFO BALDWIN MD                         311            
DEPRESSIVE DISORDER NOS                     

 

 02/10/2014            ADELFO BALDWIN MD                         401.9         
   HYPERTENSION, UNSPECIFIED ESSENTIAL                     

 

 02/10/2014            ADELFO BALDWIN MD                         250.00        
    DIABETES II CONTROLLED (UNCOMPLICATED)                     

 

 02/10/2014            ADELFO BALDWIN MD                         300.00        
    ANXIETY UNSPEC                     

 

 02/10/2014            ADELFO BALDWIN MD                         311            
DEPRESSIVE DISORDER NOS                     

 

 02/10/2014            ADELFO BALDWIN MD                         401.9         
   HYPERTENSION, UNSPECIFIED ESSENTIAL                     

 

 02/10/2014            ADELFO BALDWIN MD                         250.00        
    DIABETES II CONTROLLED (UNCOMPLICATED)                     

 

 02/10/2014            ADELFO BALDWIN MD                         300.00        
    ANXIETY UNSPEC                     

 

 02/10/2014            ADELFO BALDWIN MD                         311            
DEPRESSIVE DISORDER NOS                     

 

 02/10/2014            ADELFO BALDWIN MD                         401.9         
   HYPERTENSION, UNSPECIFIED ESSENTIAL                     

 

 02/10/2014            ADELFO BALDWIN MD                         250.00        
    DIABETES II CONTROLLED (UNCOMPLICATED)                     

 

 02/10/2014            ADELFO BALDWIN MD                         300.00        
    ANXIETY UNSPEC                     

 

 02/10/2014            ADELFO BALDWIN MD                         311            
DEPRESSIVE DISORDER NOS                     

 

 02/10/2014            ADELFO BALDWIN MD                         401.9         
   HYPERTENSION, UNSPECIFIED ESSENTIAL                     

 

 02/10/2014            ADELFO BALDWIN MD                         250.00        
    DIABETES II CONTROLLED (UNCOMPLICATED)                     

 

 02/10/2014            ADELFO BALDWIN MD                         300.00        
    ANXIETY UNSPEC                     

 

 02/10/2014            ADELFO BALDWIN MD                         311            
DEPRESSIVE DISORDER NOS                     

 

 02/10/2014            ADELFO BALDWIN MD                         401.9         
   HYPERTENSION, UNSPECIFIED ESSENTIAL                     

 

 02/10/2014            ADELFO BALDWIN MD                         250.00        
    DIABETES II CONTROLLED (UNCOMPLICATED)                     

 

 02/10/2014            ADELFO BALDWIN MD                         300.00        
    ANXIETY UNSPEC                     

 

 02/10/2014            ADELFO BALDWIN MD                         311            
DEPRESSIVE DISORDER NOS                     

 

 02/10/2014            ADELFO BALDWIN MD                         401.9         
   HYPERTENSION, UNSPECIFIED ESSENTIAL                     

 

 2014            ADELFO BALDWIN MD                         715.00        
    OSTEOARTHROSIS GENERALIZED INVOLVING UNSPECIFIED SITE                     

 

 2014            ADELFO BALDWIN MD                         719.06        
    EFFUSION OF LOWER LEG JOINT                     

 

 2014            ADELFO BALDWIN MD                         715.00        
    OSTEOARTHROSIS GENERALIZED INVOLVING UNSPECIFIED SITE                     

 

 2014            ADELFO BALDWIN MD                         719.06        
    EFFUSION OF LOWER LEG JOINT                     

 

 2014            ADELFO BALDWIN MD                         715.00        
    OSTEOARTHROSIS GENERALIZED INVOLVING UNSPECIFIED SITE                     

 

 2014            ADELFO BALDWIN MD                         719.06        
    EFFUSION OF LOWER LEG JOINT                     

 

 2014            ADELFO BALDWIN MD                         715.00        
    OSTEOARTHROSIS GENERALIZED INVOLVING UNSPECIFIED SITE                     

 

 2014            ADELFO BALDWIN MD                         719.06        
    EFFUSION OF LOWER LEG JOINT                     

 

 2014            ADELFO BALDWIN MD                         715.00        
    OSTEOARTHROSIS GENERALIZED INVOLVING UNSPECIFIED SITE                     

 

 2014            ADELFO BALDWIN MD                         719.06        
    EFFUSION OF LOWER LEG JOINT                     

 

 2014            ADELFO BALDWIN MD                         715.00        
    OSTEOARTHROSIS GENERALIZED INVOLVING UNSPECIFIED SITE                     

 

 2014            ADELFO BALDWIN MD                         719.06        
    EFFUSION OF LOWER LEG JOINT                     

 

 2014            ADELFO BALDWIN MD                         715.00        
    OSTEOARTHROSIS GENERALIZED INVOLVING UNSPECIFIED SITE                     

 

 2014            ADELFO BALDWIN MD                         719.06        
    EFFUSION OF LOWER LEG JOINT                     

 

 2014            ADELFO BALDWIN MD                         715.00        
    OSTEOARTHROSIS GENERALIZED INVOLVING UNSPECIFIED SITE                     

 

 2014            ADELFO BALDWIN MD                         719.06        
    EFFUSION OF LOWER LEG JOINT                     

 

 2014            ADELFO BALDWIN MD                         715.00        
    OSTEOARTHROSIS GENERALIZED INVOLVING UNSPECIFIED SITE                     

 

 2014            ADELFO BALDWIN MD                         719.06        
    EFFUSION OF LOWER LEG JOINT                     

 

 2014            ADELFO BALDWIN MD                         564.00        
    CONSTIPATION                     

 

 2014            ADELFO BALDWIN MD                         719.00        
    EFFUSION OF JOINT SITE UNSPECIFIED                     

 

 2014            ADELFO BALDWIN MD                         564.00        
    CONSTIPATION                     

 

 2014            ADELFO BALDWIN MD                         719.00        
    EFFUSION OF JOINT SITE UNSPECIFIED                     

 

 2014            ADELFO BALDWIN MD                         564.00        
    CONSTIPATION                     

 

 2014            ADELFO BALDWIN MD                         719.00        
    EFFUSION OF JOINT SITE UNSPECIFIED                     

 

 2014            ADELFO BALDWIN MD                         564.00        
    CONSTIPATION                     

 

 2014            ADELFO BALDWIN MD                         719.00        
    EFFUSION OF JOINT SITE UNSPECIFIED                     

 

 2014            ADELFO BALDWIN MD                         564.00        
    CONSTIPATION                     

 

 2014            ADELFO BALDWIN MD                         719.00        
    EFFUSION OF JOINT SITE UNSPECIFIED                     

 

 2014            ADELFO BALDWIN MD                         564.00        
    CONSTIPATION                     

 

 2014            ADELFO BALDWIN MD                         719.00        
    EFFUSION OF JOINT SITE UNSPECIFIED                     

 

 2014            ADELFO BALDWIN MD                         564.00        
    CONSTIPATION                     

 

 2014            ADELFO BALDWIN MD                         719.00        
    EFFUSION OF JOINT SITE UNSPECIFIED                     

 

 2014            ADELFO BALDWIN MD                         564.00        
    CONSTIPATION                     

 

 2014            ADELFO BALDWIN MD                         719.00        
    EFFUSION OF JOINT SITE UNSPECIFIED                     

 

 2014            ADELFO BALDWIN MD                         272.4         
   HYPERLIPIDEMIA                     

 

 2014            ADELFO BALDWIN MD                         729.82        
    CRAMP OF LIMB                     

 

 2014            ADELFO BALDWIN MD                         780.79        
    FATIGUE                     

 

 2014            ADELFO BALDWIN MD                         783.1         
   WEIGHT GAIN ABNORMAL                     

 

 2014            ADELFO BALDWIN MD                         788.1         
   DYSURIA                     

 

 2014            ADELFO BALDWIN MD                         272.4         
   HYPERLIPIDEMIA                     

 

 2014            ADELFO BALDWIN MD                         729.82        
    CRAMP OF LIMB                     

 

 2014            ADELFO BALDWIN MD                         780.79        
    FATIGUE                     

 

 2014            ADELFO BALDWIN MD                         783.1         
   WEIGHT GAIN ABNORMAL                     

 

 2014            ADELFO BALDWIN MD                         788.1         
   DYSURIA                     

 

 2014            ADELFO BALDWIN MD                         272.4         
   HYPERLIPIDEMIA                     

 

 2014            ADELFO BALDWIN MD                         729.82        
    CRAMP OF LIMB                     

 

 2014            ADELFO BALDWIN MD                         780.79        
    FATIGUE                     

 

 2014            ADELFO BALDWIN MD                         783.1         
   WEIGHT GAIN ABNORMAL                     

 

 2014            ADELFO BALDWIN MD                         788.1         
   DYSURIA                     

 

 2014            ADELFO BALDWIN MD                         272.4         
   HYPERLIPIDEMIA                     

 

 2014            ADELFO BALDWIN MD                         729.82        
    CRAMP OF LIMB                     

 

 2014            ADELFO BALDWIN MD                         780.79        
    FATIGUE                     

 

 2014            ADELFO BALDWIN MD                         783.1         
   WEIGHT GAIN ABNORMAL                     

 

 2014            ADELFO BALDWIN MD                         788.1         
   DYSURIA                     

 

 2014            ADELFO BALDWIN MD                         272.4         
   HYPERLIPIDEMIA                     

 

 2014            ADELFO BALDWIN MD                         729.82        
    CRAMP OF LIMB                     

 

 2014            ADELFO BALDWIN MD                         780.79        
    FATIGUE                     

 

 2014            ADELFO BALDWIN MD                         783.1         
   WEIGHT GAIN ABNORMAL                     

 

 2014            ADELFO BALDWIN MD                         788.1         
   DYSURIA                     

 

 2014            ADELFO BALDWIN MD                         272.4         
   HYPERLIPIDEMIA                     

 

 2014            ADELFO BALDWIN MD                         729.82        
    CRAMP OF LIMB                     

 

 2014            ADELFO BALDWIN MD                         780.79        
    FATIGUE                     

 

 2014            ADELFO BALDWIN MD                         783.1         
   WEIGHT GAIN ABNORMAL                     

 

 2014            ADELFO BALDWIN MD                         788.1         
   DYSURIA                     

 

 2014            ADELFO BALDWIN MD                         272.4         
   HYPERLIPIDEMIA                     

 

 2014            ADELFO BALDWIN MD                         729.82        
    CRAMP OF LIMB                     

 

 2014            ADELFO BALDWIN MD                         780.79        
    FATIGUE                     

 

 2014            ADELFO BALDWIN MD                         783.1         
   WEIGHT GAIN ABNORMAL                     

 

 2014            ADELFO BALDWIN MD                         788.1         
   DYSURIA                     

 

 2014            ADELFO BALDWIN MD                         794.5         
   NONSPECIFIC ABNORMAL RESULTS OF FUNCTION STUDY OF THYROID                   
  

 

 2014            ADELFO BALDWIN MD                         794.5         
   NONSPECIFIC ABNORMAL RESULTS OF FUNCTION STUDY OF THYROID                   
  

 

 2014            ADELFO BALDWIN MD                         794.5         
   NONSPECIFIC ABNORMAL RESULTS OF FUNCTION STUDY OF THYROID                   
  

 

 2014            ADELFO BALDWIN MD                         794.5         
   NONSPECIFIC ABNORMAL RESULTS OF FUNCTION STUDY OF THYROID                   
  

 

 2014            ADELFO BALDWIN MD                         794.5         
   NONSPECIFIC ABNORMAL RESULTS OF FUNCTION STUDY OF THYROID                   
  

 

 2014            ADELFO BALDWIN MD                         794.5         
   NONSPECIFIC ABNORMAL RESULTS OF FUNCTION STUDY OF THYROID                   
  

 

 2014            ADELFO BALDWIN MD                         530.81        
    GERD                     

 

 2014            ADELFO BALDWIN MD                         719.46        
    PAIN- KNEE                     

 

 2014            ADELFO BLADWIN MD                         530.81        
    GERD                     

 

 2014            ADELFO BALDWIN MD                         719.46        
    PAIN- KNEE                     

 

 2014            ADELFO BALDWIN MD                         530.81        
    GERD                     

 

 2014            ADELFO BALDWIN MD                         719.46        
    PAIN- KNEE                     

 

 2014            ADELFO BALDWIN MD                         530.81        
    GERD                     

 

 2014            ADELFO BALDWIN MD                         719.46        
    PAIN- KNEE                     

 

 2014            ADELFO BALDWIN MD                         530.81        
    GERD                     

 

 2014            ADELFO BALDWIN MD                         719.46        
    PAIN- KNEE                     

 

 2016            WESTON TERRAZAS L PA-C            Ot            I73.9      
                            

 

 2016            WESTON TERRAZAS PA-C            Ot            I73.9      
                            

 

 2016            WESTON TERRAZAS PA-C            Ot            I73.9      
      PERIPHERAL VASCULAR DISEASE, UNSPECIFIED                     

 

 2016            ALLISON TERRAZSAANNA L PA-C            Ot            I73.9      
      PERIPHERAL VASCULAR DISEASE, UNSPECIFIED                     

 

 2016            ALLISON TERRAZASANNA L PA-C            Ot            I73.9      
      PERIPHERAL VASCULAR DISEASE, UNSPECIFIED                     

 

 2016            ALLISON TERRAZASANNA L PA-C            Ot            I73.9      
      PERIPHERAL VASCULAR DISEASE, UNSPECIFIED                     

 

 2016            ALLISON TERRAZASANNA L PA-C            Ot            M17.12     
       UNILATERAL PRIMARY OSTEOARTHRITIS, LEFT                      

 

 2016            WESTON TERRAZAS L PA-C            Ot            M23.304    
        OTHER MENISCUS DERANGEMENTS, UNSP MEDIAL                     

 

 2016            WESTON TERRAZAS L PA-C            Ot            M25.462    
        EFFUSION, LEFT KNEE                     

 

 2016            MK WESTON L PA-C            Ot            M41.26     
       OTHER IDIOPATHIC SCOLIOSIS, LUMBAR REGIO                     

 

 2016            WESTON TERRAZAS L PA-C            Ot            M47.816    
        SPONDYLOSIS W/O MYELOPATHY OR RADICULOPA                     

 

 2016            ALLISON TERRAZASANNA L PA-C            Ot            M54.41     
       LUMBAGO WITH SCIATICA, RIGHT SIDE                     

 

 2016            ANDRÉS JIMÉNEZ, BRISEYDA MCGUIRE            Ot            R07.9       
     CHEST PAIN, UNSPECIFIED                     

 

 2016            ANDRÉSBRISEYDA DAHL MD            Ot            R07.9       
     CHEST PAIN, UNSPECIFIED                     

 

 2016            CURL, WESTON L PA-C            Ot            M17.12     
       UNILATERAL PRIMARY OSTEOARTHRITIS, LEFT                      

 

 2016            CURL, WESTON L PA-C            Ot            M23.304    
        OTHER MENISCUS DERANGEMENTS, UNSP MEDIAL                     

 

 2016            CURL, WESTON L PA-C            Ot            M25.462    
        EFFUSION, LEFT KNEE                     

 

 2016            CURL, WESTON L PA-C            Ot            M41.26     
       OTHER IDIOPATHIC SCOLIOSIS, LUMBAR REGIO                     

 

 2016            CURL, WESTON L PA-C            Ot            M47.816    
        SPONDYLOSIS W/O MYELOPATHY OR RADICULOPA                     

 

 2016            CURL, WESTON L PA-C            Ot            M54.41     
       LUMBAGO WITH SCIATICA, RIGHT SIDE                     

 

 2016            CURL, WESOTN L PA-C            Ot            M17.12     
       UNILATERAL PRIMARY OSTEOARTHRITIS, LEFT                      

 

 2016            CURL, WESTON L PA-C            Ot            M23.304    
        OTHER MENISCUS DERANGEMENTS, UNSP MEDIAL                     

 

 2016            CURL, WESTON L PA-C            Ot            M25.462    
        EFFUSION, LEFT KNEE                     

 

 2016            CURL, WESTON L PA-C            Ot            M41.26     
       OTHER IDIOPATHIC SCOLIOSIS, LUMBAR REGIO                     

 

 2016            CURL, WESTON L PA-C            Ot            M47.816    
        SPONDYLOSIS W/O MYELOPATHY OR RADICULOPA                     

 

 2016            CURL, WESTON L PA-C            Ot            M54.41     
       LUMBAGO WITH SCIATICA, RIGHT SIDE                     

 

 2016            BRISEYDA BOWEN MD            Ot            R07.9       
     CHEST PAIN, UNSPECIFIED                     

 

 2016            BRISEYDA BOWEN MD            Ot            E11.9       
     TYPE 2 DIABETES MELLITUS WITHOUT COMPLIC                     

 

 2016            BRISEYDA BOWEN MD            Ot            E66.9       
     OBESITY, UNSPECIFIED                     

 

 2016            BRISEYDA BOWEN MD            Ot            E78.5       
     HYPERLIPIDEMIA, UNSPECIFIED                     

 

 2016            BRISEYDA BOWEN MD            Ot            F41.9       
     ANXIETY DISORDER, UNSPECIFIED                     

 

 2016            BRISEYDA BOWEN MD            Ot            I10         
   ESSENTIAL (PRIMARY) HYPERTENSION                     

 

 2016            BRISEYDA BOWEN MD            Ot            I25.10      
      ATHSCL HEART DISEASE OF NATIVE CORONARY                      

 

 2016            BRISEYDA BOWEN MD            Ot            I70.202     
       UNS ATHSCL NATIVE ARTERIES OF EXTREMITI                     

 

 2016            BRISEYDA BOWEN MD, Ot            J44.9       
     CHRONIC OBSTRUCTIVE PULMONARY DISEASE, U                     

 

 2016            BRISEYDA BOWEN MD, Ot            M79.605     
       PAIN IN LEFT LEG                     

 

 2016            BRISEYDA BOWEN MD            Ot            R94.39      
      ABNORMAL RESULT OF OTHER CARDIOVASCULAR                      

 

 2016            BRISEYDA BOWEN MD            Ot            Z68.41      
      BODY MASS INDEX (BMI) 40.0-44.9, ADULT                     

 

 2016            BRISEYDA BOWEN MD            Ot            Z72.0       
     TOBACCO USE                     

 

 2016            BRISEYDA BOWEN MD, Ot            Z79.4       
     LONG TERM (CURRENT) USE OF INSULIN                     

 

 2016            BRISEYDA BOWEN MD, Ot            Z79.899     
       OTHER LONG TERM (CURRENT) DRUG THERAPY                     

 

 10/21/2016            RAH GARCIA MD            Ot            M51.16      
      INTERVERTEBRAL DISC DISORDERS W RADICULO                     

 

 10/21/2016            RAH GARCIA MD            Ot            M53.3       
     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                      

 

 2016            CHRISSY JIMÉNEZ, LIYAH MCGUIRE            Ot            R13.10    
        DYSPHAGIA, UNSPECIFIED                     

 

 2016            LIYAH LOWE MD            Ot            R13.10    
        DYSPHAGIA, UNSPECIFIED                     

 

 2016            CHRISSY JIMÉNEZ, LIYAH MCGUIRE            Ot            R13.10    
        DYSPHAGIA, UNSPECIFIED                     

 

 2017            NELSON MAI DO            Ot            R13.10      
      DYSPHAGIA, UNSPECIFIED                     

 

 2017            NELSON MAI DO            Ot            R19.5       
     OTHER FECAL ABNORMALITIES                     

 

 2017            NELSON MIA DO            Ot            Z01.818     
       ENCOUNTER FOR OTHER PREPROCEDURAL EXAMIN                     

 

 2017            NELSON MAI DO            Ot            R13.10      
      DYSPHAGIA, UNSPECIFIED                     

 

 2017            NELSON MAI DO            Ot            R19.5       
     OTHER FECAL ABNORMALITIES                     

 

 2017            NELSON MAI DO            Ot            Z01.818     
       ENCOUNTER FOR OTHER PREPROCEDURAL EXAMIN                     

 

 2017            NELSON MAI DO            Ot            D12.3       
     BENIGN NEOPLASM OF TRANSVERSE COLON                     

 

 2017            NELSON MAI DO            Ot            K21.0       
     GASTRO-ESOPHAGEAL REFLUX DISEASE WITH ES                     

 

 2017            NELSON MAI DO            Ot            K44.9       
     DIAPHRAGMATIC HERNIA WITHOUT OBSTRUCTION                     

 

 2017            NELSON MAI DO            Ot            K63.5       
     POLYP OF COLON                     

 

 02/10/2017            MAI NELSON JOHNSON            Ot            D12.3       
     BENIGN NEOPLASM OF TRANSVERSE COLON                     

 

 02/10/2017            NELSON MAI DO            Ot            K21.0       
     GASTRO-ESOPHAGEAL REFLUX DISEASE WITH ES                     

 

 02/10/2017            MAI NELSON JOHNSON            Ot            K44.9       
     DIAPHRAGMATIC HERNIA WITHOUT OBSTRUCTION                     

 

 2017            NELSON MAI DO            Ot            D12.3       
     BENIGN NEOPLASM OF TRANSVERSE COLON                     

 

 2017            NELSON MAI DO            Ot            K21.0       
     GASTRO-ESOPHAGEAL REFLUX DISEASE WITH ES                     

 

 2017            NELSON MAI DO            Ot            K44.9       
     DIAPHRAGMATIC HERNIA WITHOUT OBSTRUCTION                     

 

 2017            WESTON TERRAZAS PA-C            Ot            I73.9      
      PERIPHERAL VASCULAR DISEASE, UNSPECIFIED                     

 

 2017            WESTON TERRAZAS PA-C            Ot            M17.12     
       UNILATERAL PRIMARY OSTEOARTHRITIS, LEFT                      

 

 2017            CURL, WESTON RECINOS PA-C            Ot            M23.304    
        OTHER MENISCUS DERANGEMENTS, UNSP MEDIAL                     

 

 2017            CURLWESTON PA-C            Ot            M25.462    
        EFFUSION, LEFT KNEE                     

 

 2017            CURLWESTON PA-C            Ot            M41.26     
       OTHER IDIOPATHIC SCOLIOSIS, LUMBAR REGIO                     

 

 2017            WESTON TERRAZAS PA-C            Ot            M47.816    
        SPONDYLOSIS W/O MYELOPATHY OR RADICULOPA                     

 

 2017            WESTON TERRAZAS PA-C            Ot            M54.41     
       LUMBAGO WITH SCIATICA, RIGHT SIDE                     

 

 2017            ANDRÉS JIMÉNEZ, BRISEYDA MCGUIRE            Ot            R07.9       
     CHEST PAIN, UNSPECIFIED                     

 

 2017            CHRISSY JIMÉNEZ, LIYAH MCGUIRE            Ot            R13.10    
        DYSPHAGIA, UNSPECIFIED                     

 

 2017            NELSON MAI DO            Ot            Z01.818     
       ENCOUNTER FOR OTHER PREPROCEDURAL EXAMIN                     

 

 2017            NELSON MAI DO            Ot            Z86.010     
       PERSONAL HISTORY OF COLONIC POLYPS                     

 

 2017            NELSON MAI DO            Ot            Z01.818     
       ENCOUNTER FOR OTHER PREPROCEDURAL EXAMIN                     

 

 2017            NELSON MAI DO            Ot            Z86.010     
       PERSONAL HISTORY OF COLONIC POLYPS                     

 

 2017            NELSON MAI DO            Ot            Z01.818     
       ENCOUNTER FOR OTHER PREPROCEDURAL EXAMIN                     

 

 2017            NELSON MAI DO            Ot            Z86.010     
       PERSONAL HISTORY OF COLONIC POLYPS                     

 

 05/15/2017            NELSON MAI DO            Ot            D12.2       
     BENIGN NEOPLASM OF ASCENDING COLON                     

 

 05/15/2017            NELSON MAI DO            Ot            D12.3       
     BENIGN NEOPLASM OF TRANSVERSE COLON                     

 

 05/15/2017            NELSON MAI DO            Ot            E11.9       
     TYPE 2 DIABETES MELLITUS WITHOUT COMPLIC                     

 

 05/15/2017            NELSON MAI DO            Ot            E78.5       
     HYPERLIPIDEMIA, UNSPECIFIED                     

 

 05/15/2017            NELSON MAI DO D            Ot            I10         
   ESSENTIAL (PRIMARY) HYPERTENSION                     

 

 05/15/2017            NELSON MAI DO            Ot            J44.9       
     CHRONIC OBSTRUCTIVE PULMONARY DISEASE, U                     

 

 05/15/2017            NELSON MAI DO            Ot            Z79.4       
     LONG TERM (CURRENT) USE OF INSULIN                     

 

 05/15/2017            NELSON MAI DO            Ot            Z79.899     
       OTHER LONG TERM (CURRENT) DRUG THERAPY                     

 

 2017            NELSON MAI DO            Ot            D12.2       
     BENIGN NEOPLASM OF ASCENDING COLON                     

 

 2017            NELSON MAI DO            Ot            D12.3       
     BENIGN NEOPLASM OF TRANSVERSE COLON                     

 

 2017            NELSON MAI DO            Ot            E11.9       
     TYPE 2 DIABETES MELLITUS WITHOUT COMPLIC                     

 

 2017            NELSON MAI DO            Ot            E78.5       
     HYPERLIPIDEMIA, UNSPECIFIED                     

 

 2017            NELSON MAI DO            Ot            I10         
   ESSENTIAL (PRIMARY) HYPERTENSION                     

 

 2017            NELSON MAI DO            Ot            J44.9       
     CHRONIC OBSTRUCTIVE PULMONARY DISEASE, U                     

 

 2017            NELSON MAI DO            Ot            Z79.4       
     LONG TERM (CURRENT) USE OF INSULIN                     

 

 2017            NELSON MAI DO            Ot            Z79.899     
       OTHER LONG TERM (CURRENT) DRUG THERAPY                     

 

 2017            NELSON MAI DO            Ot            D12.2       
     BENIGN NEOPLASM OF ASCENDING COLON                     

 

 2017            NELSON MAI DO            Ot            D12.3       
     BENIGN NEOPLASM OF TRANSVERSE COLON                     

 

 2017            NELSON MAI DO            Ot            E11.43      
      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                     

 

 2017            NELSON MAI DO D            Ot            E78.5       
     HYPERLIPIDEMIA, UNSPECIFIED                     

 

 2017            NELSON MAI DO            Ot            F17.210     
       NICOTINE DEPENDENCE, CIGARETTES, UNCOMPL                     

 

 2017            NELSON MAI DO            Ot            F32.9       
     MAJOR DEPRESSIVE DISORDER, SINGLE EPISOD                     

 

 2017            NELSON MAI DO            Ot            F41.9       
     ANXIETY DISORDER, UNSPECIFIED                     

 

 2017            NELSON MAI DO            Ot            I10         
   ESSENTIAL (PRIMARY) HYPERTENSION                     

 

 2017            NELSON MAI DO            Ot            J44.9       
     CHRONIC OBSTRUCTIVE PULMONARY DISEASE, U                     

 

 2017            NELSON MAI DO            Ot            K59.00      
      CONSTIPATION, UNSPECIFIED                     

 

 2017            NELSON MAI DO            Ot            R19.7       
     DIARRHEA, UNSPECIFIED                     

 

 2017            NELSON MAI DO            Ot            Z09         
   ENCNTR FOR F/U EXAM AFT TRTMT FOR COND O                     

 

 2017            NELSON MAI DO            Ot            Z79.4       
     LONG TERM (CURRENT) USE OF INSULIN                     

 

 2017            NELSON MAI DO            Ot            Z79.84      
      LONG TERM (CURRENT) USE OF ORAL HYPOGLYC                     

 

 2017            NELSON MAI DO            Ot            Z79.899     
       OTHER LONG TERM (CURRENT) DRUG THERAPY                     

 

 2017            NELSON MAI DO            Ot            Z86.010     
       PERSONAL HISTORY OF COLONIC POLYPS                     

 

 08/10/2017            NELSON MAI DO            Ot            D12.2       
     BENIGN NEOPLASM OF ASCENDING COLON                     

 

 08/10/2017            NELSON MAI DO            Ot            D12.3       
     BENIGN NEOPLASM OF TRANSVERSE COLON                     

 

 08/10/2017            NELSON MAI DO            Ot            E11.43      
      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                     

 

 08/10/2017            NELSON MAI DO            Ot            E78.5       
     HYPERLIPIDEMIA, UNSPECIFIED                     

 

 08/10/2017            NELSON MAI DO            Ot            F17.210     
       NICOTINE DEPENDENCE, CIGARETTES, UNCOMPL                     

 

 08/10/2017            NELSON MAI DO            Ot            F32.9       
     MAJOR DEPRESSIVE DISORDER, SINGLE EPISOD                     

 

 08/10/2017            NELSON MAI DO            Ot            F41.9       
     ANXIETY DISORDER, UNSPECIFIED                     

 

 08/10/2017            NELSON MAI DO            Ot            I10         
   ESSENTIAL (PRIMARY) HYPERTENSION                     

 

 08/10/2017            NELSON MAI DO            Ot            J44.9       
     CHRONIC OBSTRUCTIVE PULMONARY DISEASE, U                     

 

 08/10/2017            NELSON MAI DO            Ot            K59.00      
      CONSTIPATION, UNSPECIFIED                     

 

 08/10/2017            NELSON MAI DO            Ot            R19.7       
     DIARRHEA, UNSPECIFIED                     

 

 08/10/2017            NELSON MAI DO            Ot            Z09         
   ENCNTR FOR F/U EXAM AFT TRTMT FOR COND O                     

 

 08/10/2017            NELSON MAI DO            Ot            Z79.4       
     LONG TERM (CURRENT) USE OF INSULIN                     

 

 08/10/2017            NELSON MAI DO            Ot            Z79.84      
      LONG TERM (CURRENT) USE OF ORAL HYPOGLYC                     

 

 08/10/2017            NELSON MAI DO            Ot            Z79.899     
       OTHER LONG TERM (CURRENT) DRUG THERAPY                     

 

 08/10/2017            NELSON MAI DO            Ot            Z86.010     
       PERSONAL HISTORY OF COLONIC POLYPS                     

 

 2017            NELSON MAI DO            Ot            D12.2       
     BENIGN NEOPLASM OF ASCENDING COLON                     

 

 2017            NELSON MAI DO            Ot            D12.3       
     BENIGN NEOPLASM OF TRANSVERSE COLON                     

 

 2017            NELSON MAI DO            Ot            E11.43      
      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                     

 

 2017            NELSON MAI DO            Ot            E78.5       
     HYPERLIPIDEMIA, UNSPECIFIED                     

 

 2017            NELSON MAI DO            Ot            F17.210     
       NICOTINE DEPENDENCE, CIGARETTES, UNCOMPL                     

 

 2017            NELSON MAI DO            Ot            F32.9       
     MAJOR DEPRESSIVE DISORDER, SINGLE EPISOD                     

 

 2017            NELSON MAI DO            Ot            F41.9       
     ANXIETY DISORDER, UNSPECIFIED                     

 

 2017            NELSON MAI DO            Ot            I10         
   ESSENTIAL (PRIMARY) HYPERTENSION                     

 

 2017            NELSON MAI DO            Ot            J44.9       
     CHRONIC OBSTRUCTIVE PULMONARY DISEASE, U                     

 

 2017            NELSON MAI DO            Ot            K59.00      
      CONSTIPATION, UNSPECIFIED                     

 

 2017            NELSON MAI DO            Ot            R19.7       
     DIARRHEA, UNSPECIFIED                     

 

 2017            NELSON MAI DO            Ot            Z09         
   ENCNTR FOR F/U EXAM AFT TRTMT FOR COND O                     

 

 2017            NELSON MAI DO            Ot            Z79.4       
     LONG TERM (CURRENT) USE OF INSULIN                     

 

 2017            NELSON MAI DO            Ot            Z79.84      
      LONG TERM (CURRENT) USE OF ORAL HYPOGLYC                     

 

 2017            NELSON MAI DO            Ot            Z79.899     
       OTHER LONG TERM (CURRENT) DRUG THERAPY                     

 

 2017            MAI DO, NELSON D            Ot            Z86.010     
       PERSONAL HISTORY OF COLONIC POLYPS                     

 

 2017            MAI DO NELSON D            Ot            D12.2       
     BENIGN NEOPLASM OF ASCENDING COLON                     

 

 2017            MAI DO NELSON D            Ot            D12.3       
     BENIGN NEOPLASM OF TRANSVERSE COLON                     

 

 2017            MAI DO NELSON D            Ot            E11.9       
     TYPE 2 DIABETES MELLITUS WITHOUT COMPLIC                     

 

 2017            MAI DO NELSON D            Ot            E78.5       
     HYPERLIPIDEMIA, UNSPECIFIED                     

 

 2017            MAI DOADILIATT D            Ot            I10         
   ESSENTIAL (PRIMARY) HYPERTENSION                     

 

 2017            MAI DO NELSON D            Ot            J44.9       
     CHRONIC OBSTRUCTIVE PULMONARY DISEASE, U                     

 

 2017            MAI DO NELSON D            Ot            Z79.4       
     LONG TERM (CURRENT) USE OF INSULIN                     

 

 2017            MAINELSON CORTÉS DO D            Ot            Z79.899     
       OTHER LONG TERM (CURRENT) DRUG THERAPY                     

 

 2017            MAI DONELSON D            Ot            D12.2       
     BENIGN NEOPLASM OF ASCENDING COLON                     

 

 2017            NELSON MAI DO D            Ot            D12.3       
     BENIGN NEOPLASM OF TRANSVERSE COLON                     

 

 2017            MAI DO, NELSON D            Ot            E11.9       
     TYPE 2 DIABETES MELLITUS WITHOUT COMPLIC                     

 

 2017            MAI DOADILIATT D            Ot            E78.5       
     HYPERLIPIDEMIA, UNSPECIFIED                     

 

 2017            ADILIA MAI DOTT D            Ot            I10         
   ESSENTIAL (PRIMARY) HYPERTENSION                     

 

 2017            NELSON MAI DO D            Ot            J44.9       
     CHRONIC OBSTRUCTIVE PULMONARY DISEASE, U                     

 

 2017            NELSON MAI DO D            Ot            Z79.4       
     LONG TERM (CURRENT) USE OF INSULIN                     

 

 2017            NELSON MAI DO D            Ot            Z79.899     
       OTHER LONG TERM (CURRENT) DRUG THERAPY                     

 

 09/15/2017            WESTON TERRAZAS-C            Ot            I73.9      
      PERIPHERAL VASCULAR DISEASE, UNSPECIFIED                     

 

 09/15/2017            WESTON TERRAZAS-C            Ot            M17.12     
       UNILATERAL PRIMARY OSTEOARTHRITIS, LEFT                      

 

 09/15/2017            WESTON TERRAZAS-C            Ot            M23.304    
        OTHER MENISCUS DERANGEMENTS, UNSP MEDIAL                     

 

 09/15/2017            WESTNO TERRAZAS PA-C            Ot            M25.462    
        EFFUSION, LEFT KNEE                     

 

 09/15/2017            WESTON TERRAZAS-C            Ot            M41.26     
       OTHER IDIOPATHIC SCOLIOSIS, LUMBAR REGIO                     

 

 09/15/2017            WESTON TERRAZAS PA-C            Ot            M47.816    
        SPONDYLOSIS W/O MYELOPATHY OR RADICULOPA                     

 

 09/15/2017            WESTON TERRAZAS PA-C            Ot            M54.41     
       LUMBAGO WITH SCIATICA, RIGHT SIDE                     

 

 09/15/2017            ANDRÉS JIMÉNEZ, BRISEYDA MCGUIRE            Ot            R07.9       
     CHEST PAIN, UNSPECIFIED                     

 

 09/15/2017            CHRISSY JIMÉNEZ, LIYAH MCGUIRE            Ot            R13.10    
        DYSPHAGIA, UNSPECIFIED                     

 

 09/15/2017            MAINELSON CORTÉS DO D            Ot            D12.2       
     BENIGN NEOPLASM OF ASCENDING COLON                     

 

 09/15/2017            MAI NELSON JOHNSON D            Ot            D12.3       
     BENIGN NEOPLASM OF TRANSVERSE COLON                     

 

 09/15/2017            MAI NELSON JOHNSON D            Ot            E11.9       
     TYPE 2 DIABETES MELLITUS WITHOUT COMPLIC                     

 

 09/15/2017            NELSON MAI DO D            Ot            E78.5       
     HYPERLIPIDEMIA, UNSPECIFIED                     

 

 09/15/2017            NELSON MAI DO D            Ot            I10         
   ESSENTIAL (PRIMARY) HYPERTENSION                     

 

 09/15/2017            NELSON MAI DO            Ot            J44.9       
     CHRONIC OBSTRUCTIVE PULMONARY DISEASE, U                     

 

 09/15/2017            NELSON MAI DO D            Ot            Z79.4       
     LONG TERM (CURRENT) USE OF INSULIN                     

 

 09/15/2017            NELSON MAI DO D            Ot            Z79.899     
       OTHER LONG TERM (CURRENT) DRUG THERAPY                     

 

 2017            WESTON TERRAZAS-C            Ot            I73.9      
      PERIPHERAL VASCULAR DISEASE, UNSPECIFIED                     

 

 2017            WESTON TERRAZAS-C            Ot            M17.12     
       UNILATERAL PRIMARY OSTEOARTHRITIS, LEFT                      

 

 2017            WESTON TERRAZAS-C            Ot            M23.304    
        OTHER MENISCUS DERANGEMENTS, UNSP MEDIAL                     

 

 2017            WESTON TERRAZAS PA-C            Ot            M25.462    
        EFFUSION, LEFT KNEE                     

 

 2017            WESTON TERRAZAS PA-C            Ot            M41.26     
       OTHER IDIOPATHIC SCOLIOSIS, LUMBAR REGIO                     

 

 2017            WESTON TERRAZAS PA-C            Ot            M47.816    
        SPONDYLOSIS W/O MYELOPATHY OR RADICULOPA                     

 

 2017            WESTON TERRAZAS PA-C            Ot            M54.41     
       LUMBAGO WITH SCIATICA, RIGHT SIDE                     

 

 2017            BRISEYDA BOWEN MD            Ot            R07.9       
     CHEST PAIN, UNSPECIFIED                     

 

 2017            LIYAH LOWE MD            Ot            R13.10    
        DYSPHAGIA, UNSPECIFIED                     

 

 2017            MAI DO, NELSON D            Ot            D12.2       
     BENIGN NEOPLASM OF ASCENDING COLON                     

 

 2017            MAI DO, NELSON D            Ot            D12.3       
     BENIGN NEOPLASM OF TRANSVERSE COLON                     

 

 2017            MAI DO, NELSON D            Ot            E11.9       
     TYPE 2 DIABETES MELLITUS WITHOUT COMPLIC                     

 

 2017            MAI DO, NELSON D            Ot            E78.5       
     HYPERLIPIDEMIA, UNSPECIFIED                     

 

 2017            MAI DO, NELSON D            Ot            I10         
   ESSENTIAL (PRIMARY) HYPERTENSION                     

 

 2017            MAI DO, NELSON D            Ot            J44.9       
     CHRONIC OBSTRUCTIVE PULMONARY DISEASE, U                     

 

 2017            MAI DO, NELSON D            Ot            Z79.4       
     LONG TERM (CURRENT) USE OF INSULIN                     

 

 2017            MAI DO, NELSON D            Ot            Z79.899     
       OTHER LONG TERM (CURRENT) DRUG THERAPY                     

 

 10/11/2017            MAI DO, NELSON D            Ot            D12.2       
     BENIGN NEOPLASM OF ASCENDING COLON                     

 

 10/11/2017            MAI DO, NELSON D            Ot            D12.3       
     BENIGN NEOPLASM OF TRANSVERSE COLON                     

 

 10/11/2017            MAI DO, NELSON D            Ot            E11.9       
     TYPE 2 DIABETES MELLITUS WITHOUT COMPLIC                     

 

 10/11/2017            MAI DO, NELSON D            Ot            E78.5       
     HYPERLIPIDEMIA, UNSPECIFIED                     

 

 10/11/2017            MAI DO, NELSON D            Ot            I10         
   ESSENTIAL (PRIMARY) HYPERTENSION                     

 

 10/11/2017            MAI DO, NELSON D            Ot            J44.9       
     CHRONIC OBSTRUCTIVE PULMONARY DISEASE, U                     

 

 10/11/2017            MAI DO, NELSON D            Ot            Z79.4       
     LONG TERM (CURRENT) USE OF INSULIN                     

 

 10/11/2017            MAI DO, NELSON D            Ot            Z79.899     
       OTHER LONG TERM (CURRENT) DRUG THERAPY                     

 

 10/23/2017            MAI DO, NELSON D            Ot            D12.2       
     BENIGN NEOPLASM OF ASCENDING COLON                     

 

 10/23/2017            MAI DO, NELSON D            Ot            D12.3       
     BENIGN NEOPLASM OF TRANSVERSE COLON                     

 

 10/23/2017            MAI DO, NELSON D            Ot            E11.9       
     TYPE 2 DIABETES MELLITUS WITHOUT COMPLIC                     

 

 10/23/2017            MAI DO, NELSON D            Ot            E78.5       
     HYPERLIPIDEMIA, UNSPECIFIED                     

 

 10/23/2017            MAI DO, NELSON D            Ot            I10         
   ESSENTIAL (PRIMARY) HYPERTENSION                     

 

 10/23/2017            MAI DO, NELSON D            Ot            J44.9       
     CHRONIC OBSTRUCTIVE PULMONARY DISEASE, U                     

 

 10/23/2017            NELSON MAI DO            Ot            Z79.4       
     LONG TERM (CURRENT) USE OF INSULIN                     

 

 10/23/2017            NELSON MAI DO            Ot            Z79.899     
       OTHER LONG TERM (CURRENT) DRUG THERAPY                     

 

 02/15/2018            NELSON MAI DO            Ot            Z01.818     
       ENCOUNTER FOR OTHER PREPROCEDURAL EXAMIN                     

 

 02/15/2018            NELSON MAI DO            Ot            Z86.010     
       PERSONAL HISTORY OF COLONIC POLYPS                     

 

 2018            NELSON MAI DO            Ot            Z01.818     
       ENCOUNTER FOR OTHER PREPROCEDURAL EXAMIN                     

 

 2018            NELSON MAI DO            Ot            Z86.010     
       PERSONAL HISTORY OF COLONIC POLYPS                     

 

 2018            NELSON MAI DO            Ot            D12.3       
     BENIGN NEOPLASM OF TRANSVERSE COLON                     

 

 2018            NELSON MAI DO            Ot            E11.43      
      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                     

 

 2018            NELSON MAI DO            Ot            F17.210     
       NICOTINE DEPENDENCE, CIGARETTES, UNCOMPL                     

 

 2018            NELSON MAI DO            Ot            F32.9       
     MAJOR DEPRESSIVE DISORDER, SINGLE EPISOD                     

 

 2018            NELSON MAI DO            Ot            F41.9       
     ANXIETY DISORDER, UNSPECIFIED                     

 

 2018            NELSON MAI DO            Ot            F44.9       
     DISSOCIATIVE AND CONVERSION DISORDER, UN                     

 

 2018            NELSON MAI DO            Ot            I10         
   ESSENTIAL (PRIMARY) HYPERTENSION                     

 

 2018            NELSON MAI DO            Ot            I73.9       
     PERIPHERAL VASCULAR DISEASE, UNSPECIFIED                     

 

 2018            NELSON MAI DO            Ot            Z09         
   ENCNTR FOR F/U EXAM AFT TRTMT FOR COND O                     

 

 2018            NELSON MAI DO            Ot            Z79.4       
     LONG TERM (CURRENT) USE OF INSULIN                     

 

 2018            NELSON MAI DO, Ot            Z79.899     
       OTHER LONG TERM (CURRENT) DRUG THERAPY                     

 

 2018            NELSON MAI DO            Ot            Z01.818     
       ENCOUNTER FOR OTHER PREPROCEDURAL EXAMIN                     

 

 2018            NELSON MAI DO            Ot            Z86.010     
       PERSONAL HISTORY OF COLONIC POLYPS                     

 

 2018            NELSON MAI DO            Ot            D12.3       
     BENIGN NEOPLASM OF TRANSVERSE COLON                     

 

 2018            NELSON MAI DO            Ot            E11.43      
      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                     

 

 2018            NELSON MAI DO            Ot            F17.210     
       NICOTINE DEPENDENCE, CIGARETTES, UNCOMPL                     

 

 2018            NELSON MAI DO            Ot            F32.9       
     MAJOR DEPRESSIVE DISORDER, SINGLE EPISOD                     

 

 2018            NELSON MAI DO            Ot            F41.9       
     ANXIETY DISORDER, UNSPECIFIED                     

 

 2018            NELSON MAI DO            Ot            F44.9       
     DISSOCIATIVE AND CONVERSION DISORDER, UN                     

 

 2018            NELSON MAI DO            Ot            I10         
   ESSENTIAL (PRIMARY) HYPERTENSION                     

 

 2018            NELSON MAI DO            Ot            I73.9       
     PERIPHERAL VASCULAR DISEASE, UNSPECIFIED                     

 

 2018            NELSON MAI DO            Ot            Z09         
   ENCNTR FOR F/U EXAM AFT TRTMT FOR COND O                     

 

 2018            NELSON MAI DO            Ot            Z79.4       
     LONG TERM (CURRENT) USE OF INSULIN                     

 

 2018            NELSON MAI DO            Ot            Z79.899     
       OTHER LONG TERM (CURRENT) DRUG THERAPY                     

 

 2018            NELSON MAI DO            Ot            D12.3       
     BENIGN NEOPLASM OF TRANSVERSE COLON                     

 

 2018            NELSON MAI DO            Ot            E11.43      
      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                     

 

 2018            NELSON MAI DO            Ot            F17.210     
       NICOTINE DEPENDENCE, CIGARETTES, UNCOMPL                     

 

 2018            NELSON MAI DO            Ot            F32.9       
     MAJOR DEPRESSIVE DISORDER, SINGLE EPISOD                     

 

 2018            NELSON MAI DO            Ot            F41.9       
     ANXIETY DISORDER, UNSPECIFIED                     

 

 2018            NELSON MAI DO            Ot            F44.9       
     DISSOCIATIVE AND CONVERSION DISORDER, UN                     

 

 2018            NELSON MAI DO            Ot            I10         
   ESSENTIAL (PRIMARY) HYPERTENSION                     

 

 2018            NELSON MAI DO            Ot            I73.9       
     PERIPHERAL VASCULAR DISEASE, UNSPECIFIED                     

 

 2018            NELSON MAI DO            Ot            Z09         
   ENCNTR FOR F/U EXAM AFT TRTMT FOR COND O                     

 

 2018            NELSON MAI DO            Ot            Z79.4       
     LONG TERM (CURRENT) USE OF INSULIN                     

 

 2018            NELSON MAI DO            Ot            Z79.899     
       OTHER LONG TERM (CURRENT) DRUG THERAPY                     

 

 2019            NELSON MAI DO            Ot            Z01.818     
       ENCOUNTER FOR OTHER PREPROCEDURAL EXAMIN                     

 

 2019            NELSON MAI DO            Ot            Z01.818     
       ENCOUNTER FOR OTHER PREPROCEDURAL EXAMIN                     



                                                                               
                                                                               
                                                                               
                                                                               
                                                                               
                                                                               
                                                                               
                                                                               
                                                                               
                                                   



Procedures

      





 Code            Description            Performed By            Performed On   
     

 

                                               DRAIN/INJECT JOINT/BURSA   
                                2014        

 

                                               KENALOG INJ, PER 10 MG     
                              2014        

 

             73136                                  A1C (IN-HOUSE)             
                      2014        

 

             52204                                  ROUTINE VENIPUNCTURE       
                            2014        

 

             92588                                  CAPILLARY BLOOD DRAW       
                            2014        

 

             27495                                  UA LONG DIP                
                   2014        

 

             79491                                  CMP                        
           2014        

 

             79032                                  LIPID PANEL                
                   2014        

 

             80477                                  MAGNESIUM                  
                 2014        

 

             [id number]                                  GFR CALC (RESULT ONLY)   
                                2014        

 

             42713                                  CBC                        
           2014        

 

             68415                                  TSH                        
           2014        

 

             66197                                  T4 TOTAL                   
                2014        

 

             63431                                  TSH                        
           2014        

 

             73654                                  XRAY KNEE LEFT 3 VIEWS     
                              10/09/2014        

 

             33637                                  CAPILLARY BLOOD DRAW       
                            10/31/2014        

 

             03436                                  A1C (IN-HOUSE)             
                      10/31/2014        

 

             37074                                  ROUTINE VENIPUNCTURE       
                            2015        

 

             92024                                  BMP                        
           2015        

 

             84252                                  MAGNESIUM                  
                 2015        

 

             [id number]                                  GFR CALC (RESULT ONLY)   
                                2015        



                                                          



Results

      





 Test            Result            Range        









 Complete urinalysis with reflex to culture - 16 09:20         









 Urine color determination            YELLOW             NRG        

 

 Urine clarity determination            CLEAR             NRG        

 

 Urine pH measurement by test strip            7             5-9        

 

 Specific gravity of urine by test strip            1.010             1.016-
1.022        

 

 Urine protein assay by test strip, semi-quantitative            NEGATIVE      
       NEGATIVE        

 

 Urine glucose detection by automated test strip            NEGATIVE           
  NEGATIVE        

 

 Erythrocytes detection in urine sediment by light microscopy            
NEGATIVE             NEGATIVE        

 

 Urine ketones detection by automated test strip            NEGATIVE           
  NEGATIVE        

 

 Urine nitrite detection by test strip            NEGATIVE             NEGATIVE
        

 

 Urine total bilirubin detection by test strip            NEGATIVE             
NEGATIVE        

 

 Urine urobilinogen measurement by automated test strip (mass/volume)          
  NORMAL             NORMAL        

 

 Urine leukocyte esterase detection by dipstick            NEGATIVE             
NEGATIVE        

 

 Automated urine sediment erythrocyte count by microscopy (number/high power 
field)            NONE             NRG        

 

 Automated urine sediment leukocyte count by microscopy (number/high power field
)            NONE             NRG        

 

 Bacteria detection in urine sediment by light microscopy            TRACE     
        NRG        

 

 Squamous epithelial cells detection in urine sediment by light microscopy     
       2-5             NRG        

 

 Crystals detection in urine sediment by light microscopy            NONE      
       NRG        

 

 Casts detection in urine sediment by light microscopy            NONE         
    NRG        

 

 Mucus detection in urine sediment by light microscopy            NEGATIVE     
        NRG        

 

 Complete urinalysis with reflex to culture            NO             NRG      
  









 Automated blood complete blood count (hemogram) panel - 16 09:47         









 Blood leukocytes automated count (number/volume)            8.0 10*3/uL       
     4.3-11.0        

 

 Blood erythrocytes automated count (number/volume)            4.33 10*6/uL    
        4.35-5.85        

 

 Venous blood hemoglobin measurement (mass/volume)            14.4 g/dL        
    11.5-16.0        

 

 Blood hematocrit (volume fraction)            42 %            35-52        

 

 Automated erythrocyte mean corpuscular volume            97 [foz_us]          
  80-99        

 

 Automated erythrocyte mean corpuscular hemoglobin (mass per erythrocyte)      
      33 pg            25-34        

 

 Automated erythrocyte mean corpuscular hemoglobin concentration measurement (
mass/volume)            34 g/dL            32-36        

 

 Automated erythrocyte distribution width ratio            12.9 %            
10.0-14.5        

 

 Automated blood platelet count (count/volume)            127 10*3/uL          
  130-400        

 

 Automated blood platelet mean volume measurement            12.4 [foz_us]     
       7.4-10.4        









 PT panel in platelet poor plasma by coagulation assay - 16 09:47         









 Prothrombin time (PT) in platelet poor plasma by coagulation assay            
12.3 s            12.2-14.7        

 

 INR in platelet poor plasma or blood by coagulation assay            0.9      
       0.8-1.4        









 Activated partial thromboplastin time (aPTT) in platelet poor plasma 
bycoagulation assay - 16 09:47         









 Activated partial thromboplastin time (aPTT) in platelet poor plasma 
bycoagulation assay            29 s            24-35        









 Comprehensive metabolic panel - 16 09:47         









 Serum or plasma sodium measurement (moles/volume)            139 mmol/L       
     135-145        

 

 Serum or plasma potassium measurement (moles/volume)            4.3 mmol/L    
        3.6-5.0        

 

 Serum or plasma chloride measurement (moles/volume)            106 mmol/L     
               

 

 Carbon dioxide            23 mmol/L            21-32        

 

 Serum or plasma anion gap determination (moles/volume)            10 mmol/L   
         5-14        

 

 Serum or plasma urea nitrogen measurement (mass/volume)            11 mg/dL   
         7-18        

 

 Serum or plasma creatinine measurement (mass/volume)            0.82 mg/dL    
        0.60-1.30        

 

 Serum or plasma urea nitrogen/creatinine mass ratio            13             
NRG        

 

 Serum or plasma creatinine measurement with calculation of estimated 
glomerular filtration rate            >             NRG        

 

 Serum or plasma glucose measurement (mass/volume)            196 mg/dL        
            

 

 Serum or plasma calcium measurement (mass/volume)            9.4 mg/dL        
    8.5-10.1        

 

 Serum or plasma total bilirubin measurement (mass/volume)            0.6 mg/dL
            0.1-1.0        

 

 Serum or plasma alkaline phosphatase measurement (enzymatic activity/volume)  
          115 U/L                    

 

 Serum or plasma aspartate aminotransferase measurement (enzymatic activity/
volume)            18 U/L            5-34        

 

 Serum or plasma alanine aminotransferase measurement (enzymatic activity/volume
)            25 U/L            0-55        

 

 Serum or plasma protein measurement (mass/volume)            6.6 g/dL         
   6.4-8.2        

 

 Serum or plasma albumin measurement (mass/volume)            4.3 g/dL         
   3.2-4.5        









 Lipid 1996 panel - 16 09:47         









 Serum or plasma triglyceride measurement (mass/volume)            193 mg/dL   
         <150        

 

 Serum or plasma cholesterol measurement (mass/volume)            151 mg/dL    
        < 200        

 

 Serum or plasma cholesterol in HDL measurement (mass/volume)            35 mg/
dL            40-60        

 

 Cholesterol in LDL [mass/volume] in serum or plasma by direct assay            
88 mg/dL            1-129        

 

 Serum or plasma cholesterol in VLDL measurement (mass/volume)            39 mg/
dL            5-40        









 Methicillin resistant Staphylococcus aureus (MRSA) screening culture -  09:47         









 Methicillin resistant Staphylococcus aureus (MRSA) screening culture          
  NEG             NRG        









 Capillary blood glucose measurement by glucometer (mass/volume) - 16 13:
42         









 Capillary blood glucose measurement by glucometer (mass/volume)            145 
mg/dL                    









 Urine beta human chorionic gonadotropin (hCG) measurement - 17 13:00    
     









 Urine beta human chorionic gonadotropin (hCG) measurement            NEGATIVE 
            NEGATIVE        









 Capillary blood glucose measurement by glucometer (mass/volume) - 17 13:
13         









 Capillary blood glucose measurement by glucometer (mass/volume)            154 
mg/dL                    









 Capillary blood glucose measurement by glucometer (mass/volume) - 17 12:
50         









 Capillary blood glucose measurement by glucometer (mass/volume)            108 
mg/dL                    









 Urine beta human chorionic gonadotropin (hCG) measurement - 17 13:05    
     









 Urine beta human chorionic gonadotropin (hCG) measurement            NEGATIVE 
            NEGATIVE        









 Capillary blood glucose measurement by glucometer (mass/volume) - 17 13:
18         









 Capillary blood glucose measurement by glucometer (mass/volume)            77 
mg/dL                    









 Comp. Metabolic Panel (14) - 17 11:04         









 Glucose, Serum            43 mg/dL            65-99        

 

 BUN            11 mg/dL            6-24        

 

 Creatinine, Serum            0.82 mg/dL            0.57-1.00        

 

 eGFR If NonAfricn Am            83 mL/min/1.73                >59        

 

 eGFR If Africn Am            95 mL/min/1.73                >59        

 

 BUN/Creatinine Ratio            13             9-23        

 

 Sodium, Serum            141 mmol/L            134-144        

 

 Potassium, Serum            3.9 mmol/L            3.5-5.2        

 

 Chloride, Serum            101 mmol/L                    

 

 Carbon Dioxide, Total            21 mmol/L            18-29        

 

 Calcium, Serum            9.4 mg/dL            8.7-10.2        

 

 Protein, Total, Serum            7.1 g/dL            6.0-8.5        

 

 Albumin, Serum            4.6 g/dL            3.5-5.5        

 

 Globulin, Total            2.5 g/dL            1.5-4.5        

 

 A/G Ratio            1.8             1.2-2.2        

 

 Bilirubin, Total            0.4 mg/dL            0.0-1.2        

 

 Alkaline Phosphatase, S            111 IU/L                    

 

 AST (SGOT)            20 IU/L            0-40        

 

 ALT (SGPT)            19 IU/L            0-32        









 Hepatitis Panel (4) - 17 11:04         









 HBsAg Screen            Negative             Negative        

 

 Hep A Ab, IgM            Negative             Negative        

 

 Hep B Core Ab, IgM            Negative             Negative        

 

 Hep C Virus Ab            <0.1 s/co ratio            0.0-0.9        









 HEPATITIS PROFILE - 17 11:04         









 Hep A Ab, IgM            Negative             Negative        

 

 HBsAg Screen            Negative             Negative        

 

 Hep B Core Ab, IgM            Negative             Negative        

 

 Hep C Virus Ab            <0.1 s/co ratio            0.0-0.9        









 BV/VAGINITIS PANEL DNA PROBE - 10/09/17 16:18         









 TRICHOMONAS:            NOT DETECTED             NOT DETECTED        

 

 GARDNERELLA:            NOT DETECTED             NOT DETECTED        

 

 CANDIDA:            NOT DETECTED             NOT DETECTED        









 GC/CHLAMYDIA (SWAB OR URINE)-RAPID - 10/09/17 16:18         









 CHLAMYDIA TRACHOMATIS RNA, TMA            NOT DETECTED             NOT 
DETECTED        

 

 NEISSERIA GONORRHOEAE RNA, TMA            NOT DETECTED             NOT 
DETECTED        

 

 COMMENT                         NR        









 SUREPATH PAP AND HPV mRNA E6/E7 - 10/09/17 16:18         









 CLINICAL INFORMATION:                         NRG        

 

 LMP:            10YRS             NRG        

 

 PREV. PAP:            NEG             NRG        

 

 PREV. BX:            NEG             NRG        

 

 SOURCE:            Cervix             NRG        

 

 STATEMENT OF ADEQUACY:                         NRG        

 

 INTERPRETATION/RESULT:                         NRG        

 

 CYTOTECHNOLOGIST:                         NRG        

 

 HPV mRNA E6/E7, SUREPATH VIAL            Not Detected             NOT DETECTED
        

 

 GENERAL CATEGORIZATION:                         NRG        

 

 COMMENT:                         NRG        

 

 PATHOLOGIST:                         NRG        









 HEPATITIS PROFILE - 17 16:50         









 HEPATITIS A IGM            NON-REACTIVE             NON-REACTIVE        

 

 HEPATITIS B SURFACE ANTIGEN            NON-REACTIVE             NON-REACTIVE  
      

 

 HEPATITIS B CORE ANTIBODY (IGM)            NON-REACTIVE             NON-
REACTIVE        

 

 HEPATITIS C ANTIBODY            NON-REACTIVE             NON-REACTIVE        

 

 SIGNAL TO CUT-OFF            0.02             <1.00        









 HIV ANTIGEN/ANTIBODY - 17 16:50         









 HIV AG/AB, 4TH GEN            NON-REACTIVE             NON-REACTIVE        









 Capillary blood glucose measurement by glucometer (mass/volume) - 18 10:
50         









 Capillary blood glucose measurement by glucometer (mass/volume)            153 
mg/dL                    



                                                          



Encounters

      





 ACCT No.            Visit Date/Time            Discharge            Status    
        Pt. Type            Provider            Facility            Loc./Unit  
          Complaint        

 

 [id number]            2015 09:52:00            2015 23:59:59          
  [facility]            Outpatient            ADELFO BALDWIN MD                       
                        

 

 [id number]            2015 10:05:00            2015 23:59:59          
  CLS            Outpatient            ADELFO BALDWIN MD                       
                        

 

 [id number]            2014 10:40:00            2014 23:59:59          
  CLS            Outpatient            ADELFO BALDWIN MD                       
                        

 

 [id number]            10/31/2014 14:02:00            10/31/2014 23:59:59          
  CLS            Outpatient            ADELFO BALDWIN MD                       
                        

 

 [id number]            2014 09:48:00            2014 23:59:59          
  CLS            Outpatient            ADELFO BALDWIN MD                       
                        

 

 [id number]            2014 09:03:00            2014 23:59:59          
  CLS            Outpatient            ADELFO BALDWIN MD                       
                        

 

 [id number]            2014 08:22:00            2014 23:59:59          
  CLS            Outpatient            ADELFO BALDWIN MD                       
                        

 

 [id number]            2014 13:17:00            2014 23:59:59          
  CLS            Outpatient            ADELFO BALDWIN MD                       
                        

 

 [id number]            2014 14:52:00            2014 23:59:59          
  CLS            Outpatient            ADELFO BALDWIN MD                       
                        

 

 [id number]            02/10/2014 10:48:00            02/10/2014 23:59:59          
  CLS            Outpatient            KRAFT DO ENOCH SHANKS                        
                       

 

 [id number]            2017 12:07:00                                   
   Document Registration                                                       
     

 

 [id number]            2019 05:40:00            2019 13:23:00    
        DIS            Outpatient            NELSON MAI DO            [facility] 
[facility]            PREOP            COLONOSCOPY        

 

 [id number]            2018 10:17:00            2018 13:30:00    
        DIS            Outpatient            NELSON MAI DO            [facility] 
[facility]            ENDO            HX OF POLYPS        

 

 [id number]            02/15/2018 11:30:00            02/15/2018 12:48:00    
        DIS            Outpatient            NELSON MAI DO            [facility] 
[facility]            PREOP            DUNBRE        

 

 [id number]            2017 12:57:00            2017 15:00:00    
        DIS            Outpatient            NELSON MAI DO            [facility] 
[facility]            ENDO            HISTORY OF POLYPS      
  

 

 [id number]            2017 11:57:00            2017 23:59:59    
        CLS            Outpatient            NELSON MAI DO            [facility] 
[facility]            ENDO            HX OF POLYPS        

 

 [id number]            2017 06:10:00            2017 11:13:00    
        DIS            Outpatient            NELSON MAI DO            [facility] 
[facility]            PREOP            HX OF POLYPS        

 

 [id number]            2017 12:51:00            2017 15:50:00    
        DIS            Outpatient            NELSON MAI DO            Via 
[facility]            ENDO            BLOOD IN STOOLS;
DYSPHAGIA        

 

 [id number]            2017 09:27:00            2017 12:24:00    
        DIS            Outpatient            NELSON MAI DO            [facility]ia 
[facility]            PREOP            BLOOD IN STOOLS;
DYSPHAGIA        

 

 [id number]            2016 10:00:00            2016 23:59:59    
        CLS            Outpatient            LIYAH LOWE MD            
[facility]            RAD            DYSPHAGIA        

 

 [id number]            10/21/2016 10:15:00            10/21/2016 11:49:00    
        DIS            Outpatient            RAH GARCIA MD            [facility] 
[facility]            CARD            M51.16, M53.3        

 

 [id number]            2016 08:40:00            2016 16:05:00    
        DIS            Outpatient            BRISEYDA BOWEN MD            [facility] 
[facility]            CATH            PVP,CP,HTN, HLP        

 

 [id number]            08/10/2016 09:29:00            08/10/2016 23:59:59    
        CLS            Outpatient            BRISEYDA BOWEN MD            [facility] 
[facility]            CARD            CHEST PAIN        

 

 [id number]            2016 12:38:00            2016 23:59:59    
        CLS            Outpatient            WESTON TERRAZAS PA-C            [facility]ia 
[facility]            RAD            EFFUSION LEFT KNEE,LOW 
BACK PAIN        

 

 [id number]            2016 12:24:00            2016 23:59:59    
        CLS            Outpatient            WESTON TERRAZAS PA-C            [facility] 
[facility]            RAD            BILATERAL CLAUDICATION  
      

 

 [id number]            2019 11:00:00                         PEN       
     Preadmit            NELSON MAI DO            [facility]            ENDO            HX POLYPS        

 

 [id number]            2018 10:20:00            2018 23:59:59            
CLS            Outpatient            LIYAH LOWE IOLA                     

 

 [id number]            2017 15:40:00                                      
Document Registration                                                          
  

 

 [id number]            10/09/2017 10:20:00                                      
Document Registration                                                          
  

 

 [id number]            2017 10:00:00                                      
Document Registration

## 2019-02-26 NOTE — ANESTHESIA-GENERAL POST-OP
MAC


Patient Condition


Mental Status/LOC:  Same as Preop


Cardiovascular:  Satisfactory


Nausea/Vomiting:  Absent


Respiratory:  Satisfactory


Pain:  Controlled


Complications:  Absent





Post Op Complications


Complications


None





Follow Up Care/Instructions


Patient Instructions


None needed.





Anesthesiology Discharge Order


Discharge Order


Patient is doing well, no complaints, stable vital signs, no apparent adverse 

anesthesia problems.   


No complications reported per nursing.











KELSEY LONDON CRNA Feb 26, 2019 14:37

## 2019-02-26 NOTE — XMS REPORT
[facility]

 Created on: 2018



Duyen Larkin

External Reference #: [id number]

: 1965

Sex: Female



Demographics







 Address  [address] [place]  [address]

 

 Preferred Language  Unknown

 

 Marital Status  Unknown

 

 Yazidism Affiliation  Unknown

 

 Race  Unknown

 

 Ethnic Group  Unknown





Author







 Author  LIYAH LOWE

 

 [facility]

 

 Address  [address] [place]  [address]



 

 Phone  [phone]







Care Team Providers







 Care Team Member Name  Role  Phone

 

 LIYAH LOWE  Unavailable  [phone]







PROBLEMS







 Type  Condition  ICD9-CM Code  WSI32-ZQ Code  Onset Dates  Condition Status  
SNOMED Code

 

 Problem  Effusion of lower leg joint  719.06        Active  738428839

 

 Problem  Esophageal reflux  530.81        Active  063514982

 

 Problem  Dysphagia, unspecified type     R13.10     Active  02714195

 

 Problem  Other hyperlipidemia     E78.4     Active  71780756

 

 Problem  Unilateral primary osteoarthritis, left knee     M17.12     Active  
387029112

 

 Problem  Type 2 diabetes mellitus without complications     E11.9     Active  
503301239

 

 Problem  Hypothyroidism (acquired)     E03.9     Active  203729425

 

 Problem  Mild episode of recurrent major depressive disorder     F33.0     
Active  913451084

 

 Problem  COPD with exacerbation     J44.1     Active  234040326

 

 Problem  Long term current use of insulin     Z79.4     Active  758444389

 

 Problem  Diabetic polyneuropathy associated with type 2 diabetes mellitus     
E11.42     Active  43065512

 

 Problem  Cataracts, bilateral     H26.9     Active  57324167

 

 Problem  Claudication     I73.9     Active  629145528

 

 Problem  Primary generalized (osteo)arthritis     M15.0     Active  178315682

 

 Problem  Chronic renal insufficiency, stage II (mild)     N18.2     Active  
206140123

 

 Problem  Chronic obstructive pulmonary disease with acute exacerbation     
J44.1     Active  670755483

 

 Problem  Primary osteoarthritis of both knees     M17.0     Active  447758081

 

 Problem  Type 2 diabetes mellitus with diabetic neuropathy, without long-term 
current use of insulin     E11.40     Active  32967364

 

 Problem  Major depressive disorder, single episode, unspecified     F32.9     
Active  78238358

 

 Problem  Anxiety disorder, unspecified     F41.9     Active  993828329

 

 Problem  Essential (primary) hypertension     I10     Active  47569141

 

 Problem  Peripheral arterial occlusive disease     I77.9     Active  489563653

 

 Problem  Old tear of lateral meniscus of left knee, unspecified tear type     
M23.201     Active  141412383

 

 Problem  Hyperlipidemia LDL goal <130     E78.5     Active  67616176

 

 Problem  Effusion, left knee     M25.462     Active  578353433

 

 Problem  Type 2 diabetes mellitus with hyperglycemia     E11.65     Active  
56626431







ALLERGIES

No Information



ENCOUNTERS







 Encounter  Location  Date  Diagnosis

 

 [facility] [place]  [address] [facility] [address] [id number] [place] [id number]  02 May, 
2018   

 

 [facility] [place]  [address] [facility] [address] [id number] [place] [id number]     

 

 [facility] [place]  [address] [facility] [address] [id number] [place] [id number]     

 

 [facility] [place]  [address] [facility] [address] [id number] [place] [id number]     

 

 [facility] [place]  [address] [facility] [address] [id number] [place] [id number]  23 Mar, 
2018  Type 2 diabetes mellitus without complications E11.9

 

 [facility] [place]  [address] [place] [address] [address] [id number] [place] [id number]  23 Mar, 
2018   

 

 [facility]  [address] [facility] [id number] [place] [address]  13 Mar, 2018   

 

 [facility]  [address] [facility] [address] [id number] [place] [address]  09 Mar, 2018  Type 2 diabetes mellitus without complications E11.9

 

 [facility] [place]  [address] [place] [address] [address] [id number] [place] [id number]     

 

 [facility] [place]  [address] [facility] [address] [id number] [place] [id number]    Diabetic polyneuropathy associated with type 2 diabetes mellitus E11.42

 

 [facility] [place]  [address] [facility] [address] [id number] [place] [id number]     

 

 [facility] [place]  [address] [facility] [address] [id number] [place] [id number]  21 Dec, 
2017  Type 2 diabetes mellitus without complications E11.9 ; Long term current 
use of insulin Z79.4 ; High risk sexual behavior Z72.51 ; BMI 40.0-44.9, adult 
Z68.41 and Encounter for immunization Z23

 

 [facility] [place]  [address] [facility] [address] [id number] [place] [id number]  04 Dec, 
2017   

 

 [facility] [place]  [address] [facility] [address] [id number] [place] [id number]  04 Dec, 
2017   

 

 [facility] [place]  [address] [facility] [address] [id number] [place], [place] [phone]    COPD with exacerbation J44.1 and BMI 40.0-44.9, adult Z68.41

 

 [facility] [place]  [address] [facility] [address] [id number] [place] [id number]  09 Oct, 
2017  Encounter for screening mammogram for malignant neoplasm of breast Z12.31 
; Well woman exam Z01.419 and High risk sexual behavior Z72.51

 

 [facility] [place]  [address] [place] [address] [address] [id number] [place] [id number]  11 Sep, 
2017   

 

 [facility] [place]  [address] [place] [address] C [id number] [place] [id number]  11 Sep, 
2017  Type 2 diabetes mellitus without complications E11.9

 

 [facility] [place]  [address] [place] [address] C [id number] [place] [id number]  11 Sep, 
2017   

 

 [facility] [place]  [address] [place] [address] C [id number] [place], [place] 9459[address]162  11 Sep, 
2017  Old tear of lateral meniscus of left knee, unspecified tear type M23.201

 

 [facility] [place]  [address] [place] [address] C [id number] [place] [id number]  11 Sep, 
2017  Other type of osteoarthritis, unspecified site M19.90 ; Type 2 diabetes 
mellitus without complications E11.9 ; Primary osteoarthritis of both knees 
M17.0 ; Chronic renal insufficiency, stage II (mild) N18.2 ; Diabetic 
polyneuropathy associated with type 2 diabetes mellitus E11.42 and Exposure to 
hepatitis C Z20.5

 

 [facility] [place]  [address] [place] [address] [address] [id number] [place] [id number]  06 Sep, 
2017  Type 2 diabetes mellitus without complications E11.9

 

 [facility] [place]  [address] [facility] [address] [id number] [place] [id number]  30 Aug, 
2017   

 

 [facility] [place]  [address] [facility] [address] [id number] [place] [id number]  30 Aug, 
2017   

 

 [facility] [place]  [address] [place] [address] C [id number] [place], [place] [id number]  16 Aug, 
2017  Other type of osteoarthritis, unspecified site M19.90

 

 [facility] [place]  [address] [facility] [address] [id number] [place] [address]  15 Aug, 
2017  Type 2 diabetes mellitus without complications E11.9

 

 [facility] [place]  [address] [facility] [address] [id number] [place] [phone]  07 Aug, 
2017   

 

 [facility] [place]  [address] [facility] [address] [id number] [place] [id number]     

 

 [facility] [place]  [address] [facility] [address] [id number] [place] [id number]     

 

 [facility] [place]  [address] [place] [address] [address] [id number] [place], [place] [phone]    Type 2 diabetes mellitus without complications E11.9 ; Type 2 diabetes 
mellitus with diabetic neuropathy, without long-term current use of insulin 
E11.40 ; Primary osteoarthritis of both knees M17.0 and Chronic renal 
insufficiency, stage II (mild) N18.2

 

 [facility] [place]  [address] [place] [address] [address] [id number] [place] [id number]     

 

 [facility] [place]  [address] [place] [address] [address] [id number] [place] [id number]  30 May, 
2017  Hyperlipidemia LDL goal <130 E78.5

 

 [facility] [place]  [address] [place] [address] [address] [id number] [place], [place] [id number]  19 May, 
2017  Type 2 diabetes mellitus without complications E11.9

 

 [facility]  [address] [facility] [id number] [place] [address]  16 May, 2017   

 

 [facility] [place]  [address] [facility] [address] [id number] [place] [id number]  08 May, 
2017   

 

 [facility] [place]  [address] [facility] [address] [id number] [place] [id number]  01 May, 
2017   

 

 [facility] [place]  [address] [place] [address] [address] [id number] [place] [phone]     

 

 [facility] [place]  [address] [facility] [address] [id number] [place] [address]    Primary generalized (osteo)arthritis M15.0 and Type 2 diabetes mellitus 
without complications E11.9

 

 [facility] [place]  [address] [facility] [address] [id number] [place] [address]    Old tear of lateral meniscus of left knee, unspecified tear type M23.201

 

 [facility] [place]  [address] [facility] [address] [id number] [place] [address]     

 

 [facility] [place]  [address] [facility] [address] [id number] [place] 9567[address]     

 

 [facility] [place]  [address] [facility] [address] [id number] [place] 941093171     

 

 [facility] [place]  [address] [facility] [address] [id number] [place], [place] [id number]  20 Mar, 
2017  Type 2 diabetes mellitus without complications E11.9

 

 [facility] [place]  [address] [place] [address] [address] [id number] [place], [place] 333884085  13 Mar, 
2017  Type 2 diabetes mellitus without complications E11.9

 

 [facility] [place]  [address] [place] [address] [address] [id number] [place], [place] [id number]  13 Mar, 
2017  Type 2 diabetes mellitus without complications E11.9

 

 [facility] [place]  [address] [place] [address] [address] [id number] [place], [place] 144801669  04 Mar, 
2017  Type 2 diabetes mellitus without complications E11.9

 

 [facility] [place]  [address] [place] [address] [address] [id number] [place] 938629689     

 

 [facility] [place]  [address] [facility] [address] [id number] [place] [id number]    Chronic obstructive pulmonary disease with acute exacerbation J44.1

 

 [facility] [place]  [address] [place] [address] [address] [id number] [place] [id number]     

 

 [facility] [place]  [address] [place] [address] [address] [id number] [place], [place] [id number]    Dysuria R30.0 ; Essential (primary) hypertension I10 ; Hypothyroidism (
acquired) E03.9 ; Type 2 diabetes mellitus without complications E11.9 and Mild 
episode of recurrent major depressive disorder F33.0

 

 [facility] [place]  [address] [facility] [address] [id number] [place] [id number]     

 

 [facility] [place]  [address] [facility] [address] [id number] [place] 8968[address]46    Dysuria R30.0 ; Vaginal candidiasis B37.3 and Candidiasis B37.9

 

 [facility] [place]  [address] [facility] [address] [id number] [place] 92867[address]24     

 

 [facility] [place]  [address] [facility] [address] [id number] [place] [address]  10 Octaviano, 
2017  COPD with exacerbation J44.1 and Dysphagia, unspecified type R13.10

 

 [facility] [place]  [address] [facility] [address] [id number] [place] [id number]  27 Dec, 
2016   

 

 [facility] [place]  [address] [place] [address] [address] [id number] [place] [id number]  12 Dec, 
2016  Essential (primary) hypertension I10 ; Hypothyroidism (acquired) E03.9 ; 
Type 2 diabetes mellitus without complications E11.9 ; Primary generalized (
osteo)arthritis M15.0 ; Major depressive disorder, single episode, unspecified 
F32.9 ; Unilateral primary osteoarthritis, left knee M17.12 ; Hyperlipidemia 
LDL goal <130 E78.5 and Dysphagia, unspecified type R13.10

 

 [facility] [place]  [address] [place] [address] [address] [id number] [place] [id number]  01 Dec, 
2016   

 

 [facility] [place]  [address] [place] [address] [address] [id number] [place] [id number]     

 

 [facility] [place]  [address] [facility] [address] [id number] [place] [id number]     

 

 [facility] [place]  [address] [place] [address] [address] [id number] [place] 927308085     

 

 [facility] [place]  [address] [place] [address] [address] [id number] [place] [id number]     

 

 [facility] [place]  [address] [facility] [address] [id number] [place] [id number]     

 

 [facility] [place]  [address] [facility] [address] [id number] [place] [id number]    Type 2 diabetes mellitus without complications E11.9 ; Primary 
generalized (osteo)arthritis M15.0 ; Effusion, left knee M25.462 ; Old tear of 
lateral meniscus of left knee, unspecified tear type M23.201 and Fatigue, 
unspecified type R53.83

 

 [facility] [place]  [address] [place] [address] [id number] [place], [place] [id number]  26 Sep, 
2016  Type 2 diabetes mellitus without complications E11.9 ; Claudication I73.9 
; Pain in right leg M79.604 and Pain of left leg M79.605

 

 [facility] [place]  [address] [place] [address] C [id number] [place], [place] [phone]  14 Sep, 
2016   

 

 [facility] [place]  [address] [place] [address] [id number] [place], [place] 302856682  12 Sep, 
2016   

 

 [facility] [place]  [address] [place] [address] [id number] [place] [id number]  18 Aug, 
2016  Type 2 diabetes mellitus with hyperglycemia E11.65 ; Long term current 
use of insulin Z79.4 ; Anxiety disorder, unspecified F41.9 ; Essential (primary
) hypertension I10 ; Major depressive disorder, single episode, unspecified 
F32.9 and Peripheral arterial occlusive disease I77.9

 

 [facility] [place]  [address] [place] [address] [id number] [place], [place] [id number]  17 Aug, 
2016   

 

 [facility] [place]  [address] [place] [address] C [id number] [place] [id number]  11 Aug, 
2016   

 

 [facility] [place]  [address] [place] [address] [id number] [place], [place] [id number]  11 Aug, 
2016   

 

 [facility] [place]  [address] [place] [address] [id number] [place] [id number]  10 Aug, 
2016   

 

 [facility] [place]  [address] [place] [address] [id number] [place], [place] [id number]  05 Aug, 
2016  Acute midline low back pain with right-sided sciatica M54.41

 

 [facility]  [address] [facility] [address] [id number] [place] [address]  05 Aug, 2016  Chest pain, unspecified type R07.9 ; Palpitations R00.2 ; 
Claudication I73.9 ; Generalized pain R52 and Type 2 diabetes mellitus without 
complications E11.9

 

 [facility] [place]  [address] [place] [address] [id number] [place], [place] [phone]    Acute midline low back pain with right-sided sciatica M54.41 ; Dysuria 
R30.0 ; Claudication I73.9 ; Peripheral arterial occlusive disease I77.9 ; 
Shortness of breath R06.02 ; Effusion, left knee M25.462 and Type 2 diabetes 
mellitus without complications E11.9

 

 [facility] [place]  [address] [facility] [address] [id number] [place] [id number]     

 

 [facility] [place]  [address] [facility] [address] [id number] [place] [id number]     

 

 [facility] [place]  [address] [facility] [address] [id number] [place] 720938872     

 

 [facility] [place]  [address] [facility] [address] [id number] [place] [id number]    Type 2 diabetes mellitus without complications E11.9 ; Other 
hyperlipidemia E78.4 ; Peripheral arterial occlusive disease I77.9 ; Essential (
primary) hypertension I10 and Other fatigue R53.83

 

 [facility] [place]  [address] [facility] [address] [id number] [place] 398162410  18 May, 
2016   

 

 [facility] [place]  [address] [place] [address] [address] [id number] [place] [id number]  06 May, 
2016   

 

 [facility] [place]  [address] [place] [address] [address] [id number] [place], [place] [id number]  05 May, 
2016  Chest pain, unspecified type R07.9 ; Peripheral arterial occlusive 
disease I77.9 ; Anxiety disorder, unspecified F41.9 ; Essential (primary) 
hypertension I10 ; Type 2 diabetes mellitus without complications E11.9 and 
Other hyperlipidemia E78.4

 

 [facility] [place]  [address] [facility] [address] [id number] [place] [id number]  04 May, 
2016   

 

 [facility] [place]  [address] [facility] [address] [id number] [place] [id number]     

 

 [facility] [place]  [address] [facility] [address] [id number] [place] [id number]     

 

 [facility] [place]  [address] [facility] [address] [id number] [place] [id number]    Type 2 diabetes mellitus without complications E11.9 ; Peripheral 
arterial occlusive disease I77.9 ; Primary generalized (osteo)arthritis M15.0 ; 
Major depressive disorder, single episode, unspecified F32.9 ; Anxiety disorder
, unspecified F41.9 and Essential (primary) hypertension I10

 

 [facility] [place]  [address] [place] [address] [address] [id number] [place] [id number]     

 

 [facility] [place]  [address] [place] [address] [address] [id number] [place] [id number]     

 

 [facility]  [address] [facility] [address] [id number] [place] [address]-
[address]26  02 Mar, 2016   

 

 [facility] [place]  [address] [place] [address] [address] [id number] [place] [id number]     

 

 [facility] [place]  [address] [place] [address] [address] [id number] [place] [id number]    Bronchitis J40 ; Shortness of breath R06.02 and Wheezing R06.2

 

 [facility] [place]  [address] [place] [address] [address] [id number] [place] [id number]     

 

 [facility] [place]  [address] [place] [address] [address] [id number] [place] [id number]     

 

 [facility] [place]  [address] [place] [address] [address] [id number] [place] [id number]     

 

 [facility] [place]  [address] [place] [address] [address] [id number] [place] [id number]     

 

 [facility] [place]  [address] [place] [address] [address] [id number] [place] 754769494    Type 2 diabetes mellitus without complications E11.9 ; Claudication I73.9 
; Other hyperlipidemia E78.4 ; Cataracts, bilateral H26.9 and Other fatigue 
R53.83

 

 [facility] [place]  [address] [facility] [address] [id number] [place] [id number]  28 Oct, 
2015   

 

 [facility] [place]  [address] [place] [address] [address] [id number] [place] [id number]  22 Oct, 
2015   

 

 [facility] [place]  [address] [place] [address] [address] [id number] [place] [id number]  16 Oct, 
2015   

 

 [facility] [place]  [address] [place] [address] [address] [id number] [place] [id number]  14 Oct, 
2015  Type 2 diabetes mellitus without complications E11.9 ; Other 
hyperlipidemia E78.4 ; Primary generalized (osteo)arthritis M15.0 and 
Claudication I73.9

 

 [facility] [place]  [address] [facility] [address] [id number] [place] 067623695  12 Oct, 
2015   

 

 [facility] [place]  [address] [facility] [address] [id number] [place] [id number]  26 Aug, 
2015   

 

 [facility] [place]  [address] [facility] [address] [id number] [place] [id number]  12 Aug, 
2015   

 

 [facility] [place]  [address] [facility] [address] [id number] [place] [id number]  12 Aug, 
2015   

 

 [facility] [place]  [address] [facility] [address] [id number] [place] [id number]  10 Aug, 
2015   

 

 [facility] [place]  [address] [facility] [address] [id number] [place] [id number]  05 Aug, 
2015   

 

 [facility] [place]  [address] [place] [address] [address] [id number] [place] [id number]  11 May, 
2015  Diabetes mellitus without mention of complication, type II or unspecified 
type, not stated as uncontrolled 250.00 ; Bronchitis 490 and Effusion of lower 
leg joint 719.06

 

 [facility] [place]  [address] [place] [address] [address] [id number] [place], [place] [id number]  01 May, 
2015   

 

 [facility] [place]  [address] [place] [address] C [id number] [place] 082203663  01 May, 
2015  Bronchitis 490 and Wheezing 786.07

 

 [place] [address] [id number] [address]-
[address]     

 

 [place] [address] [facility] [address] [id number] [address]-
[address]     

 

 [place] [address] [id number] [address]
2546     

 

 [facility]  [address] [place] [address] C [id number] [place] [id number]  19 Mar, 
2015   

 

 [place] [address] [facility] [address] [id number] [address]
2546  19 Mar, 2015   

 

 [facility] [place]  [address] [id number] [place] [id number]  09 Mar, 
2015   

 

 [facility]  [address] [facility] [id number] [place] [address]  09 Mar, 2015   

 

 [facility] [place]  [address] [id number] [place] [id number]     

 

 [facility]  [address] [facility] [address] [id number] [place] [address]-
[address]     

 

 [facility]  [address] [facility] [address] [id number] [place] [place] [address]-
[address]  10 Feb, 2015   

 

 [facility] [place]  [address] [id number] [place] [id number]     

 

 [facility] [place]  [address] [facility] [address] [id number] [place] [id number]     

 

 [facility] [place] [facility]  [address] [facility] [address] [id number] [place] [address]-
[address]     

 

 [facility] [place] [facility]  [address] [facility] [address] [id number] [place] [place] [address]-
[address]     

 

 [facility] [place]  [address] [facility] [address] [id number] [place] [id number]     

 

 [facility] [place] [facility]  [address] [facility] [address] [id number] [place] [address]-
57[address]     

 

 [facility] [place]  [address] [facility] [address] [id number] [place] [id number]     

 

 [facility] [place] [facility]  [address] [facility] [address] [id number] [place] [place] [address]-
[address]     

 

 [facility] [place]  [address] [id number] [place] [phone]  24 Dec, 
2014   

 

 [facility]  [address] [facility] [address] [id number] [place] [address]-
4816  24 Dec, 2014   

 

 [facility] [place]  [address] [facility] [address] [id number] [place] [phone]  18 Dec, 
2014   

 

 [facility] [place] [facility]  [address] [id number] [place] [address]-
[address]  18 Dec, 2014   

 

 [facility] [place]  [address] [facility] [address] [id number] [place] [id number]  09 Dec, 
2014   

 

 [facility] [place] [facility]  [address] [facility] [address] [id number] [place] [place] [address]-
[address]  09 Dec, 2014   

 

 [facility] [place]  [address] [facility] [address] [id number] [place], [place] [id number]  02 Dec, 
2014   

 

 [facility] [place] [facility]  [address] [id number] [place] [address]-
79[address]  02 Dec, 2014   

 

 [facility] [place]  [address] [facility] [address] [id number] [place] [id number]     

 

 [facility] [place] [facility]  [address] [id number] [place] [id number]     

 

 [facility] [place]  [address] [facility] [id number] [place] [id number]     

 

 [facility]  [address] [id number] [place] [address]-
[address]     

 

 [facility] [place]  [address] [facility] [id number] [place], [place] [id number]  31 Oct, 
2014   

 

 [facility] [place] [facility]  [address] [id number] [place] [address]  31 Oct, 2014   

 

 [facility] [place]  [address] [facility] [address] [id number] [place] [id number]  16 Oct, 
2014   

 

 [facility] [place] [facility]  [address] [id number] [place] [address]  16 Oct, 2014   

 

 [facility] [place]  [address] [facility] [address] [id number] [place] [id number]  10 Oct, 
2014   

 

 [facility] [place] [facility]  [address] [id number] [place] [address]  10 Oct, 2014   

 

 [facility] [place]  [address] [facility] [address] [id number] [place] [id number]  26 Sep, 
2014   

 

 [facility] [place] [facility]  [address] [id number] [place] [address]-
[address]  26 Sep, 2014   

 

 [facility] [place]  [address] [id number] [place] [id number]  29 Aug, 
2014   

 

 [facility] [place] [facility]  [address] [facility] [id number] [place] [address]  29 Aug, 2014   

 

 [facility] [place]  [address] [id number] [place] [id number]  25 Aug, 
2014   

 

 [facility] [place]  [address] [id number] [place] [id number]  25 Aug, 
2014   

 

 [facility] [place] [facility]  [address] [id number] [place] [address]-
[address]  25 Aug, 2014   

 

 [facility] [place] [facility]  [address] [facility] [id number] [place] [address]-
[address]  25 Aug, 2014   

 

 [facility] [place]  [address] [facility] [address] [id number] [place] [id number]  18 Aug, 
2014   

 

 [facility] [place] [facility]  [address] [id number] [place] [address]  18 Aug, 2014   

 

 [facility] [place]  [address] [facility] [address] [id number] [place] [id number]  14 Aug, 
2014   

 

 [facility] [place] [facility]  [address] [facility] [id number] [place] [address]  14 Aug, 2014   

 

 [facility] [place] [facility]  [address] [facility] [id number] [place] [address]-
2918  11 Aug, 2014   

 

 [facility] [place]  [address] [facility] [address] [id number] [place] [id number]  11 Aug, 
2014   

 

 [facility] [place] [facility]  [address] [facility] [id number] [place] [address]-
9481  11 Aug, 2014   

 

 [facility] [place] [facility]  [address] [facility] [id number] [place] [address]-
8765  11 Aug, 2014   

 

 [facility] [place] [facility]  [address] [facility] [id number] [place] [address]-
1043     

 

 [facility] [place]  [address] [id number] [place] [id number]     

 

 [facility]  [address] [id number] [place] [address]-
[address]     

 

 [facility]  [address] [id number] [place] [address]     

 

 [facility] [place]  [address] [id number] [place] [id number]     

 

 [facility]  [address] [id number] [place] [address]     

 

 [facility] [place]  [address] [id number] [place] [id number]  30 May, 
2014   

 

 [facility]  [address] [id number] [place] [address]-
[address]  30 May, 2014   

 

 [facility] [place]  [address] [id number] [place] [id number]  14 May, 
2014   

 

 [facility] [place] [facility]  [address] [id number] [place] [address]-
[address]  14 May, 2014   

 

 [facility] [place]  [address] [id number] [place] [id number]  05 May, 
2014   

 

 [facility] [place] [facility]  [address] [id number] [place] [address]-
[address]28  05 May, 2014   

 

 [facility] [place]  [address] [id number] [place] [id number]     

 

 [facility] [place] [facility]  [address] [id number] [place] [address]     

 

 [facility] [place]  [address] [id number] [place] [id number]     

 

 [facility]  [address] [id number] [place] [place] [address]     

 

 [facility] [place]  [address] [id number] [place] [id number]  10 Feb, 
2014   

 

 [facility]  [address] [facility] [id number] [place] [address]  10 Feb, 2014   







IMMUNIZATIONS

No Known Immunizations



SOCIAL HISTORY

Never Assessed



REASON FOR VISIT

Rx clarification



PLAN OF CARE





VITAL SIGNS





MEDICATIONS

Unknown Medications



RESULTS

No Results



PROCEDURES

No Known procedures



INSTRUCTIONS





MEDICATIONS ADMINISTERED

No Known Medications



MEDICAL (GENERAL) HISTORY







 Type  Description  Date

 

 Medical History  Diabetes mellitus without mention of complication, type II or 
unspecified type, not stated as uncontrolled   

 

 Medical History  Depressive disorder, not elsewhere classified   

 

 Medical History  Anxiety state, unspecified   

 

 Medical History  Effusion of lower leg joint   

 

 Medical History  Generalized osteoarthrosis, unspecified site   

 

 Medical History  Other and unspecified hyperlipidemia   

 

 Medical History  Abnormal weight gain   

 

 Medical History  Effusion of joint, site unspecified   

 

 Medical History  Esophageal reflux   

 

 Medical History  hypertension   

 

 Medical History  colonoscopy- inscreased polyp   

 

 Surgical History  Tonsillectomy   

 

 Surgical History  Orthopedic: Bilateral Knee x2   

 

 Surgical History  colonoscopy  2017

 

 Hospitalization History  Surgery(s)/Childbirth(s) only

## 2019-02-26 NOTE — XMS REPORT
[facility]

 Created on: 2018



Debi Larkinthia

External Reference #: [id number]

: 1965

Sex: Female



Demographics







 Address  [address] [place] [address]  [address]

 

 Preferred Language  Unknown

 

 Marital Status  Unknown

 

 Scientologist Affiliation  Unknown

 

 Race  Unknown

 

 Ethnic Group  Unknown





Author







 Author  LIYAH LOWE

 

 [facility]  [facility]  [place]

 

 Address  [address] [place]  [address]



 

 Phone  [phone]







Care Team Providers







 Care Team Member Name  Role  Phone

 

 LIYAH LOWE  Unavailable  [phone]







PROBLEMS







 Type  Condition  ICD9-CM Code  SGH42-UF Code  Onset Dates  Condition Status  
SNOMED Code

 

 Problem  Effusion of lower leg joint  719.06        Active  253951773

 

 Problem  Esophageal reflux  530.81        Active  984559810

 

 Problem  Other hyperlipidemia     E78.4     Active  67092231

 

 Problem  Type 2 diabetes mellitus without complications     E11.9     Active  
903068043

 

 Problem  Primary generalized (osteo)arthritis     M15.0     Active  386286464

 

 Problem  COPD with exacerbation     J44.1     Active  977274228

 

 Problem  Claudication     I73.9     Active  553645151

 

 Problem  Mild episode of recurrent major depressive disorder     F33.0     
Active  599702779

 

 Problem  Cataracts, bilateral     H26.9     Active  49326043

 

 Problem  Chronic obstructive pulmonary disease with acute exacerbation     
J44.1     Active  101242713

 

 Problem  Chronic renal insufficiency, stage II (mild)     N18.2     Active  
022654649

 

 Problem  Primary osteoarthritis of both knees     M17.0     Active  552544286

 

 Problem  Other chronic pain     G89.29     Active  86451854

 

 Problem  Lumbar degenerative disc disease     M51.36     Active  81932230

 

 Problem  Essential (primary) hypertension     I10     Active  85474555

 

 Problem  Anxiety disorder, unspecified     F41.9     Active  562743858

 

 Problem  Peripheral arterial occlusive disease     I77.9     Active  792891595

 

 Problem  Diabetic polyneuropathy associated with type 2 diabetes mellitus     
E11.42     Active  87690508

 

 Problem  Type 2 diabetes mellitus with diabetic neuropathy, without long-term 
current use of insulin     E11.40     Active  82861770

 

 Problem  Chronic obstructive pulmonary disease, unspecified COPD type     
J44.9     Active  01367726

 

 Problem  Long term current use of insulin     Z79.4     Active  455199535

 

 Problem  Type 2 diabetes mellitus with hyperglycemia     E11.65     Active  
19689791

 

 Problem  Old tear of lateral meniscus of left knee, unspecified tear type     
M23.201     Active  324454490

 

 Problem  Major depressive disorder, single episode, unspecified     F32.9     
Active  34843072

 

 Problem  Effusion, left knee     M25.462     Active  753265064

 

 Problem  Dysphagia, unspecified type     R13.10     Active  47974975

 

 Problem  Hyperlipidemia LDL goal <130     E78.5     Active  80292966

 

 Problem  Unilateral primary osteoarthritis, left knee     M17.12     Active  
802261448

 

 Problem  Hypothyroidism (acquired)     E03.9     Active  208366701







ALLERGIES







 Substance  Reaction  Event Type  Date  Status

 

 Lisinopril  Unknown  Drug Allergy  02 May, 2018  Active

 

 Gabapentin  dizzy/nausea  Drug Allergy  02 May, 2018  Active

 

 Demerol  Unknown  Drug Allergy  02 May, 2018  Active







ENCOUNTERS







 Encounter  Location  Date  Diagnosis

 

 [address] [address]  21 May, 2018  Pain in left 
knee M25.562

 

 [address] [address]  21 May, 2018  Pain in left 
knee M25.562 and Other chronic pain G89.29

 

 [place] [id number] [place] [address]  15 May, 2018   

 

 [address] [address]  02 May, 2018  Type 2 
diabetes mellitus without complications E11.9 ; Long term current use of 
insulin Z79.4 ; Unilateral primary osteoarthritis, left knee M17.12 ; Lumbar 
degenerative disc disease M51.36 and Chronic obstructive pulmonary disease, 
unspecified COPD type J44.9

 

 [address] [address]     

 

 [address] [address]     

 

 [address] [address]     

 

 [address] [address]  23 Mar, 2018  Type 2 
diabetes mellitus without complications E11.9

 

 [address] [address]  23 Mar, 2018   

 

 [address] [address]  13 Mar, 2018   

 

 [place] [id number] [place] [address] [place] [address]  09 Mar, 2018  Type 2 diabetes mellitus without complications E11.9

 

 [address] [address]     

 

 [address] [address]    Diabetic 
polyneuropathy associated with type 2 diabetes mellitus E11.42

 

 [address] [address]     

 

 [address] [address]  21 Dec, 2017  Type 2 
diabetes mellitus without complications E11.9 ; Long term current use of 
insulin Z79.4 ; High risk sexual behavior Z72.51 ; BMI 40.0-44.9, adult Z68.41 
and Encounter for immunization Z23

 

 [address] [address]  04 Dec, 2017   

 

 [address] [address]  04 Dec, 2017   

 

 [address] [address]    COPD with 
exacerbation J44.1 and BMI 40.0-44.9, adult Z68.41

 

 [address] [address]  09 Oct, 2017  Encounter 
for screening mammogram for malignant neoplasm of breast Z12.31 ; Well woman 
exam Z01.419 and High risk sexual behavior Z72.51

 

 [address] [address]  11 Sep, 2017   

 

 [address] [address]  11 Sep, 2017  Type 2 
diabetes mellitus without complications E11.9

 

 [address] [address]  11 Sep, 2017   

 

 [address] [address]  11 Sep, 2017  Old tear of 
lateral meniscus of left knee, unspecified tear type M23.201

 

 [address] [address]  11 Sep, 2017  Other type 
of osteoarthritis, unspecified site M19.90 ; Type 2 diabetes mellitus without 
complications E11.9 ; Primary osteoarthritis of both knees M17.0 ; Chronic 
renal insufficiency, stage II (mild) N18.2 ; Diabetic polyneuropathy associated 
with type 2 diabetes mellitus E11.42 and Exposure to hepatitis C Z20.5

 

 [facility]  [address] [place] [address]  06 Sep, 2017  Type 2 
diabetes mellitus without complications E11.9

 

 [facility] [address]  30 Aug, 2017   

 

 [facility]  [address] [place] [address]  30 Aug, 2017   

 

 [facility]  [address] [place] [address]  16 Aug, 2017  Other type 
of osteoarthritis, unspecified site M19.90

 

 [facility] [address] [address]  15 Aug, 2017  Type 2 
diabetes mellitus without complications E11.9

 

 [facility] [address] [address]  07 Aug, 2017   

 

 [facility] [address] [address]     

 

 [facility] [address] [address]     

 

 [facility] [address] [address]    Type 2 
diabetes mellitus without complications E11.9 ; Type 2 diabetes mellitus with 
diabetic neuropathy, without long-term current use of insulin E11.40 ; Primary 
osteoarthritis of both knees M17.0 and Chronic renal insufficiency, stage II (
mild) N18.2

 

 [facility] [address] [address]     

 

 [facility] [address] [address]  30 May, 2017  
Hyperlipidemia LDL goal <130 E78.5

 

 [facility] [address] [address]  19 May, 2017  Type 2 
diabetes mellitus without complications E11.9

 

 [facility]  [address] [facility] [id number] [place] [address]  16 May, 2017   

 

 [facility] [address] [address]  08 May, 2017   

 

 [facility] [address]  01 May, 2017   

 

 [facility]  [address] [place] [address]     

 

 [facility]  [address] [place] [address]    Primary 
generalized (osteo)arthritis M15.0 and Type 2 diabetes mellitus without 
complications E11.9

 

 [facility]  [address] [place] [address]    Old tear of 
lateral meniscus of left knee, unspecified tear type M23.201

 

 [facility] [address] [address]     

 

 [facility] [address]     

 

 [facility] [address]     

 

 [facility] [address]  20 Mar, 2017  Type 2 
diabetes mellitus without complications E11.9

 

 [facility] [address] [address]  13 Mar, 2017  Type 2 
diabetes mellitus without complications E11.9

 

 [facility] [address]  13 Mar, 2017  Type 2 
diabetes mellitus without complications E11.9

 

 [facility] [address] [address]  04 Mar, 2017  Type 2 
diabetes mellitus without complications E11.9

 

 [facility] [address]     

 

 [facility]  [address] [place] [address]    Chronic 
obstructive pulmonary disease with acute exacerbation J44.1

 

 [facility] [address] [address]     

 

 [facility] [address]    Dysuria 
R30.0 ; Essential (primary) hypertension I10 ; Hypothyroidism (acquired) E03.9 
; Type 2 diabetes mellitus without complications E11.9 and Mild episode of 
recurrent major depressive disorder F33.0

 

 [address] [address]     

 

 [address] [address]  08 2017  Dysuria 
R30.0 ; Vaginal candidiasis B37.3 and Candidiasis B37.9

 

 [address] [address]     

 

 [address] [address]  10 Octaviano, 2017  COPD with 
exacerbation J44.1 and Dysphagia, unspecified type R13.10

 

 [address] [address]  27 Dec, 2016   

 

 [address] [address]  12 Dec, 2016  Essential (
primary) hypertension I10 ; Hypothyroidism (acquired) E03.9 ; Type 2 diabetes 
mellitus without complications E11.9 ; Primary generalized (osteo)arthritis 
M15.0 ; Major depressive disorder, single episode, unspecified F32.9 ; 
Unilateral primary osteoarthritis, left knee M17.12 ; Hyperlipidemia LDL goal <
130 E78.5 and Dysphagia, unspecified type R13.10

 

 [facility]  [address] [place] [address]  01 Dec, 2016   

 

 [address] [address]     

 

 [address] [address]     

 

 [address] [address]     

 

 [address] [address]     

 

 [address] [address]     

 

 [address] [address]    Type 2 
diabetes mellitus without complications E11.9 ; Primary generalized (osteo)
arthritis M15.0 ; Effusion, left knee M25.462 ; Old tear of lateral meniscus of 
left knee, unspecified tear type M23.201 and Fatigue, unspecified type R53.83

 

 [address] [address]  26 Sep, 2016  Type 2 
diabetes mellitus without complications E11.9 ; Claudication I73.9 ; Pain in 
right leg M79.604 and Pain of left leg M79.605

 

 [address] [address]  14 Sep, 2016   

 

 [address] [address]  12 Sep, 2016   

 

 [address] [address]  18 Aug, 2016  Type 2 
diabetes mellitus with hyperglycemia E11.65 ; Long term current use of insulin 
Z79.4 ; Anxiety disorder, unspecified F41.9 ; Essential (primary) hypertension 
I10 ; Major depressive disorder, single episode, unspecified F32.9 and 
Peripheral arterial occlusive disease I77.9

 

 [address] [address]  17 Aug, 2016   

 

 [address] [address]  11 Aug, 2016   

 

 [address] [address]  11 Aug, 2016   

 

 [address] [address]  10 Aug, 2016   

 

 [address] [address]  05 Aug, 2016  Acute 
midline low back pain with right-sided sciatica M54.41

 

 [facility]  [address] [facility] [id number] [address]  05 Aug, 2016  Chest pain, unspecified type R07.9 ; Palpitations R00.2 ; 
Claudication I73.9 ; Generalized pain R52 and Type 2 diabetes mellitus without 
complications E11.9

 

 [address] [address]    Acute 
midline low back pain with right-sided sciatica M54.41 ; Dysuria R30.0 ; 
Claudication I73.9 ; Peripheral arterial occlusive disease I77.9 ; Shortness of 
breath R06.02 ; Effusion, left knee M25.462 and Type 2 diabetes mellitus 
without complications E11.9

 

 [address] [address]     

 

 [address] [address]     

 

 [address] [address]     

 

 [address] [address]    Type 2 
diabetes mellitus without complications E11.9 ; Other hyperlipidemia E78.4 ; 
Peripheral arterial occlusive disease I77.9 ; Essential (primary) hypertension 
I10 and Other fatigue R53.83

 

 [address] [address]  18 May, 2016   

 

 [address] [address]  06 May, 2016   

 

 [address] [address]  05 May, 2016  Chest pain, 
unspecified type R07.9 ; Peripheral arterial occlusive disease I77.9 ; Anxiety 
disorder, unspecified F41.9 ; Essential (primary) hypertension I10 ; Type 2 
diabetes mellitus without complications E11.9 and Other hyperlipidemia E78.4

 

 [address] [address]  04 May, 2016   

 

 [address] [address]     

 

 [address] [address]     

 

 [address] [address]    Type 2 
diabetes mellitus without complications E11.9 ; Peripheral arterial occlusive 
disease I77.9 ; Primary generalized (osteo)arthritis M15.0 ; Major depressive 
disorder, single episode, unspecified F32.9 ; Anxiety disorder, unspecified 
F41.9 and Essential (primary) hypertension I10

 

 [address] [address]     

 

 [address] [address]     

 

 [facility]  [address] [facility] [id number] [place] [address]  02 Mar, 2016   

 

 [address] [address]     

 

 [facility]  [address] [place] [address]    Bronchitis 
J40 ; Shortness of breath R06.02 and Wheezing R06.2

 

 [address] [address]     

 

 [facility]  [address] [place] [address]     

 

 [address] [address]     

 

 [address] [address]     

 

 [address] [address]    Type 2 
diabetes mellitus without complications E11.9 ; Claudication I73.9 ; Other 
hyperlipidemia E78.4 ; Cataracts, bilateral H26.9 and Other fatigue R53.83

 

 [address] [address]  28 Oct, 2015   

 

 [address] [address]  22 Oct, 2015   

 

 [address] [address]  16 Oct, 2015   

 

 [address] [address]  14 Oct, 2015  Type 2 
diabetes mellitus without complications E11.9 ; Other hyperlipidemia E78.4 ; 
Primary generalized (osteo)arthritis M15.0 and Claudication I73.9

 

 [address] [address]  12 Oct, 2015   

 

 [address] [address]  26 Aug, 2015   

 

 [address] [address]  12 Aug, 2015   

 

 [address] [address]  12 Aug, 2015   

 

 [facility]  [address] [place] [address]  10 Aug, 2015   

 

 [facility]  [address] [place] [address]  05 Aug, 2015   

 

 [address] [address]  11 May, 2015  Diabetes 
mellitus without mention of complication, type II or unspecified type, not 
stated as uncontrolled 250.00 ; Bronchitis 490 and Effusion of lower leg joint 
719.06

 

 [facility]  [address] [place] [address]  01 May, 2015   

 

 [address] [address]  01 May, 2015  Bronchitis 
490 and Wheezing 786.07

 

 [place] [id number] [place] [address]     

 

 [place] [id number] [place] [address]     

 

 [facility]  [address] [place], [place] [address] [place] [address]-
[address]     

 

 [facility]  [address] [place] [address]  19 Mar, 2015   

 

 [facility]  [address] [place], [place] [address] [place] [address]-
[address]  19 Mar, 2015   

 

 [facility]  [address] [place] [address]  09 Mar, 2015   

 

 [facility]  [address] [place], [place] [address] [place] [address]
364[address]  09 Mar, 2015   

 

 [address] [address]     

 

 [facility]  [address] [place], [place] [address] [place] [address]-
[address]     

 

 [facility]  [address] [place] [address] [address]  10 Feb, 2015   

 

 [facility]  [address] [place] [address]  ,    

 

 [facility]  [address] [place] [address]  ,    

 

 [facility] [place] [facility]  [address], [place] [address]  ,    

 

 [facility] [place] [facility]  [address] [place] [id number] [address]  ,    

 

 [facility]  [address] [place] [address]     

 

 [facility] [place] [facility]  [address] [place] [id number] [address]     

 

 [facility]  [address] [place] [address]     

 

 [facility] [place] [facility]  [address], [place] [address]-
[address]     

 

 [facility]  [address] [place] [address]  24 Dec, 2014   

 

 [facility] [place] [facility]  [address] [place] [id number] [address]  24 Dec, 2014   

 

 [facility]  [address] [place] [address]  18 Dec, 2014   

 

 [facility] [place] [facility]  [address] [place] [id number] [address]173  18 Dec, 2014   

 

 [facility]  [address] [place] [address]  09 Dec, 2014   

 

 [facility] [place] [facility]  [address] [id number] [address]
6243  09 Dec, 2014   

 

 [facility]  [address] [place] [address]  02 Dec, 2014   

 

 [facility] [place] [facility]  [address] [address]-
1785  02 Dec, 2014   

 

 [facility]  [address] [place] [address]     

 

 [facility] [place] [facility]  [address] [place] [id number] [address]-
[address]     

 

 [facility]  [address] [place] [facility] [address]     

 

 [facility] [place] [facility]  [address] [facility] [id number] [address]     

 

 [facility]  [address] [place] [facility] [address]  31 Oct, 2014   

 

 [facility] [place] [facility]  [address] [id number] [address]-
3648  31 Oct, 2014   

 

 [facility] [place] [address]  16 Oct, 2014   

 

 [facility] [place] [facility]  [address] [id number] [address]-
6418  16 Oct, 2014   

 

 [facility] [place] [address]  10 Oct, 2014   

 

 [facility] [place] [facility]  [address] [id number] [address]
3859  10 Oct, 2014   

 

 [facility] [place] [address]  26 Sep, 2014   

 

 [facility] [place] [facility]  [address] [id number] [address]
2821  26 Sep, 2014   

 

 [facility] [place] [address]  29 Aug, 2014   

 

 [facility] [place] [facility]  [address] [id number] [address]-
3356  29 Aug, 2014   

 

 [facility] [place] [address]  25 Aug, 2014   

 

 [facility] [address]  25 Aug, 2014   

 

 [facility] [place] [facility]  [address] [facility] [id number][place] [address]  25 Aug, 2014   

 

 [facility] [place] [facility]  [address] [id number] [address]  25 Aug, 2014   

 

 [facility]  [address] [place] [address]  18 Aug, 2014   

 

 [facility] [place] [facility]  [address] [id number] [address]-
4517  18 Aug, 2014   

 

 [facility]  [address] [place] [facility] [address]  14 Aug, 2014   

 

 [facility] [place] [facility]  [address] [facility] [id number] [place] [address]-
9446  14 Aug, 2014   

 

 [facility] [place] [facility]  [address] [facility] [id number] [place] [address]-
3866  11 Aug, 2014   

 

 [facility]  [address] [place] [facility] [address]  11 Aug, 2014   

 

 [facility] [place] [facility]  [address] [facility] [id number] [place] [address]-
1218  11 Aug, 2014   

 

 [facility] [place] [facility]  [address] [facility] [id number] [place] [address]-
6240  11 Aug, 2014   

 

 [facility] [place] [facility]  [address] [facility] [id number] [place] [address]-
[address]87     

 

 [facility]  [address] [place] [facility] [address]     

 

 [facility] [place] [facility]  [address] [facility] [id number] [place] [address]-
[address]     

 

 [facility] [place] [facility]  [address] [facility] [id number] [place] [address]-
2791     

 

 [facility]  [address] [place] [address]     

 

 [facility] [place] [facility]  [address] [facility] [id number] [address]     

 

 [facility]  [address] [place] [address]  30 May, 2014   

 

 [facility] [place] [facility]  [address] [facility] [id number] [place] [address]-
4962  30 May, 2014   

 

 [facility]  [address] [place] [address]  14 May, 2014   

 

 [facility] [place] [facility]  [address] [facility] [id number][place] [address]-
6668  14 May, 2014   

 

 [facility]  [address] [place] [address]  05 May, 2014   

 

 [facility]  [address] [facility] [id number] [address]-
5409  05 May, 2014   

 

 [facility] [place] [address]     

 

 [facility]  [address] [facility] [id number] [address]-
[address]60     

 

 [facility] [place] [address]     

 

 [facility]  [address] [place] [id number] [address]-
5019     

 

 [facility] [place] [address]  10 Feb, 2014   

 

 [place] [address] [place] [id number] [address]-
6288  10 Feb, 2014   







IMMUNIZATIONS

No Known Immunizations



SOCIAL HISTORY

Never Assessed



REASON FOR VISIT

[facility]



PL[facility]







 Activity  Details









  









 Follow Up  3 Months Reason:

 

 Pending Test  A1C (IN HOUSE) 



 



VITAL SIGNS







 Height  66 in  2018

 

 Weight  269.6 lbs  2018

 

 Temperature  97.6 degrees Fahrenheit  2018

 

 Heart Rate  68 bpm  2018

 

 Respiratory Rate  20   2018

 

 BMI  43.51 kg/m2  2018

 

 Blood pressure systolic  112 mmHg  2018

 

 Blood pressure diastolic  58 mmHg  2018







MEDICATIONS







 Medication  Instructions  Dosage  Frequency  Start Date  End Date  Duration  
Status

 

 Lyrica 75MG  Orally twice a day  1 capsule  12h           Active

 

 Alprazolam 0.5 MG     take 1 tablet (0.5 mg) by oral route 3 times per day     
10 Feb, 2014        Active

 

 Ventolin HFA 90 mcg/actuation  by inhalation route every 4 hrs  1-2 puffs  4h  
26 Sep, 2014     90 days  Active

 

 Januvia 100 MG  Orally Once a day  1 tablet  24h  23 Mar, 2018        Active

 

 Levothyroxine Sodium 25 MCG  Orally Once a day in the morning  Take 1 tablet 
on an empty stomach           30  Active

 

 Klor-Con 10 10MEQ ER     take 1 tablet by Oral route with food  1 time per day
              Not-Taking

 

 B Complex -                    Not-Taking

 

 Losartan Potassium 25 MG  Orally Once a day  1 tablet  24h        90 days  
Active

 

 Cyclobenzaprine HCl 10MG  Orally Three times a day for muscle spasms  1 tablet 
as needed              Active

 

 Vitamin B 12 100 MCG                    Not-Taking

 

 Tramadol HCl 50 mg  Orally 2 times a day  1 tablet as needed  12h  08 2017        Active

 

 Tramadol HCl 50 mg  Orally every 6 hrs  1 tablet as needed for pain  6h  14 Oct
, 2015        Not-Taking

 

 Mobic 7.5 MG  Orally Once a day  1 tablet  24h        30  Active

 

 Lasix 20 mg     take 1 tablet (20 mg) by oral route once daily     31 Oct, 
2014        Active

 

 Sucralfate 1 GM  Orally 4 times a day  1 tablet on an empty stomach  6h       
    Unknown

 

 Metformin HCl 1000 MG  Orally Twice a day  1 tablet with meals  12h        90 
days  Active

 

 NovoLog Flexpen 100U/ML     INJECT 25 UNITS SUB-Q PER INSULIN SLIDING SCALE 
PROTOCOL THREE TIMES DAILY.              Not-Taking

 

 Albuterol Sulfate (2.5 MG/3ML) 0.083%  Inhalation every 6 hrs PRN wheezing/SOA
  3 ml     11 May, 2015        Active

 

 NovoLog Flexpen 100 UNIT/ML  Subcutaneous 3 times a day  Inject 25 Units by 
Subcutaneous route as per insulin sliding scale protocol  3 times per day  8h  
        Active

 

 Cymbalta 30 mg     take 3 capsule by Oral route 1 time per day             Active

 

 Lantus SoloStar 100UNIT/     INJECT 50 UNITS SUB-Q TWICE DAILY              
Active

 

 trazodone 150 mg     take 1 Tablet by Oral route 1 time per day qhs     31 Oct
, 2014        Active

 

 Colace 100 MG  Orally Once a day  1 capsule as needed  24h           Unknown

 

 Zoloft 50 MG  Orally Once a day  take 2 tablets (100 mg) by oral route once 
daily  24h  10 Feb, 2014        Active

 

 Lipitor 40 MG     1 tablet          90 days  Active

 

 Atorvastatin Calcium 40 mg  Orally Once a day  1 tablet  24h  30 May, 2017    
    Active

 

 NovoFine 30G X 8 MM     as directed             Unknown

 

 Voltaren 1 %  Transdermal 4 times a day PRN knee pain  4 grams              
Active

 

 Ipratropium-Albuterol 0.5-2.5 (3) MG/3ML  Inhalation every 6 hrs  3 ml  6h  02 
May, 2018        Active

 

 Fish Oil 1000 MG  Orally Once a day  1 capsule  24h           Not-Taking

 

 Klor-Con 10 10 MEQ     take 1 tablet by Oral route with food  1 time per day  
           Active

 

 Pantoprazole Sodium 40 mg  Orally Once a day  1 tablet  24h           Active







RESULTS

No Results



PROCEDURES







 Procedure  Date Ordered  Result  Body Site

 

 [facility]  [facility]      

 

 GLYCATED HEMOGLOBIN TEST  May 02, 2018      

 

 X-RAY EXAM CHEST 2 VIEWS  May 02, 2018      

 

 FOOT EXAM PERFORMED  May 02, 2018      

 

 VENIPUNCT, ROUTINE*  May 02, 2018      

 

 MICROALBUMIN, SEMIQUANT  May 02, 2018      







INSTRUCTIONS





MEDICATIONS ADMINISTERED

No Known Medications



MEDICAL (GENERAL) HISTORY







 Type  Description  Date

 

 Medical History  Diabetes mellitus without mention of complication, type II or 
unspecified type, not stated as uncontrolled   

 

 Medical History  Depressive disorder, not elsewhere classified   

 

 Medical History  Anxiety state, unspecified   

 

 Medical History  Effusion of lower leg joint   

 

 Medical History  Generalized osteoarthrosis, unspecified site   

 

 Medical History  Other and unspecified hyperlipidemia   

 

 Medical History  Abnormal weight gain   

 

 Medical History  Effusion of joint, site unspecified   

 

 Medical History  Esophageal reflux   

 

 Medical History  hypertension   

 

 Medical History  colonoscopy- inscreased polyp   

 

 Surgical History  Tonsillectomy   

 

 Surgical History  Orthopedic: Bilateral Knee x2   

 

 Surgical History  colonoscopy  

 

 Hospitalization History  Surgery(s)/Childbirth(s) only

## 2019-02-26 NOTE — XMS REPORT
[facility]

 Created on: 2018



Duyen Larkin

External Reference #: [id number]

: 1965

Sex: Female



Demographics







 Address  [address] [place]  [address]

 

 Preferred Language  Unknown

 

 Marital Status  Unknown

 

 Jewish Affiliation  Unknown

 

 Race  Unknown

 

 Ethnic Group  Unknown





Author







 Author  LIYAH LOWE

 

 [facility] [place]

 

 Address  [address] [place]  [address]



 

 Phone  [phone]







Care Team Providers







 Care Team Member Name  Role  Phone

 

 LIYAH LOWE  Unavailable  [phone]







PROBLEMS







 Type  Condition  ICD9-CM Code  SMC48-JW Code  Onset Dates  Condition Status  
SNOMED Code

 

 Problem  Effusion of lower leg joint  719.06        Active  505407837

 

 Problem  Esophageal reflux  530.81        Active  631224947

 

 Problem  Dysphagia, unspecified type     R13.10     Active  43355104

 

 Problem  Other hyperlipidemia     E78.4     Active  50345761

 

 Problem  Unilateral primary osteoarthritis, left knee     M17.12     Active  
146508000

 

 Problem  Type 2 diabetes mellitus without complications     E11.9     Active  
860252814

 

 Problem  Hypothyroidism (acquired)     E03.9     Active  025536156

 

 Problem  Mild episode of recurrent major depressive disorder     F33.0     
Active  001628523

 

 Problem  COPD with exacerbation     J44.1     Active  540950524

 

 Problem  Long term current use of insulin     Z79.4     Active  515852184

 

 Problem  Diabetic polyneuropathy associated with type 2 diabetes mellitus     
E11.42     Active  15303796

 

 Problem  Cataracts, bilateral     H26.9     Active  58541964

 

 Problem  Claudication     I73.9     Active  393417400

 

 Problem  Primary generalized (osteo)arthritis     M15.0     Active  419472334

 

 Problem  Chronic renal insufficiency, stage II (mild)     N18.2     Active  
589910460

 

 Problem  Chronic obstructive pulmonary disease with acute exacerbation     
J44.1     Active  451606297

 

 Problem  Primary osteoarthritis of both knees     M17.0     Active  792333596

 

 Problem  Type 2 diabetes mellitus with diabetic neuropathy, without long-term 
current use of insulin     E11.40     Active  10484365

 

 Problem  Major depressive disorder, single episode, unspecified     F32.9     
Active  34845647

 

 Problem  Anxiety disorder, unspecified     F41.9     Active  119146772

 

 Problem  Essential (primary) hypertension     I10     Active  86400469

 

 Problem  Peripheral arterial occlusive disease     I77.9     Active  836546736

 

 Problem  Old tear of lateral meniscus of left knee, unspecified tear type     
M23.201     Active  271298316

 

 Problem  Hyperlipidemia LDL goal <130     E78.5     Active  28223819

 

 Problem  Effusion, left knee     M25.462     Active  611145032

 

 Problem  Type 2 diabetes mellitus with hyperglycemia     E11.65     Active  
48473805







ALLERGIES

No Information



ENCOUNTERS







 Encounter  Location  Date  Diagnosis

 

 [facility] [place]  [address] [facility] C [id number] [place], [place] [id number]     

 

 [facility] [place]  [address] [facility] [id number] [place] [address]     

 

 [facility] [place]  [address] [place] [address] C [id number] [place], [place] [id number]  23 Mar, 
2018  Type 2 diabetes mellitus without complications E11.9

 

 [facility] [place]  [address] [place] [address] C [id number] [place] [id number]  23 Mar, 
2018   

 

 [facility]  [address] [facility] [id number] [place] [address]  13 Mar, 2018   

 

 [facility]  [address] [facility] [id number] [place] [address]  09 Mar, 2018  Type 2 diabetes mellitus without complications E11.9

 

 [facility] [place]  [address] [place] [address] C [id number] [place], [place] [id number]     

 

 [facility] [place]  [address] [place] [address] C [id number] [place], [place] [id number]    Diabetic polyneuropathy associated with type 2 diabetes mellitus E11.42

 

 [facility] [place]  [address] [place] [address] C [id number] [place], [place] [id number]     

 

 [facility] [place]  [address] [facility] C [id number] [place], [place] [id number]  21 Dec, 
2017  Type 2 diabetes mellitus without complications E11.9 ; Long term current 
use of insulin Z79.4 ; High risk sexual behavior Z72.51 ; BMI 40.0-44.9, adult 
Z68.41 and Encounter for immunization Z23

 

 [facility] [place]  [address] [facility] [address] [id number] [place] [id number]  04 Dec, 
2017   

 

 [facility] [place]  [address] [facility] C [id number] [place] [id number]  04 Dec, 
2017   

 

 [facility] [place]  [address] [facility] C [id number] [place] [id number]    COPD with exacerbation J44.1 and BMI 40.0-44.9, adult Z68.41

 

 [facility] [place]  [address] [facility] C [id number] [place], [place] [id number]  09 Oct, 
2017  Encounter for screening mammogram for malignant neoplasm of breast Z12.31 
; Well woman exam Z01.419 and High risk sexual behavior Z72.51

 

 [facility] [place]  [address] [facility] [address] [id number] [place], [place] [id number]  11 Sep, 
2017   

 

 [facility] [place]  [address] [place] [address] C [id number] [place] [id number]  11 Sep, 
2017  Type 2 diabetes mellitus without complications E11.9

 

 [facility] [place]  [address] [place] [address] C [id number] [place], [place] 264029905  11 Sep, 
2017   

 

 [facility] [place]  [address] [place] [address] C [id number] [place], [place] 118[address]2425  11 Sep, 
2017  Old tear of lateral meniscus of left knee, unspecified tear type M23.201

 

 [facility] [place]  [address] [place] [address] C [id number] [place], [place] [address]  11 Sep, 
2017  Other type of osteoarthritis, unspecified site M19.90 ; Type 2 diabetes 
mellitus without complications E11.9 ; Primary osteoarthritis of both knees 
M17.0 ; Chronic renal insufficiency, stage II (mild) N18.2 ; Diabetic 
polyneuropathy associated with type 2 diabetes mellitus E11.42 and Exposure to 
hepatitis C Z20.5

 

 [facility] [place]  [address] [place] [address] C [id number] [place], [place] [address]77[address]6941  06 Sep, 
2017  Type 2 diabetes mellitus without complications E11.9

 

 [facility] [place]  [address] [facility] C [id number] [place] 146432334  30 Aug, 
2017   

 

 [facility] [place]  [address] [facility] [address] [id number] [place] [id number]  30 Aug, 
2017   

 

 [facility] [place]  [address] [facility] [address] [id number] [place] 8[address]  16 Aug, 
2017  Other type of osteoarthritis, unspecified site M19.90

 

 [facility] [place]  [address] [facility] [address] [id number] [place], [place] [id number]  15 Aug, 
2017  Type 2 diabetes mellitus without complications E11.9

 

 [facility] [place]  [address] [facility] [address] [id number] [place] 25[address]16  07 Aug, 
2017   

 

 [facility] [place]  [address] [facility] [address] [id number] [place], [place] [id number]     

 

 [facility] [place]  [address] [facility] [address] [id number] [place], [place] [address]     

 

 [facility] [place]  [address] [facility] [address] [id number] [place], [place] 7[address]    Type 2 diabetes mellitus without complications E11.9 ; Type 2 diabetes 
mellitus with diabetic neuropathy, without long-term current use of insulin 
E11.40 ; Primary osteoarthritis of both knees M17.0 and Chronic renal 
insufficiency, stage II (mild) N18.2

 

 [facility] [place]  [address] [facility] [address] [id number] [place], [place] [address]     

 

 [facility] [place]  [address] [place] [address] C [id number] [place], [place] 101911471  30 May, 
2017  Hyperlipidemia LDL goal <130 E78.5

 

 [facility] [place]  [address] [place] [address] C [id number] [place] [id number]  19 May, 
2017  Type 2 diabetes mellitus without complications E11.9

 

 [facility]  [address] [facility] [id number] [place] [address]  16 May, 2017   

 

 [facility] [place]  [address] [place] [address] C [id number] [place] [id number]  08 May, 
2017   

 

 [facility] [place]  [address] [place] [address] C [id number] [place], [place] 34[address]706[address]  01 May, 
2017   

 

 [facility] [place]  [address] [facility] [address] [id number] [place] [address]     

 

 [facility] [place]  [address] [facility] [address] [id number] [place], [place] [address]277[address]    Primary generalized (osteo)arthritis M15.0 and Type 2 diabetes mellitus 
without complications E11.9

 

 [facility] [place]  [address] [facility] [address] [id number] [place], [place] [address]    Old tear of lateral meniscus of left knee, unspecified tear type M23.201

 

 [facility] [place]  [address] [facility] [address] [id number] [place], [place] [address]     

 

 [facility] [place]  [address] [facility] [address] [id number] [place], [place] 194333085     

 

 [facility] [place]  [address] [facility] [address] [id number] [place], [place] 514449757     

 

 [facility] [place]  [address] [facility] [address] [id number] [place] [id number]  20 Mar, 
2017  Type 2 diabetes mellitus without complications E11.9

 

 [facility] [place]  [address] [facility] [address] [id number] [place], [place] [id number]  13 Mar, 
2017  Type 2 diabetes mellitus without complications E11.9

 

 [facility] [place]  [address] [place] [address] [address] [id number] [place], [place] [address]56[address]  13 Mar, 
2017  Type 2 diabetes mellitus without complications E11.9

 

 [facility] [place]  [address] [place] [address] [address] [id number] [place] 830651582  04 Mar, 
2017  Type 2 diabetes mellitus without complications E11.9

 

 [facility] [place]  [address] [place] [address] [address] [id number] [place], [place] 761840713     

 

 [facility] [place]  [address] [place] [address] [address] [id number] [place], [place] [id number]    Chronic obstructive pulmonary disease with acute exacerbation J44.1

 

 [facility] [place]  [address] [place] [address] [address] [id number] [place] 456212766     

 

 [facility] [place]  [address] [place] [address] [address] [id number] [place], [place] 9972[address]494    Dysuria R30.0 ; Essential (primary) hypertension I10 ; Hypothyroidism (
acquired) E03.9 ; Type 2 diabetes mellitus without complications E11.9 and Mild 
episode of recurrent major depressive disorder F33.0

 

 [facility] [place]  [address] [facility] [address] [id number] [place], [place] 117365860     

 

 [facility] [place]  [address] [facility] [address] [id number] [place], [place] [address]47[address]1671    Dysuria R30.0 ; Vaginal candidiasis B37.3 and Candidiasis B37.9

 

 [facility] [place]  [address] [place] [address] [address] [id number] [place], [place] 553903376     

 

 [facility] [place]  [address] [facility] [address] [id number] [place], [place] [address]  10 Octaviano, 
2017  COPD with exacerbation J44.1 and Dysphagia, unspecified type R13.10

 

 [facility] [place]  [address] [facility] C [id number] [place], [place] [id number]  27 Dec, 
2016   

 

 [facility] [place]  [address] [place] [address] [address] [id number] [place], [place] 9587[address]324  12 Dec, 
2016  Essential (primary) hypertension I10 ; Hypothyroidism (acquired) E03.9 ; 
Type 2 diabetes mellitus without complications E11.9 ; Primary generalized (
osteo)arthritis M15.0 ; Major depressive disorder, single episode, unspecified 
F32.9 ; Unilateral primary osteoarthritis, left knee M17.12 ; Hyperlipidemia 
LDL goal <130 E78.5 and Dysphagia, unspecified type R13.10

 

 [facility] [place]  [address] [place] [address] [address] [id number] [place], [place] 160094328  01 Dec, 
2016   

 

 [facility] [place]  [address] [place] [address] [address] [id number] [place] 0380[address]754     

 

 [facility] [place]  [address] [facility] [address] [id number] [place] 963805383     

 

 [facility] [place]  [address] [place] [address] [address] [id number] [place], [place] 258327746     

 

 [facility] [place]  [address] [facility] [address] [id number] [place], [place] 574004302     

 

 [facility] [place]  [address] [place] [address] [address] [id number] [place] 005195267     

 

 [facility] [place]  [address] [place] [address] [address] [id number] [place], [place] 747408385    Type 2 diabetes mellitus without complications E11.9 ; Primary 
generalized (osteo)arthritis M15.0 ; Effusion, left knee M25.462 ; Old tear of 
lateral meniscus of left knee, unspecified tear type M23.201 and Fatigue, 
unspecified type R53.83

 

 [facility] [place]  [address] [facility] [address] [id number] [place], [place] [id number]  26 Sep, 
2016  Type 2 diabetes mellitus without complications E11.9 ; Claudication I73.9 
; Pain in right leg M79.604 and Pain of left leg M79.605

 

 [facility] [place]  [address] [place] [address] C [id number] [place], [place] [id number]  14 Sep, 
2016   

 

 [facility] [place]  [address] [place] [address] C [id number] [place] [id number]  12 Sep, 
2016   

 

 [facility] [place]  [address] [place] [address] C [id number] [place], [place] [id number]  18 Aug, 
2016  Type 2 diabetes mellitus with hyperglycemia E11.65 ; Long term current 
use of insulin Z79.4 ; Anxiety disorder, unspecified F41.9 ; Essential (primary
) hypertension I10 ; Major depressive disorder, single episode, unspecified 
F32.9 and Peripheral arterial occlusive disease I77.9

 

 [facility] [place]  [address] [place] [address] C [id number] [place], [place] [id number]  17 Aug, 
2016   

 

 [facility] [place]  [address] [place] [address] [id number] [place] [id number]  11 Aug, 
2016   

 

 [facility] [place]  [address] [place] [address] C [id number] [place], [place] [id number]  11 Aug, 
2016   

 

 [facility] [place]  [address] [place] [address] [id number] [place], [place] 028519682  10 Aug, 
2016   

 

 [facility] [place]  [address] [place] [address] [id number] [place], [place] [id number]  05 Aug, 
2016  Acute midline low back pain with right-sided sciatica M54.41

 

 [facility] [place]  [address] [facility] [id number] [place], [place] [phone]-
[address]  05 Aug, 2016  Chest pain, unspecified type R07.9 ; Palpitations R00.2 ; 
Claudication I73.9 ; Generalized pain R52 and Type 2 diabetes mellitus without 
complications E11.9

 

 [facility] [place]  [address] [place] [address] C [id number] [place], [place] 515518569    Acute midline low back pain with right-sided sciatica M54.41 ; Dysuria 
R30.0 ; Claudication I73.9 ; Peripheral arterial occlusive disease I77.9 ; 
Shortness of breath R06.02 ; Effusion, left knee M25.462 and Type 2 diabetes 
mellitus without complications E11.9

 

 [facility] [place]  [address] [facility] [address] [id number] [place] 121831565     

 

 [facility] [place]  [address] [facility] [address] [id number] [place] [id number]     

 

 [facility] [place]  [address] [facility] [address] [id number] [place], [place] 240709999     

 

 [facility] [place]  [address] [facility] [address] [id number] [place], [place] [id number]    Type 2 diabetes mellitus without complications E11.9 ; Other 
hyperlipidemia E78.4 ; Peripheral arterial occlusive disease I77.9 ; Essential (
primary) hypertension I10 and Other fatigue R53.83

 

 [facility] [place]  [address] [facility] [address] [id number] [place], [place] 165670919  18 May, 
2016   

 

 [facility] [place]  [address] [facility] [address] [id number] [place] 702771283  06 May, 
2016   

 

 [facility] [place]  [address] [facility] [address] [id number] [place] [id number]  05 May, 
2016  Chest pain, unspecified type R07.9 ; Peripheral arterial occlusive 
disease I77.9 ; Anxiety disorder, unspecified F41.9 ; Essential (primary) 
hypertension I10 ; Type 2 diabetes mellitus without complications E11.9 and 
Other hyperlipidemia E78.4

 

 [facility] [place]  [address] [facility] [address] [id number] [place] 868415242  04 May, 
2016   

 

 [facility] [place]  [address] [facility] [address] [id number] [place] 445070156     

 

 [facility] [place]  [address] [facility] [address] [id number] [place] 768410056     

 

 [facility] [place]  [address] [facility] [address] [id number] [place] [id number]    Type 2 diabetes mellitus without complications E11.9 ; Peripheral 
arterial occlusive disease I77.9 ; Primary generalized (osteo)arthritis M15.0 ; 
Major depressive disorder, single episode, unspecified F32.9 ; Anxiety disorder
, unspecified F41.9 and Essential (primary) hypertension I10

 

 [facility] [place]  [address] [facility] [address] [id number] [place] 424913339     

 

 [facility] [place]  [address] [place] [address] [address] [id number] [place] [id number]     

 

 [facility]  [address] [facility] [id number] [place] [address]-
[address]  02 Mar, 2016   

 

 [facility] [place]  [address] [place] [address] [address] [id number] [place] 926429531     

 

 [facility] [place]  [address] [place] [address] [address] [id number] [place] 943913371    Bronchitis J40 ; Shortness of breath R06.02 and Wheezing R06.2

 

 [facility] [place]  [address] [place] [address] [address] [id number] [place] 922366636     

 

 [facility] [place]  [address] [place] [address] [address] [id number] [place] 416906813     

 

 [facility] [place]  [address] [place] [address] [address] [id number] [place] 797428957     

 

 [facility] [place]  [address] [place] [address] [address] [id number] [place] [id number]     

 

 [facility] [place]  [address] [place] [address] [address] [id number] [place] [id number]    Type 2 diabetes mellitus without complications E11.9 ; Claudication I73.9 
; Other hyperlipidemia E78.4 ; Cataracts, bilateral H26.9 and Other fatigue 
R53.83

 

 [facility] [place]  [address] [place] [address] [address] [id number] [place] 203481805  28 Oct, 
2015   

 

 [facility] [place]  [address] [place] [address] [address] [id number] [place] 883175870  22 Oct, 
2015   

 

 [facility] [place]  [address] [place] [address] [address] [id number] [place] 954652978  16 Oct, 
2015   

 

 [facility] [place]  [address] [place] [address] [address] [id number] [place], [place] [id number]  14 Oct, 
2015  Type 2 diabetes mellitus without complications E11.9 ; Other 
hyperlipidemia E78.4 ; Primary generalized (osteo)arthritis M15.0 and 
Claudication I73.9

 

 [facility] [place]  [address] [place] [address] [address] [id number] [place] 699345965  12 Oct, 
2015   

 

 [facility] [place]  [address] [facility] [address] [id number] [place], [place] 187531577  26 Aug, 
2015   

 

 [facility] [place]  [address] [facility] [address] [id number] [place] 035884966  12 Aug, 
2015   

 

 [facility] [place]  [address] [facility] [address] [id number] [place] 817457483  12 Aug, 
2015   

 

 [facility] [place]  [address] [facility] [address] [id number] [place] 592407856  10 Aug, 
2015   

 

 [facility] [place]  [address] [facility] [address] [id number] [place] 127891815  05 Aug, 
2015   

 

 [facility] [place]  [address] [facility] [address] [id number] [place], [place] 832292513  11 May, 
2015  Diabetes mellitus without mention of complication, type II or unspecified 
type, not stated as uncontrolled 250.00 ; Bronchitis 490 and Effusion of lower 
leg joint 719.06

 

 [facility] [place]  [address] [facility] [address] [id number] [place], [place] 168273608  01 May, 
2015   

 

 [facility] [place]  [address] [facility] [address] [id number] [place] 549134831  01 May, 
2015  Bronchitis 490 and Wheezing 786.07

 

 [facility] [place]  [address] [id number] [address]-
[address]     

 

 [facility] [place]  [address] [id number] [address]-
[address]     

 

 [facility] [place]  [address] [id number] [address]
2546     

 

 [facility] [place]  [address] [facility] [address] [id number] [place] 912246584  19 Mar, 
2015   

 

 [facility] [place]  [address] [address]46  19 Mar, 2015   

 

 [facility] [place]  [address] [facility] [address] [id number] [place] [id number]  09 Mar, 
2015   

 

 [facility] [place]  [address] [id number] [address]-
1010  09 Mar, 2015   

 

 [facility] [place]  [address] [facility] [address] [id number] [place] 033551376     

 

 [facility] [place] [facility]  [address] [facility] [id number] [place] [address]-
1106     

 

 [facility] [place] [facility]  [address] [facility] [id number] [place] [address]-
3166  10 Feb, 2015   

 

 [facility] [place]  [address] [facility] [address] [id number] [place] 080248949     

 

 [facility] [place]  [address] [facility] [address] [id number] [place] [id number]     

 

 [facility] [place] [facility]  [address] [facility] [id number] [place] [address]-
0656     

 

 [facility] [place] [facility]  [address] [facility] [id number] [place] [address]-
9756     

 

 [facility] [place]  [address] [facility] [address] [id number] [place] [id number]     

 

 [facility] [place] [facility]  [address] [facility] [id number] [place] [address]-
[address]     

 

 [facility] [place]  [address] [facility] [address] [id number] [place] 401696613     

 

 [facility] [place] [facility]  [address] [facility] [id number] [place] [address]-
5656     

 

 [facility] [place]  [address] [facility] [address] [id number] [place], [place] [id number]  24 Dec, 
2014   

 

 [facility] [place] [facility]  [address] [id number] [place] [address]-
9686  24 Dec, 2014   

 

 [facility] [place]  [address] [facility] [address] [id number] [place] [id number]  18 Dec, 
2014   

 

 [facility] [place] [facility]  [address] [facility] [id number] [place] [address]-
7336  18 Dec, 2014   

 

 [facility] [place]  [address] [facility] [address] [id number] [place] [id number]  09 Dec, 
2014   

 

 [facility] [place] [facility]  [address] [id number] [place] [address]-
[address]  09 Dec, 2014   

 

 [facility] [place]  [address] [id number] [place] [id number]  02 Dec, 
2014   

 

 [facility] [place] [facility]  [address] [id number] [place] [address]-
7010  02 Dec, 2014   

 

 [facility] [place]  [address] [facility] [address] [id number] [place] 934753717     

 

 [facility] [place] [facility]  [address] [id number] [place] [address]-
8254     

 

 [facility] [place]  [address] [facility] [address] [id number] [place] 715874615     

 

 [facility]  [address] [id number] [place] [address]-
6453     

 

 [facility] [place]  [address] [facility] [address] [id number] [place], [place] [id number]  31 Oct, 
2014   

 

 [facility]  [address] [id number] [place] [address]  31 Oct, 2014   

 

 [facility] [place]  [address] [facility] [address] [id number] [place] [id number]  16 Oct, 
2014   

 

 [facility]  [address] [id number] [place] [address]  16 Oct, 2014   

 

 [facility] [place]  [address] [facility] [facility] [address] [id number] [place] [id number]  10 Oct, 
2014   

 

 [facility] [place] [facility]  [address] [id number] [place] [address]  10 Oct, 2014   

 

 [facility] [place]  [address] [id number] [place] [id number]  26 Sep, 
2014   

 

 [facility] [place] [facility]  [address] [address] [id number] [place] [id number]  26 Sep, 2014   

 

 [facility] [place]  [address] [id number] [place] [id number]  29 Aug, 
2014   

 

 [facility] [place] [facility]  [address] [id number] [place] [address]-
[address]  29 Aug, 2014   

 

 [facility] [place]  [address] [id number] [place] [id number]  25 Aug, 
2014   

 

 [facility] [place]  [address] [id number] [place] [id number]  25 Aug, 
2014   

 

 [facility] [place] [facility]  [address] [id number] [place] [address]  25 Aug, 2014   

 

 [facility] [place] [facility]  [address] [id number] [place] [address]-
[address]  25 Aug, 2014   

 

 [facility] [place]  [address] [id number] [place] [id number]  18 Aug, 
2014   

 

 [facility] [place] [facility]  [address] [id number] [place] [address]-
[address]  18 Aug, 2014   

 

 [facility] [place]  [address] [id number] [place] [id number]  14 Aug, 
2014   

 

 [facility] [place] [facility]  [address] [id number] [place] [place] [address]-
[address]  14 Aug, 2014   

 

 [facility] [place] [facility]  [address] [id number] [facility] [place] [address]-
7340  11 Aug, 2014   

 

 [facility] [place]  [address] [id number] [place] [id number]  11 Aug, 
2014   

 

 [facility] [place] [facility]  [address] [id number] [place] [place] [address]-
1406  11 Aug, 2014   

 

 [facility] [place] [facility]  [address] [id number] [facility] [place] [address]-
3146  11 Aug, 2014   

 

 [facility] [place] [facility]  [address] [id number] [place] [address]-
6244     

 

 [facility] [place]  [address] [id number] [place] [id number]     

 

 [facility] [place] [facility]  [address] [id number] [place] [address]-
[address]     

 

 [facility] [place] [facility]  [address] [facility] [id number] [place] [address]
2546     

 

 [facility] [place]  [address] [facility] [address] [id number] [place] [id number]     

 

 [facility] [place]  [address] [facility] [id number] [address]     

 

 [facility] [place]  [address] [facility] [address] [id number] [place] [id number]  30 May, 
2014   

 

 [facility] [place]  [address] [facility] [id number] [address]796  30 May, 2014   

 

 [facility] [place]  [address] [facility] [address] [id number] [place] [id number]  14 May, 
2014   

 

 [facility] [place]  [address] [facility] [id number][place] [address]76  14 May, 2014   

 

 [facility] [place]  [address] [facility] [address] [id number] [place] [id number]  05 May, 
2014   

 

 [facility] [place]  [address] [facility] [id number] [address]-
[address]96  05 May, 2014   

 

 [facility] [place]  [address] [facility] [address] [id number] [facility] [place] [id number]     

 

 [facility] [place]  [address] [facility] [id number] [place] [address]-
[address]36     

 

 [facility] [place]  [address] [facility] [address] [id number] [place] [id number]     

 

 [facility] [place]  [address] [facility] [id number][place] [address]-
3966     

 

 [facility] [place]  [address] [facility] [address] [id number] [place] [id number]  10 Feb, 
2014   

 

 [facility] [place]  [address] [facility] [id number][place] [address]-
5706  10 Feb, 2014   







IMMUNIZATIONS

No Known Immunizations



SOCIAL HISTORY

Never Assessed



REASON FOR VISIT

PALs- Ventolin



PLAN OF CARE





VITAL SIGNS





MEDICATIONS







 Medication  Instructions  Dosage  Frequency  Start Date  End Date  Duration  
Status

 

 Ventolin HFA 90 mcg/actuation  by inhalation route every 4 hrs  1-2 puffs  4h  
26 Sep, 2014     90 days  Active







RESULTS

No Results



PROCEDURES

No Known procedures



INSTRUCTIONS





MEDICATIONS ADMINISTERED

No Known Medications



MEDICAL (GENERAL) HISTORY







 Type  Description  Date

 

 Medical History  Diabetes mellitus without mention of complication, type II or 
unspecified type, not stated as uncontrolled   

 

 Medical History  Depressive disorder, not elsewhere classified   

 

 Medical History  Anxiety state, unspecified   

 

 Medical History  Effusion of lower leg joint   

 

 Medical History  Generalized osteoarthrosis, unspecified site   

 

 Medical History  Other and unspecified hyperlipidemia   

 

 Medical History  Abnormal weight gain   

 

 Medical History  Effusion of joint, site unspecified   

 

 Medical History  Esophageal reflux   

 

 Medical History  hypertension   

 

 Medical History  colonoscopy- inscreased polyp   

 

 Surgical History  Tonsillectomy   

 

 Surgical History  Orthopedic: Bilateral Knee x2   

 

 Surgical History  colonoscopy  2017

 

 Hospitalization History  Surgery(s)/Childbirth(s) only

## 2019-02-26 NOTE — XMS REPORT
[facility]

 Created on: 2018



Duyen Larkin

External Reference #: [id number]

: 1965

Sex: Female



Demographics







 Address  [address] [place]  [address]

 

 Preferred Language  Unknown

 

 Marital Status  Unknown

 

 Temple Affiliation  Unknown

 

 Race  Unknown

 

 Ethnic Group  Unknown





Author







 Author  LIYAH LOWE

 

 [facility] [place]

 

 Address  [address] [place]  [address]



 

 Phone  [phone]







Care Team Providers







 Care Team Member Name  Role  Phone

 

 LIYAH LOWE  Unavailable  [phone]







PROBLEMS







 Type  Condition  ICD9-CM Code  [id number] Code  Onset Dates  Condition Status  
SNOMED Code

 

 Problem  Effusion of lower leg joint  719.06        Active  405173827

 

 Problem  Esophageal reflux  530.81        Active  727060524

 

 Problem  Other hyperlipidemia     E78.4     Active  90098042

 

 Problem  Type 2 diabetes mellitus without complications     E11.9     Active  
118286711

 

 Problem  Primary generalized (osteo)arthritis     M15.0     Active  247487702

 

 Problem  COPD with exacerbation     J44.1     Active  369636287

 

 Problem  Claudication     I73.9     Active  798107073

 

 Problem  Mild episode of recurrent major depressive disorder     F33.0     
Active  091434383

 

 Problem  Cataracts, bilateral     H26.9     Active  57662225

 

 Problem  Chronic obstructive pulmonary disease with acute exacerbation     
J44.1     Active  507398877

 

 Problem  Chronic renal insufficiency, stage II (mild)     N18.2     Active  
385126722

 

 Problem  Primary osteoarthritis of both knees     M17.0     Active  580672798

 

 Problem  Other chronic pain     G89.29     Active  29030747

 

 Problem  Lumbar degenerative disc disease     M51.36     Active  44884024

 

 Problem  Essential (primary) hypertension     I10     Active  44678773

 

 Problem  Anxiety disorder, unspecified     F41.9     Active  637586473

 

 Problem  Peripheral arterial occlusive disease     I77.9     Active  432103572

 

 Problem  Diabetic polyneuropathy associated with type 2 diabetes mellitus     
E11.42     Active  97386094

 

 Problem  Type 2 diabetes mellitus with diabetic neuropathy, without long-term 
current use of insulin     E11.40     Active  14355119

 

 Problem  Chronic obstructive pulmonary disease, unspecified COPD type     
J44.9     Active  91617329

 

 Problem  Long term current use of insulin     Z79.4     Active  693578351

 

 Problem  Type 2 diabetes mellitus with hyperglycemia     E11.65     Active  
13501490

 

 Problem  Old tear of lateral meniscus of left knee, unspecified tear type     
M23.201     Active  648015393

 

 Problem  Major depressive disorder, single episode, unspecified     F32.9     
Active  07727542

 

 Problem  Effusion, left knee     M25.462     Active  203598144

 

 Problem  Dysphagia, unspecified type     R13.10     Active  28315176

 

 Problem  Hyperlipidemia LDL goal <130     E78.5     Active  39070451

 

 Problem  Unilateral primary osteoarthritis, left knee     M17.12     Active  
01965

 

 Problem  Hypothyroidism (acquired)     E03.9     Active  478784743







ALLERGIES

No Information



ENCOUNTERS







 Encounter  Location  Date  Diagnosis

 

 [facility] [place]  [address] [facility] C [id number] [place] [id number]  21 May, 
2018  Pain in left knee M25.562

 

 [facility] [place]  [address] [place] [address] C [id number] [place], [place] [id number]  21 May, 
2018  Pain in left knee M25.562 and Other chronic pain G89.29

 

 [facility]  [address] [id number] [address]  15 May, 2018   

 

 [facility] [place]  [address] [place] [address] C [id number] [place] [id number]  02 May, 
2018  Type 2 diabetes mellitus without complications E11.9 ; Long term current 
use of insulin Z79.4 ; Unilateral primary osteoarthritis, left knee M17.12 ; 
Lumbar degenerative disc disease M51.36 and Chronic obstructive pulmonary 
disease, unspecified COPD type J44.9

 

 [facility] [place]  [address] [facility] C [id number] [place] [id number]     

 

 [facility] [place]  [address] [facility] C [id number] [place] [id number]     

 

 [facility] [place]  [address] [place] [address] C [id number] [place] [id number]     

 

 [facility] [place]  [address] [place] [address] C [id number] [place] [id number]  23 Mar, 
2018  Type 2 diabetes mellitus without complications E11.9

 

 [facility] [place]  [address] [place] [address] C [id number] [place] [id number]  23 Mar, 
2018   

 

 [facility]  [address] [facility] [id number] [address]  13 Mar, 2018   

 

 [facility]  [address] [id number], [place] [address]  09 Mar, 2018  Type 2 diabetes mellitus without complications E11.9

 

 [facility] [place]  [address] [facility] C [id number] [place] [id number]     

 

 [facility] [place]  [address] [facility] C [id number] [place] [id number]    Diabetic polyneuropathy associated with type 2 diabetes mellitus E11.42

 

 [facility] [place]  [address] [facility] C [id number] [place] [id number]     

 

 [facility] [place]  [address] [facility] C [id number] [place] [id number]  21 Dec, 
2017  Type 2 diabetes mellitus without complications E11.9 ; Long term current 
use of insulin Z79.4 ; High risk sexual behavior Z72.51 ; BMI 40.0-44.9, adult 
Z68.41 and Encounter for immunization Z23

 

 [facility] [place]  [address] [place] [address] C [id number] [place], [place] [id number]  04 Dec, 
2017   

 

 [facility] [place]  [address] [place] [address] C [id number] [place] [id number]  04 Dec, 
2017   

 

 [facility] [place]  [address] [place] [address] C [id number] [place] [id number]    COPD with exacerbation J44.1 and BMI 40.0-44.9, adult Z68.41

 

 [facility] [place]  [address] [facility] C [id number] [place] [id number]  09 Oct, 
2017  Encounter for screening mammogram for malignant neoplasm of breast Z12.31 
; Well woman exam Z01.419 and High risk sexual behavior Z72.51

 

 [facility] [place]  [address] [facility] [address] [id number] [place] [id number]  11 Sep, 
2017   

 

 [facility] [place]  [address] [facility] [address] [id number] [place] [id number]  11 Sep, 
2017  Type 2 diabetes mellitus without complications E11.9

 

 [facility] [place]  [address] [facility] [address] [id number] [place] [address]  11 Sep, 
2017   

 

 [facility] [place]  [address] [facility] C [id number] [place] [id number]  11 Sep, 
2017  Old tear of lateral meniscus of left knee, unspecified tear type M23.201

 

 [facility] [place]  [address] [facility] [address] [id number] [place] [id number]  11 Sep, 
2017  Other type of osteoarthritis, unspecified site M19.90 ; Type 2 diabetes 
mellitus without complications E11.9 ; Primary osteoarthritis of both knees 
M17.0 ; Chronic renal insufficiency, stage II (mild) N18.2 ; Diabetic 
polyneuropathy associated with type 2 diabetes mellitus E11.42 and Exposure to 
hepatitis C Z20.5

 

 [facility] [place]  [address] [facility] [address] [id number] [place], [place] [address]  06 Sep, 
2017  Type 2 diabetes mellitus without complications E11.9

 

 [facility] [place]  [address] [facility] [address] [id number] [place] [address]  30 Aug, 
2017   

 

 [facility] [place]  [address] [facility] [address] [id number] [place] 757[address]  30 Aug, 
2017   

 

 [facility] [place]  [address] [facility] [address] [id number] [place] 1214[address]165  16 Aug, 
2017  Other type of osteoarthritis, unspecified site M19.90

 

 [facility] [place]  [address] [facility] [address] [id number] [place] [address]  15 Aug, 
2017  Type 2 diabetes mellitus without complications E11.9

 

 [facility] [place]  [address] [facility] [address] [id number] [place] [address]27[address]  07 Aug, 
2017   

 

 [facility] [place]  [address] [facility] [address] [id number] [place] [address]     

 

 [facility] [place]  [address] [facility] [address] [id number] [place] [address]     

 

 [facility] [place]  [address] [facility] [address] [id number] [place], [place] [id number]    Type 2 diabetes mellitus without complications E11.9 ; Type 2 diabetes 
mellitus with diabetic neuropathy, without long-term current use of insulin 
E11.40 ; Primary osteoarthritis of both knees M17.0 and Chronic renal 
insufficiency, stage II (mild) N18.2

 

 [facility] [place]  [address] [facility] [address] [id number] [place] [address]     

 

 [facility] [place]  [address] [facility] [address] [id number] [place] [address]  30 May, 
2017  Hyperlipidemia LDL goal <130 E78.5

 

 [facility] [place]  [address] [facility] [address] [id number] [place] [address]  19 May, 
2017  Type 2 diabetes mellitus without complications E11.9

 

 [facility]  [address] [facility] [id number] [place] [address]  16 May, 2017   

 

 [facility] [place]  [address] [facility] [address] [id number] [place] [id number]  08 May, 
2017   

 

 [facility] [place]  [address] [facility] [address] [id number] [place], [place] 9[address]  01 May, 
2017   

 

 [facility] [place]  [address] [facility] [address] [id number] [place] [address]     

 

 [facility] [place]  [address] [facility] [address] [id number] [place] [address]    Primary generalized (osteo)arthritis M15.0 and Type 2 diabetes mellitus 
without complications E11.9

 

 [facility] [place]  [address] [facility] [address] [id number] [place], [place] [address]    Old tear of lateral meniscus of left knee, unspecified tear type M23.201

 

 [facility] [place]  [address] [facility] [address] [id number] [place], [place] [address]     

 

 [facility] [place]  [address] [facility] [address] [id number] [place] [address]     

 

 [facility] [place]  [address] [facility] [address] [id number] [place], [place] [address]     

 

 [facility] [place]  [address] [facility] [address] [id number] [place], [place] [id number]  20 Mar, 
2017  Type 2 diabetes mellitus without complications E11.9

 

 [facility] [place]  [address] [facility] [address] [id number] [place] 6274[address]  13 Mar, 
2017  Type 2 diabetes mellitus without complications E11.9

 

 [facility] [place]  [address] [facility] [address] [id number] [place] [address]  13 Mar, 
2017  Type 2 diabetes mellitus without complications E11.9

 

 [facility] [place]  [address] [facility] [address] [id number] [place] [address]  04 Mar, 
2017  Type 2 diabetes mellitus without complications E11.9

 

 [facility] [place]  [address] [place] [address] C [id number] [place], [place] [address]     

 

 [facility] [place]  [address] [place] [address] C [id number] [place] [address]98[address]    Chronic obstructive pulmonary disease with acute exacerbation J44.1

 

 [facility] [place]  [address] [place] [address] C [id number] [place] [id number]     

 

 [facility] [place]  [address] [place] [address] C [id number] [place], [place] [id number]    Dysuria R30.0 ; Essential (primary) hypertension I10 ; Hypothyroidism (
acquired) E03.9 ; Type 2 diabetes mellitus without complications E11.9 and Mild 
episode of recurrent major depressive disorder F33.0

 

 [facility] [place]  [address] [place] [address] C [id number] [place], [place] [id number]     

 

 [facility] [place]  [address] [place] [address] C [id number] [place], [place] 152953082    Dysuria R30.0 ; Vaginal candidiasis B37.3 and Candidiasis B37.9

 

 [facility] [place]  [address] [place] [address] C [id number] [place] [address]     

 

 [facility] [place]  [address] [place] [address] C [id number] [place] [phone]  10 Octaviano, 
2017  COPD with exacerbation J44.1 and Dysphagia, unspecified type R13.10

 

 [facility] [place]  [address] [place] [address] C [id number] [place] [id number]  27 Dec, 
2016   

 

 [facility] [place]  [address] [place] [address] C [id number] [place] [id number]  12 Dec, 
2016  Essential (primary) hypertension I10 ; Hypothyroidism (acquired) E03.9 ; 
Type 2 diabetes mellitus without complications E11.9 ; Primary generalized (
osteo)arthritis M15.0 ; Major depressive disorder, single episode, unspecified 
F32.9 ; Unilateral primary osteoarthritis, left knee M17.12 ; Hyperlipidemia 
LDL goal <130 E78.5 and Dysphagia, unspecified type R13.10

 

 [facility] [place]  [address] [place] [address] C [id number] [place] 157047652  01 Dec, 
2016   

 

 [facility] [place]  [address] [facility] C [id number] [place] 1270[address]33     

 

 [facility] [place]  [address] [facility] C [id number] [place] 899997047     

 

 [facility] [place]  [address] [facility] C [id number] [place] 607123764     

 

 [facility] [place]  [address] [place] [address] C [id number] [place] [id number]     

 

 [facility] [place]  [address] [place] [address] C [id number] [place] [id number]     

 

 [facility] [place]  [address] [place] [address] C [id number] [place], [place] [id number]    Type 2 diabetes mellitus without complications E11.9 ; Primary 
generalized (osteo)arthritis M15.0 ; Effusion, left knee M25.462 ; Old tear of 
lateral meniscus of left knee, unspecified tear type M23.201 and Fatigue, 
unspecified type R53.83

 

 [facility] [place]  [address] [place] [address] C [id number] [place], [place] 858252883  26 Sep, 
2016  Type 2 diabetes mellitus without complications E11.9 ; Claudication I73.9 
; Pain in right leg M79.604 and Pain of left leg M79.605

 

 [facility] [place]  [address] [place] [address] C [id number] [place] 550970303  14 Sep, 
2016   

 

 [facility] [place]  [address] [place] [address] C [id number] [place] 809869435  12 Sep, 
2016   

 

 [facility] [place]  [address] [place] [address] C [id number] [place], [place] 229676025  18 Aug, 
2016  Type 2 diabetes mellitus with hyperglycemia E11.65 ; Long term current 
use of insulin Z79.4 ; Anxiety disorder, unspecified F41.9 ; Essential (primary
) hypertension I10 ; Major depressive disorder, single episode, unspecified 
F32.9 and Peripheral arterial occlusive disease I77.9

 

 [facility] [place]  [address] [place] [address] C [id number] [place] 305803292  17 Aug, 
2016   

 

 [facility] [place]  [address] [place] [address] C [id number] [place] [id number]  11 Aug, 
2016   

 

 [facility] [place]  [address] [facility] [address] [id number] [place] 197154446  11 Aug, 
2016   

 

 [facility] [place]  [address] [facility] C [id number] [place] [id number]  10 Aug, 
2016   

 

 [facility] [place]  [address] [place] [address] C [id number] [place] 148477759  05 Aug, 
2016  Acute midline low back pain with right-sided sciatica M54.41

 

 [facility]  [address] [facility] [id number] [place] [address]
[address]  05 Aug, 2016  Chest pain, unspecified type R07.9 ; Palpitations R00.2 ; 
Claudication I73.9 ; Generalized pain R52 and Type 2 diabetes mellitus without 
complications E11.9

 

 [facility] [place]  [address] [place] [address] C [id number] [place], [place] [id number]    Acute midline low back pain with right-sided sciatica M54.41 ; Dysuria 
R30.0 ; Claudication I73.9 ; Peripheral arterial occlusive disease I77.9 ; 
Shortness of breath R06.02 ; Effusion, left knee M25.462 and Type 2 diabetes 
mellitus without complications E11.9

 

 [facility] [place]  [address] [place] [address] [address] [id number] [place] [id number]     

 

 [facility] [place]  [address] [place] [address] C [id number] [place] [id number]     

 

 [facility] [place]  [address] [place] [address] [address] [id number] [place] [id number]     

 

 [facility] [place]  [address] [place] [address] [address] [id number] [place] 787622485    Type 2 diabetes mellitus without complications E11.9 ; Other 
hyperlipidemia E78.4 ; Peripheral arterial occlusive disease I77.9 ; Essential (
primary) hypertension I10 and Other fatigue R53.83

 

 [facility] [place]  [address] [facility] [address] [id number] [place] 989200066  18 May, 
2016   

 

 [facility] [place]  [address] [place] [address] C [id number] [place] [id number]  06 May, 
2016   

 

 [facility] [place]  [address] [facility] [address] [id number] [place] [id number]  05 May, 
2016  Chest pain, unspecified type R07.9 ; Peripheral arterial occlusive 
disease I77.9 ; Anxiety disorder, unspecified F41.9 ; Essential (primary) 
hypertension I10 ; Type 2 diabetes mellitus without complications E11.9 and 
Other hyperlipidemia E78.4

 

 [facility] [place]  [address] [facility] [address] [id number] [place] [id number]  04 May, 
2016   

 

 [facility] [place]  [address] [place] [address] [address] [id number] [place], [place] 300812224     

 

 [facility] [place]  [address] [place] [address] [address] [id number] [place] [id number]     

 

 [facility] [place]  [address] [place] [address] [address] [id number] [place] [id number]    Type 2 diabetes mellitus without complications E11.9 ; Peripheral 
arterial occlusive disease I77.9 ; Primary generalized (osteo)arthritis M15.0 ; 
Major depressive disorder, single episode, unspecified F32.9 ; Anxiety disorder
, unspecified F41.9 and Essential (primary) hypertension I10

 

 [facility] [place]  [address] [place] [address] [address] [id number] [place], [place] [id number]     

 

 [facility] [place]  [address] [place] [address] [address] [id number] [place] [id number]     

 

 [facility]  [address] [facility] [id number] [place] [address]-
[address]  02 Mar, 2016   

 

 [facility] [place]  [address] [place] [address] [address] [id number] [place] 786926311     

 

 [facility] [place]  [address] [place] [address] [address] [id number] [place] 877307855    Bronchitis J40 ; Shortness of breath R06.02 and Wheezing R06.2

 

 [facility] [place]  [address] [place] [address] [address] [id number] [place] 394024404     

 

 [facility] [place]  [address] [place] [address] [address] [id number] [place] 332424080     

 

 [facility] [place]  [address] [place] [address] [address] [id number] [place] [id number]     

 

 [facility] [place]  [address] [facility] [address] [id number] [place] 938856934     

 

 [facility] [place]  [address] [facility] [address] [id number] [place] [id number]    Type 2 diabetes mellitus without complications E11.9 ; Claudication I73.9 
; Other hyperlipidemia E78.4 ; Cataracts, bilateral H26.9 and Other fatigue 
R53.83

 

 [facility] [place]  [address] [facility] [address] [id number] [place] [id number]  28 Oct, 
2015   

 

 [facility] [place]  [address] [facility] [address] [id number] [place] 012337576  22 Oct, 
2015   

 

 [facility] [place]  [address] [facility] [address] [id number] [place] [id number]  16 Oct, 
2015   

 

 [facility] [place]  [address] [facility] [address] [id number] [place] [id number]  14 Oct, 
2015  Type 2 diabetes mellitus without complications E11.9 ; Other 
hyperlipidemia E78.4 ; Primary generalized (osteo)arthritis M15.0 and 
Claudication I73.9

 

 [facility] [place]  [address] [facility] [address] [id number] [place] [id number]  12 Oct, 
2015   

 

 [facility] [place]  [address] [facility] [address] [id number] [place] 652182710  26 Aug, 
2015   

 

 [facility] [place]  [address] [facility] [address] [id number] [place] [id number]  12 Aug, 
2015   

 

 [facility] [place]  [address] [facility] [address] [id number] [place] [id number]  12 Aug, 
2015   

 

 [facility] [place]  [address] [facility] [address] [id number] [place] [id number]  10 Aug, 
2015   

 

 [facility] [place]  [address] [facility] [address] [id number] [place] 959647204  05 Aug, 
2015   

 

 [facility] [place]  [address] [facility] [address] [id number] [place] [id number]  11 May, 
2015  Diabetes mellitus without mention of complication, type II or unspecified 
type, not stated as uncontrolled 250.00 ; Bronchitis 490 and Effusion of lower 
leg joint 719.06

 

 [facility] [place]  [address] [facility] [address] [id number] [place] 856265916  01 May, 
2015   

 

 [facility] [place]  [address] [facility] [address] [id number] [place] 086762576  01 May, 
2015  Bronchitis 490 and Wheezing 786.07

 

 [facility]  [address] [facility] [id number] [place] [address]-
[address]     

 

 [facility]  [address] [facility] [id number] [address]-
8873     

 

 [facility]  [address] [facility] [id number], [place] [address]-
[address]     

 

 [facility] [place]  [address] [facility] [address] [id number] [place] 656161878  19 Mar, 
2015   

 

 [facility]  [address] [facility] [id number][place] [address]-
[address]  19 Mar, 2015   

 

 [facility] [place]  [address] [facility] [address] [id number] [place] [id number]  09 Mar, 
2015   

 

 [facility]  [address] [facility] [id number], [place] [address]-
5669  09 Mar, 2015   

 

 [facility] [place]  [address] [facility] [address] [id number] [place] [id number]     

 

 [facility]  [address] [facility] [id number], [place] [address]-
58[address]     

 

 [facility]  [address] [facility] [id number], [place] [address]-
2546  10 Feb, 2015   

 

 [facility] [place]  [address] [facility] [address] [id number] [place] [id number]     

 

 [facility] [place]  [address] [facility] [address] [id number] [place] [id number]     

 

 [facility]  [address] [facility] [id number][place] [address]-
1296     

 

 [facility]  [address] [facility] [id number], [place] [address]-
3366     

 

 [facility] [place]  [address] [id number] [place] [id number]     

 

 [facility] [place] [facility]  [address] [id number] [place] [address]-
[address]     

 

 [facility] [place]  [address] [id number] [place] [phone]     

 

 [facility] [place] [facility] [facility]  [address] [id number] [place] [address]-
[address]     

 

 [facility] [place]  [address] [id number] [place] [id number]  24 Dec, 
2014   

 

 [facility] [place] [facility] [facility]  [address] [id number] [facility] [place] [address]-
6527  24 Dec, 2014   

 

 [facility] [place]  [address] [id number] [place] [id number]  18 Dec, 
2014   

 

 [facility] [place] [facility] [facility]  [address] [id number] [place] [address]-
[address]  18 Dec, 2014   

 

 [facility] [place]  [address] [id number] [place] [id number]  09 Dec, 
2014   

 

 [facility] [place] [facility]  [address] [id number] [place] [address]-
1656  09 Dec, 2014   

 

 [facility] [place]  [address] [id number] [place] [id number]  02 Dec, 
2014   

 

 [facility] [place] [facility]  [address] [id number] [place] [address]-
[address]  02 Dec, 2014   

 

 [facility] [place]  [address] [id number] [place] [id number]     

 

 [facility] [place] [facility]  [address] [id number] [place] [address]-
[address]     

 

 [facility] [place]  [address] [id number] [place] [id number]     

 

 [facility] [place] [facility]  [address] [id number] [place] [address]-
44[address]     

 

 [facility] [place]  [address] [id number] [place] [id number]  31 Oct, 
2014   

 

 [facility] [place] [facility]  [address] [id number] [place] [address]-
6465  31 Oct, 2014   

 

 [facility] [place]  [address] [id number] [place] [id number]  16 Oct, 
2014   

 

 [facility] [place] [facility]  [address] [id number] [place] [address]-
7256  16 Oct, 2014   

 

 [facility] [place]  [address] [id number] [place] [id number]  10 Oct, 
2014   

 

 [facility] [place] [facility]  [address] [id number] [place] [address]-
3231  10 Oct, 2014   

 

 [facility] [place]  [address] [id number] [place] [id number]  26 Sep, 
2014   

 

 [facility] [place] [facility]  [address] [id number] [place] [address]-
3244  26 Sep, 2014   

 

 [facility] [place]  [address] [id number] [place] [id number]  29 Aug, 
2014   

 

 [facility] [place] [facility]  [address] [id number] [place] [address]-
0747  29 Aug, 2014   

 

 [facility] [place]  [address] [id number] [place] [id number]  25 Aug, 
2014   

 

 [facility] [place]  [address] [id number] [place] [id number]  25 Aug, 
2014   

 

 [facility] [place] [facility]  [address] [id number] [place] [address]-
[address]  25 Aug, 2014   

 

 [facility] [place] [facility]  [address] [id number] [place] [address]-
0329  25 Aug, 2014   

 

 [facility] [place]  [address] [id number] [place] [id number]  18 Aug, 
2014   

 

 [facility] [place] [facility]  [address] [id number] [place] [address]-
8583  18 Aug, 2014   

 

 [facility] [place]  [address] [id number] [place] [id number]  14 Aug, 
2014   

 

 [facility]  [address] [id number] [place] [address]-
5836  14 Aug, 2014   

 

 [facility] [place] [facility]  [address] [id number] [place] [address]-
7936  11 Aug, 2014   

 

 [facility] [place]  [address] [id number] [place] [id number]  11 Aug, 
2014   

 

 [facility] [place] [facility]  [address] [id number] [place] [address]-
[address]6  11 Aug, 2014   

 

 [facility]  [address] [id number] [place] [address]-
7407  11 Aug, 2014   

 

 [facility] [place] [facility]  [address] [id number] [place] [address]-
1867     

 

 [facility] [place]  [address] [id number] [place] [id number]     

 

 [facility] [place] [facility]  [address] [id number] [place] [address]-
[address]     

 

 [facility] [place] [facility]  [address] [id number] [place] [address]-
8299     

 

 [facility] [place]  [address] [id number] [place] [id number]     

 

 [facility]  [address] [id number] [place] [address]-
3572     

 

 [facility] [place]  [address] [id number] [place] [id number]  30 May, 
2014   

 

 [facility] [place] [facility]  [address] [id number] [place] [address]-
4006  30 May, 2014   

 

 [facility] [place]  [address] [id number] [place] [id number]  14 May, 
2014   

 

 [facility] [place] [facility]  [address] [id number] [place] [address]-
9796  14 May, 2014   

 

 [facility] [place]  [address] [id number] [place] [id number]  05 May, 
2014   

 

 [facility]  [address] [facility] [id number] [place] [address]-
[address]886  05 May, 2014   

 

 [facility] [place]  [address] [facility] [address] [id number] [place] [id number]     

 

 [facility]  [address] [facility] [id number] [place] [address]-
8396     

 

 [facility] [place]  [address] [facility] [address] [id number] [place] [id number]     

 

 [facility]  [address] [facility] [id number] [place] [address]-
0136     

 

 [facility] [place]  [address] [facility] [address] [id number] [place] [id number]  10 Feb, 
2014   

 

 [facility]  [address] [facility] [id number] [place] [address]-
3026  10 Feb, 2014   







IMMUNIZATIONS

No Known Immunizations



SOCIAL HISTORY

Never Assessed



REASON FOR VISIT

medication refill 



PLAN OF CARE





VITAL SIGNS





MEDICATIONS







 Medication  Instructions  Dosage  Frequency  Start Date  End Date  Duration  
Status

 

 Pantoprazole Sodium 40 mg  Orally Once a day  1 tablet  24h  27 Dec, 2016     
   Active







RESULTS

No Results



PROCEDURES

No Known procedures



INSTRUCTIONS





MEDICATIONS ADMINISTERED

No Known Medications



MEDICAL (GENERAL) HISTORY







 Type  Description  Date

 

 Medical History  Diabetes mellitus without mention of complication, type II or 
unspecified type, not stated as uncontrolled   

 

 Medical History  Depressive disorder, not elsewhere classified   

 

 Medical History  Anxiety state, unspecified   

 

 Medical History  Effusion of lower leg joint   

 

 Medical History  Generalized osteoarthrosis, unspecified site   

 

 Medical History  Other and unspecified hyperlipidemia   

 

 Medical History  Abnormal weight gain   

 

 Medical History  Effusion of joint, site unspecified   

 

 Medical History  Esophageal reflux   

 

 Medical History  hypertension   

 

 Medical History  colonoscopy- inscreased polyp   

 

 Surgical History  Tonsillectomy   

 

 Surgical History  Orthopedic: Bilateral Knee x2   

 

 Surgical History  colonoscopy  2017

 

 Hospitalization History  Surgery(s)/Childbirth(s) only

## 2019-02-26 NOTE — XMS REPORT
[facility]

 Created on: 2018



Duyen Larkin

External Reference #: [id number]

: 1965

Sex: Female



Demographics







 Address  [address] [place]  [address]

 

 Preferred Language  Unknown

 

 Marital Status  Unknown

 

 [employer] Affiliation  Unknown

 

 Race  Unknown

 

 Ethnic Group  Unknown





Author







 Author  MARY ANNE WALKER

 

 Organization  [facility]  [place]

 

 Address  [address] [place]  [address]



 

 Phone  [phone]







Care Team Providers







 Care Team Member Name  Role  Phone

 

 WALKERMARY ANNE  Unavailable  [phone]







PROBLEMS







 Type  Condition  ICD9-CM Code  [id number] Code  Onset Dates  Condition Status  
SNOMED Code

 

 Problem  Effusion of lower leg joint  719.06        Active  649251167

 

 Problem  Esophageal reflux  530.81        Active  979258512

 

 Problem  Other hyperlipidemia     E78.4     Active  67594049

 

 Problem  Type 2 diabetes mellitus without complications     E11.9     Active  
959495021

 

 Problem  Primary generalized (osteo)arthritis     M15.0     Active  744186251

 

 Problem  COPD with exacerbation     J44.1     Active  060547133

 

 Problem  Claudication     I73.9     Active  077112796

 

 Problem  Mild episode of recurrent major depressive disorder     F33.0     
Active  588666927

 

 Problem  Cataracts, bilateral     H26.9     Active  09623555

 

 Problem  Chronic obstructive pulmonary disease with acute exacerbation     
J44.1     Active  338656319

 

 Problem  Chronic renal insufficiency, stage II (mild)     N18.2     Active  
768546346

 

 Problem  Primary osteoarthritis of both knees     M17.0     Active  403037213

 

 Problem  Other chronic pain     G89.29     Active  58933349

 

 Problem  Lumbar degenerative disc disease     M51.36     Active  83735835

 

 Problem  Essential (primary) hypertension     I10     Active  46435987

 

 Problem  Anxiety disorder, unspecified     F41.9     Active  310688526

 

 Problem  Peripheral arterial occlusive disease     I77.9     Active  334744968

 

 Problem  Diabetic polyneuropathy associated with type 2 diabetes mellitus     
E11.42     Active  58722607

 

 Problem  Type 2 diabetes mellitus with diabetic neuropathy, without long-term 
current use of insulin     E11.40     Active  95729362

 

 Problem  Chronic obstructive pulmonary disease, unspecified COPD type     
J44.9     Active  43334127

 

 Problem  Long term current use of insulin     Z79.4     Active  704854709

 

 Problem  Type 2 diabetes mellitus with hyperglycemia     E11.65     Active  
84937300

 

 Problem  Old tear of lateral meniscus of left knee, unspecified tear type     
M23.201     Active  064364021

 

 Problem  Major depressive disorder, single episode, unspecified     F32.9     
Active  52858402

 

 Problem  Effusion, left knee     M25.462     Active  347281299

 

 Problem  Dysphagia, unspecified type     R13.10     Active  83917421

 

 Problem  Hyperlipidemia LDL goal <130     E78.5     Active  03845488

 

 Problem  Unilateral primary osteoarthritis, left knee     M17.12     Active  
165379012

 

 Problem  Hypothyroidism (acquired)     E03.9     Active  132876135







ALLERGIES

No Information



ENCOUNTERS







 Encounter  Location  Date  Diagnosis

 

 [address] [address]  21 May, 2018  Pain in left 
knee M25.562

 

 [address] [address]  21 May, 2018  Pain in left 
knee M25.562 and Other chronic pain G89.29

 

 [address] [place] [address]  15 May, 2018   

 

 [address] [address]  02 May, 2018  Type 2 
diabetes mellitus without complications E11.9 ; Long term current use of 
insulin Z79.4 ; Unilateral primary osteoarthritis, left knee M17.12 ; Lumbar 
degenerative disc disease M51.36 and Chronic obstructive pulmonary disease, 
unspecified COPD type J44.9

 

 [address] [address]     

 

 [address] [address]     

 

 [address] [address]     

 

 [address] [address]  23 Mar, 2018  Type 2 
diabetes mellitus without complications E11.9

 

 [address] [address]  23 Mar, 2018   

 

 [place] [id number] [place] [address]-
[address]  13 Mar, 2018   

 

 [place] [id number] [place] [address]-
[address]  09 Mar, 2018  Type 2 diabetes mellitus without complications E11.9

 

 [address] [address]     

 

 [address] [address]    Diabetic 
polyneuropathy associated with type 2 diabetes mellitus E11.42

 

 [address] [address]     

 

 [address] [address]  21 Dec, 2017  Type 2 
diabetes mellitus without complications E11.9 ; Long term current use of 
insulin Z79.4 ; High risk sexual behavior Z72.51 ; BMI 40.0-44.9, adult Z68.41 
and Encounter for immunization Z23

 

 [address] [address]  04 Dec, 2017   

 

 [address] [address]  04 Dec, 2017   

 

 [address] [address]    COPD with 
exacerbation J44.1 and BMI 40.0-44.9, adult Z68.41

 

 [address] [address]  09 Oct, 2017  Encounter 
for screening mammogram for malignant neoplasm of breast Z12.31 ; Well woman 
exam Z01.419 and High risk sexual behavior Z72.51

 

 [address] [address]  11 Sep, 2017   

 

 [address] [address]  11 Sep, 2017  Type 2 
diabetes mellitus without complications E11.9

 

 [address] [address]  11 Sep, 2017   

 

 [address] [address]  11 Sep, 2017  Old tear of 
lateral meniscus of left knee, unspecified tear type M23.201

 

 [address] [address]  11 Sep, 2017  Other type 
of osteoarthritis, unspecified site M19.90 ; Type 2 diabetes mellitus without 
complications E11.9 ; Primary osteoarthritis of both knees M17.0 ; Chronic 
renal insufficiency, stage II (mild) N18.2 ; Diabetic polyneuropathy associated 
with type 2 diabetes mellitus E11.42 and Exposure to hepatitis C Z20.5

 

 [facility] [place]  [address] [place] [address]  06 Sep, 2017  Type 2 
diabetes mellitus without complications E11.9

 

 [facility] [address] [address]  30 Aug, 2017   

 

 [facility] [address] [address]  30 Aug, 2017   

 

 [facility] [address] [address]  16 Aug, 2017  Other type 
of osteoarthritis, unspecified site M19.90

 

 [facility] [address] [address]  15 Aug, 2017  Type 2 
diabetes mellitus without complications E11.9

 

 [facility] [address] [address]  07 Aug, 2017   

 

 [facility] [address] [address]     

 

 [facility] [address] [address]     

 

 [facility] [address] [address]    Type 2 
diabetes mellitus without complications E11.9 ; Type 2 diabetes mellitus with 
diabetic neuropathy, without long-term current use of insulin E11.40 ; Primary 
osteoarthritis of both knees M17.0 and Chronic renal insufficiency, stage II (
mild) N18.2

 

 [facility] [address] [address]     

 

 [facility] [address] [address]  30 May, 2017  
Hyperlipidemia LDL goal <130 E78.5

 

 [address] [address]  19 May, 2017  Type 2 
diabetes mellitus without complications E11.9

 

 [facility]  [address] [facility] [id number] [place] [address]  16 May, 2017   

 

 [address] [address]  08 May, 2017   

 

 [address] [address]  01 May, 2017   

 

 [facility] [place]  [address] [place] [address]     

 

 [facility] [place]  [address] [place] [address]    Primary 
generalized (osteo)arthritis M15.0 and Type 2 diabetes mellitus without 
complications E11.9

 

 [facility] [place]  [address] [place] [address]    Old tear of 
lateral meniscus of left knee, unspecified tear type M23.201

 

 [facility] [place]  [address] [place] [address]     

 

 [facility] [place]  [address] [place] [address]     

 

 [facility] [place]  [address] [place] [address]     

 

 [facility] [place]  [address] [place] [address]  20 Mar, 2017  Type 2 
diabetes mellitus without complications E11.9

 

 [facility] [place]  [address] [place] [address]  13 Mar, 2017  Type 2 
diabetes mellitus without complications E11.9

 

 [facility] [place] [address]  13 Mar, 2017  Type 2 
diabetes mellitus without complications E11.9

 

 [facility] [place]  [address] [place] [address]  04 Mar, 2017  Type 2 
diabetes mellitus without complications E11.9

 

 [facility] [place] [address]     

 

 [facility] [place]  [address] [place] [address]    Chronic 
obstructive pulmonary disease with acute exacerbation J44.1

 

 [facility] [place]  [address] [place] [address]     

 

 [facility] [place]  [address] [place] [address]    Dysuria 
R30.0 ; Essential (primary) hypertension I10 ; Hypothyroidism (acquired) E03.9 
; Type 2 diabetes mellitus without complications E11.9 and Mild episode of 
recurrent major depressive disorder F33.0

 

 [facility] [place]  [address] [place] [address]  09 2017   

 

 [address] [address]    Dysuria 
R30.0 ; Vaginal candidiasis B37.3 and Candidiasis B37.9

 

 [address] [address]     

 

 [address] [address]  10 Octaviano, 2017  COPD with 
exacerbation J44.1 and Dysphagia, unspecified type R13.10

 

 [address] [address]  27 Dec, 2016   

 

 [address] [address]  12 Dec, 2016  Essential (
primary) hypertension I10 ; Hypothyroidism (acquired) E03.9 ; Type 2 diabetes 
mellitus without complications E11.9 ; Primary generalized (osteo)arthritis 
M15.0 ; Major depressive disorder, single episode, unspecified F32.9 ; 
Unilateral primary osteoarthritis, left knee M17.12 ; Hyperlipidemia LDL goal <
130 E78.5 and Dysphagia, unspecified type R13.10

 

 [address] [address]  01 Dec, 2016   

 

 [address] [address]     

 

 [address] [address]     

 

 [address] [address]     

 

 [address] [address]     

 

 [address] [address]     

 

 [address] [address]    Type 2 
diabetes mellitus without complications E11.9 ; Primary generalized (osteo)
arthritis M15.0 ; Effusion, left knee M25.462 ; Old tear of lateral meniscus of 
left knee, unspecified tear type M23.201 and Fatigue, unspecified type R53.83

 

 [address] [address]  26 Sep, 2016  Type 2 
diabetes mellitus without complications E11.9 ; Claudication I73.9 ; Pain in 
right leg M79.604 and Pain of left leg M79.605

 

 [address] [address]  14 Sep, 2016   

 

 [address] [address]  12 Sep, 2016   

 

 [address] [address]  18 Aug, 2016  Type 2 
diabetes mellitus with hyperglycemia E11.65 ; Long term current use of insulin 
Z79.4 ; Anxiety disorder, unspecified F41.9 ; Essential (primary) hypertension 
I10 ; Major depressive disorder, single episode, unspecified F32.9 and 
Peripheral arterial occlusive disease I77.9

 

 [address] [address]  17 Aug, 2016   

 

 [address] [address]  11 Aug, 2016   

 

 [address] [address]  11 Aug, 2016   

 

 [address] [address]  10 Aug, 2016   

 

 [address] [address]  05 Aug, 2016  Acute 
midline low back pain with right-sided sciatica M54.41

 

 [facility]  [address] [facility] [id number] [place] [address]  05 Aug, 2016  Chest pain, unspecified type R07.9 ; Palpitations R00.2 ; 
Claudication I73.9 ; Generalized pain R52 and Type 2 diabetes mellitus without 
complications E11.9

 

 [address] [address]    Acute 
midline low back pain with right-sided sciatica M54.41 ; Dysuria R30.0 ; 
Claudication I73.9 ; Peripheral arterial occlusive disease I77.9 ; Shortness of 
breath R06.02 ; Effusion, left knee M25.462 and Type 2 diabetes mellitus 
without complications E11.9

 

 [address] [place] [address]     

 

 [facility]  [address] [place] [address]     

 

 [facility]  [address] [place] [address]     

 

 [facility]  [address] [place] [address]    Type 2 
diabetes mellitus without complications E11.9 ; Other hyperlipidemia E78.4 ; 
Peripheral arterial occlusive disease I77.9 ; Essential (primary) hypertension 
I10 and Other fatigue R53.83

 

 [facility]  [address] [place] [address]  18 May, 2016   

 

 [facility]  [address] [place] [address]  06 May, 2016   

 

 [facility]  [address] [place] [address]  05 May, 2016  Chest pain, 
unspecified type R07.9 ; Peripheral arterial occlusive disease I77.9 ; Anxiety 
disorder, unspecified F41.9 ; Essential (primary) hypertension I10 ; Type 2 
diabetes mellitus without complications E11.9 and Other hyperlipidemia E78.4

 

 [facility]  [address] [place] [address]  04 May, 2016   

 

 [facility]  [address] [place] [address]     

 

 [facility]  [address] [place] [address]     

 

 [facility]  [address] [place] [address]    Type 2 
diabetes mellitus without complications E11.9 ; Peripheral arterial occlusive 
disease I77.9 ; Primary generalized (osteo)arthritis M15.0 ; Major depressive 
disorder, single episode, unspecified F32.9 ; Anxiety disorder, unspecified 
F41.9 and Essential (primary) hypertension I10

 

 [facility]  [address] [place] [address]     

 

 [facility]  [address] [place] [address]     

 

 [facility]  [address] [facility] [id number] [address]  02 Mar, 2016   

 

 [facility] [place]  [address] [place] [address]     

 

 [facility]  [address] [place] [address]    Bronchitis 
J40 ; Shortness of breath R06.02 and Wheezing R06.2

 

 [facility]  [address] [place] [address]     

 

 [facility]  [address] [place] [address]     

 

 [facility]  [address] [place] [address]     

 

 [address] [address]     

 

 [address] [address]    Type 2 
diabetes mellitus without complications E11.9 ; Claudication I73.9 ; Other 
hyperlipidemia E78.4 ; Cataracts, bilateral H26.9 and Other fatigue R53.83

 

 [address] [address]  28 Oct, 2015   

 

 [address] [address]  22 Oct, 2015   

 

 [address] [address]  16 Oct, 2015   

 

 [address] [address]  14 Oct, 2015  Type 2 
diabetes mellitus without complications E11.9 ; Other hyperlipidemia E78.4 ; 
Primary generalized (osteo)arthritis M15.0 and Claudication I73.9

 

 [facility]  [address] [place] [address]  12 Oct, 2015   

 

 [facility] [place] [address]  26 Aug, 2015   

 

 [facility] [address]  12 Aug, 2015   

 

 [facility] [place]  [address] [place] [address]  12 Aug, 2015   

 

 [facility]  [address] [place] [address]  10 Aug, 2015   

 

 [facility]  [address] [place] [address]  05 Aug, 2015   

 

 [facility]  [address] [place] [address]  11 May, 2015  Diabetes 
mellitus without mention of complication, type II or unspecified type, not 
stated as uncontrolled 250.00 ; Bronchitis 490 and Effusion of lower leg joint 
719.06

 

 [facility]  [address] [place] [address]  01 May, 2015   

 

 [facility]  [address] [place] [address]  01 May, 2015  Bronchitis 
490 and Wheezing 786.07

 

 [facility]  [address] [place] [id number] [place] [address]-
[address]     

 

 [facility]  [address] [place] [id number] [place] [address]-
1029     

 

 [facility]  [address] [place] [id number] [place] [address]-
8402     

 

 [facility]  [address] [place] [address]  19 Mar, 2015   

 

 [facility]  [address] [place] [id number] [place] [address]-
3777  19 Mar, 2015   

 

 [facility]  [address] [place] [address]  09 Mar, 2015   

 

 [facility]  [address] [place] [id number] [place] [address]-
0203  09 Mar, 2015   

 

 [facility]  [address] [place] [address]     

 

 [facility]  [address] [place], [place] [id number] [place] [address]-
[address]     

 

 [facility]  [address] [place] [id number] [place] [address]-
3549  10 Feb, 2015   

 

 [address] [address]     

 

 [facility] [place]  [address] [place] [address]     

 

 [facility] [place] [facility]  [address] [facility] [id number] [address]-
8[address]  ,    

 

 [facility] [place] [facility]  [address] [place] [id number] [address]-
5646     

 

 [facility] [place]  [address] [place] [address]     

 

 [facility] [place] [facility]  [address] [place] [id number] [address]-
[address]     

 

 [facility] [place]  [address] [place] [address]     

 

 [facility] [place] [facility]  [address] [place] [id number] [address]-
[address]     

 

 [facility] [place]  [address] [place] [address]  24 Dec, 2014   

 

 [facility] [place] [facility]  [address]100[place] [address]-
[address]  24 Dec, 2014   

 

 [facility] [place]  [address] [place] [address]  18 Dec, 2014   

 

 [facility] [place] [facility]  [address] [place] [id number] [address]-
1631  18 Dec, 2014   

 

 [facility] [place]  [address] [place] [address]  09 Dec, 2014   

 

 [facility] [place] [facility]  [address] [place] [id number] [address]-
0061  09 Dec, 2014   

 

 [facility] [place]  [address] [place] [address]  02 Dec, 2014   

 

 [facility] [place] [facility]  [address] [place] [id number] [address]-
5969  02 Dec, 2014   

 

 [facility] [place]  [address] [place] [address]     

 

 [facility] [place] [facility]  [address] [place] [id number] [address]-
0966     

 

 [facility] [place]  [address] [place] [address]     

 

 [facility] [place] [facility]  [address]65100[place] [address]-
1838     

 

 [facility] [place]  [address] [place] [address]  31 Oct, 2014   

 

 [facility] [place] [facility]  [address] [place] [id number] [address]-
2996  31 Oct, 2014   

 

 [facility] [place]  [address] [place] [address]  16 Oct, 2014   

 

 [facility] [place] [facility]  [address] [place] [id number] [address]-
8007  16 Oct, 2014   

 

 [facility] [place]  [address] [place] [address]  10 Oct, 2014   

 

 [facility] [place] [facility]  [address] [place] [id number] [address]-
[address]31  10 Oct, 2014   

 

 [facility] [place]  [address] [place] [address]  26 Sep, 2014   

 

 [facility] [place] [facility]  [address] [place] [id number] [address]-
3687  26 Sep, 2014   

 

 [facility] [place]  [address] [place] [address]  29 Aug, 2014   

 

 [facility] [place] [facility]  [address] [place] [id number] [address]-
9549  29 Aug, 2014   

 

 [facility] [place]  [address] [place] [address]  25 Aug, 2014   

 

 [facility] [place]  [address] [place] [address]  25 Aug, 2014   

 

 [facility] [place] [facility]  [address] [place] [id number] [address]-
4372  25 Aug, 2014   

 

 [facility] [place] [facility]  [address] [place] [id number] [address]-
0887  25 Aug, 2014   

 

 [facility] [place]  [address] [place] [address]  18 Aug, 2014   

 

 [facility] [place] [facility]  [address] [place] [id number] [address]-
8298  18 Aug, 2014   

 

 [facility] [place] [address]  14 Aug, 2014   

 

 [facility] [place] [facility]  [address] [facility] [id number] [address]-
5376  14 Aug, 2014   

 

 [facility] [place] [facility]  [address] [facility] [id number] [place] [address]-
7966  11 Aug, 2014   

 

 [facility] [place]  [address] [place] [address]  11 Aug, 2014   

 

 [facility] [place] [facility]  [address] [facility] [id number] [place] [address]-
2533  11 Aug, 2014   

 

 [facility] [place] [facility]  [address] [facility] [id number] [place] [address]-
5073  11 Aug, 2014   

 

 [facility] [place] [facility]  [address] [facility] [id number] [place] [address]-
9015     

 

 [facility] [place]  [address] [place] [address]     

 

 [facility] [place] [facility]  [address] [place] [id number] [address]-
2007     

 

 [facility] [place] [facility]  [address] [place] [id number] [address]-
0320     

 

 [facility] [place]  [address] [place] [address]     

 

 [facility] [place] [facility]  [address] [place] [id number] [address]-
4304     

 

 [facility] [place]  [address] [place] [address]  30 May, 2014   

 

 [facility] [place] [facility]  [address] [place] [id number] [address]-
2989  30 May, 2014   

 

 [facility] [place]  [address] [place] [address]  14 May, 2014   

 

 [facility] [place] [facility]  [address] [facility] [id number] [place] [address]-
0230  14 May, 2014   

 

 [facility] [place]  [address] [place] [address]  05 May, 2014   

 

 [facility] [place] [facility]  [address] [place] [id number] [address]-
4468  05 May, 2014   

 

 [facility] [place] [address]     

 

 [facility]  [address] [place] [id number] [address]-
5986     

 

 [facility]   [place] [address]     

 

 [facility]  [address] [id number] [address]-
8035     

 

 [facility] [place] [address]  10 Feb, 2014   

 

 [place] [address] [place] [id number] [address]-
2071  10 Feb, 2014   







IMMUNIZATIONS

No Known Immunizations



SOCIAL HISTORY

Never Assessed



REASON FOR VISIT

Transfer future order to current



PLAN OF CARE





VITAL SIGNS





MEDICATIONS

Unknown Medications



RESULTS







 Name  Result  Date  Reference Range

 

 CBC w/MANUAL DIFF     2018   

 

 ABSOLUTE LYMPHOCYTES         

 

 ABSOLUTE PLASMA CELLS         

 

 ABSOLUTE PROLYMPHOCYTES         

 

 ABSOLUTE REACTIVE LYMPHOCYTES         

 

 Baso(Absolute)         

 

 Basos         

 

 CBC MORPHOLOGY         

 

 Differential Comment         

 

 Eos         

 

 Eos (Absolute Value)         

 

 Hematocrit         

 

 Hematology Comments:         

 

 Hemoglobin         

 

 Immature Cells         

 

 Lymphs         

 

 Lymphs (Absolute)         

 

 MCH         

 

 [facility]         

 

 MCV         

 

 Monocytes         

 

 Monocytes(Absolute)         

 

 Neutrophils         

 

 Neutrophils Absolute         

 

 NRBC         

 

 PLASMA CELLS         

 

 Platelet Comment         

 

 PLATELET ESTIMATION         

 

 Platelets         

 

 Please note         

 

 PROLYMPHOCYTES         

 

 RBC         

 

 RBC Comment         

 

 RDW         

 

 Request Problem         

 

 Request Problem         

 

 WBC         

 

 WHITE BLOOD CELL COUNT         

 

 RED BLOOD CELL COUNT         

 

 HEMOGLOBIN         

 

 HEMATOCRIT         

 

 MCV         

 

 MCH         

 

 MCHC         

 

 RDW         

 

 PLATELET COUNT         

 

 NEUTROPHILS         

 

 BAND NEUTROPHILS         

 

 ABSOLUTE BAND NEUTROPHILS         

 

 METAMYELOCYTES         

 

 ABSOLUTE METAMYELOCYTES         

 

 MYELOCYTES         

 

 ABSOLUTE MYELOCYTES         

 

 PROMYELOCYTES         

 

 ABSOLUTE PROMYELOCYTES         

 

 ABSOLUTE NEUTROPHILS         

 

 LYMPHOCYTES         

 

 REACTIVE LYMPHOCYTES         

 

 ABSOLUTE LYMPHOCYTES         

 

 MONOCYTES         

 

 ABSOLUTE MONOCYTES         

 

 EOSINOPHILS         

 

 ABSOLUTE EOSINOPHILS         

 

 BASOPHILS         

 

 ABSOLUTE BASOPHILS         

 

 BLASTS         

 

 ABSOLUTE BLASTS         

 

 NUCLEATED RBC         

 

 ABSOLUTE NUCLEATED RBC         

 

 COMMENT(S)         

 

 MPV         

 

 ESR/SED RATE     2018   

 

 Please note         

 

 Request Problem         

 

 Request Problem         

 

 Request Problem         

 

 Sedimentation Rate-Westergren         

 

 SED RATE BY MODIFIED         







PROCEDURES







 Procedure  Date Ordered  Result  Body Site

 

 LAB NOT BILLED BY [facility]  May 21, 2018      







INSTRUCTIONS





MEDICATIONS ADMINISTERED

No Known Medications



MEDICAL (GENERAL) HISTORY







 Type  Description  Date

 

 Medical History  Diabetes mellitus without mention of complication, type II or 
unspecified type, not stated as uncontrolled   

 

 Medical History  Depressive disorder, not elsewhere classified   

 

 Medical History  Anxiety state, unspecified   

 

 Medical History  Effusion of lower leg joint   

 

 Medical History  Generalized osteoarthrosis, unspecified site   

 

 Medical History  Other and unspecified hyperlipidemia   

 

 Medical History  Abnormal weight gain   

 

 Medical History  Effusion of joint, site unspecified   

 

 Medical History  Esophageal reflux   

 

 Medical History  hypertension   

 

 Medical History  colonoscopy- inscreased polyp   

 

 Surgical History  Tonsillectomy   

 

 Surgical History  Orthopedic: Bilateral Knee x2   

 

 Surgical History  colonoscopy  2017

 

 Hospitalization History  Surgery(s)/Childbirth(s) only

## 2019-02-26 NOTE — XMS REPORT
[facility]

 Created on: 2018



Duyen Larkin

External Reference #: [id number]

: 1965

Sex: Female



Demographics







 Address  [address] [place] [address]  [address]

 

 Preferred Language  Unknown

 

 Marital Status  Unknown

 

 Anabaptism Affiliation  Unknown

 

 Race  Unknown

 

 Ethnic Group  Unknown





Author







 Author  HONEY TIWARI

 

 [facility]  [facility] [place]

 

 Address  [address] [place]  [address]



 

 Phone  [phone]







Care Team Providers







 Care Team Member Name  Role  Phone

 

 HONEY TIWARI  Unavailable  [phone]







PROBLEMS







 Type  Condition  ICD9-CM Code  KMZ67-HP Code  Onset Dates  Condition Status  
SNOMED Code

 

 Problem  Effusion of lower leg joint  719.06        Active  960059416

 

 Problem  Esophageal reflux  530.81        Active  611351238

 

 Problem  Dysphagia, unspecified type     R13.10     Active  21953562

 

 Problem  Other hyperlipidemia     E78.4     Active  29653511

 

 Problem  Unilateral primary osteoarthritis, left knee     M17.12     Active  
260676590

 

 Problem  Type 2 diabetes mellitus without complications     E11.9     Active  
740836812

 

 Problem  Hypothyroidism (acquired)     E03.9     Active  321257701

 

 Problem  Mild episode of recurrent major depressive disorder     F33.0     
Active  519942733

 

 Problem  COPD with exacerbation     J44.1     Active  135448118

 

 Problem  Long term current use of insulin     Z79.4     Active  116461637

 

 Problem  Diabetic polyneuropathy associated with type 2 diabetes mellitus     
E11.42     Active  46346623

 

 Problem  Cataracts, bilateral     H26.9     Active  89095792

 

 Problem  Claudication     I73.9     Active  592523337

 

 Problem  Primary generalized (osteo)arthritis     M15.0     Active  515364572

 

 Problem  Chronic renal insufficiency, stage II (mild)     N18.2     Active  
113539285

 

 Problem  Chronic obstructive pulmonary disease with acute exacerbation     
J44.1     Active  046260461

 

 Problem  Primary osteoarthritis of both knees     M17.0     Active  181285758

 

 Problem  Type 2 diabetes mellitus with diabetic neuropathy, without long-term 
current use of insulin     E11.40     Active  13684004

 

 Problem  Major depressive disorder, single episode, unspecified     F32.9     
Active  19482013

 

 Problem  Anxiety disorder, unspecified     F41.9     Active  992266068

 

 Problem  Essential (primary) hypertension     I10     Active  61384067

 

 Problem  Peripheral arterial occlusive disease     I77.9     Active  404037300

 

 Problem  Old tear of lateral meniscus of left knee, unspecified tear type     
M23.201     Active  891184582

 

 Problem  Hyperlipidemia LDL goal <130     E78.5     Active  74028131

 

 Problem  Effusion, left knee     M25.462     Active  622808368

 

 Problem  Type 2 diabetes mellitus with hyperglycemia     E11.65     Active  
40672166







ALLERGIES

No Information



ENCOUNTERS







 Encounter  Location  Date  Diagnosis

 

 [facility] [place]  [address] [facility] [address] [id number] [place], [place] [id number]  02 May, 
2018   

 

 [facility] [place]  [address] [facility] [address] [id number] [place] [id number]     

 

 [facility] [place]  [address] [facility] [address] [id number] [place] [id number]     

 

 [facility] [place]  [address] [facility] [address] [id number] [place] [id number]     

 

 [facility] [place]  [address] [facility] [address] [id number] [place] [id number]  23 Mar, 
2018  Type 2 diabetes mellitus without complications E11.9

 

 [facility] [place]  [address] [place] [address] [address] [id number] [place] [id number]  23 Mar, 
2018   

 

 [facility]  [address] [facility] [id number] [place] [address]  13 Mar, 2018   

 

 [facility]  [address] [facility] [id number] [place] [address]  09 Mar, 2018  Type 2 diabetes mellitus without complications E11.9

 

 [facility] [place]  [address] [place] [address] [address] [id number] [place] [id number]     

 

 [facility] [place]  [address] [facility] [address] [id number] [place] [address]    Diabetic polyneuropathy associated with type 2 diabetes mellitus E11.42

 

 [facility] [place]  [address] [facility] [address] [id number] [place] [address]     

 

 [facility] [place]  [address] [facility] [address] [id number] [place] [id number]  21 Dec, 
2017  Type 2 diabetes mellitus without complications E11.9 ; Long term current 
use of insulin Z79.4 ; High risk sexual behavior Z72.51 ; BMI 40.0-44.9, adult 
Z68.41 and Encounter for immunization Z23

 

 [facility] [place]  [address] [facility] [address] [id number] [place] [id number]  04 Dec, 
2017   

 

 [facility] [place]  [address] [facility] [address] [id number] [place] [address]67[address]  04 Dec, 
2017   

 

 [facility] [place]  [address] [facility] [address] [id number] [place] 114[address]659    COPD with exacerbation J44.1 and BMI 40.0-44.9, adult Z68.41

 

 [facility] [place]  [address] [facility] [address] [id number] [place] [id number]  09 Oct, 
2017  Encounter for screening mammogram for malignant neoplasm of breast Z12.31 
; Well woman exam Z01.419 and High risk sexual behavior Z72.51

 

 [facility] [place]  [address] [place] [address] C [id number] [place], [place] [id number]  11 Sep, 
2017   

 

 [facility] [place]  [address] [place] [address] C [id number] [place] [id number]  11 Sep, 
2017  Type 2 diabetes mellitus without complications E11.9

 

 [facility] [place]  [address] [place] [address] C [id number] [place] [address]16  11 Sep, 
2017   

 

 [facility] [place]  [address] [place] [address] C [id number] [place], [place] 1534[address]268  11 Sep, 
2017  Old tear of lateral meniscus of left knee, unspecified tear type M23.201

 

 [facility] [place]  [address] [place] [address] C [id number] [place], [place] [id number]  11 Sep, 
2017  Other type of osteoarthritis, unspecified site M19.90 ; Type 2 diabetes 
mellitus without complications E11.9 ; Primary osteoarthritis of both knees 
M17.0 ; Chronic renal insufficiency, stage II (mild) N18.2 ; Diabetic 
polyneuropathy associated with type 2 diabetes mellitus E11.42 and Exposure to 
hepatitis C Z20.5

 

 [facility] [place]  [address] [place] [address] [address] [id number] [place], [place] [address]80[address]  06 Sep, 
2017  Type 2 diabetes mellitus without complications E11.9

 

 [facility] [place]  [address] [facility] [address] [id number] [place] [id number]  30 Aug, 
2017   

 

 [facility] [place]  [address] [facility] [address] [id number] [place] [id number]  30 Aug, 
2017   

 

 [facility] [place]  [address] [place] [address] C [id number] [place] [id number]  16 Aug, 
2017  Other type of osteoarthritis, unspecified site M19.90

 

 [facility] [place]  [address] [facility] [address] [id number] [place] [address]  15 Aug, 
2017  Type 2 diabetes mellitus without complications E11.9

 

 [facility] [place]  [address] [facility] [address] [id number] [place] 980686268  07 Aug, 
2017   

 

 [facility] [place]  [address] [facility] [address] [id number] [place] [id number]     

 

 [facility] [place]  [address] [facility] [address] [id number] [place] [address]85     

 

 [facility] [place]  [address] [facility] [address] [id number] [place], [place] [id number]    Type 2 diabetes mellitus without complications E11.9 ; Type 2 diabetes 
mellitus with diabetic neuropathy, without long-term current use of insulin 
E11.40 ; Primary osteoarthritis of both knees M17.0 and Chronic renal 
insufficiency, stage II (mild) N18.2

 

 [facility] [place]  [address] [facility] [address] [id number] [place] [address]     

 

 [facility] [place]  [address] [facility] [address] [id number] [place] [address]  30 May, 
2017  Hyperlipidemia LDL goal <130 E78.5

 

 [facility] [place]  [address] [facility] [address] [id number] [place], [place] [address]  19 May, 
2017  Type 2 diabetes mellitus without complications E11.9

 

 [facility]  [address] [facility] [id number] [place] [address]  16 May, 2017   

 

 [facility] [place]  [address] [facility] [address] [id number] [place] 6354[address]457  08 May, 
2017   

 

 [facility] [place]  [address] [facility] [address] [id number] [place], [place] [address]  01 May, 
2017   

 

 [facility] [place]  [address] [facility] [address] [id number] [place] [address]     

 

 [facility] [place]  [address] [facility] [address] [id number] [place] [address]    Primary generalized (osteo)arthritis M15.0 and Type 2 diabetes mellitus 
without complications E11.9

 

 [facility] [place]  [address] [facility] [address] [id number] [place] [address]    Old tear of lateral meniscus of left knee, unspecified tear type M23.201

 

 [facility] [place]  [address] [facility] [address] [id number] [place], [place] [address]85497589     

 

 [facility] [place]  [address] [facility] [address] [id number] [place] [address]4548844     

 

 [facility] [place]  [address] [facility] [address] [id number] [place] 4468880[address]     

 

 [facility] [place]  [address] [facility] [address] [id number] [place] [address]85[address]  20 Mar, 
2017  Type 2 diabetes mellitus without complications E11.9

 

 [facility] [place]  [address] [facility] [address] [id number] [place] [id number]  13 Mar, 
2017  Type 2 diabetes mellitus without complications E11.9

 

 [facility] [place]  [address] [place] [address] [address] [id number] [place] 581671776  13 Mar, 
2017  Type 2 diabetes mellitus without complications E11.9

 

 [facility] [place]  [address] [place] [address] [address] [id number] [place] 036334495  04 Mar, 
2017  Type 2 diabetes mellitus without complications E11.9

 

 [facility] [place]  [address] [place] [address] [address] [id number] [place], [place] 273052633     

 

 [facility] [place]  [address] [facility] [address] [id number] [place], [place] 328100072    Chronic obstructive pulmonary disease with acute exacerbation J44.1

 

 [facility] [place]  [address] [facility] [address] [id number] [place] 176124366     

 

 [facility] [place]  [address] [facility] [address] [id number] [place], [place] [id number]    Dysuria R30.0 ; Essential (primary) hypertension I10 ; Hypothyroidism (
acquired) E03.9 ; Type 2 diabetes mellitus without complications E11.9 and Mild 
episode of recurrent major depressive disorder F33.0

 

 [facility] [place]  [address] [facility] [address] [id number] [place], [place] [address]18971528     

 

 [facility] [place]  [address] [facility] [address] [id number] [place], [place] 404797644    Dysuria R30.0 ; Vaginal candidiasis B37.3 and Candidiasis B37.9

 

 [facility] [place]  [address] [facility] [address] [id number] [place] 9616206[address]     

 

 [facility] [place]  [address] [facility] [address] [id number] [place] [id number]  10 Octaviano, 
2017  COPD with exacerbation J44.1 and Dysphagia, unspecified type R13.10

 

 [facility] [place]  [address] [facility] [address] [id number] [place] [address]  27 Dec, 
2016   

 

 [facility] [place]  [address] [place] [address] [address] [id number] [place] 651265655  12 Dec, 
2016  Essential (primary) hypertension I10 ; Hypothyroidism (acquired) E03.9 ; 
Type 2 diabetes mellitus without complications E11.9 ; Primary generalized (
osteo)arthritis M15.0 ; Major depressive disorder, single episode, unspecified 
F32.9 ; Unilateral primary osteoarthritis, left knee M17.12 ; Hyperlipidemia 
LDL goal <130 E78.5 and Dysphagia, unspecified type R13.10

 

 [facility] [place]  [address] [place] [address] [address] [id number] [place] [id number]  01 Dec, 
2016   

 

 [facility] [place]  [address] [place] [address] [address] [id number] [place] [id number]     

 

 [facility] [place]  [address] [facility] [address] [id number] [place] [address]490083     

 

 [facility] [place]  [address] [place] [address] [address] [id number] [place] 943853666     

 

 [facility] [place]  [address] [place] [address] [address] [id number] [place] 649475125     

 

 [facility] [place]  [address] [facility] [address] [id number] [place] 644718196     

 

 [facility] [place]  [address] [facility] [address] [id number] [place] 278784287    Type 2 diabetes mellitus without complications E11.9 ; Primary 
generalized (osteo)arthritis M15.0 ; Effusion, left knee M25.462 ; Old tear of 
lateral meniscus of left knee, unspecified tear type M23.201 and Fatigue, 
unspecified type R53.83

 

 [facility] [place]  [address] [place] [address] [id number] [place], [place] 5317[address]773  26 Sep, 
2016  Type 2 diabetes mellitus without complications E11.9 ; Claudication I73.9 
; Pain in right leg M79.604 and Pain of left leg M79.605

 

 [facility] [place]  [address] [place] [address] C [id number] [place] 051879212  14 Sep, 
2016   

 

 [facility] [place]  [address] [place] [address] [id number] [place] 785444522  12 Sep, 
2016   

 

 [facility] [place]  [address] [place] [address] [id number] [place] 900831730  18 Aug, 
2016  Type 2 diabetes mellitus with hyperglycemia E11.65 ; Long term current 
use of insulin Z79.4 ; Anxiety disorder, unspecified F41.9 ; Essential (primary
) hypertension I10 ; Major depressive disorder, single episode, unspecified 
F32.9 and Peripheral arterial occlusive disease I77.9

 

 [facility] [place]  [address] [place] [address] [id number] [place] 818406960  17 Aug, 
2016   

 

 [facility] [place]  [address] [place] [address] [id number] [place] 746077178  11 Aug, 
2016   

 

 [facility] [place]  [address] [place] [address] [id number] [place], [place] 313625250  11 Aug, 
2016   

 

 [facility] [place]  [address] [place] [address] [id number] [place] [id number]  10 Aug, 
2016   

 

 [facility] [place]  [address] [place] [address] [id number] [place], [place] 089000640  05 Aug, 
2016  Acute midline low back pain with right-sided sciatica M54.41

 

 [facility]  [address] [facility] [id number] [place] [address]-
[address]  05 Aug, 2016  Chest pain, unspecified type R07.9 ; Palpitations R00.2 ; 
Claudication I73.9 ; Generalized pain R52 and Type 2 diabetes mellitus without 
complications E11.9

 

 [facility] [place]  [address] [place] [address] C [id number] [place] [id number]    Acute midline low back pain with right-sided sciatica M54.41 ; Dysuria 
R30.0 ; Claudication I73.9 ; Peripheral arterial occlusive disease I77.9 ; 
Shortness of breath R06.02 ; Effusion, left knee M25.462 and Type 2 diabetes 
mellitus without complications E11.9

 

 [facility] [place]  [address] [facility] [address] [id number] [place] [id number]     

 

 [facility] [place]  [address] [facility] [address] [id number] [place] 930557264     

 

 [facility] [place]  [address] [facility] [address] [id number] [place], [place] 532914873     

 

 [facility] [place]  [address] [facility] [address] [id number] [place] 682346305    Type 2 diabetes mellitus without complications E11.9 ; Other 
hyperlipidemia E78.4 ; Peripheral arterial occlusive disease I77.9 ; Essential (
primary) hypertension I10 and Other fatigue R53.83

 

 [facility] [place]  [address] [facility] [address] [id number] [place], [place] [id number]  18 May, 
2016   

 

 [facility] [place]  [address] [place] [address] [address] [id number] [place] [id number]  06 May, 
2016   

 

 [facility] [place]  [address] [place] [address] [address] [id number] [place], [place] [id number]  05 May, 
2016  Chest pain, unspecified type R07.9 ; Peripheral arterial occlusive 
disease I77.9 ; Anxiety disorder, unspecified F41.9 ; Essential (primary) 
hypertension I10 ; Type 2 diabetes mellitus without complications E11.9 and 
Other hyperlipidemia E78.4

 

 [facility] [place]  [address] [facility] [address] [id number] [place] [id number]  04 May, 
2016   

 

 [facility] [place]  [address] [facility] [address] [id number] [place] 690894510     

 

 [facility] [place]  [address] [facility] [address] [id number] [place] [id number]     

 

 [facility] [place]  [address] [facility] [address] [id number] [place] 730992774    Type 2 diabetes mellitus without complications E11.9 ; Peripheral 
arterial occlusive disease I77.9 ; Primary generalized (osteo)arthritis M15.0 ; 
Major depressive disorder, single episode, unspecified F32.9 ; Anxiety disorder
, unspecified F41.9 and Essential (primary) hypertension I10

 

 [facility] [place]  [address] [place] [address] [address] [id number] [place] 737582879     

 

 [facility] [place]  [address] [place] [address] [address] [id number] [place] [id number]     

 

 [facility]  [address] [facility] [id number] [place] [address]-
[address]  02 Mar, 2016   

 

 [facility] [place]  [address] [place] [address] [address] [id number] [place] 250607091     

 

 [facility] [place]  [address] [place] [address] [address] [id number] [place] 476280742    Bronchitis J40 ; Shortness of breath R06.02 and Wheezing R06.2

 

 [facility] [place]  [address] [place] [address] [address] [id number] [place], [place] [id number]     

 

 [facility] [place]  [address] [place] [address] [address] [id number] [place] 849485061     

 

 [facility] [place]  [address] [place] [address] [address] [id number] [place] 786240511     

 

 [facility] [place]  [address] [place] [address] [address] [id number] [place] [id number]     

 

 [facility] [place]  [address] [place] [address] [address] [id number] [place] 520038451    Type 2 diabetes mellitus without complications E11.9 ; Claudication I73.9 
; Other hyperlipidemia E78.4 ; Cataracts, bilateral H26.9 and Other fatigue 
R53.83

 

 [facility] [place]  [address] [facility] [address] [id number] [place] [id number]  28 Oct, 
2015   

 

 [facility] [place]  [address] [place] [address] [address] [id number] [place] 986350498  22 Oct, 
2015   

 

 [facility] [place]  [address] [place] [address] [address] [id number] [place] 051208610  16 Oct, 
2015   

 

 [facility] [place]  [address] [place] [address] [address] [id number] [place] [id number]  14 Oct, 
2015  Type 2 diabetes mellitus without complications E11.9 ; Other 
hyperlipidemia E78.4 ; Primary generalized (osteo)arthritis M15.0 and 
Claudication I73.9

 

 [facility] [place]  [address] [facility] [address] [id number] [place] 999911642  12 Oct, 
2015   

 

 [facility] [place]  [address] [facility] [address] [id number] [place] 053717895  26 Aug, 
2015   

 

 [facility] [place]  [address] [facility] [address] [id number] [place] [id number]  12 Aug, 
2015   

 

 [facility] [place]  [address] [facility] [address] [id number] [place] 303720116  12 Aug, 
2015   

 

 [facility] [place]  [address] [facility] [address] [id number] [place] 952574190  10 Aug, 
2015   

 

 [facility] [place]  [address] [facility] [address] [id number] [place] 870692132  05 Aug, 
2015   

 

 [facility] [place]  [address] [place] [address] [address] [id number] [place] [id number]  11 May, 
2015  Diabetes mellitus without mention of complication, type II or unspecified 
type, not stated as uncontrolled 250.00 ; Bronchitis 490 and Effusion of lower 
leg joint 719.06

 

 [facility] [place]  [address] [facility] [address] [id number] [place], [place] 358114974  01 May, 
2015   

 

 [facility] [place]  [address] [place] [address] C [id number] [place] [id number]  01 May, 
2015  Bronchitis 490 and Wheezing 786.07

 

 [facility] [place] [address] [id number] [place] [address]-
[address]     

 

 [place] [address] [facility] [id number] [address]-
2546     

 

 [place] [address] [id number] [address]
2546     

 

 [facility]  [address] [place] [address] C [id number] [place] 255690588  19 Mar, 
2015   

 

 [facility]  [address] [id number] [address]
2546  19 Mar, 2015   

 

 [facility] [place]  [address] [id number] [place] 399093610  09 Mar, 
2015   

 

 [facility] [place] [facility]  [address] [facility] [id number] [facility] [address]-
[address]  09 Mar, 2015   

 

 [facility] [place]  [address] [facility] [address] [id number] [place] 066049976     

 

 [facility] [place] [facility]  [address] [facility] [id number] [place] [address]-
[address]     

 

 [facility]  [address] [facility] [id number] [place] [address]-
9616  10 Feb, 2015   

 

 [facility] [place]  [address] [id number] [place] [id number]     

 

 [facility] [place]  [address] [facility] [address] [id number] [place] 235363760     

 

 [facility] [place] [facility]  [address] [facility] [id number] [place] [address]-
1986     

 

 [facility] [place] [facility]  [address] [facility] [id number] [place] [address]-
4746     

 

 [facility] [place]  [address] [facility] [address] [id number] [place] [id number]     

 

 [facility] [place] [facility]  [address] [facility] [id number] [place] [address]-
2566     

 

 [facility] [place]  [address] [facility] [address] [id number] [place] 730327280     

 

 [facility] [place] [facility]  [address] [facility] [id number] [facility] [address]-
[address]     

 

 [facility] [place]  [address] [id number] [place] 087672959  24 Dec, 
2014   

 

 [facility]  [address] [facility] [id number] [place] [address]-
2546  24 Dec, 2014   

 

 [facility] [place]  [address] [id number] [place] 859369345  18 Dec, 
2014   

 

 [facility] [place] [facility]  [address] [id number] [place] [address]-
5351  18 Dec, 2014   

 

 [facility] [place]  [address] [facility] [address] [id number] [place] 500768523  09 Dec, 
2014   

 

 [facility] [place] [facility]  [address] [id number] [place] [address]-
7751  09 Dec, 2014   

 

 [facility] [place]  [address] [facility] [address] [id number] [place] [id number]  02 Dec, 
2014   

 

 [facility] [place] [facility]  [address] [id number] [place] [address]-
3257  02 Dec, 2014   

 

 [facility] [place]  [address] [facility] [address] [id number] [place] [id number]     

 

 [facility] [place] [facility]  [address] [facility] [id number] [place] [address]-
9469     

 

 [facility] [place]  [address] [facility] [address] [id number] [place] [id number]     

 

 [facility] [place] [facility]  [address] [id number] [place] [address]-
0000     

 

 [facility] [place]  [address] [facility] [address] [id number] [place] 922114665  31 Oct, 
2014   

 

 [facility] [place] [facility]  [address] [facility] [id number] [facility] [place] [address]-
[address]  31 Oct, 2014   

 

 [facility] [place]  [address] [id number] [place] [id number]  16 Oct, 
2014   

 

 [facility] [place] [facility]  [address] [id number] [place] [address]-
[address]  16 Oct, 2014   

 

 [facility] [place]  [address] [facility] [id number] [place] [id number]  10 Oct, 
2014   

 

 [facility] [place] [facility]  [address] [id number] [place] [address]-
[address]  10 Oct, 2014   

 

 [facility] [place]  [address] [facility] [id number] [place] [id number]  26 Sep, 
2014   

 

 [facility] [place] [facility]  [address] [id number] [place] [address]  26 Sep, 2014   

 

 [facility] [place]  [address] [id number] [place] [id number]  29 Aug, 
2014   

 

 [facility] [place] [facility]  [address] [facility] [id number] [facility] [place] [address]  29 Aug, 2014   

 

 [facility] [place]  [address] [id number] [place] [id number]  25 Aug, 
2014   

 

 [facility] [place]  [address] [id number] [place] [id number]  25 Aug, 
2014   

 

 [facility] [place] [facility]  [address] [id number] [place] [address]-
[address]  25 Aug, 2014   

 

 [facility] [place]  [address] [facility] [id number] [place] [address]-
[address]  25 Aug, 2014   

 

 [facility] [place]  [address] [id number] [place] [id number]  18 Aug, 
2014   

 

 [facility] [place] [facility]  [address] [id number] [place] [address]-
[address]  18 Aug, 2014   

 

 [facility] [place]  [address] [facility] [address] [id number] [place] [id number]  14 Aug, 
2014   

 

 [facility] [place] [facility]  [address] [facility] [id number] [place] [address]-
[address]  14 Aug, 2014   

 

 [facility] [place]  [address] [facility] [id number] [facility] [address]  11 Aug, 2014   

 

 [facility] [place]  [address] [id number] [place] [id number]  11 Aug, 
2014   

 

 [facility] [place]  [address] [facility] [id number] [place] [address]-
[address]78  11 Aug, 2014   

 

 [facility] [place]  [address] [facility] [id number] [place] [address]-
9864  11 Aug, 2014   

 

 [facility] [place] [facility]  [address] [facility] [id number] [place] [address]-
[address]     

 

 [facility] [place]  [address] [id number] [place] [id number]     

 

 [facility]  [address] [id number] [place] [address]-
[address]     

 

 [facility]  [address] [id number] [facility] [place] [address]     

 

 [facility] [place]  [address] [id number] [place] [id number]     

 

 [facility]  [address] [id number] [place] [address]-
[address]     

 

 [facility] [place]  [address] [id number] [place] [id number]  30 May, 
2014   

 

 [facility]  [address] [id number] [place] [address]  30 May, 2014   

 

 [facility] [place]  [address] [id number] [place] [id number]  14 May, 
2014   

 

 [facility] [place] [facility]  [address] [facility] [id number] [place] [address]-
[address]  14 May, 2014   

 

 [facility] [place]  [address] [id number] [place] [id number]  05 May, 
2014   

 

 [facility] [place] [facility]  [address] [id number] [place] [address]-
[address]  05 May, 2014   

 

 [facility] [place]  [address] [id number] [place] [id number]     

 

 [facility]  [address] [id number] [place] [address]     

 

 [facility] [place]  [address] [id number] [place] [id number]     

 

 [facility]  [address] [id number] [facility] [place] [address]     

 

 [facility] [place]  [address] [id number] [place] [id number]  10 Feb, 
2014   

 

 [facility]  [address] [facility] [id number] [place] [address]-
[address]  10 Feb, 2014   







IMMUNIZATIONS

No Known Immunizations



SOCIAL HISTORY

Never Assessed



REASON FOR VISIT

med refills



PLAN OF CARE





VITAL SIGNS





MEDICATIONS

Unknown Medications



RESULTS

No Results



PROCEDURES

No Known procedures



INSTRUCTIONS





MEDICATIONS ADMINISTERED

No Known Medications



MEDICAL (GENERAL) HISTORY







 Type  Description  Date

 

 Medical History  Diabetes mellitus without mention of complication, type II or 
unspecified type, not stated as uncontrolled   

 

 Medical History  Depressive disorder, not elsewhere classified   

 

 Medical History  Anxiety state, unspecified   

 

 Medical History  Effusion of lower leg joint   

 

 Medical History  Generalized osteoarthrosis, unspecified site   

 

 Medical History  Other and unspecified hyperlipidemia   

 

 Medical History  Abnormal weight gain   

 

 Medical History  Effusion of joint, site unspecified   

 

 Medical History  Esophageal reflux   

 

 Medical History  hypertension   

 

 Medical History  colonoscopy- inscreased polyp   

 

 Surgical History  Tonsillectomy   

 

 Surgical History  Orthopedic: Bilateral Knee x2   

 

 Surgical History  colonoscopy  

 

 Hospitalization History  Surgery(s)/Childbirth(s) only

## 2021-11-13 NOTE — XMS REPORT
Inpatient Cardiology Consultation      Reason for Consult:  Chest Pain     Consulting Physician: Dr. Glenroy Sprague     Requesting Physician:  Dr. Yolanda Griffin    Date of Consultation: 11/13/2021    HISTORY OF PRESENT ILLNESS:   Ms. Mike Woodson is a 26-year-old  female who formerly followed with Dr. Jose Lyles. She was most recently seen in the office with Dr. Polly Chambers on 04/23/2021. Her medical history includes CKD, HTN, HLD, PAF maintained on Rythmol, chronic anticoagulation with Eliquis, GERD, anxiety and depression, and restless leg syndrome. Ms. Mike Woodson presented to [facility] ED on 11/21/2021 with complaints of heartburn. She states that prior to presentation over the course of the past week she has been having midsternal \" heartburn and indigestion\" that has been radiating to her upper chest. She states that this sensation lasts several hours in duration and is alleviated for approximately 10-15 minutes after she takes times. She denies worsening of her heartburn with exertion, deep inspiration, eating, or anorexia. She states that her heartburn worsened \" to the point that he needed to vomit\" on 11/12/2021. She states upon vomiting she had complete alleviation of her heartburn sensation. However, she then developed bilateral inner arm pain with radiation to her bilateral neck and upper back. He states that these pains lasted approximately 3 hours in duration and were unchanged with exertion. She denies accompanying diaphoresis or dyspnea. Upon arrival to the ED her VS were oral temperature 97.6-79-17-98% on RA-137/80. EKG reportedly SR (currently unavailable for further review, has been requested). Troponin of 61. WBC 6.5. H&H 12.8/38. . K4.6. BUN/SCR 23/1.5. UA with small leukocyte esterase. CXR unremarkable. She received 1 g of Tylenol,  mg and 1 SL NTG. Upon receiving SL NTG she had complete alleviation of her bilateral arm and neck pain and denies recurrence of back or chest discomfort/heartburn.  She [facility]

 Created on: 2018



Duyen Larkin

External Reference #: [id number]

: 1965

Sex: Female



Demographics







 Address  [address] [place]  [address]

 

 Preferred Language  Unknown

 

 Marital Status  Unknown

 

 Mu-ism Affiliation  Unknown

 

 Race  Unknown

 

 Ethnic Group  Unknown





Author







 Author  LIYAH LOWE

 

 [facility] [place]

 

 Address  [address] [place]  [address]



 

 Phone  [phone]







Care Team Providers







 Care Team Member Name  Role  Phone

 

 LIYAH LOWE  Unavailable  [phone]







PROBLEMS







 Type  Condition  ICD9-CM Code  DRP84-HK Code  Onset Dates  Condition Status  
SNOMED Code

 

 Problem  Effusion of lower leg joint  719.06        Active  548880741

 

 Problem  Esophageal reflux  530.81        Active  142298280

 

 Problem  Dysphagia, unspecified type     R13.10     Active  69592512

 

 Problem  Other hyperlipidemia     E78.4     Active  97347972

 

 Problem  Unilateral primary osteoarthritis, left knee     M17.12     Active  
878660829

 

 Problem  Type 2 diabetes mellitus without complications     E11.9     Active  
727120742

 

 Problem  Hypothyroidism (acquired)     E03.9     Active  483943006

 

 Problem  Mild episode of recurrent major depressive disorder     F33.0     
Active  538347002

 

 Problem  COPD with exacerbation     J44.1     Active  358467449

 

 Problem  Long term current use of insulin     Z79.4     Active  114974585

 

 Problem  Diabetic polyneuropathy associated with type 2 diabetes mellitus     
E11.42     Active  28497810

 

 Problem  Cataracts, bilateral     H26.9     Active  98246586

 

 Problem  Claudication     I73.9     Active  870505593

 

 Problem  Primary generalized (osteo)arthritis     M15.0     Active  055002084

 

 Problem  Chronic renal insufficiency, stage II (mild)     N18.2     Active  
845864171

 

 Problem  Chronic obstructive pulmonary disease with acute exacerbation     
J44.1     Active  867879572

 

 Problem  Primary osteoarthritis of both knees     M17.0     Active  225140943

 

 Problem  Type 2 diabetes mellitus with diabetic neuropathy, without long-term 
current use of insulin     E11.40     Active  22350200

 

 Problem  Major depressive disorder, single episode, unspecified     F32.9     
Active  27134734

 

 Problem  Anxiety disorder, unspecified     F41.9     Active  096660890

 

 Problem  Essential (primary) hypertension     I10     Active  21652212

 

 Problem  Peripheral arterial occlusive disease     I77.9     Active  943639481

 

 Problem  Old tear of lateral meniscus of left knee, unspecified tear type     
M23.201     Active  697007269

 

 Problem  Hyperlipidemia LDL goal <130     E78.5     Active  48060334

 

 Problem  Effusion, left knee     M25.462     Active  517729122

 

 Problem  Type 2 diabetes mellitus with hyperglycemia     E11.65     Active  
61332012







ALLERGIES

No Information



ENCOUNTERS







 Encounter  Location  Date  Diagnosis

 

 [facility] [place]  [address] [facility] [address] [id number] [place], [place] [id number]  02 May, 
2018   

 

 [facility] [place]  [address] [facility] [address] [id number] [place] [id number]     

 

 [facility] [place]  [address] [facility] [address] [id number] [place] [id number]     

 

 [facility] [place]  [address] [facility] [address] [id number] [place] [id number]     

 

 [facility] [place]  [address] [facility] [address] [id number] [place], [place] [id number]  23 Mar, 
2018  Type 2 diabetes mellitus without complications E11.9

 

 [facility] [place]  [address] [place] [address] [address] [id number] [place] [id number]  23 Mar, 
2018   

 

 [facility]  [address] [facility] [id number] [place] [address]  13 Mar, 2018   

 

 [facility]  [address] [facility] [id number] [place] [address]  09 Mar, 2018  Type 2 diabetes mellitus without complications E11.9

 

 [facility] [place]  [address] [place] [address] [address] [id number] [place] [id number]     

 

 [facility] [place]  [address] [facility] [address] [id number] [place] [id number]    Diabetic polyneuropathy associated with type 2 diabetes mellitus E11.42

 

 [facility] [place]  [address] [facility] [address] [id number] [place] [id number]     

 

 [facility] [place]  [address] [facility] [address] [id number] [place], [place] [id number]  21 Dec, 
2017  Type 2 diabetes mellitus without complications E11.9 ; Long term current 
use of insulin Z79.4 ; High risk sexual behavior Z72.51 ; BMI 40.0-44.9, adult 
Z68.41 and Encounter for immunization Z23

 

 [facility] [place]  [address] [facility] [address] [id number] [place] [id number]  04 Dec, 
2017   

 

 [facility] [place]  [address] [facility] [address] [id number] [place] [id number]  04 Dec, 
2017   

 

 [facility] [place]  [address] [facility] [address] [id number] [place], [place] [id number]    COPD with exacerbation J44.1 and BMI 40.0-44.9, adult Z68.41

 

 [facility] [place]  [address] [facility] [address] [id number] [place], [place] [id number]  09 Oct, 
2017  Encounter for screening mammogram for malignant neoplasm of breast Z12.31 
; Well woman exam Z01.419 and High risk sexual behavior Z72.51

 

 [facility] [place]  [address] [place] [address] [address] [id number] [place] [id number]  11 Sep, 
2017   

 

 [facility] [place]  [address] [place] [address] C [id number] [place], [place] [id number]  11 Sep, 
2017  Type 2 diabetes mellitus without complications E11.9

 

 [facility] [place]  [address] [place] [address] C [id number] [place], [place] [id number]  11 Sep, 
2017   

 

 [facility] [place]  [address] [place] [address] C [id number] [place] [id number]  11 Sep, 
2017  Old tear of lateral meniscus of left knee, unspecified tear type M23.201

 

 [facility] [place]  [address] [place] [address] C [id number] [place], [place] [id number]  11 Sep, 
2017  Other type of osteoarthritis, unspecified site M19.90 ; Type 2 diabetes 
mellitus without complications E11.9 ; Primary osteoarthritis of both knees 
M17.0 ; Chronic renal insufficiency, stage II (mild) N18.2 ; Diabetic 
polyneuropathy associated with type 2 diabetes mellitus E11.42 and Exposure to 
hepatitis C Z20.5

 

 [facility] [place]  [address] [place] [address] [address] [id number] [place] [id number]  06 Sep, 
2017  Type 2 diabetes mellitus without complications E11.9

 

 [facility] [place]  [address] [facility] [address] [id number] [place] [id number]  30 Aug, 
2017   

 

 [facility] [place]  [address] [facility] [address] [id number] [place], [place] [id number]  30 Aug, 
2017   

 

 [facility] [place]  [address] [place] [address] C [id number] [place], [place] [id number]  16 Aug, 
2017  Other type of osteoarthritis, unspecified site M19.90

 

 [facility] [place]  [address] [facility] [address] [id number] [place] [id number]  15 Aug, 
2017  Type 2 diabetes mellitus without complications E11.9

 

 [facility] [place]  [address] [facility] [address] [id number] [place] [id number]  07 Aug, 
2017   

 

 [facility] [place]  [address] [facility] [address] [id number] [place] [id number]     

 

 [facility] [place]  [address] [facility] [address] [id number] [place] [id number]     

 

 [facility] [place]  [address] [place] [address] [address] [id number] [place], [place] [id number]    Type 2 diabetes mellitus without complications E11.9 ; Type 2 diabetes 
mellitus with diabetic neuropathy, without long-term current use of insulin 
E11.40 ; Primary osteoarthritis of both knees M17.0 and Chronic renal 
insufficiency, stage II (mild) N18.2

 

 [facility] [place]  [address] [place] [address] [address] [id number] [place] [id number]     

 

 [facility] [place]  [address] [place] [address] [address] [id number] [place] [id number]  30 May, 
2017  Hyperlipidemia LDL goal <130 E78.5

 

 [facility] [place]  [address] [place] [address] [address] [id number] [place], [place] [id number]  19 May, 
2017  Type 2 diabetes mellitus without complications E11.9

 

 [facility]  [address] [facility] [id number] [place] [address]  16 May, 2017   

 

 [facility] [place]  [address] [facility] [address] [id number] [place], [place] [id number]  08 May, 
2017   

 

 [facility] [place]  [address] [facility] [address] [id number] [place] [id number]  01 May, 
2017   

 

 [facility] [place]  [address] [place] [address] [address] [id number] [place], [place] [id number]     

 

 [facility] [place]  [address] [facility] [address] [id number] [place], [place] [id number]    Primary generalized (osteo)arthritis M15.0 and Type 2 diabetes mellitus 
without complications E11.9

 

 [facility] [place]  [address] [facility] [address] [id number] [place], [place] [id number]    Old tear of lateral meniscus of left knee, unspecified tear type M23.201

 

 [facility] [place]  [address] [facility] [address] [id number] [place], [place] [id number]     

 

 [facility] [place]  [address] [facility] [address] [id number] [place], [place] [id number]     

 

 [facility] [place]  [address] [facility] [address] [id number] [place], [place] [id number]     

 

 [facility] [place]  [address] [facility] [address] [id number] [place] [id number]  20 Mar, 
2017  Type 2 diabetes mellitus without complications E11.9

 

 [facility] [place]  [address] [place] [address] [address] [id number] [place] [id number]  13 Mar, 
2017  Type 2 diabetes mellitus without complications E11.9

 

 [facility] [place]  [address] [place] [address] [address] [id number] [place] [id number]  13 Mar, 
2017  Type 2 diabetes mellitus without complications E11.9

 

 [facility] [place]  [address] [place] [address] [address] [id number] [place], [place] [id number]  04 Mar, 
2017  Type 2 diabetes mellitus without complications E11.9

 

 [facility] [place]  [address] [place] [address] [address] [id number] [place], [place] [id number]     

 

 [facility] [place]  [address] [facility] [address] [id number] [place] [id number]    Chronic obstructive pulmonary disease with acute exacerbation J44.1

 

 [facility] [place]  [address] [place] [address] [address] [id number] [place], [place] [id number]     

 

 [facility] [place]  [address] [place] [address] [address] [id number] [place], [place] [id number]    Dysuria R30.0 ; Essential (primary) hypertension I10 ; Hypothyroidism (
acquired) E03.9 ; Type 2 diabetes mellitus without complications E11.9 and Mild 
episode of recurrent major depressive disorder F33.0

 

 [facility] [place]  [address] [facility] [address] [id number] [place] [id number]     

 

 [facility] [place]  [address] [facility] [address] [id number] [place], [place] [address]99    Dysuria R30.0 ; Vaginal candidiasis B37.3 and Candidiasis B37.9

 

 [facility] [place]  [address] [facility] [address] [id number] [place], [place] [id number]     

 

 [facility] [place]  [address] [facility] [address] [id number] [place], [place] [id number]  10 Octaviano, 
2017  COPD with exacerbation J44.1 and Dysphagia, unspecified type R13.10

 

 [facility] [place]  [address] [facility] [address] [id number] [place], [place] [id number]  27 Dec, 
2016   

 

 [facility] [place]  [address] [place] [address] [address] [id number] [place] [id number]  12 Dec, 
2016  Essential (primary) hypertension I10 ; Hypothyroidism (acquired) E03.9 ; 
Type 2 diabetes mellitus without complications E11.9 ; Primary generalized (
osteo)arthritis M15.0 ; Major depressive disorder, single episode, unspecified 
F32.9 ; Unilateral primary osteoarthritis, left knee M17.12 ; Hyperlipidemia 
LDL goal <130 E78.5 and Dysphagia, unspecified type R13.10

 

 [facility] [place]  [address] [place] [address] [address] [id number] [place], [place] [id number]  01 Dec, 
2016   

 

 [facility] [place]  [address] [place] [address] [address] [id number] [place] 822957729     

 

 [facility] [place]  [address] [facility] [address] [id number] [place], [place] 198571388     

 

 [facility] [place]  [address] [place] [address] [address] [id number] [place] [id number]     

 

 [facility] [place]  [address] [place] [address] [address] [id number] [place] [id number]     

 

 [facility] [place]  [address] [facility] [address] [id number] [place] [id number]     

 

 [facility] [place]  [address] [facility] [address] [id number] [place], [place] [id number]    Type 2 diabetes mellitus without complications E11.9 ; Primary 
generalized (osteo)arthritis M15.0 ; Effusion, left knee M25.462 ; Old tear of 
lateral meniscus of left knee, unspecified tear type M23.201 and Fatigue, 
unspecified type R53.83

 

 [facility] [place]  [address] [place] [address] [id number] [place], [place] [id number]  26 Sep, 
2016  Type 2 diabetes mellitus without complications E11.9 ; Claudication I73.9 
; Pain in right leg M79.604 and Pain of left leg M79.605

 

 [facility] [place]  [address] [place] [address] C [id number] [place] 728363831  14 Sep, 
2016   

 

 [facility] [place]  [address] [place] [address] [id number] [place], [place] [id number]  12 Sep, 
2016   

 

 [facility] [place]  [address] [place] [address] [id number] [place], [place] [id number]  18 Aug, 
2016  Type 2 diabetes mellitus with hyperglycemia E11.65 ; Long term current 
use of insulin Z79.4 ; Anxiety disorder, unspecified F41.9 ; Essential (primary
) hypertension I10 ; Major depressive disorder, single episode, unspecified 
F32.9 and Peripheral arterial occlusive disease I77.9

 

 [facility] [place]  [address] [place] [address] [id number] [place], [place] [id number]  17 Aug, 
2016   

 

 [facility] [place]  [address] [place] [address] C [id number] [place], [place] [id number]  11 Aug, 
2016   

 

 [facility] [place]  [address] [place] [address] [id number] [place], [place] [id number]  11 Aug, 
2016   

 

 [facility] [place]  [address] [place] [address] [id number] [place], [place] 954778610  10 Aug, 
2016   

 

 [facility] [place]  [address] [place] [address] [id number] [place], [place] [id number]  05 Aug, 
2016  Acute midline low back pain with right-sided sciatica M54.41

 

 [facility]  [address] [facility] [id number] [place], [place] [phone]-
[address]  05 Aug, 2016  Chest pain, unspecified type R07.9 ; Palpitations R00.2 ; 
Claudication I73.9 ; Generalized pain R52 and Type 2 diabetes mellitus without 
complications E11.9

 

 [facility] [place]  [address] [place] [address] [id number] [place], [place] [id number]    Acute midline low back pain with right-sided sciatica M54.41 ; Dysuria 
R30.0 ; Claudication I73.9 ; Peripheral arterial occlusive disease I77.9 ; 
Shortness of breath R06.02 ; Effusion, left knee M25.462 and Type 2 diabetes 
mellitus without complications E11.9

 

 [facility] [place]  [address] [facility] [address] [id number] [place], [place] 802998865     

 

 [facility] [place]  [address] [facility] [address] [id number] [place] [id number]     

 

 [facility] [place]  [address] [facility] [address] [id number] [place], [place] [id number]     

 

 [facility] [place]  [address] [facility] [address] [id number] [place], [place] [id number]    Type 2 diabetes mellitus without complications E11.9 ; Other 
hyperlipidemia E78.4 ; Peripheral arterial occlusive disease I77.9 ; Essential (
primary) hypertension I10 and Other fatigue R53.83

 

 [facility] [place]  [address] [facility] [address] [id number] [place] [id number]  18 May, 
2016   

 

 [facility] [place]  [address] [place] [address] [address] [id number] [place] [id number]  06 May, 
2016   

 

 [facility] [place]  [address] [place] [address] [address] [id number] [place], [place] [id number]  05 May, 
2016  Chest pain, unspecified type R07.9 ; Peripheral arterial occlusive 
disease I77.9 ; Anxiety disorder, unspecified F41.9 ; Essential (primary) 
hypertension I10 ; Type 2 diabetes mellitus without complications E11.9 and 
Other hyperlipidemia E78.4

 

 [facility] [place]  [address] [facility] [address] [id number] [place] [id number]  04 May, 
2016   

 

 [facility] [place]  [address] [facility] [address] [id number] [place] [id number]     

 

 [facility] [place]  [address] [facility] [address] [id number] [place] [id number]     

 

 [facility] [place]  [address] [facility] [address] [id number] [place] [id number]    Type 2 diabetes mellitus without complications E11.9 ; Peripheral 
arterial occlusive disease I77.9 ; Primary generalized (osteo)arthritis M15.0 ; 
Major depressive disorder, single episode, unspecified F32.9 ; Anxiety disorder
, unspecified F41.9 and Essential (primary) hypertension I10

 

 [facility] [place]  [address] [place] [address] [address] [id number] [place] [id number]     

 

 [facility] [place]  [address] [place] [address] [address] [id number] [place], [place] [id number]     

 

 [facility]  [address] [facility] [id number] [place] [address]-
[address]  02 Mar, 2016   

 

 [facility] [place]  [address] [place] [address] [address] [id number] [place] [id number]     

 

 [facility] [place]  [address] [place] [address] [address] [id number] [place] [id number]    Bronchitis J40 ; Shortness of breath R06.02 and Wheezing R06.2

 

 [facility] [place]  [address] [place] [address] [address] [id number] [place] [id number]     

 

 [facility] [place]  [address] [place] [address] [address] [id number] [place] [id number]     

 

 [facility] [place]  [address] [place] [address] [address] [id number] [place] 445677218     

 

 [facility] [place]  [address] [place] [address] [address] [id number] [place] [id number]     

 

 [facility] [place]  [address] [place] [address] [address] [id number] [place] [id number]    Type 2 diabetes mellitus without complications E11.9 ; Claudication I73.9 
; Other hyperlipidemia E78.4 ; Cataracts, bilateral H26.9 and Other fatigue 
R53.83

 

 [facility] [place]  [address] [facility] [address] [id number] [place] [id number]  28 Oct, 
2015   

 

 [facility] [place]  [address] [place] [address] [address] [id number] [place] [id number]  22 Oct, 
2015   

 

 [facility] [place]  [address] [place] [address] [address] [id number] [place], [place] [id number]  16 Oct, 
2015   

 

 [facility] [place]  [address] [place] [address] [address] [id number] [place] [id number]  14 Oct, 
2015  Type 2 diabetes mellitus without complications E11.9 ; Other 
hyperlipidemia E78.4 ; Primary generalized (osteo)arthritis M15.0 and 
Claudication I73.9

 

 [facility] [place]  [address] [facility] [address] [id number] [place] [id number]  12 Oct, 
2015   

 

 [facility] [place]  [address] [facility] [address] [id number] [place], [place] [id number]  26 Aug, 
2015   

 

 [facility] [place]  [address] [facility] [address] [id number] [place] [id number]  12 Aug, 
2015   

 

 [facility] [place]  [address] [facility] [address] [id number] [place] [id number]  12 Aug, 
2015   

 

 [facility] [place]  [address] [facility] [address] [id number] [place], [place] [id number]  10 Aug, 
2015   

 

 [facility] [place]  [address] [facility] [address] [id number] [place], [place] [id number]  05 Aug, 
2015   

 

 [facility] [place]  [address] [place] [address] [address] [id number] [place], [place] [id number]  11 May, 
2015  Diabetes mellitus without mention of complication, type II or unspecified 
type, not stated as uncontrolled 250.00 ; Bronchitis 490 and Effusion of lower 
leg joint 719.06

 

 [facility] [place]  [address] [place] [address] [address] [id number] [place] [id number]  01 May, 
2015   

 

 [facility] [place]  [address] [place] [address] C [id number] [place] [id number]  01 May, 
2015  Bronchitis 490 and Wheezing 786.07

 

 [place] [address] [id number] [address]     

 

 [place] [address] [facility] [id number] [address]-
[address]     

 

 [place] [address] [id number] [address]     

 

 [facility]  [address] [place] [address] C [id number] [place] [id number]  19 Mar, 
2015   

 

 [place] [address] [facility] [id number][place] [address]
2546  19 Mar, 2015   

 

 [facility] [place]  [address] [id number] [place] [id number]  09 Mar, 
2015   

 

 [facility]  [address] [facility] [id number] [facility] [place] [address]  09 Mar, 2015   

 

 [facility] [place]  [address] [id number] [place] [id number]     

 

 [facility] [place]  [address] [facility] [id number] [facility] [place] [address]-
[address]     

 

 [facility]  [address] [facility] [id number] [facility] [place] [address]
8066  10 Feb, 2015   

 

 [facility] [place]  [address] [id number] [place] [id number]     

 

 [facility] [place]  [address] [facility] [address] [id number] [place] [id number]     

 

 [facility] [place] [facility]  [address] [facility] [id number] [place], [place] [address]     

 

 [facility] [place] [facility]  [address] [facility] [id number] [facility] [place] [address]-
59[address]     

 

 [facility] [place]  [address] [facility] [address] [id number] [place] [id number]     

 

 [facility] [place] [facility]  [address] [facility] [id number] [place] [address]-
[address]16     

 

 [facility] [place]  [address] [facility] [address] [id number] [place] [phone]     

 

 [facility] [place] [facility]  [address] [facility] [id number] [facility] [place] [address]-
[address]406     

 

 [facility] [place]  [address] [id number] [place] [id number]  24 Dec, 
2014   

 

 [facility] [place]  [address] [facility] [id number] [place] [address]-
4526  24 Dec, 2014   

 

 [facility] [place]  [address] [facility] [address] [id number] [place] [id number]  18 Dec, 
2014   

 

 [facility] [place] [facility]  [address] [id number] [place] [address]-
4620  18 Dec, 2014   

 

 [facility] [place]  [address] [facility] [address] [id number] [place] [id number]  09 Dec, 
2014   

 

 [facility] [place] [facility]  [address] [facility] [id number] [facility] [place] [address]-
4469  09 Dec, 2014   

 

 [facility] [place]  [address] [facility] [address] [id number] [place] [phone]  02 Dec, 
2014   

 

 [facility] [place] [facility]  [address] [id number] [place] [address]
7703  02 Dec, 2014   

 

 [facility] [place]  [address] [facility] [address] [id number] [place] [id number]     

 

 [facility] [place] [facility]  [address] [facility] [id number] [facility] [place] [address]-
1448     

 

 [facility] [place]  [address] [facility] [address] [id number] [place] [phone]     

 

 [facility]  [address] [facility] [id number] [facility] [place] [address]-
1250     

 

 [facility] [place]  [address] [facility] [address] [id number] [place] [id number]  31 Oct, 
2014   

 

 [facility] [place] [facility]  [address] [facility] [id number] [facility] [place] [address]-
8668  31 Oct, 2014   

 

 [facility] [place]  [address] [facility] [address] [id number] [place] [phone]  16 Oct, 
2014   

 

 [facility] [place] [facility]  [address] [id number] [place] [address]-
8547  16 Oct, 2014   

 

 [facility] [place]  [address] [facility] [address] [id number] [place] [id number]  10 Oct, 
2014   

 

 [facility] [place] [facility]  [address] [id number] [facility] [place] [address]-
2290  10 Oct, 2014   

 

 [facility] [place]  [address] [facility] [address] [id number] [place] [id number]  26 Sep, 
2014   

 

 [facility] [place] [facility]  [address] [id number] [place] [address]-
59[address]  26 Sep, 2014   

 

 [facility] [place]  [address] [id number] [place] [id number]  29 Aug, 
2014   

 

 [facility] [place] [facility]  [address] [facility] [id number] [facility] [place] [address]-
6224  29 Aug, 2014   

 

 [facility] [place]  [address] [id number] [place] [phone]  25 Aug, 
2014   

 

 [facility] [place]  [address] [id number] [place] [id number]  25 Aug, 
2014   

 

 [facility] [place] [facility]  [address] [id number] [place] [address]-
2005  25 Aug, 2014   

 

 [facility] [place] [facility]  [address] [facility] [id number] [place] [address]-
1070  25 Aug, 2014   

 

 [facility] [place]  [address] [facility] [address] [id number] [place] [id number]  18 Aug, 
2014   

 

 [facility] [place] [facility]  [address] [id number] [place] [address]-
4404  18 Aug, 2014   

 

 [facility] [place]  [address] [facility] [address] [id number] [place] [id number]  14 Aug, 
2014   

 

 [facility] [place] [facility]  [address] [facility] [id number] [place] [address]-
0231  14 Aug, 2014   

 

 [facility] [place] [facility]  [address] [facility] [id number] [place] [address]-
7115  11 Aug, 2014   

 

 [facility] [place]  [address] [facility] [address] [id number] [place] [phone]  11 Aug, 
2014   

 

 [facility] [place] [facility]  [address] [facility] [id number] [place] [address]-
9222  11 Aug, 2014   

 

 [facility] [place] [facility]  [address] [facility] [id number] [place] [address]-
6214  11 Aug, 2014   

 

 [facility] [place] [facility]  [address] [facility] [id number] [place] [address]-
7013     

 

 [facility] [place]  [address] [facility] [address] [id number] [place] [id number]     

 

 [facility]  [address] [id number] [place] [place] [address]-
[address]99     

 

 [facility] [place]  [address] [id number] [facility] [place] [address]-
2616     

 

 [facility] [place]  [address] [id number] [place] [phone]     

 

 [facility] [place] [facility]  [address] [id number] [place] [address]-
[address]76     

 

 [facility] [place]  [address] [id number] [place] [id number]  30 May, 
2014   

 

 [facility] [place] [facility]  [address] [id number] [place] [address]-
8906  30 May, 2014   

 

 [facility] [place]  [address] [id number] [place] [phone]  14 May, 
2014   

 

 [facility] [place] [facility]  [address] [id number] [place] [address]-
0307  14 May, 2014   

 

 [facility] [place]  [address] [id number] [place] [id number]  05 May, 
2014   

 

 [facility] [place] [facility]  [address] [id number] [place] [address]-
3761  05 May, 2014   

 

 [facility] [place]  [address] [id number] [place] [id number]     

 

 [facility] [place] [facility]  [address] [id number] [place] [address]-
1126     

 

 [facility] [place]  [address] [id number] [place] [phone]     

 

 [facility] [place]  [address] [id number] [facility] [place] [address]-
4346     

 

 [facility] [place]  [address] [id number] [place] [id number]  10 Feb, 
2014   

 

 [facility] [place]  [address] [facility] [id number] [place] [address]-
27[address]  10 Feb, 2014   







IMMUNIZATIONS

No Known Immunizations



SOCIAL HISTORY

Never Assessed



REASON FOR VISIT

PA for Lantus



PLAN OF CARE





VITAL SIGNS





MEDICATIONS







 Medication  Instructions  Dosage  Frequency  Start Date  End Date  Duration  
Status

 

 Lantus SoloStar 100 unit/mL (3 mL)  Subcutaneous 2 times a day  inject 50 
Units by Subcutaneous  12h  19 Mar, 2015     30 days  Active







RESULTS

No Results



PROCEDURES

No Known procedures



INSTRUCTIONS





MEDICATIONS ADMINISTERED

No Known Medications



MEDICAL (GENERAL) HISTORY







 Type  Description  Date

 

 Medical History  Diabetes mellitus without mention of complication, type II or 
unspecified type, not stated as uncontrolled   

 

 Medical History  Depressive disorder, not elsewhere classified   

 

 Medical History  Anxiety state, unspecified   

 

 Medical History  Effusion of lower leg joint   

 

 Medical History  Generalized osteoarthrosis, unspecified site   

 

 Medical History  Other and unspecified hyperlipidemia   

 

 Medical History  Abnormal weight gain   

 

 Medical History  Effusion of joint, site unspecified   

 

 Medical History  Esophageal reflux   

 

 Medical History  hypertension   

 

 Medical History  colonoscopy- inscreased polyp   

 

 Surgical History  Tonsillectomy   

 

 Surgical History  Orthopedic: Bilateral Knee x2   

 

 Surgical History  colonoscopy  2017

 

 Hospitalization History  Surgery(s)/Childbirth(s) only was admitted to a telemetry monitored unit. Cardiology was consulted by Dr. Wiliam Rodriguez for management of chest pain. Please note: past medical records were reviewed per electronic medical record (EMR) - see detailed reports under Past Medical/ Surgical History. Past Medical and Surgical History:   1. Allergies to Bactrim, penicillin, sulfa and quinine. 2. Never smoker. 3. Hypertension. 4. Dyslipidemia. 5. Stage 3 CKD. Follows with Dr. Jagdeep Meade. 6. No family history of premature CAD. 7. PAF diagnosed in her 42's.  Maintained on Rythmol. 8. [id number] score = 4. Chronic anticoagulation with Eliquis  7. GERD. 8. Restless leg syndrome. 9. Anxiety/depression. 10. Echocardiogram, 06/07/2016. Ejection fraction, 65%. Mild LVH. No chamber dilatation. No pulmonary hypertension. No hemodynamically significant valvular abnormality. Medications Prior to admit:  Prior to Admission medications    Medication Sig Start Date End Date Taking? Authorizing Provider   LORazepam (ATIVAN) 0.5 MG tablet Take 0.5 mg by mouth every 8 hours as needed for Anxiety.    Yes Historical Provider, MD   acetaminophen (TYLENOL) 325 MG tablet Take 650 mg by mouth every 6 hours as needed for Pain   Yes Historical Provider, MD   diphenhydrAMINE (BENADRYL) 25 MG tablet Take 25 mg by mouth every 8 hours as needed for Itching   Yes Historical Provider, MD   rOPINIRole (REQUIP) 1 MG tablet Take 1 mg by mouth nightly   Yes Historical Provider, MD   propafenone (RYTHMOL) 150 MG tablet TAKE 1 TABLET BY MOUTH EVERY 8 HOURS 6/24/21  Yes Jill Rahman MD   magnesium gluconate (MAGONATE) 500 MG tablet Take 500 mg by mouth 2 times daily   Yes Historical Provider, MD   b complex vitamins capsule Take 500 capsules by mouth daily   Yes Historical Provider, MD   calcium carbonate (TUMS) 500 MG chewable tablet Take 1 tablet by mouth 2 times daily    Yes Historical Provider, MD   apixaban (ELIQUIS) 5 MG TABS tablet Take 1 tablet by mouth 2 times daily 4/23/21  Yes Lacye Cano MD   rosuvastatin (CRESTOR) 10 MG tablet Take 10 mg by mouth daily   Yes Historical Provider, MD   Cholecalciferol (VITAMIN D3) 50 MCG (2000 UT) CAPS Take by mouth daily   Yes Historical Provider, MD   TURMERIC PO Take by mouth   Yes Historical Provider, MD   losartan (COZAAR) 25 MG tablet Take 25 mg by mouth daily  6/16/17  Yes Historical Provider, MD   sertraline (ZOLOFT) 100 MG tablet 150 mg  5/17/16  Yes Historical Provider, MD   omeprazole (PRILOSEC) 40 MG delayed release capsule Take 40 mg by mouth Daily  3/25/16  Yes Historical Provider, MD   traZODone (DESYREL) 100 MG tablet Take 100 mg by mouth nightly  3/18/16  Yes Historical Provider, MD       Current Medications:    Current Facility-Administered Medications: nitroGLYCERIN (NITROSTAT) SL tablet 0.4 mg, 0.4 mg, SubLINGual, Q5 Min PRN  calcium carbonate (TUMS) chewable tablet 500 mg, 1 tablet, Oral, Daily  losartan (COZAAR) tablet 25 mg, 25 mg, Oral, Daily  pantoprazole (PROTONIX) tablet 40 mg, 40 mg, Oral, QAM AC  propafenone (RYTHMOL) tablet 150 mg, 150 mg, Oral, 3 times per day  sertraline (ZOLOFT) tablet 150 mg, 150 mg, Oral, Daily  traZODone (DESYREL) tablet 100 mg, 100 mg, Oral, Nightly  triamterene-hydroCHLOROthiazide (MAXZIDE-25) 37.5-25 MG per tablet 1 tablet, 1 tablet, Oral, Daily  apixaban (ELIQUIS) tablet 5 mg, 5 mg, Oral, BID  sodium chloride flush 0.9 % injection 5-40 mL, 5-40 mL, IntraVENous, 2 times per day  sodium chloride flush 0.9 % injection 10 mL, 10 mL, IntraVENous, PRN  0.9 % sodium chloride infusion, 25 mL, IntraVENous, PRN  ondansetron (ZOFRAN-ODT) disintegrating tablet 4 mg, 4 mg, Oral, Q8H PRN **OR** ondansetron (ZOFRAN) injection 4 mg, 4 mg, IntraVENous, Q6H PRN  acetaminophen (TYLENOL) tablet 650 mg, 650 mg, Oral, Q6H PRN **OR** acetaminophen (TYLENOL) suppository 650 mg, 650 mg, Rectal, Q6H PRN  magnesium hydroxide (MILK OF MAGNESIA) 400 MG/5ML suspension 30 mL, 30 mL, Oral, Daily PRN  [START ON 11/14/2021] aspirin chewable tablet 81 mg, 81 mg, Oral, Daily  atorvastatin (LIPITOR) tablet 40 mg, 40 mg, Oral, Nightly    Allergies:  Bactrim [sulfamethoxazole-trimethoprim], Pcn [penicillins], Quinine derivatives, and Sulfa antibiotics    Social History:    Denies tobacco, alcohol, illicit drug use. Caffeine intake includes a cup of coffee daily. Family History:   Please note this information was not obtained at this time, as it is limited in nature due to the patient's advanced age. REVIEW OF SYSTEMS:     · Constitutional: Denies fevers, chills, night sweats, and fatigue  · HEENT: Denies headaches, nose bleeds, and blurred vision,oral pain, abscess or lesion. · Musculoskeletal: Denies falls, pain to BLE with ambulation and edema to BLE. · Neurological: Denies dizziness and lightheadedness, numbness and tingling  · Cardiovascular: Complains of heartburn/chest pain-see HPI. denies palpitations, and feelings of heart racing. · Respiratory: Denies orthopnea and PND  · Gastrointestinal: Denies nausea/vomiting, diarrhea and constipation, black/bloody, and tarry stools. Complains of heartburn-see HPI  · Genitourinary: Denies dysuria and hematuria  · Hematologic: Denies excessive bruising or bleeding  · Lymphatic: Denies lumps and bumps to neck, axilla, breast, and groin  · Endocrine: Denies excessive thirst. Denies intolerance to hot and cold  · GYN: Postmenopausal state; Denies vaginal bleeding. · Psychiatric: Denies anxiety and depression. PHYSICAL EXAM:   BP (!) 147/87   Pulse 75   Temp 97.4 °F (36.3 °C) (Temporal)   Resp 18   Ht 5' 4\" (1.626 m)   Wt 169 lb (76.7 kg)   SpO2 97%   BMI 29.01 kg/m²   CONST:  Well developed, well nourished elderly  female who appears stated age.  Awake, alert, cooperative, no apparent distress  HEENT:   Head- Normocephalic, atraumatic   Eyes- Conjunctivae pink, anicteric  Throat- Oral mucosa pink and moist  Neck-  No stridor, trachea midline, no jugular venous distention. No adenopathy   CHEST: Chest symmetrical and non-tender to palpation. No accessory muscle use or intercostal retractions  RESPIRATORY: Lung sounds - clear throughout fields   CARDIOVASCULAR:     No carotid bruit  Heart Inspection- shows no noted pulsations  Heart Palpation- no heaves or thrills; PMI is non-displaced   Heart Ausculation- Regular rate and rhythm, no murmur. No s3, s4 or rub   PV: No lower extremity edema. No varicosities. Pedal pulses palpable, no clubbing or cyanosis   ABDOMEN: Soft, non-tender to light palpation. Bowel sounds present. No palpable masses no organomegaly; no abdominal bruit  MS: Good muscle strength and tone. No atrophy or abnormal movements. : Deferred  SKIN: Warm and dry no statis dermatitis or ulcers   NEURO / PSYCH: Oriented to person, place and time. Speech clear and appropriate. Follows all commands. Pleasant affect     DATA:    ECG: As above  Tele strips: SR  Diagnostic:  Labs:   CBC:   Recent Labs     11/12/21  1528   WBC 6.5   HGB 12.8   HCT 38.0        BMP:   Recent Labs     11/12/21  1528      K 4.6   CO2 24   BUN 23   CREATININE 1.5*   LABGLOM 34   CALCIUM 10.2   LIVER PROFILE:  Recent Labs     11/12/21  1528   AST 30   ALT 19   LABALBU 4.3     Results for Frank Grimm (MRN [id number]) as of 11/13/2021 14:50   Ref. Range 11/12/2021 15:28 11/12/2021 21:49 11/13/2021 10:25 11/13/2021 11:29   Troponin, High Sensitivity Latest Ref Range: 0 - 9 ng/L 61 (H) 41 (H) 21 (H) 21 (H)     11/24/2021 CXR:  No acute cardiopulmonary process. IMPRESSION and PLAN to follow per Dr. Golden Valverde    Electronically signed by LISA Pratt CNP on 11/13/2021 at 12:27 PM      I have personally seen and evaluated the patient. I personally obtained the history and performed the physical exam.  I personally reviewed all of the above labs, history, review of systems, and data. All of the assessments and recommendations are from me.   All of the above cardiac medical decisions are from me. Please see my additional contributions to the history, physical exam, assessment, and recommendations below. History of chief complaint:  She complains of indigestion and burning in her midsternal area all week. This is not associate with any physical activities. It is not changed with any physical activities. She states that Tums would resolve her symptoms for approximately 10 to 15 minutes but then the symptoms would recur. Yesterday she then had nausea and vomiting which then resolved her symptoms. However, after that she then developed bilateral arm, neck, and back discomfort. This persisted for 3 hours. She is asymptomatic today. Review of systems:     Heart: as above   Lungs: as above   Eyes: denies changes in vision or discharge. Ears: denies changes in hearing or pain. Nose: denies epistaxis or masses   Throat: denies sore throat or trouble swallowing. Neuro: denies numbness, tingling, tremors. Skin: denies rashes or itching. : denies hematuria, dysuria   GI: denies vomiting, diarrhea   Psych: denies mood changed, anxiety, depression. Physical exam:  BP (!) 147/87   Pulse 75   Temp 97.4 °F (36.3 °C) (Temporal)   Resp 18   Ht 5' 4\" (1.626 m)   Wt 169 lb (76.7 kg)   SpO2 97%   BMI 29.01 kg/m²   Constitutional: A&O x3, communicates well, no acute distress. Eyes: extraocular muscles intact, PERRL. Normal lids & conjunctiva. No icterus. ENT: clear, no bleeding. No external masses. Lips normal formation. Neck: supple, full ROM, no JVD, no bruits, no lymphadenopathy. No masses. trachea midline. Heart: regular rate & rhythm, normal S1 & S2, I/VI (normal physiologic) systolic murmur, S4 gallop. No heave. Lungs: CTA. No accessory muscles. Abd: soft, non-tender. Normal bowel sounds. Neuro: Full ROM X 4, EOMI, no tremors. EXT: No bilateral lower extremity edema  Skin: warm, dry, intact. Good turgor.   Psych: A&O x 3, normal behavior, not anxious. Patient seen and examined. Chart, labs & data reviewed. A:  1. Atypical chest discomfort as described above. Relieved with Tums and vomiting. 2. Chronic renal insufficiency. 3. Borderline troponins in the setting of chronic renal insufficiency. 4. Paroxysmal atrial fibrillation. 5. Hypertension. 6. GERD. 7. Hypercholesterolemia. Rec:  1. Lexiscan Cardiolite stress test.  2. Okay for discharge with cardiology if the stress test is negative. Electronically signed by Ana Barillas DO on 11/13/2021 at 5:43 PM    Note: This report was completed using computerized voice recognition software. Every effort has been made to ensure accuracy, however; and invert and computerized transcription errors may be present.